# Patient Record
Sex: FEMALE | Race: OTHER | Employment: OTHER | URBAN - METROPOLITAN AREA
[De-identification: names, ages, dates, MRNs, and addresses within clinical notes are randomized per-mention and may not be internally consistent; named-entity substitution may affect disease eponyms.]

---

## 2017-01-02 ENCOUNTER — APPOINTMENT (OUTPATIENT)
Dept: CARDIAC REHAB | Facility: HOSPITAL | Age: 77
End: 2017-01-02
Attending: INTERNAL MEDICINE
Payer: MEDICARE

## 2017-01-04 ENCOUNTER — CARDPULM VISIT (OUTPATIENT)
Dept: CARDIAC REHAB | Facility: HOSPITAL | Age: 77
End: 2017-01-04
Attending: INTERNAL MEDICINE
Payer: MEDICARE

## 2017-01-04 ENCOUNTER — TELEPHONE (OUTPATIENT)
Dept: SURGERY | Facility: CLINIC | Age: 77
End: 2017-01-04

## 2017-01-04 PROCEDURE — 93798 PHYS/QHP OP CAR RHAB W/ECG: CPT

## 2017-01-04 NOTE — TELEPHONE ENCOUNTER
----- Message from Antonio Wood MD sent at 12/27/2016  8:26 AM CST -----  Call pt. Inform her that CT scan shows no abnormal findings in the kidneys or ureters or bladder. There are some incidental findings in the bile ducts that show some distension.

## 2017-01-04 NOTE — TELEPHONE ENCOUNTER
Phoned pt and spoke with daughter, Nba Wheeler. (after verifying in fyi, ok do so) Read to her 's note as outlined below. She verbalized understanding and agrees to plan. Copy of c.t. Placed in outgoing mail. Mimi agrees to keep appt for upcoming cysto.

## 2017-01-06 ENCOUNTER — CARDPULM VISIT (OUTPATIENT)
Dept: CARDIAC REHAB | Facility: HOSPITAL | Age: 77
End: 2017-01-06
Attending: INTERNAL MEDICINE
Payer: MEDICARE

## 2017-01-06 PROCEDURE — 93798 PHYS/QHP OP CAR RHAB W/ECG: CPT

## 2017-01-09 ENCOUNTER — CARDPULM VISIT (OUTPATIENT)
Dept: CARDIAC REHAB | Facility: HOSPITAL | Age: 77
End: 2017-01-09
Attending: INTERNAL MEDICINE
Payer: MEDICARE

## 2017-01-09 PROCEDURE — 93798 PHYS/QHP OP CAR RHAB W/ECG: CPT

## 2017-01-11 ENCOUNTER — CARDPULM VISIT (OUTPATIENT)
Dept: CARDIAC REHAB | Facility: HOSPITAL | Age: 77
End: 2017-01-11
Attending: INTERNAL MEDICINE
Payer: MEDICARE

## 2017-01-11 PROCEDURE — 93798 PHYS/QHP OP CAR RHAB W/ECG: CPT

## 2017-01-13 ENCOUNTER — CARDPULM VISIT (OUTPATIENT)
Dept: CARDIAC REHAB | Facility: HOSPITAL | Age: 77
End: 2017-01-13
Attending: INTERNAL MEDICINE
Payer: MEDICARE

## 2017-01-13 PROCEDURE — 93798 PHYS/QHP OP CAR RHAB W/ECG: CPT

## 2017-01-16 ENCOUNTER — CARDPULM VISIT (OUTPATIENT)
Dept: CARDIAC REHAB | Facility: HOSPITAL | Age: 77
End: 2017-01-16
Attending: INTERNAL MEDICINE
Payer: MEDICARE

## 2017-01-16 PROCEDURE — 93798 PHYS/QHP OP CAR RHAB W/ECG: CPT

## 2017-01-18 ENCOUNTER — CARDPULM VISIT (OUTPATIENT)
Dept: CARDIAC REHAB | Facility: HOSPITAL | Age: 77
End: 2017-01-18
Attending: INTERNAL MEDICINE
Payer: MEDICARE

## 2017-01-18 PROCEDURE — 93798 PHYS/QHP OP CAR RHAB W/ECG: CPT

## 2017-01-19 RX ORDER — CITALOPRAM 10 MG/1
TABLET ORAL
Qty: 30 TABLET | Refills: 2 | Status: SHIPPED | OUTPATIENT
Start: 2017-01-19 | End: 2017-01-20

## 2017-01-19 NOTE — TELEPHONE ENCOUNTER
Refill Protocol Appointment Criteria  · Appointment scheduled in the past 6 months or in the next 3 months  Recent Visits       Provider Department Primary Dx    1 month ago [address] [facility] [address], [address] [facility] Hematuria    3 teddy

## 2017-01-20 ENCOUNTER — OFFICE VISIT (OUTPATIENT)
Dept: SURGERY | Facility: CLINIC | Age: 77
End: 2017-01-20

## 2017-01-20 VITALS
HEIGHT: 57 IN | DIASTOLIC BLOOD PRESSURE: 60 MMHG | BODY MASS INDEX: 25.46 KG/M2 | WEIGHT: 118 LBS | SYSTOLIC BLOOD PRESSURE: 128 MMHG | HEART RATE: 66 BPM | RESPIRATION RATE: 16 BRPM | TEMPERATURE: 98 F

## 2017-01-20 DIAGNOSIS — R31.0 GROSS HEMATURIA: Primary | ICD-10-CM

## 2017-01-20 PROCEDURE — 52000 CYSTOURETHROSCOPY: CPT | Performed by: UROLOGY

## 2017-01-20 PROCEDURE — 99213 OFFICE O/P EST LOW 20 MIN: CPT | Performed by: UROLOGY

## 2017-01-20 NOTE — PROGRESS NOTES
Frank Calixto is a 77year old female. HPI:   Patient presents with:  Hematuria: here for cystoscopy      78-year-old female presents for office cystoscopy in follow-up to a previous visit December 6, 2016.   The patient had intermittent gross painless ELECTROCARDIOGRAM, COMPLETE  09-    Comment Scanned to Media Tab - Date of Service 09-    ANESTH,ANGIOPLASTY        Family History   Problem Relation Age of Onset   • Diabetes Father    • Other[other] Olive Martínez Father 58   • Diabetes Mother 59 Chloride ER 10 MG Oral Tablet 24 Hr Take 1 tablet (10 mg total) by mouth daily. Disp: 90 tablet Rfl: 3   aspirin 325 MG Oral Tab Take 325 mg by mouth once daily. Disp:  Rfl:    Milk Thistle Extract 175 MG Oral Tab Take 1 tablet by mouth once daily.  Disp:

## 2017-01-23 ENCOUNTER — CARDPULM VISIT (OUTPATIENT)
Dept: CARDIAC REHAB | Facility: HOSPITAL | Age: 77
End: 2017-01-23
Attending: INTERNAL MEDICINE
Payer: MEDICARE

## 2017-01-23 PROCEDURE — 93798 PHYS/QHP OP CAR RHAB W/ECG: CPT

## 2017-01-24 ENCOUNTER — OFFICE VISIT (OUTPATIENT)
Dept: CARDIOLOGY CLINIC | Facility: CLINIC | Age: 77
End: 2017-01-24

## 2017-01-24 VITALS
HEIGHT: 57 IN | HEART RATE: 62 BPM | SYSTOLIC BLOOD PRESSURE: 110 MMHG | WEIGHT: 118 LBS | BODY MASS INDEX: 25.46 KG/M2 | DIASTOLIC BLOOD PRESSURE: 58 MMHG

## 2017-01-24 DIAGNOSIS — I25.10 CORONARY ARTERY DISEASE INVOLVING NATIVE CORONARY ARTERY OF NATIVE HEART WITHOUT ANGINA PECTORIS: Primary | ICD-10-CM

## 2017-01-24 DIAGNOSIS — I10 ESSENTIAL HYPERTENSION: ICD-10-CM

## 2017-01-24 PROCEDURE — 99214 OFFICE O/P EST MOD 30 MIN: CPT | Performed by: INTERNAL MEDICINE

## 2017-01-24 PROCEDURE — G0463 HOSPITAL OUTPT CLINIC VISIT: HCPCS | Performed by: INTERNAL MEDICINE

## 2017-01-24 NOTE — PROGRESS NOTES
Fly Estevez is a 77year old female. Patient presents with:  CAD: follow up    HPI:   This is a pleasant 21-year-old with hypertension coronary disease with LAD stent in the past who presents for follow-up. She is presently feeling well.   She has had o mouth daily. Disp: 90 tablet Rfl: 3   aspirin 325 MG Oral Tab Take 325 mg by mouth once daily. Disp:  Rfl:    Milk Thistle Extract 175 MG Oral Tab Take 1 tablet by mouth once daily.  Disp:  Rfl:    B Complex Vitamins (VITAMIN B COMPLEX) Oral Tab Take 1 tabl S1,S2,no murmur  GI: good BS's,no masses, HSM or tenderness  EXTREMITIES: no cyanosis, clubbing or edema    Assessment  ASSESSMENT AND PLAN:     Problem List Items Addressed This Visit     HTN (hypertension)    CAD (coronary artery disease) - Primary

## 2017-01-25 ENCOUNTER — CARDPULM VISIT (OUTPATIENT)
Dept: CARDIAC REHAB | Facility: HOSPITAL | Age: 77
End: 2017-01-25
Attending: INTERNAL MEDICINE
Payer: MEDICARE

## 2017-01-25 PROCEDURE — 93798 PHYS/QHP OP CAR RHAB W/ECG: CPT

## 2017-01-27 ENCOUNTER — CARDPULM VISIT (OUTPATIENT)
Dept: CARDIAC REHAB | Facility: HOSPITAL | Age: 77
End: 2017-01-27
Attending: INTERNAL MEDICINE
Payer: MEDICARE

## 2017-01-27 PROCEDURE — 93798 PHYS/QHP OP CAR RHAB W/ECG: CPT

## 2017-01-30 ENCOUNTER — APPOINTMENT (OUTPATIENT)
Dept: CARDIAC REHAB | Facility: HOSPITAL | Age: 77
End: 2017-01-30
Attending: INTERNAL MEDICINE
Payer: MEDICARE

## 2017-02-01 ENCOUNTER — APPOINTMENT (OUTPATIENT)
Dept: CARDIAC REHAB | Facility: HOSPITAL | Age: 77
End: 2017-02-01
Attending: INTERNAL MEDICINE
Payer: MEDICARE

## 2017-02-02 ENCOUNTER — TELEPHONE (OUTPATIENT)
Dept: FAMILY MEDICINE CLINIC | Facility: CLINIC | Age: 77
End: 2017-02-02

## 2017-02-02 DIAGNOSIS — I25.10 CORONARY ARTERY DISEASE INVOLVING NATIVE HEART WITHOUT ANGINA PECTORIS, UNSPECIFIED VESSEL OR LESION TYPE: Primary | ICD-10-CM

## 2017-02-02 NOTE — TELEPHONE ENCOUNTER
Tonja Horner from cardiac rehab calling and requesting to have patients referral for cardiac rehab back dated to 10/17/16 for (36) visits.        Fax to [phone]

## 2017-02-03 ENCOUNTER — APPOINTMENT (OUTPATIENT)
Dept: CARDIAC REHAB | Facility: HOSPITAL | Age: 77
End: 2017-02-03
Attending: INTERNAL MEDICINE
Payer: MEDICARE

## 2017-02-07 NOTE — TELEPHONE ENCOUNTER
Anupam Ventura from cardiac rehab called again regarding status of the previous message. States she needs for referral to be retro.

## 2017-03-02 ENCOUNTER — TELEPHONE (OUTPATIENT)
Dept: OPHTHALMOLOGY | Facility: CLINIC | Age: 77
End: 2017-03-02

## 2017-03-02 NOTE — TELEPHONE ENCOUNTER
Patient has an appointment scheduled with Dr. Chandu Ramos on 3/21/17 and they need a referral for the appointment. Please do not hesitate to call me if you have any questions about this. Thank you, Dalton Travis at Dr. Marrufo Monday office [phone].

## 2017-03-06 ENCOUNTER — LAB ENCOUNTER (OUTPATIENT)
Dept: LAB | Age: 77
End: 2017-03-06
Attending: FAMILY MEDICINE
Payer: MEDICARE

## 2017-03-06 DIAGNOSIS — N18.30 CKD (CHRONIC KIDNEY DISEASE) STAGE 3, GFR 30-59 ML/MIN (HCC): ICD-10-CM

## 2017-03-06 DIAGNOSIS — R31.9 HEMATURIA: ICD-10-CM

## 2017-03-06 DIAGNOSIS — E11.9 TYPE 2 DIABETES MELLITUS WITHOUT COMPLICATION, WITHOUT LONG-TERM CURRENT USE OF INSULIN (HCC): ICD-10-CM

## 2017-03-06 LAB
ALBUMIN SERPL BCP-MCNC: 3.7 G/DL (ref 3.5–4.8)
ALBUMIN/GLOB SERPL: 1.5 {RATIO} (ref 1–2)
ALP SERPL-CCNC: 46 U/L (ref 32–100)
ALT SERPL-CCNC: 29 U/L (ref 14–54)
ANION GAP SERPL CALC-SCNC: 9 MMOL/L (ref 0–18)
AST SERPL-CCNC: 31 U/L (ref 15–41)
BASOPHILS # BLD: 0 K/UL (ref 0–0.2)
BASOPHILS NFR BLD: 1 %
BILIRUB SERPL-MCNC: 0.5 MG/DL (ref 0.3–1.2)
BUN SERPL-MCNC: 13 MG/DL (ref 8–20)
BUN/CREAT SERPL: 15.1 (ref 10–20)
CALCIUM SERPL-MCNC: 9 MG/DL (ref 8.5–10.5)
CHLORIDE SERPL-SCNC: 102 MMOL/L (ref 95–110)
CHOLEST SERPL-MCNC: 153 MG/DL (ref 110–200)
CO2 SERPL-SCNC: 26 MMOL/L (ref 22–32)
CREAT SERPL-MCNC: 0.86 MG/DL (ref 0.5–1.5)
CREAT UR-MCNC: 32.7 MG/DL
EOSINOPHIL # BLD: 0.1 K/UL (ref 0–0.7)
EOSINOPHIL NFR BLD: 3 %
ERYTHROCYTE [DISTWIDTH] IN BLOOD BY AUTOMATED COUNT: 14.1 % (ref 11–15)
GLOBULIN PLAS-MCNC: 2.5 G/DL (ref 2.5–3.7)
GLUCOSE SERPL-MCNC: 114 MG/DL (ref 70–99)
HBA1C MFR BLD: 4.5 % (ref 4–6)
HCT VFR BLD AUTO: 27.2 % (ref 35–48)
HDLC SERPL-MCNC: 47 MG/DL
HGB BLD-MCNC: 9.1 G/DL (ref 12–16)
LDLC SERPL CALC-MCNC: 81 MG/DL (ref 0–99)
LYMPHOCYTES # BLD: 1.3 K/UL (ref 1–4)
LYMPHOCYTES NFR BLD: 30 %
MCH RBC QN AUTO: 33.4 PG (ref 27–32)
MCHC RBC AUTO-ENTMCNC: 33.3 G/DL (ref 32–37)
MCV RBC AUTO: 100.2 FL (ref 80–100)
MICROALBUMIN UR-MCNC: 0 MG/DL (ref 0–1.8)
MICROALBUMIN/CREAT UR: 0 MG/G{CREAT} (ref 0–20)
MONOCYTES # BLD: 0.3 K/UL (ref 0–1)
MONOCYTES NFR BLD: 8 %
NEUTROPHILS # BLD AUTO: 2.5 K/UL (ref 1.8–7.7)
NEUTROPHILS NFR BLD: 58 %
NONHDLC SERPL-MCNC: 106 MG/DL
OSMOLALITY UR CALC.SUM OF ELEC: 285 MOSM/KG (ref 275–295)
PLATELET # BLD AUTO: 247 K/UL (ref 140–400)
PMV BLD AUTO: 7.7 FL (ref 7.4–10.3)
POTASSIUM SERPL-SCNC: 4.1 MMOL/L (ref 3.3–5.1)
PROT SERPL-MCNC: 6.2 G/DL (ref 5.9–8.4)
RBC # BLD AUTO: 2.71 M/UL (ref 3.7–5.4)
SODIUM SERPL-SCNC: 137 MMOL/L (ref 136–144)
TRIGL SERPL-MCNC: 127 MG/DL (ref 1–149)
WBC # BLD AUTO: 4.2 K/UL (ref 4–11)

## 2017-03-06 PROCEDURE — 82570 ASSAY OF URINE CREATININE: CPT

## 2017-03-06 PROCEDURE — 85025 COMPLETE CBC W/AUTO DIFF WBC: CPT

## 2017-03-06 PROCEDURE — 36415 COLL VENOUS BLD VENIPUNCTURE: CPT

## 2017-03-06 PROCEDURE — 80061 LIPID PANEL: CPT

## 2017-03-06 PROCEDURE — 82043 UR ALBUMIN QUANTITATIVE: CPT

## 2017-03-06 PROCEDURE — 80053 COMPREHEN METABOLIC PANEL: CPT

## 2017-03-06 PROCEDURE — 83036 HEMOGLOBIN GLYCOSYLATED A1C: CPT

## 2017-03-13 ENCOUNTER — OFFICE VISIT (OUTPATIENT)
Dept: FAMILY MEDICINE CLINIC | Facility: CLINIC | Age: 77
End: 2017-03-13

## 2017-03-13 VITALS
HEART RATE: 62 BPM | WEIGHT: 120 LBS | SYSTOLIC BLOOD PRESSURE: 111 MMHG | BODY MASS INDEX: 26 KG/M2 | TEMPERATURE: 98 F | DIASTOLIC BLOOD PRESSURE: 62 MMHG

## 2017-03-13 DIAGNOSIS — E11.9 TYPE 2 DIABETES MELLITUS WITHOUT COMPLICATION, WITHOUT LONG-TERM CURRENT USE OF INSULIN (HCC): ICD-10-CM

## 2017-03-13 DIAGNOSIS — F41.9 ANXIETY: ICD-10-CM

## 2017-03-13 DIAGNOSIS — I25.10 CORONARY ARTERY DISEASE INVOLVING NATIVE HEART WITHOUT ANGINA PECTORIS, UNSPECIFIED VESSEL OR LESION TYPE: Primary | ICD-10-CM

## 2017-03-13 DIAGNOSIS — D64.9 ANEMIA, UNSPECIFIED TYPE: ICD-10-CM

## 2017-03-13 PROCEDURE — 99214 OFFICE O/P EST MOD 30 MIN: CPT | Performed by: FAMILY MEDICINE

## 2017-03-13 PROCEDURE — G0463 HOSPITAL OUTPT CLINIC VISIT: HCPCS | Performed by: FAMILY MEDICINE

## 2017-03-13 RX ORDER — LOSARTAN POTASSIUM AND HYDROCHLOROTHIAZIDE 12.5; 1 MG/1; MG/1
1 TABLET ORAL DAILY
Qty: 90 TABLET | Refills: 3 | Status: SHIPPED | OUTPATIENT
Start: 2017-03-13 | End: 2017-10-20

## 2017-03-13 NOTE — PROGRESS NOTES
Numbers much better. No problems with he r  Paulette. Has anemia worse. On aspirin and plavix. No sob. Has some swelling of ankles. Has been much more active. Its started in cardiac rehab. No still walking.         Patient's past medical surgical fami or lesion type  Stable. 2. Type 2 diabetes mellitus without complication, without long-term current use of insulin (HCC)  Stable monitor. Improved with exercise    3. Anxiety  Stable.     4. Anemia, unspecified type  Referral.  - Oncology/Hematology Ref

## 2017-03-17 ENCOUNTER — TELEPHONE (OUTPATIENT)
Dept: INTERNAL MEDICINE CLINIC | Facility: CLINIC | Age: 77
End: 2017-03-17

## 2017-03-17 NOTE — TELEPHONE ENCOUNTER
Pt is eligible for a Medicare Annual Health Assessment anytime during the next 12 months. Left message to call back.

## 2017-03-17 NOTE — TELEPHONE ENCOUNTER
Daughter called back. Declined appointment currently.  Patient was just seen by Dr. Olman Chambers on 03/13/2016 and was given referral for opthalmology appointment, was advised by MD not to do Mammogram until patient consults with oncologist. Daughter states she

## 2017-03-21 ENCOUNTER — TELEPHONE (OUTPATIENT)
Dept: FAMILY MEDICINE CLINIC | Facility: CLINIC | Age: 77
End: 2017-03-21

## 2017-03-21 DIAGNOSIS — R14.0 BLOATING: Primary | ICD-10-CM

## 2017-03-21 RX ORDER — CITALOPRAM 10 MG/1
TABLET ORAL
Qty: 90 TABLET | Refills: 2 | Status: SHIPPED | OUTPATIENT
Start: 2017-03-21 | End: 2017-12-30

## 2017-03-21 NOTE — TELEPHONE ENCOUNTER
Pt will need a 90 day script send to the mail pharmacy.      Current Outpatient Prescriptions:  CITALOPRAM HYDROBROMIDE 10 MG Oral Tab TAKE 1 TABLET(10 MG) BY MOUTH DAILY Disp: 90 tablet Rfl: 3

## 2017-03-21 NOTE — TELEPHONE ENCOUNTER
Chart reviewed, last filled 12/16/16 for 90 days 3 refills.  erx with 2 refills sent to [facility] mail order

## 2017-03-27 RX ORDER — ESOMEPRAZOLE MAGNESIUM 40 MG/1
40 CAPSULE, DELAYED RELEASE ORAL
Qty: 90 CAPSULE | Refills: 3 | Status: SHIPPED | OUTPATIENT
Start: 2017-03-27 | End: 2018-03-06

## 2017-03-30 ENCOUNTER — OFFICE VISIT (OUTPATIENT)
Dept: OPHTHALMOLOGY | Facility: CLINIC | Age: 77
End: 2017-03-30

## 2017-03-30 DIAGNOSIS — H25.13 AGE-RELATED NUCLEAR CATARACT OF BOTH EYES: ICD-10-CM

## 2017-03-30 DIAGNOSIS — E11.9 DIABETES MELLITUS TYPE 2 WITHOUT RETINOPATHY (HCC): Primary | ICD-10-CM

## 2017-03-30 DIAGNOSIS — T15.01XA FOREIGN BODY IN CORNEA, RIGHT, INITIAL ENCOUNTER: ICD-10-CM

## 2017-03-30 PROBLEM — T15.00XA FOREIGN BODY IN CORNEA: Status: ACTIVE | Noted: 2017-03-30

## 2017-03-30 PROCEDURE — 99214 OFFICE O/P EST MOD 30 MIN: CPT | Performed by: OPHTHALMOLOGY

## 2017-03-30 PROCEDURE — G0463 HOSPITAL OUTPT CLINIC VISIT: HCPCS | Performed by: OPHTHALMOLOGY

## 2017-03-30 PROCEDURE — 92015 DETERMINE REFRACTIVE STATE: CPT | Performed by: OPHTHALMOLOGY

## 2017-03-30 NOTE — PROGRESS NOTES
Deya Christiansen is a 69year old female.     HPI:     HPI     Consult    Additional comments: Referred by Glendy Murillo MD           Diabetic Eye Exam    Additional comments: Pt has been a diabetic for 8 years  0 years on pills/  0 years on Insulin   Pt degeneration Neg    • Other[other] [OTHER] Brother 59       Social History:   Smoking Status: Never Smoker                      Smokeless Status: Never Used                        Alcohol Use: No                Medications:    Current Outpatient Prescripti mouth once daily.  Disp:  Rfl:        Allergies:    Zocor [Simvastatin]     Swelling    ROS:     ROS     Negative for: Constitutional, Gastrointestinal, Neurological, Skin, Genitourinary, Musculoskeletal, HENT, Endocrine, Cardiovascular, Eyes, Respiratory, +0.50 +1.25 100 +2.75   Left +0.75 +0.50 095 +2.75       Type:  Progressive bifocal                 ASSESSMENT/PLAN:     Diagnoses and Plan:     Diabetes mellitus type 2 without retinopathy ([facility]  Diet controlled diabetes: no background of retinopathy, no

## 2017-03-30 NOTE — ASSESSMENT & PLAN NOTE
Diet controlled diabetes: no background of retinopathy, no signs of neovascularization noted. Discussed ocular and systemic benefits of blood sugar control. Diagnosis and treatment discussed in detail with patient.

## 2017-03-30 NOTE — PATIENT INSTRUCTIONS
Diabetes mellitus type 2 without retinopathy ([facility]  Diet controlled diabetes: no background of retinopathy, no signs of neovascularization noted. Discussed ocular and systemic benefits of blood sugar control.   Diagnosis and treatment discussed in detail w

## 2017-04-24 ENCOUNTER — TELEPHONE (OUTPATIENT)
Dept: FAMILY MEDICINE CLINIC | Facility: CLINIC | Age: 77
End: 2017-04-24

## 2017-04-24 NOTE — TELEPHONE ENCOUNTER
Pt's daughter called for pt. stts pt has an UTI but is [place]. Daughter requested pt to be seen Friday with only [facility] but no open appointments. Daughter asking for a call back from a nurse. Please advise.

## 2017-04-24 NOTE — TELEPHONE ENCOUNTER
Spoke to daughter; again reiterated to reinforce to patient to go to [place] in [facility] Should not wait until Friday. Daughter, Stiven Marquestz will speak to mother.

## 2017-04-24 NOTE — TELEPHONE ENCOUNTER
DR Melara 16: please advise if you can add on patient to Friday schedule. (SDS slot)    Spoke to daughter; patient currently in [facility] now and will return Thursday.   Has minimal pain and discomfort with urination; per daughter communication with patient, her sym

## 2017-05-02 ENCOUNTER — LAB ENCOUNTER (OUTPATIENT)
Dept: LAB | Facility: HOSPITAL | Age: 77
End: 2017-05-02
Attending: NURSE PRACTITIONER
Payer: MEDICARE

## 2017-05-02 ENCOUNTER — OFFICE VISIT (OUTPATIENT)
Dept: HEMATOLOGY/ONCOLOGY | Facility: HOSPITAL | Age: 77
End: 2017-05-02
Attending: INTERNAL MEDICINE
Payer: MEDICARE

## 2017-05-02 VITALS
DIASTOLIC BLOOD PRESSURE: 46 MMHG | HEART RATE: 64 BPM | WEIGHT: 119 LBS | SYSTOLIC BLOOD PRESSURE: 117 MMHG | RESPIRATION RATE: 18 BRPM | BODY MASS INDEX: 25.67 KG/M2 | HEIGHT: 57 IN | TEMPERATURE: 98 F

## 2017-05-02 DIAGNOSIS — N18.9 ANEMIA IN CKD (CHRONIC KIDNEY DISEASE): ICD-10-CM

## 2017-05-02 DIAGNOSIS — D63.1 ANEMIA IN CKD (CHRONIC KIDNEY DISEASE): ICD-10-CM

## 2017-05-02 DIAGNOSIS — D50.8 OTHER IRON DEFICIENCY ANEMIA: ICD-10-CM

## 2017-05-02 DIAGNOSIS — D50.8 OTHER IRON DEFICIENCY ANEMIA: Primary | ICD-10-CM

## 2017-05-02 PROCEDURE — 82728 ASSAY OF FERRITIN: CPT

## 2017-05-02 PROCEDURE — 83540 ASSAY OF IRON: CPT

## 2017-05-02 PROCEDURE — G0463 HOSPITAL OUTPT CLINIC VISIT: HCPCS | Performed by: NURSE PRACTITIONER

## 2017-05-02 PROCEDURE — 36415 COLL VENOUS BLD VENIPUNCTURE: CPT

## 2017-05-02 PROCEDURE — 85025 COMPLETE CBC W/AUTO DIFF WBC: CPT

## 2017-05-02 PROCEDURE — 84466 ASSAY OF TRANSFERRIN: CPT

## 2017-05-02 PROCEDURE — 99214 OFFICE O/P EST MOD 30 MIN: CPT | Performed by: NURSE PRACTITIONER

## 2017-05-02 NOTE — PROGRESS NOTES
HPI       Jefry Bullard is a 69year old female who is here today for f/u of Other iron deficiency anemia  (primary encounter diagnosis)     She states still taking the iron daily.  States that if has GI issues such as diarrhea or constipation she holds Negative for adenopathy. Does not bruise/bleed easily. Psychiatric/Behavioral: Negative for sleep disturbance. The patient is not nervous/anxious.          Denies depression           Current Outpatient Prescriptions:  Esomeprazole Magnesium 40 MG Oral Ca Allergies:     Zocor [Simvastatin]     Swelling    Past Medical History   Diagnosis Date   • Diabetes [facility])      Management:  Medication   • Unspecified essential hypertension      medical management   • Other and unspecified hyperlipidemia      medica Readings from Last 6 Encounters:  05/02/17 : 53.978 kg (119 lb)  03/13/17 : 54.432 kg (120 lb)  01/24/17 : 53.524 kg (118 lb)  01/20/17 : 53.524 kg (118 lb)  12/06/16 : 53.524 kg (118 lb)  11/28/16 : 52.164 kg (115 lb)      Physical Exam   Constitutional: erythema. Psychiatric: She has a normal mood and affect. Judgment and thought content normal.   Vitals reviewed.           ASSESSMENT/PLAN:   Other iron deficiency anemia  (primary encounter diagnosis)    Improved on iron replacement therapy with resolved Absolute      1.8-7.7 K/UL 2.5   Lymphocytes Absolute      1.0-4.0 K/UL 1.3   Monocytes Absolute      0.0-1.0 K/UL 0.3   Eosinophils Absolute      0.0-0.7 K/UL 0.1   Basophils Absolute      0.0-0.2 K/UL 0.0   HDL Cholesterol       47   CHOLESTEROL, TOTAL

## 2017-05-03 ENCOUNTER — TELEPHONE (OUTPATIENT)
Dept: HEMATOLOGY/ONCOLOGY | Facility: HOSPITAL | Age: 77
End: 2017-05-03

## 2017-05-03 NOTE — TELEPHONE ENCOUNTER
Spoke with patients daughter and notified per Pari EDUARDO request that iron test results were low. Instructed that pt should increase to 3 iron tabs a day as done previously. Daughter verbalizes understanding and will notify pt.  Instructed for pt to

## 2017-05-15 ENCOUNTER — TELEPHONE (OUTPATIENT)
Dept: INTERNAL MEDICINE CLINIC | Facility: CLINIC | Age: 77
End: 2017-05-15

## 2017-05-15 NOTE — TELEPHONE ENCOUNTER
Pt is eligible for a Medicare Annual Health Assessment anytime during the next 12 months. Left message to call back [phone].

## 2017-05-25 RX ORDER — MONTELUKAST SODIUM 10 MG/1
TABLET ORAL
Qty: 90 TABLET | Refills: 11 | Status: SHIPPED | OUTPATIENT
Start: 2017-05-25 | End: 2018-05-26

## 2017-06-09 ENCOUNTER — TELEPHONE (OUTPATIENT)
Dept: FAMILY MEDICINE CLINIC | Facility: CLINIC | Age: 77
End: 2017-06-09

## 2017-06-09 RX ORDER — HYDROCHLOROTHIAZIDE 12.5 MG/1
CAPSULE, GELATIN COATED ORAL
Qty: 90 CAPSULE | Refills: 0 | OUTPATIENT
Start: 2017-06-09

## 2017-06-09 RX ORDER — HYDROCHLOROTHIAZIDE 12.5 MG/1
12.5 CAPSULE, GELATIN COATED ORAL DAILY
Qty: 30 CAPSULE | Refills: 0 | Status: SHIPPED | OUTPATIENT
Start: 2017-06-09 | End: 2017-10-20

## 2017-06-09 NOTE — TELEPHONE ENCOUNTER
Pt daughter is calling state that pt ankles are swollen and the color is changing requesting to speak with a RN

## 2017-06-09 NOTE — TELEPHONE ENCOUNTER
Add 12.5 mg 1 p.o. dailyhydrochlorothiazide  to her meds  #30 no refill. (She is already on losartan hydrochlorothiazide) follow-up next week  this will be in addition to that.

## 2017-06-09 NOTE — TELEPHONE ENCOUNTER
Actions Requested: expedited appt for 6/12/17 Mon  No access  Clear triage w/o access  Situation/Background   Problem: bilateral ankle swelling and discoloration   Onset: swelling ongoing and for past week worsening.   Discoloration > 2 days   Associated Sy

## 2017-06-10 ENCOUNTER — TELEPHONE (OUTPATIENT)
Dept: FAMILY MEDICINE CLINIC | Facility: CLINIC | Age: 77
End: 2017-06-10

## 2017-06-10 ENCOUNTER — TELEPHONE (OUTPATIENT)
Dept: INTERNAL MEDICINE CLINIC | Facility: CLINIC | Age: 77
End: 2017-06-10

## 2017-06-10 NOTE — TELEPHONE ENCOUNTER
Pharmacy called and informed with understanding. Per Dr. Nancy Kamara notes on 6/9/17 5:07 pm acute encounter  Add 12.5 mg 1 p.o. dailyhydrochlorothiazide to her meds   #30 no refill.    (She is already on losartan hydrochlorothiazide) follow-up next week

## 2017-06-10 NOTE — TELEPHONE ENCOUNTER
# [id number] to triage pt    Actions Requested: verification of appt and clarification of message regarding new medication (HCTZ 12.5 mg)  Situation/Background   Problem: intermittent redness to legs    Onset: 3 days   Associated Symptoms:

## 2017-06-10 NOTE — TELEPHONE ENCOUNTER
Pharmacy just want to confirm that the patient should be taking both medications listed below please call to advise    as both of them have been prescribed by Dr. Nancy Kamara     dailyhydrochlorothiazide    hydrochlorothiazide)

## 2017-06-10 NOTE — TELEPHONE ENCOUNTER
Please advise,Pharmacy stated pt takes Losartan/HCTZ,asking if pt taking an Extra dose along with RX

## 2017-06-15 ENCOUNTER — OFFICE VISIT (OUTPATIENT)
Dept: FAMILY MEDICINE CLINIC | Facility: CLINIC | Age: 77
End: 2017-06-15

## 2017-06-15 ENCOUNTER — LAB ENCOUNTER (OUTPATIENT)
Dept: LAB | Age: 77
End: 2017-06-15
Attending: FAMILY MEDICINE
Payer: MEDICARE

## 2017-06-15 VITALS
WEIGHT: 117 LBS | DIASTOLIC BLOOD PRESSURE: 62 MMHG | HEART RATE: 56 BPM | TEMPERATURE: 98 F | BODY MASS INDEX: 25 KG/M2 | SYSTOLIC BLOOD PRESSURE: 149 MMHG

## 2017-06-15 DIAGNOSIS — D50.9 IRON DEFICIENCY ANEMIA, UNSPECIFIED IRON DEFICIENCY ANEMIA TYPE: ICD-10-CM

## 2017-06-15 DIAGNOSIS — I10 ESSENTIAL HYPERTENSION: ICD-10-CM

## 2017-06-15 DIAGNOSIS — I73.9 PVD (PERIPHERAL VASCULAR DISEASE) (HCC): ICD-10-CM

## 2017-06-15 DIAGNOSIS — D50.8 OTHER IRON DEFICIENCY ANEMIA: ICD-10-CM

## 2017-06-15 DIAGNOSIS — E11.9 TYPE 2 DIABETES MELLITUS WITHOUT COMPLICATION, WITHOUT LONG-TERM CURRENT USE OF INSULIN (HCC): Primary | ICD-10-CM

## 2017-06-15 DIAGNOSIS — E11.9 TYPE 2 DIABETES MELLITUS WITHOUT COMPLICATION, WITHOUT LONG-TERM CURRENT USE OF INSULIN (HCC): ICD-10-CM

## 2017-06-15 PROCEDURE — 99214 OFFICE O/P EST MOD 30 MIN: CPT | Performed by: FAMILY MEDICINE

## 2017-06-15 PROCEDURE — 84100 ASSAY OF PHOSPHORUS: CPT | Performed by: INTERNAL MEDICINE

## 2017-06-15 PROCEDURE — 84443 ASSAY THYROID STIM HORMONE: CPT

## 2017-06-15 PROCEDURE — 82728 ASSAY OF FERRITIN: CPT

## 2017-06-15 PROCEDURE — 83036 HEMOGLOBIN GLYCOSYLATED A1C: CPT

## 2017-06-15 PROCEDURE — 83540 ASSAY OF IRON: CPT

## 2017-06-15 PROCEDURE — 85025 COMPLETE CBC W/AUTO DIFF WBC: CPT

## 2017-06-15 PROCEDURE — 80061 LIPID PANEL: CPT

## 2017-06-15 PROCEDURE — 36415 COLL VENOUS BLD VENIPUNCTURE: CPT

## 2017-06-15 PROCEDURE — G0463 HOSPITAL OUTPT CLINIC VISIT: HCPCS | Performed by: FAMILY MEDICINE

## 2017-06-15 PROCEDURE — 84466 ASSAY OF TRANSFERRIN: CPT

## 2017-06-15 PROCEDURE — 80053 COMPREHEN METABOLIC PANEL: CPT

## 2017-06-16 ENCOUNTER — TELEPHONE (OUTPATIENT)
Dept: INTERNAL MEDICINE CLINIC | Facility: CLINIC | Age: 77
End: 2017-06-16

## 2017-06-22 ENCOUNTER — HOSPITAL ENCOUNTER (OUTPATIENT)
Dept: ULTRASOUND IMAGING | Facility: HOSPITAL | Age: 77
Discharge: HOME | End: 2017-06-22
Attending: FAMILY MEDICINE
Payer: MEDICARE

## 2017-06-22 DIAGNOSIS — I73.9 PVD (PERIPHERAL VASCULAR DISEASE) (HCC): ICD-10-CM

## 2017-06-22 PROCEDURE — 93924 LWR XTR VASC STDY BILAT: CPT | Performed by: FAMILY MEDICINE

## 2017-06-22 PROCEDURE — 93923 UPR/LXTR ART STDY 3+ LVLS: CPT | Performed by: FAMILY MEDICINE

## 2017-06-26 RX ORDER — OXYBUTYNIN CHLORIDE 10 MG/1
TABLET, EXTENDED RELEASE ORAL
Qty: 90 TABLET | Refills: 11 | Status: SHIPPED | OUTPATIENT
Start: 2017-06-26 | End: 2018-09-25

## 2017-06-29 ENCOUNTER — OFFICE VISIT (OUTPATIENT)
Dept: FAMILY MEDICINE CLINIC | Facility: CLINIC | Age: 77
End: 2017-06-29

## 2017-06-29 VITALS
BODY MASS INDEX: 26.85 KG/M2 | HEIGHT: 56 IN | SYSTOLIC BLOOD PRESSURE: 104 MMHG | DIASTOLIC BLOOD PRESSURE: 56 MMHG | OXYGEN SATURATION: 97 % | TEMPERATURE: 99 F | WEIGHT: 119.38 LBS | HEART RATE: 62 BPM

## 2017-06-29 DIAGNOSIS — I73.9 PVD (PERIPHERAL VASCULAR DISEASE) (HCC): ICD-10-CM

## 2017-06-29 DIAGNOSIS — E11.9 TYPE 2 DIABETES MELLITUS WITHOUT COMPLICATION, WITHOUT LONG-TERM CURRENT USE OF INSULIN (HCC): Primary | ICD-10-CM

## 2017-06-29 DIAGNOSIS — I10 ESSENTIAL HYPERTENSION: ICD-10-CM

## 2017-06-29 DIAGNOSIS — M16.0 OSTEOARTHRITIS OF BOTH HIPS, UNSPECIFIED OSTEOARTHRITIS TYPE: ICD-10-CM

## 2017-06-29 DIAGNOSIS — D50.9 IRON DEFICIENCY ANEMIA, UNSPECIFIED IRON DEFICIENCY ANEMIA TYPE: ICD-10-CM

## 2017-06-29 PROBLEM — F32.4 MAJOR DEPRESSION IN PARTIAL REMISSION (HCC): Chronic | Status: ACTIVE | Noted: 2017-06-29

## 2017-06-29 PROBLEM — F32.4 MAJOR DEPRESSION IN PARTIAL REMISSION: Chronic | Status: ACTIVE | Noted: 2017-06-29

## 2017-06-29 PROCEDURE — 99214 OFFICE O/P EST MOD 30 MIN: CPT | Performed by: FAMILY MEDICINE

## 2017-06-29 PROCEDURE — G0463 HOSPITAL OUTPT CLINIC VISIT: HCPCS | Performed by: FAMILY MEDICINE

## 2017-06-29 RX ORDER — AMLODIPINE BESYLATE 5 MG/1
5 TABLET ORAL DAILY
Qty: 90 TABLET | Refills: 3 | Status: SHIPPED | OUTPATIENT
Start: 2017-06-29 | End: 2017-08-21

## 2017-06-29 NOTE — PROGRESS NOTES
767 systolic bp  Sugar 408 this am.  Some mild leg swell ing less than before. B/l lower ext    Cbc still shows anemia. appt with Dr. Jah Machuca 8/2/2017    Patient's past medical surgical family social history was reviewed.      Review of Systems  Allergic: no

## 2017-06-29 NOTE — PROGRESS NOTES
Pt here without takingher htn medicaiton  No sob no nv  Sugar under relatively good control  No hypoglycemic episodes at home  No bracelet   Trying to follow a diabetic diet. Leg pain anteriorly and posteriorly worse with walking.   Goes to inguinal luis alberto options. referral to Dr Mauricio Ty. Majority of time was spent in discussion with 25 minutes spent discussing these issues. More than 50% of our time was spent in discussion.

## 2017-07-06 ENCOUNTER — TELEPHONE (OUTPATIENT)
Dept: INTERNAL MEDICINE CLINIC | Facility: CLINIC | Age: 77
End: 2017-07-06

## 2017-07-06 NOTE — TELEPHONE ENCOUNTER
----- Message from Deidra Doyle DO sent at 7/6/2017  8:49 AM CDT -----  Patient does have some mild blockage in the arteries of her leg.   I would have her follow-up with Dr. Parisa Mcleod diagnosis peripheral vascular disease    Dr Warden Vogt [phone]

## 2017-07-07 NOTE — TELEPHONE ENCOUNTER
Patient informed of results during office visit with her  today with Aia 16. No further action need. F/w appt with Dr. Makayla Teixeira has been schedule by patient, referral provided.

## 2017-07-14 PROBLEM — I73.9 PERIPHERAL VASCULAR DISEASE: Status: ACTIVE | Noted: 2017-07-14

## 2017-07-14 PROBLEM — I70.0 ATHEROSCLEROSIS OF AORTA (HCC): Status: ACTIVE | Noted: 2017-07-14

## 2017-07-14 PROBLEM — I73.9 PERIPHERAL VASCULAR DISEASE (HCC): Status: ACTIVE | Noted: 2017-07-14

## 2017-07-14 PROBLEM — I70.0 ATHEROSCLEROSIS OF AORTA: Status: ACTIVE | Noted: 2017-07-14

## 2017-07-17 ENCOUNTER — OFFICE VISIT (OUTPATIENT)
Dept: FAMILY MEDICINE CLINIC | Facility: CLINIC | Age: 77
End: 2017-07-17

## 2017-07-17 VITALS
DIASTOLIC BLOOD PRESSURE: 73 MMHG | SYSTOLIC BLOOD PRESSURE: 145 MMHG | BODY MASS INDEX: 26 KG/M2 | HEART RATE: 61 BPM | WEIGHT: 118 LBS | TEMPERATURE: 98 F

## 2017-07-17 DIAGNOSIS — D50.8 OTHER IRON DEFICIENCY ANEMIA: ICD-10-CM

## 2017-07-17 DIAGNOSIS — Z23 NEED FOR VACCINATION: Primary | ICD-10-CM

## 2017-07-17 DIAGNOSIS — F33.41 RECURRENT MAJOR DEPRESSIVE DISORDER, IN PARTIAL REMISSION (HCC): Chronic | ICD-10-CM

## 2017-07-17 DIAGNOSIS — I10 ESSENTIAL HYPERTENSION: ICD-10-CM

## 2017-07-17 DIAGNOSIS — Z00.00 ENCOUNTER FOR ANNUAL HEALTH EXAMINATION: ICD-10-CM

## 2017-07-17 DIAGNOSIS — Z12.31 VISIT FOR SCREENING MAMMOGRAM: ICD-10-CM

## 2017-07-17 DIAGNOSIS — I70.0 ATHEROSCLEROSIS OF AORTA (HCC): ICD-10-CM

## 2017-07-17 DIAGNOSIS — I73.9 PVD (PERIPHERAL VASCULAR DISEASE) (HCC): ICD-10-CM

## 2017-07-17 DIAGNOSIS — M81.0 AGE-RELATED OSTEOPOROSIS WITHOUT CURRENT PATHOLOGICAL FRACTURE: ICD-10-CM

## 2017-07-17 DIAGNOSIS — Z00.00 MEDICARE ANNUAL WELLNESS VISIT, SUBSEQUENT: ICD-10-CM

## 2017-07-17 DIAGNOSIS — I25.10 CORONARY ARTERY DISEASE INVOLVING NATIVE HEART WITHOUT ANGINA PECTORIS, UNSPECIFIED VESSEL OR LESION TYPE: ICD-10-CM

## 2017-07-17 DIAGNOSIS — D50.9 IRON DEFICIENCY ANEMIA, UNSPECIFIED IRON DEFICIENCY ANEMIA TYPE: ICD-10-CM

## 2017-07-17 DIAGNOSIS — E11.9 TYPE 2 DIABETES MELLITUS WITHOUT COMPLICATION, WITHOUT LONG-TERM CURRENT USE OF INSULIN (HCC): ICD-10-CM

## 2017-07-17 PROCEDURE — 96160 PT-FOCUSED HLTH RISK ASSMT: CPT | Performed by: FAMILY MEDICINE

## 2017-07-17 PROCEDURE — 90732 PPSV23 VACC 2 YRS+ SUBQ/IM: CPT | Performed by: FAMILY MEDICINE

## 2017-07-17 PROCEDURE — G0439 PPPS, SUBSEQ VISIT: HCPCS | Performed by: FAMILY MEDICINE

## 2017-07-17 PROCEDURE — G0009 ADMIN PNEUMOCOCCAL VACCINE: HCPCS | Performed by: FAMILY MEDICINE

## 2017-07-17 NOTE — PATIENT INSTRUCTIONS
Brennon Nieves's SCREENING SCHEDULE   Tests on this list are recommended by your physician but may not be covered, or covered at this frequency, by your insurer. Please check with your insurance carrier before scheduling to verify coverage.    PREVENTATI visit.  Limited to patients who meet one of the following criteria:   • Men who are 73-68 years old and have smoked more than 100 cigarettes in their lifetime   • Anyone with a family history    Colorectal Cancer Screening  Covered up to Age 76     Colonosc 06/16/2015 Please get this Mammogram regularly   Immunizations      Influenza  Covered Annually   Orders placed or performed in visit on 10/06/16  -FLU VACC PRSV FREE INC ANTIG    Please get every year    Pneumococcal 13 (Prevnar)  Covered Once after 65 [address] [facility] regarding Advance Directives.

## 2017-07-17 NOTE — PROGRESS NOTES
HPI:   Jefry Bullard is a 69year old female who presents for a Medicare Subsequent Annual Wellness visit (Pt already had Initial Annual Wellness).     Pt states leg pain went away when we reduced the dose of the amlodipine  Min disease on u/s    Her la daily.   OXYBUTYNIN CHLORIDE ER 10 MG Oral Tablet 24 Hr TAKE 1 TABLET(10 MG) BY MOUTH DAILY   hydrochlorothiazide 12.5 MG Oral Cap Take 1 capsule (12.5 mg total) by mouth daily.    MONTELUKAST SODIUM 10 MG Oral Tab TAKE ONE TABLET BY MOUTH EVERY DAY   Maishaome She  has a past surgical history that includes ; cholecystectomy; electrocardiogram, complete (2013); and anesth,angioplasty.     Her family history includes Diabetes in her brother and father; Diabetes (age of onset: 59) in her mother; [de-identified] I have to strain to understand conversations:  No   I have to worry about missing the telephone ring or doorbell:  No I have trouble hearing conversations in a noisy background such as a crowded room or restaurant:  No   I get confused about where sounds c Pulses: Fleeting pulse b/l dp   Skin: Skin color, texture, turgor normal, no rashes or lesions no foot ulcers   Lymph nodes: Cervical, supraclavicular, and axillary nodes normal   Neurologic: Normal         SUGGESTED VACCINATIONS - Influenza, Pneumococca to see Dr. Ramin Orozco. Ms. Clare Rodríguez already takes aspirin and has it on her medication list.     Diet assessment: good     Advanced Directive:  Living Will on file in Gen? Ousmane Chew does not have a Living Will on file in Gen.  Discussed with pa 1-Yes    Have you fallen in the last 12 months?: 0-No    Do you accidently lose urine?: 0-No    Do you have difficulty seeing?: 1-Yes    Do you have any difficulty walking or getting up?: 0-No    Do you have any tripping hazards?: 0-No    Are you on multip Screening      Colonoscopy Screen every 10 years Colonoscopy,5 Years due on 07/08/2021 Update Health Maintenance if applicable    Flex Sigmoidoscopy Screen every 10 years No results found for this or any previous visit. No flowsheet data found.      Fecal O covered with your prescription benefits, but Medicare does not cover unless Medically needed    Zoster  Not covered by Medicare Part B No vaccine history found This may be covered with your pharmacy  prescription benefits        SPECIFIC DISEASE MONITORING

## 2017-07-20 ENCOUNTER — HOSPITAL ENCOUNTER (OUTPATIENT)
Dept: BONE DENSITY | Age: 77
Discharge: HOME | End: 2017-07-20
Attending: FAMILY MEDICINE
Payer: MEDICARE

## 2017-07-20 ENCOUNTER — HOSPITAL ENCOUNTER (OUTPATIENT)
Dept: MAMMOGRAPHY | Age: 77
Discharge: HOME | End: 2017-07-20
Attending: FAMILY MEDICINE
Payer: MEDICARE

## 2017-07-20 DIAGNOSIS — Z12.31 VISIT FOR SCREENING MAMMOGRAM: ICD-10-CM

## 2017-07-20 DIAGNOSIS — M81.0 AGE-RELATED OSTEOPOROSIS WITHOUT CURRENT PATHOLOGICAL FRACTURE: ICD-10-CM

## 2017-07-20 PROCEDURE — 77067 SCR MAMMO BI INCL CAD: CPT | Performed by: FAMILY MEDICINE

## 2017-07-20 PROCEDURE — 77080 DXA BONE DENSITY AXIAL: CPT | Performed by: FAMILY MEDICINE

## 2017-07-21 ENCOUNTER — OFFICE VISIT (OUTPATIENT)
Dept: SURGERY | Facility: CLINIC | Age: 77
End: 2017-07-21

## 2017-07-21 VITALS
RESPIRATION RATE: 16 BRPM | DIASTOLIC BLOOD PRESSURE: 65 MMHG | SYSTOLIC BLOOD PRESSURE: 115 MMHG | BODY MASS INDEX: 27.31 KG/M2 | HEIGHT: 55 IN | HEART RATE: 68 BPM | WEIGHT: 118 LBS

## 2017-07-21 DIAGNOSIS — R31.29 MICROHEMATURIA: ICD-10-CM

## 2017-07-21 DIAGNOSIS — R31.0 GROSS HEMATURIA: Primary | ICD-10-CM

## 2017-07-21 LAB
BILIRUB UR QL: NEGATIVE
COLOR UR: YELLOW
GLUCOSE UR-MCNC: NEGATIVE MG/DL
HGB UR QL STRIP.AUTO: NEGATIVE
KETONES UR-MCNC: NEGATIVE MG/DL
NITRITE UR QL STRIP.AUTO: NEGATIVE
PH UR: 6 [PH] (ref 5–8)
PROT UR-MCNC: NEGATIVE MG/DL
RBC #/AREA URNS AUTO: 4 /HPF
SP GR UR STRIP: 1.01 (ref 1–1.03)
UROBILINOGEN UR STRIP-ACNC: <2
VIT C UR-MCNC: 40 MG/DL
WBC #/AREA URNS AUTO: 169 /HPF

## 2017-07-21 PROCEDURE — G0463 HOSPITAL OUTPT CLINIC VISIT: HCPCS | Performed by: UROLOGY

## 2017-07-21 PROCEDURE — 99213 OFFICE O/P EST LOW 20 MIN: CPT | Performed by: UROLOGY

## 2017-07-21 NOTE — PROGRESS NOTES
Lynda Guerrero is a 69year old female. HPI:   Patient presents with:  Hematuria    79-year-old female status post gross hematuria evaluation January 20, 2017 presents in follow-up. States she feels well.   She has some baseline history of overactive b Alcohol use: No                 Medications (Active prior to today's visit):    Current Outpatient Prescriptions:  aspirin 81 MG Oral Tab Take 81 mg by mouth daily.  Disp:  Rfl:    AmLODIPine Besylate 5 MG Oral Tab Take 1 tablet (5 mg total) by mouth bar Vitamins (VITAMIN B COMPLEX) Oral Tab Take 1 tablet by mouth once daily.  Disp:  Rfl:        Allergies:    Zocor [Simvastatin]     Swelling      ROS:       PHYSICAL EXAM:        ASSESSMENT/PLAN:   Assessment   Microhematuria  Gross hematuria  (primary encou

## 2017-07-25 ENCOUNTER — OFFICE VISIT (OUTPATIENT)
Dept: CARDIOLOGY CLINIC | Facility: CLINIC | Age: 77
End: 2017-07-25

## 2017-07-25 VITALS
WEIGHT: 113 LBS | DIASTOLIC BLOOD PRESSURE: 64 MMHG | SYSTOLIC BLOOD PRESSURE: 126 MMHG | RESPIRATION RATE: 16 BRPM | HEART RATE: 56 BPM | BODY MASS INDEX: 28 KG/M2

## 2017-07-25 DIAGNOSIS — I25.10 CORONARY ARTERY DISEASE INVOLVING NATIVE CORONARY ARTERY OF NATIVE HEART WITHOUT ANGINA PECTORIS: Primary | ICD-10-CM

## 2017-07-25 DIAGNOSIS — I10 ESSENTIAL HYPERTENSION: ICD-10-CM

## 2017-07-25 PROCEDURE — G0463 HOSPITAL OUTPT CLINIC VISIT: HCPCS | Performed by: INTERNAL MEDICINE

## 2017-07-25 PROCEDURE — 99214 OFFICE O/P EST MOD 30 MIN: CPT | Performed by: INTERNAL MEDICINE

## 2017-07-25 NOTE — PATIENT INSTRUCTIONS
Continue same medications and keep on walking regularly  Schedule Lexiscan stress test just prior to follow-up visit in 6 months  Call sooner if chest pains are increased

## 2017-07-25 NOTE — PROGRESS NOTES
Frank Calixto is a 69year old female. Patient presents with:   Follow - Up: Pressure in chest with activity, Dizziness    HPI:   This is a pleasant 80-year-old diabetic with hypertension coronary disease prior LAD stent in July 2016 who presents for cornelio Enzymes (ENZYME DIGEST OR) Take by mouth. Disp:  Rfl:    LORazepam 0.5 MG Oral Tab Take 0.5 mg by mouth every 4 (four) hours as needed for Anxiety. Disp:  Rfl:    Atorvastatin Calcium 20 MG Oral Tab Take 20 mg by mouth nightly.  Disp:  Rfl:    Blood Pressur Resp 16   Wt 113 lb (51.3 kg)   BMI 28.28 kg/m²   GENERAL: well developed, well nourished,in no apparent distress  SKIN: no rashes,no suspicious lesions  HEENT: atraumatic, normocephalic,ears and throat are clear  NECK: supple,no adenopathy,no bruits  YIN

## 2017-08-01 ENCOUNTER — OFFICE VISIT (OUTPATIENT)
Dept: HEMATOLOGY/ONCOLOGY | Facility: HOSPITAL | Age: 77
End: 2017-08-01
Attending: INTERNAL MEDICINE
Payer: MEDICARE

## 2017-08-01 VITALS
HEIGHT: 57 IN | DIASTOLIC BLOOD PRESSURE: 51 MMHG | TEMPERATURE: 99 F | HEART RATE: 62 BPM | WEIGHT: 122 LBS | SYSTOLIC BLOOD PRESSURE: 149 MMHG | BODY MASS INDEX: 26.32 KG/M2 | RESPIRATION RATE: 16 BRPM

## 2017-08-01 DIAGNOSIS — Z51.81 MEDICATION MONITORING ENCOUNTER: ICD-10-CM

## 2017-08-01 DIAGNOSIS — D50.8 OTHER IRON DEFICIENCY ANEMIA: Primary | ICD-10-CM

## 2017-08-01 PROCEDURE — 99214 OFFICE O/P EST MOD 30 MIN: CPT | Performed by: INTERNAL MEDICINE

## 2017-08-01 PROCEDURE — G0463 HOSPITAL OUTPT CLINIC VISIT: HCPCS | Performed by: INTERNAL MEDICINE

## 2017-08-01 RX ORDER — CHOLECALCIFEROL (VITAMIN D3) 1250 MCG
CAPSULE ORAL WEEKLY
COMMUNITY
End: 2017-08-01

## 2017-08-01 RX ORDER — MELATONIN
325 DAILY
COMMUNITY

## 2017-08-01 NOTE — PROGRESS NOTES
HPI       Fang Chase is a 69year old female who is here today for f/u of Other iron deficiency anemia  (primary encounter diagnosis)     She states still taking the iron 3x daily. States felt better but dizzy at times when gets up from bending. for sleep disturbance. The patient is not nervous/anxious. Depressed mood as above. Current Outpatient Prescriptions:  ferrous sulfate 325 (65 FE) MG Oral Tab EC Take 325 mg by mouth 3 (three) times daily with meals.  Disp:  Rfl:    judy Monitoring Does not apply Misc Use as needed Disp: 1 Device Rfl: 0   Milk Thistle Extract 175 MG Oral Tab Take 1 tablet by mouth once daily. Disp:  Rfl:    B Complex Vitamins (VITAMIN B COMPLEX) Oral Tab Take 1 tablet by mouth once daily.  Disp:  Rfl: Other [OTHER] Brother 47   • Other [OTHER] Brother 59   • Glaucoma Neg    • Macular degeneration Neg          PHYSICAL EXAM:    /51 (BP Location: Left arm, Patient Position: Sitting, Cuff Size: adult)   Pulse 62   Temp 98.6 °F (37 °C) (Oral)   Resp 1 adenopathy. Right: No inguinal and no supraclavicular adenopathy present. Left: No inguinal and no supraclavicular adenopathy present. Neurological: She is alert and oriented to person, place, and time. Gait normal.   Skin: No rash noted.  Meño Romero Eosinophils Absolute      0.0 - 0.7 K/UL 0.1 0.1 0.1   Basophils Absolute      0.0 - 0.2 K/UL 0.0 0.0 0.0   Iron, Serum      28 - 170 mcg/dL 19 (L) 23 (L)    Iron Bind. Cap.(TIBC)      228 - 428 mcg/dL 400 371    IRON SATURATION      15 - 50 % 5 (L) 6 (L)

## 2017-08-08 RX ORDER — ATORVASTATIN CALCIUM 20 MG/1
TABLET, FILM COATED ORAL
Qty: 90 TABLET | Refills: 0 | Status: SHIPPED | OUTPATIENT
Start: 2017-08-08 | End: 2018-03-06

## 2017-08-19 ENCOUNTER — LAB ENCOUNTER (OUTPATIENT)
Dept: LAB | Age: 77
End: 2017-08-19
Attending: FAMILY MEDICINE
Payer: MEDICARE

## 2017-08-19 DIAGNOSIS — D50.9 IRON DEFICIENCY ANEMIA, UNSPECIFIED IRON DEFICIENCY ANEMIA TYPE: ICD-10-CM

## 2017-08-19 DIAGNOSIS — E11.9 TYPE 2 DIABETES MELLITUS WITHOUT COMPLICATION, WITHOUT LONG-TERM CURRENT USE OF INSULIN (HCC): ICD-10-CM

## 2017-08-19 DIAGNOSIS — D50.8 OTHER IRON DEFICIENCY ANEMIA: ICD-10-CM

## 2017-08-19 DIAGNOSIS — M81.0 AGE-RELATED OSTEOPOROSIS WITHOUT CURRENT PATHOLOGICAL FRACTURE: ICD-10-CM

## 2017-08-19 LAB
ALBUMIN SERPL BCP-MCNC: 3.9 G/DL (ref 3.5–4.8)
ALBUMIN/GLOB SERPL: 1.7 {RATIO} (ref 1–2)
ALP SERPL-CCNC: 38 U/L (ref 32–100)
ALT SERPL-CCNC: 22 U/L (ref 14–54)
ANION GAP SERPL CALC-SCNC: 11 MMOL/L (ref 0–18)
AST SERPL-CCNC: 24 U/L (ref 15–41)
BASOPHILS # BLD: 0.1 K/UL (ref 0–0.2)
BASOPHILS NFR BLD: 1 %
BILIRUB SERPL-MCNC: 0.4 MG/DL (ref 0.3–1.2)
BUN SERPL-MCNC: 14 MG/DL (ref 8–20)
BUN/CREAT SERPL: 16.7 (ref 10–20)
CALCIUM SERPL-MCNC: 9.2 MG/DL (ref 8.5–10.5)
CHLORIDE SERPL-SCNC: 94 MMOL/L (ref 95–110)
CHOLEST SERPL-MCNC: 164 MG/DL (ref 110–200)
CO2 SERPL-SCNC: 26 MMOL/L (ref 22–32)
CREAT SERPL-MCNC: 0.84 MG/DL (ref 0.5–1.5)
CREAT UR-MCNC: 41.9 MG/DL
EOSINOPHIL # BLD: 0.1 K/UL (ref 0–0.7)
EOSINOPHIL NFR BLD: 2 %
ERYTHROCYTE [DISTWIDTH] IN BLOOD BY AUTOMATED COUNT: 12.9 % (ref 11–15)
GLOBULIN PLAS-MCNC: 2.3 G/DL (ref 2.5–3.7)
GLUCOSE SERPL-MCNC: 129 MG/DL (ref 70–99)
HBA1C MFR BLD: 5.1 % (ref 4–6)
HCT VFR BLD AUTO: 29.9 % (ref 35–48)
HDLC SERPL-MCNC: 46 MG/DL
HGB BLD-MCNC: 10.2 G/DL (ref 12–16)
LDLC SERPL CALC-MCNC: 75 MG/DL (ref 0–99)
LYMPHOCYTES # BLD: 1.2 K/UL (ref 1–4)
LYMPHOCYTES NFR BLD: 27 %
MCH RBC QN AUTO: 33.3 PG (ref 27–32)
MCHC RBC AUTO-ENTMCNC: 34.2 G/DL (ref 32–37)
MCV RBC AUTO: 97.5 FL (ref 80–100)
MICROALBUMIN UR-MCNC: 0.4 MG/DL (ref 0–1.8)
MICROALBUMIN/CREAT UR: 9.5 MG/G{CREAT} (ref 0–20)
MONOCYTES # BLD: 0.4 K/UL (ref 0–1)
MONOCYTES NFR BLD: 9 %
NEUTROPHILS # BLD AUTO: 2.9 K/UL (ref 1.8–7.7)
NEUTROPHILS NFR BLD: 61 %
NONHDLC SERPL-MCNC: 118 MG/DL
OSMOLALITY UR CALC.SUM OF ELEC: 274 MOSM/KG (ref 275–295)
PLATELET # BLD AUTO: 251 K/UL (ref 140–400)
PMV BLD AUTO: 7.3 FL (ref 7.4–10.3)
POTASSIUM SERPL-SCNC: 4.2 MMOL/L (ref 3.3–5.1)
PROT SERPL-MCNC: 6.2 G/DL (ref 5.9–8.4)
RBC # BLD AUTO: 3.07 M/UL (ref 3.7–5.4)
SODIUM SERPL-SCNC: 131 MMOL/L (ref 136–144)
TRIGL SERPL-MCNC: 215 MG/DL (ref 1–149)
VIT B12 SERPL-MCNC: 1014 PG/ML (ref 181–914)
WBC # BLD AUTO: 4.7 K/UL (ref 4–11)

## 2017-08-19 PROCEDURE — 82306 VITAMIN D 25 HYDROXY: CPT

## 2017-08-19 PROCEDURE — 85025 COMPLETE CBC W/AUTO DIFF WBC: CPT

## 2017-08-19 PROCEDURE — 80053 COMPREHEN METABOLIC PANEL: CPT

## 2017-08-19 PROCEDURE — 82043 UR ALBUMIN QUANTITATIVE: CPT

## 2017-08-19 PROCEDURE — 82607 VITAMIN B-12: CPT

## 2017-08-19 PROCEDURE — 83036 HEMOGLOBIN GLYCOSYLATED A1C: CPT

## 2017-08-19 PROCEDURE — 36415 COLL VENOUS BLD VENIPUNCTURE: CPT

## 2017-08-19 PROCEDURE — 80061 LIPID PANEL: CPT

## 2017-08-19 PROCEDURE — 82570 ASSAY OF URINE CREATININE: CPT

## 2017-08-21 ENCOUNTER — OFFICE VISIT (OUTPATIENT)
Dept: FAMILY MEDICINE CLINIC | Facility: CLINIC | Age: 77
End: 2017-08-21

## 2017-08-21 VITALS
DIASTOLIC BLOOD PRESSURE: 74 MMHG | TEMPERATURE: 98 F | HEART RATE: 60 BPM | WEIGHT: 118 LBS | SYSTOLIC BLOOD PRESSURE: 151 MMHG | BODY MASS INDEX: 26 KG/M2

## 2017-08-21 DIAGNOSIS — I25.10 CORONARY ARTERY DISEASE INVOLVING NATIVE HEART WITHOUT ANGINA PECTORIS, UNSPECIFIED VESSEL OR LESION TYPE: ICD-10-CM

## 2017-08-21 DIAGNOSIS — D50.9 IRON DEFICIENCY ANEMIA, UNSPECIFIED IRON DEFICIENCY ANEMIA TYPE: ICD-10-CM

## 2017-08-21 DIAGNOSIS — E87.1 HYPONATREMIA: ICD-10-CM

## 2017-08-21 DIAGNOSIS — R31.9 HEMATURIA, UNSPECIFIED TYPE: ICD-10-CM

## 2017-08-21 DIAGNOSIS — I10 ESSENTIAL HYPERTENSION: Primary | ICD-10-CM

## 2017-08-21 LAB — 25(OH)D3 SERPL-MCNC: 67 NG/ML

## 2017-08-21 PROCEDURE — 99214 OFFICE O/P EST MOD 30 MIN: CPT | Performed by: FAMILY MEDICINE

## 2017-08-21 PROCEDURE — G0463 HOSPITAL OUTPT CLINIC VISIT: HCPCS | Performed by: FAMILY MEDICINE

## 2017-08-21 RX ORDER — METOPROLOL SUCCINATE 25 MG/1
25 TABLET, EXTENDED RELEASE ORAL DAILY
Qty: 90 TABLET | Refills: 3 | Status: SHIPPED | OUTPATIENT
Start: 2017-08-21 | End: 2018-03-06

## 2017-08-29 NOTE — PROGRESS NOTES
Patient states that she feels better. She has recently seen cardiology regarding her coronary artery disease. She is to have a Lexiscan and a follow-up in their office in the near future history of a stent placed last year.   Denies any chest pain at this there was no cyanosis or trace edema      Assessment/ Plan          1. Essential hypertension  Continue on the current medication regimen recheck BMP  - Metoprolol Succinate ER 25 MG Oral Tablet 24 Hr; Take 1 tablet (25 mg total) by mouth daily.   Dispense:

## 2017-09-29 ENCOUNTER — TELEPHONE (OUTPATIENT)
Dept: HEMATOLOGY/ONCOLOGY | Facility: HOSPITAL | Age: 77
End: 2017-09-29

## 2017-10-12 ENCOUNTER — LAB ENCOUNTER (OUTPATIENT)
Dept: LAB | Age: 77
End: 2017-10-12
Attending: NURSE PRACTITIONER
Payer: MEDICARE

## 2017-10-12 DIAGNOSIS — D50.8 OTHER IRON DEFICIENCY ANEMIA: ICD-10-CM

## 2017-10-12 DIAGNOSIS — I10 ESSENTIAL HYPERTENSION: ICD-10-CM

## 2017-10-12 DIAGNOSIS — E87.1 HYPONATREMIA: ICD-10-CM

## 2017-10-12 PROCEDURE — 80048 BASIC METABOLIC PNL TOTAL CA: CPT

## 2017-10-12 PROCEDURE — 84466 ASSAY OF TRANSFERRIN: CPT

## 2017-10-12 PROCEDURE — 83540 ASSAY OF IRON: CPT

## 2017-10-12 PROCEDURE — 82728 ASSAY OF FERRITIN: CPT

## 2017-10-12 PROCEDURE — 36415 COLL VENOUS BLD VENIPUNCTURE: CPT

## 2017-10-12 PROCEDURE — 85025 COMPLETE CBC W/AUTO DIFF WBC: CPT

## 2017-10-20 ENCOUNTER — TELEPHONE (OUTPATIENT)
Dept: OTHER | Age: 77
End: 2017-10-20

## 2017-10-20 DIAGNOSIS — E87.1 HYPONATREMIA: Primary | ICD-10-CM

## 2017-10-20 NOTE — TELEPHONE ENCOUNTER
----- Message from Stacey Kemp RN sent at 10/20/2017  8:19 AM CDT -----      ----- Message -----  From: Liliana Gagnon DO  Sent: 10/19/2017   3:36 PM  To: Tessa Oconnor Fm Lmb Lpn/Cma    Sodium remains low.   Discontinue the losartan hydroch

## 2017-10-24 ENCOUNTER — OFFICE VISIT (OUTPATIENT)
Dept: HEMATOLOGY/ONCOLOGY | Facility: HOSPITAL | Age: 77
End: 2017-10-24
Attending: INTERNAL MEDICINE
Payer: MEDICARE

## 2017-10-24 VITALS
HEIGHT: 57 IN | TEMPERATURE: 99 F | SYSTOLIC BLOOD PRESSURE: 130 MMHG | BODY MASS INDEX: 26.32 KG/M2 | RESPIRATION RATE: 18 BRPM | DIASTOLIC BLOOD PRESSURE: 57 MMHG | WEIGHT: 122 LBS | HEART RATE: 57 BPM

## 2017-10-24 DIAGNOSIS — Z51.81 MEDICATION MONITORING ENCOUNTER: ICD-10-CM

## 2017-10-24 DIAGNOSIS — D50.9 IRON DEFICIENCY ANEMIA, UNSPECIFIED IRON DEFICIENCY ANEMIA TYPE: Primary | ICD-10-CM

## 2017-10-24 PROCEDURE — G0463 HOSPITAL OUTPT CLINIC VISIT: HCPCS | Performed by: INTERNAL MEDICINE

## 2017-10-24 PROCEDURE — 99213 OFFICE O/P EST LOW 20 MIN: CPT | Performed by: INTERNAL MEDICINE

## 2017-10-24 RX ORDER — AMLODIPINE BESYLATE 5 MG/1
TABLET ORAL
Refills: 3 | COMMUNITY
Start: 2017-08-07 | End: 2018-03-06

## 2017-10-24 RX ORDER — LOSARTAN POTASSIUM AND HYDROCHLOROTHIAZIDE 12.5; 1 MG/1; MG/1
TABLET ORAL
Refills: 3 | COMMUNITY
Start: 2017-09-05 | End: 2017-10-25

## 2017-10-24 NOTE — PROGRESS NOTES
KT       Matilda Guzman is a 69year old female who is here today for f/u of Iron deficiency anemia, unspecified iron deficiency anemia type  (primary encounter diagnosis)  Medication monitoring encounter     She states still taking the iron 3x daily. disturbance. The patient is not nervous/anxious. Denies depression.            Current Outpatient Prescriptions:  ATORVASTATIN 20 MG Oral Tab TAKE 1 TABLET(20 MG) BY MOUTH DAILY Disp: 90 tablet Rfl: 0   ferrous sulfate 325 (65 FE) MG Oral Tab EC Som Potassium-HCTZ 100-12.5 MG Oral Tab TK 1 T PO D Disp:  Rfl: 3   Metoprolol Succinate ER 25 MG Oral Tablet 24 Hr Take 1 tablet (25 mg total) by mouth daily.  Disp: 90 tablet Rfl: 3     Allergies:     Zocor [Simvastatin]     Swelling    Past Medical History: • Macular degeneration Neg          PHYSICAL EXAM:    /57 (BP Location: Left arm, Patient Position: Sitting, Cuff Size: adult)   Pulse 57   Temp 98.6 °F (37 °C) (Oral)   Resp 18   Ht 1.448 m (4' 9\")   Wt 55.3 kg (122 lb)   BMI 26.40 kg/m²   Wt Edyrie Stair Left: No inguinal and no supraclavicular adenopathy present. Neurological: She is alert and oriented to person, place, and time. Gait normal.   Skin: No rash noted. She is not diaphoretic. No erythema.    Psychiatric: She has a normal mood and affe 4.0 - 11.0 K/UL 4.9 4.7   RBC      3.70 - 5.40 M/UL 3.07 (L) 3.07 (L)   Hemoglobin      12.0 - 16.0 g/dL 10.2 (L) 10.2 (L)   Hematocrit      35.0 - 48.0 % 29.7 (L) 29.9 (L)   MCV      80.0 - 100.0 fL 97.0 97.5   MCH      27.0 - 32.0 pg 33.3 (H) 33.3 (H)

## 2017-10-25 RX ORDER — LOSARTAN POTASSIUM 100 MG/1
100 TABLET ORAL DAILY
Qty: 90 TABLET | Refills: 3 | Status: SHIPPED | OUTPATIENT
Start: 2017-10-25 | End: 2018-03-06

## 2017-10-25 NOTE — TELEPHONE ENCOUNTER
Pt returned call, information reviewed with Language Line assistance, agent # [id number]. Verified pt name and . Reviewed test results and recommendations with pt per doctor's instructions.  Pt agreed with plan of care, will start Losartan 100 mg and disc

## 2017-11-06 ENCOUNTER — APPOINTMENT (OUTPATIENT)
Dept: LAB | Age: 77
End: 2017-11-06
Attending: FAMILY MEDICINE
Payer: MEDICARE

## 2017-11-06 PROCEDURE — 80048 BASIC METABOLIC PNL TOTAL CA: CPT | Performed by: FAMILY MEDICINE

## 2017-11-06 PROCEDURE — 36415 COLL VENOUS BLD VENIPUNCTURE: CPT | Performed by: FAMILY MEDICINE

## 2017-11-07 ENCOUNTER — TELEPHONE (OUTPATIENT)
Dept: FAMILY MEDICINE CLINIC | Facility: CLINIC | Age: 77
End: 2017-11-07

## 2017-11-08 RX ORDER — ATORVASTATIN CALCIUM 20 MG/1
TABLET, FILM COATED ORAL
Qty: 90 TABLET | Refills: 0 | Status: SHIPPED | OUTPATIENT
Start: 2017-11-08 | End: 2018-03-06

## 2017-11-08 NOTE — TELEPHONE ENCOUNTER
Signed Prescriptions Disp Refills    ATORVASTATIN 20 MG Oral Tab 90 tablet 0      Sig: TAKE 1 TABLET(20 MG) BY MOUTH DAILY        Authorizing Provider: Ky Ade        Ordering User: Ugo Moscoso           Refill approved per protocol.

## 2017-11-08 NOTE — TELEPHONE ENCOUNTER
Cholesterol Medications  Protocol Criteria:  · Appointment scheduled in the past 12 months or in the next 3 months  · ALT & LDL on file in the past 12 months  · ALT result < 80  · LDL result <130   Recent Outpatient Visits            2 weeks ago Iron defic

## 2017-12-09 RX ORDER — CLOPIDOGREL BISULFATE 75 MG/1
TABLET ORAL
Qty: 90 TABLET | Refills: 0 | Status: SHIPPED | OUTPATIENT
Start: 2017-12-09 | End: 2018-03-06

## 2017-12-30 RX ORDER — CALCIUM CITRATE/VITAMIN D3 200MG-6.25
TABLET ORAL
Qty: 200 STRIP | Refills: 0 | Status: SHIPPED | OUTPATIENT
Start: 2017-12-30 | End: 2018-03-07

## 2017-12-30 RX ORDER — GLUCOSAM/CHON-MSM1/C/MANG/BOSW 500-416.6
TABLET ORAL
Qty: 200 EACH | Refills: 0 | Status: SHIPPED | OUTPATIENT
Start: 2017-12-30 | End: 2018-03-07

## 2017-12-30 NOTE — TELEPHONE ENCOUNTER
Refilled per office supply protocol.    Diabetes Medications  Protocol Criteria:  · Appointment scheduled in the past 6 months or the next 3 months  · A1C < 7.5 in the past 6 months  · Creatinine in the past 12 months  · Creatinine result < 1.5   Recent Out

## 2018-01-02 RX ORDER — CITALOPRAM 10 MG/1
TABLET ORAL
Qty: 90 TABLET | Refills: 0 | Status: SHIPPED | OUTPATIENT
Start: 2018-01-02 | End: 2018-03-06

## 2018-02-05 ENCOUNTER — OFFICE VISIT (OUTPATIENT)
Dept: SURGERY | Facility: CLINIC | Age: 78
End: 2018-02-05

## 2018-02-05 VITALS
BODY MASS INDEX: 25.89 KG/M2 | TEMPERATURE: 98 F | SYSTOLIC BLOOD PRESSURE: 129 MMHG | HEART RATE: 59 BPM | WEIGHT: 120 LBS | HEIGHT: 57 IN | DIASTOLIC BLOOD PRESSURE: 72 MMHG

## 2018-02-05 DIAGNOSIS — R31.29 MICROHEMATURIA: Primary | ICD-10-CM

## 2018-02-05 DIAGNOSIS — R82.90 URINE FINDING: ICD-10-CM

## 2018-02-05 LAB
APPEARANCE: CLEAR
BILIRUB UR QL: NEGATIVE
COLOR UR: YELLOW
GLUCOSE UR-MCNC: NEGATIVE MG/DL
KETONES UR-MCNC: NEGATIVE MG/DL
MULTISTIX LOT#: ABNORMAL NUMERIC
NITRITE UR QL STRIP.AUTO: NEGATIVE
PH UR: 6 [PH] (ref 5–8)
PH, URINE: 7 (ref 4.5–8)
PROT UR-MCNC: NEGATIVE MG/DL
RBC #/AREA URNS AUTO: 2 /HPF
SP GR UR STRIP: 1.01 (ref 1–1.03)
SPECIFIC GRAVITY: 1.01 (ref 1–1.03)
URINE-COLOR: YELLOW
UROBILINOGEN UR STRIP-ACNC: <2
UROBILINOGEN,SEMI-QN: 0.2 MG/DL (ref 0–1.9)
VIT C UR-MCNC: NEGATIVE MG/DL
WBC #/AREA URNS AUTO: 40 /HPF

## 2018-02-05 PROCEDURE — 99213 OFFICE O/P EST LOW 20 MIN: CPT | Performed by: UROLOGY

## 2018-02-05 PROCEDURE — 81003 URINALYSIS AUTO W/O SCOPE: CPT | Performed by: UROLOGY

## 2018-02-05 PROCEDURE — G0463 HOSPITAL OUTPT CLINIC VISIT: HCPCS | Performed by: UROLOGY

## 2018-02-05 NOTE — PROGRESS NOTES
Latosha Bell is a 69year old female. HPI:   Patient presents with:  Hematuria: Patient here for 6 month follow up. Patient denies gross hematuria. Urinary Symptoms (urologic): Patient c/o urge incontinence.  Patient states she has more of an urgenc ANESTH,ANGIOPLASTY  No date:   No date: CHOLECYSTECTOMY  2013: ELECTROCARDIOGRAM, COMPLETE      Comment: Scanned to Media Tab - Date of Service                2013   Family History   Problem Relation Age of Onset   • Diabetes Father Disp: 90 tablet Rfl: 11   MONTELUKAST SODIUM 10 MG Oral Tab TAKE ONE TABLET BY MOUTH EVERY DAY Disp: 90 tablet Rfl: 11   Esomeprazole Magnesium 40 MG Oral Capsule Delayed Release Take 1 capsule (40 mg total) by mouth every morning before breakfast. Disp: 9 encounter    Imaging & Referrals:  None     2/5/2018  Sebastian Mcnamara MD

## 2018-02-06 RX ORDER — ATORVASTATIN CALCIUM 20 MG/1
TABLET, FILM COATED ORAL
Qty: 90 TABLET | Refills: 0 | Status: SHIPPED | OUTPATIENT
Start: 2018-02-06 | End: 2018-03-06

## 2018-02-06 NOTE — TELEPHONE ENCOUNTER
Cholesterol Medications:Refilled per protocol    Protocol Criteria:  · Appointment scheduled in the past 12 months or in the next 3 months  · ALT & LDL on file in the past 12 months  · ALT result < 80  · LDL result <130   Recent Outpatient Visits

## 2018-02-08 ENCOUNTER — OFFICE VISIT (OUTPATIENT)
Dept: CARDIOLOGY CLINIC | Facility: CLINIC | Age: 78
End: 2018-02-08

## 2018-02-08 ENCOUNTER — TELEPHONE (OUTPATIENT)
Dept: FAMILY MEDICINE CLINIC | Facility: CLINIC | Age: 78
End: 2018-02-08

## 2018-02-08 VITALS
WEIGHT: 126 LBS | HEART RATE: 60 BPM | BODY MASS INDEX: 27 KG/M2 | DIASTOLIC BLOOD PRESSURE: 70 MMHG | SYSTOLIC BLOOD PRESSURE: 132 MMHG | RESPIRATION RATE: 12 BRPM

## 2018-02-08 DIAGNOSIS — I10 ESSENTIAL HYPERTENSION: ICD-10-CM

## 2018-02-08 DIAGNOSIS — I25.10 CORONARY ARTERY DISEASE INVOLVING NATIVE CORONARY ARTERY OF NATIVE HEART WITHOUT ANGINA PECTORIS: Primary | ICD-10-CM

## 2018-02-08 DIAGNOSIS — I25.10 DISEASE OF CARDIOVASCULAR SYSTEM: Primary | ICD-10-CM

## 2018-02-08 PROCEDURE — G0463 HOSPITAL OUTPT CLINIC VISIT: HCPCS | Performed by: INTERNAL MEDICINE

## 2018-02-08 PROCEDURE — 99214 OFFICE O/P EST MOD 30 MIN: CPT | Performed by: INTERNAL MEDICINE

## 2018-02-08 NOTE — PATIENT INSTRUCTIONS
Lexiscan stress test prior to follow-up  Continue same medicines  Drink plenty water to stay hydrated and pause when changing positions to commit MR does not

## 2018-02-08 NOTE — PROGRESS NOTES
Jayden Hendersondeborah is a 69year old female. Patient presents with:   Follow - Up: Dizziness    HPI:   This is a pleasant 15-year-old with diabetes hypertension coronary disease and prior LAD stent in July 2016 who presents for follow-up she is presently doing w Disp: 90 tablet Rfl: 11   Esomeprazole Magnesium 40 MG Oral Capsule Delayed Release Take 1 capsule (40 mg total) by mouth every morning before breakfast. Disp: 90 capsule Rfl: 3   Blood Glucose Monitoring Suppl (TRUE METRIX METER) W/DEVICE Does not apply K HPI  GI: denies abdominal pain and denies heartburn  NEURO: denies headaches  Remainder of review of systems is completed and negative    EXAM:   /70   Pulse 60   Resp 12   Wt 126 lb (57.2 kg)   BMI 27.27 kg/m²   GENERAL: well developed, well nourish

## 2018-02-23 ENCOUNTER — PATIENT OUTREACH (OUTPATIENT)
Dept: INTERNAL MEDICINE CLINIC | Facility: CLINIC | Age: 78
End: 2018-02-23

## 2018-02-26 ENCOUNTER — TELEPHONE (OUTPATIENT)
Dept: SURGERY | Facility: CLINIC | Age: 78
End: 2018-02-26

## 2018-02-26 NOTE — TELEPHONE ENCOUNTER
----- Message from Ryann Rankin MD sent at 2/25/2018 10:43 PM CST -----  Please call this patient. please note the patient noted a urine culture shows an infection.   During her visit she denied any dysuria or UTI symptoms but because she complained of

## 2018-02-26 NOTE — TELEPHONE ENCOUNTER
Phoned pt and spoke with her. She put  on phone. Read to him 's result note as outlined below. He verbalized understanding, agrees to plan, and is thankful.

## 2018-02-28 ENCOUNTER — PATIENT OUTREACH (OUTPATIENT)
Dept: INTERNAL MEDICINE CLINIC | Facility: CLINIC | Age: 78
End: 2018-02-28

## 2018-02-28 ENCOUNTER — TELEPHONE (OUTPATIENT)
Dept: SURGERY | Facility: CLINIC | Age: 78
End: 2018-02-28

## 2018-02-28 NOTE — TELEPHONE ENCOUNTER
Spoke to patient's daughter, Melissa Jenkins see Nabila Vlilasenor. Daughter states since patient started Bactrim DS 2 days ago, patient c/o nausea, abdominal pain, diarrhea.   Patient's daughter states patient had 3 doses total; stopped taking the antibiotic last night; this mor

## 2018-02-28 NOTE — TELEPHONE ENCOUNTER
Pts daughter states bactrim was causing pt to have dizziness and diarrhea, states she has stopped taking medication, asking if it is necessary for pt to be taking rx. Pls call thank you.

## 2018-03-02 ENCOUNTER — TELEPHONE (OUTPATIENT)
Dept: OTHER | Age: 78
End: 2018-03-02

## 2018-03-02 DIAGNOSIS — N30.01 ACUTE CYSTITIS WITH HEMATURIA: Primary | ICD-10-CM

## 2018-03-02 NOTE — TELEPHONE ENCOUNTER
Pts daugh calling to f/up on previous message. [address] states that pt has not taken any medication for the past 2 days.

## 2018-03-02 NOTE — TELEPHONE ENCOUNTER
Have the patient repeat her urine culture since she did get 3 doses of the Bactrim prior to deciding on any additional antibiotics.   Her diarrhea is a common side effect unfortunately to most antibiotics so I do not want to substitute something else in the

## 2018-03-02 NOTE — TELEPHONE ENCOUNTER
I spoke with pt's dtr Airam Khan because she called back but I only saw this because it was under and enct of todays date under Dr. Rupert Almonte and that is where [facility] [place] also wrote his msg that I copied and pasted below. I gave dtr.  All the info in the msg and she

## 2018-03-02 NOTE — TELEPHONE ENCOUNTER
Spoke with pt's dtr and gave [facility] msg and she will have pt submit a new C&S and call on Monday for the results.

## 2018-03-02 NOTE — TELEPHONE ENCOUNTER
I tried to reach pt's dtr at the [facility] # and spokw with pt's spouse who did not speak very good english and he advised to call his dtr Radha Day on the cell # and I did this and had to [place].     Mercedes Hendrix MD        3/2/18 1:55 PM   Note      Have the patien

## 2018-03-02 NOTE — TELEPHONE ENCOUNTER
I tried to reach pt's dtr to inform that [facility] wants pt to submit another urine culture specimen but I had to [place].

## 2018-03-02 NOTE — TELEPHONE ENCOUNTER
call #[phone]    Spoke with patient and  and are still waiting for Dr. Mady Lyle to address Bactrim that was \"too strong\"-please see acute encounter 2/28/18 under Dr. Pablo Matamoros been sent to him 2 times-awaiting response.     Relayed to sherice

## 2018-03-03 ENCOUNTER — APPOINTMENT (OUTPATIENT)
Dept: LAB | Age: 78
End: 2018-03-03
Attending: UROLOGY
Payer: MEDICARE

## 2018-03-03 DIAGNOSIS — N30.01 ACUTE CYSTITIS WITH HEMATURIA: ICD-10-CM

## 2018-03-03 PROCEDURE — 87086 URINE CULTURE/COLONY COUNT: CPT

## 2018-03-05 ENCOUNTER — TELEPHONE (OUTPATIENT)
Dept: SURGERY | Facility: CLINIC | Age: 78
End: 2018-03-05

## 2018-03-05 NOTE — TELEPHONE ENCOUNTER
LMTCB     Notes Recorded by Cecile Langford MD on 3/4/2018 at 10:11 PM CST  Please mail a copy of the patient's recent urine culture with a note stating results are normal.   Culture

## 2018-03-06 ENCOUNTER — OFFICE VISIT (OUTPATIENT)
Dept: FAMILY MEDICINE CLINIC | Facility: CLINIC | Age: 78
End: 2018-03-06

## 2018-03-06 VITALS
WEIGHT: 123 LBS | HEIGHT: 57 IN | BODY MASS INDEX: 26.54 KG/M2 | SYSTOLIC BLOOD PRESSURE: 144 MMHG | DIASTOLIC BLOOD PRESSURE: 69 MMHG | HEART RATE: 54 BPM

## 2018-03-06 DIAGNOSIS — M85.80 OSTEOPENIA, UNSPECIFIED LOCATION: Primary | ICD-10-CM

## 2018-03-06 DIAGNOSIS — E11.9 TYPE 2 DIABETES MELLITUS WITHOUT COMPLICATION, WITHOUT LONG-TERM CURRENT USE OF INSULIN (HCC): ICD-10-CM

## 2018-03-06 DIAGNOSIS — F33.41 RECURRENT MAJOR DEPRESSIVE DISORDER, IN PARTIAL REMISSION (HCC): ICD-10-CM

## 2018-03-06 DIAGNOSIS — I70.0 ATHEROSCLEROSIS OF AORTA (HCC): ICD-10-CM

## 2018-03-06 DIAGNOSIS — I25.10 CORONARY ARTERY DISEASE INVOLVING NATIVE CORONARY ARTERY OF NATIVE HEART WITHOUT ANGINA PECTORIS: ICD-10-CM

## 2018-03-06 DIAGNOSIS — Z84.1 FAMILY HISTORY OF RENAL FAILURE: ICD-10-CM

## 2018-03-06 DIAGNOSIS — D50.9 IRON DEFICIENCY ANEMIA, UNSPECIFIED IRON DEFICIENCY ANEMIA TYPE: ICD-10-CM

## 2018-03-06 DIAGNOSIS — Z00.00 ENCOUNTER FOR ANNUAL HEALTH EXAMINATION: ICD-10-CM

## 2018-03-06 DIAGNOSIS — I73.9 PERIPHERAL VASCULAR DISEASE (HCC): ICD-10-CM

## 2018-03-06 DIAGNOSIS — I10 ESSENTIAL HYPERTENSION: ICD-10-CM

## 2018-03-06 DIAGNOSIS — Z12.31 VISIT FOR SCREENING MAMMOGRAM: ICD-10-CM

## 2018-03-06 DIAGNOSIS — R14.0 BLOATING: ICD-10-CM

## 2018-03-06 DIAGNOSIS — I25.118 CORONARY ARTERY DISEASE OF NATIVE HEART WITH STABLE ANGINA PECTORIS, UNSPECIFIED VESSEL OR LESION TYPE (HCC): ICD-10-CM

## 2018-03-06 DIAGNOSIS — Z00.00 MEDICARE ANNUAL WELLNESS VISIT, SUBSEQUENT: ICD-10-CM

## 2018-03-06 DIAGNOSIS — E87.1 HYPONATREMIA: ICD-10-CM

## 2018-03-06 PROBLEM — T15.00XA FOREIGN BODY IN CORNEA: Status: RESOLVED | Noted: 2017-03-30 | Resolved: 2018-03-06

## 2018-03-06 PROBLEM — E11.22 TYPE 2 DIABETES MELLITUS WITH DIABETIC CHRONIC KIDNEY DISEASE (HCC): Chronic | Status: ACTIVE | Noted: 2018-03-06

## 2018-03-06 PROCEDURE — G0009 ADMIN PNEUMOCOCCAL VACCINE: HCPCS | Performed by: FAMILY MEDICINE

## 2018-03-06 PROCEDURE — 96160 PT-FOCUSED HLTH RISK ASSMT: CPT | Performed by: FAMILY MEDICINE

## 2018-03-06 PROCEDURE — G0439 PPPS, SUBSEQ VISIT: HCPCS | Performed by: FAMILY MEDICINE

## 2018-03-06 PROCEDURE — 90670 PCV13 VACCINE IM: CPT | Performed by: FAMILY MEDICINE

## 2018-03-06 RX ORDER — CLOPIDOGREL BISULFATE 75 MG/1
75 TABLET ORAL
Qty: 90 TABLET | Refills: 3 | Status: SHIPPED | OUTPATIENT
Start: 2018-03-06 | End: 2019-03-05

## 2018-03-06 RX ORDER — ATORVASTATIN CALCIUM 20 MG/1
TABLET, FILM COATED ORAL
Qty: 90 TABLET | Refills: 3 | Status: SHIPPED | OUTPATIENT
Start: 2018-03-06 | End: 2019-04-19

## 2018-03-06 RX ORDER — METOPROLOL SUCCINATE 25 MG/1
25 TABLET, EXTENDED RELEASE ORAL DAILY
Qty: 90 TABLET | Refills: 3 | Status: ON HOLD | OUTPATIENT
Start: 2018-03-06 | End: 2018-09-23

## 2018-03-06 RX ORDER — AMLODIPINE BESYLATE 5 MG/1
5 TABLET ORAL DAILY
Qty: 90 TABLET | Refills: 3 | Status: SHIPPED | OUTPATIENT
Start: 2018-03-06 | End: 2018-04-03 | Stop reason: ALTCHOICE

## 2018-03-06 RX ORDER — CITALOPRAM 10 MG/1
10 TABLET ORAL
Qty: 90 TABLET | Refills: 3 | Status: SHIPPED | OUTPATIENT
Start: 2018-03-06 | End: 2019-04-19

## 2018-03-06 RX ORDER — ESOMEPRAZOLE MAGNESIUM 40 MG/1
40 CAPSULE, DELAYED RELEASE ORAL
Qty: 90 CAPSULE | Refills: 3 | Status: SHIPPED | OUTPATIENT
Start: 2018-03-06 | End: 2018-03-28

## 2018-03-06 RX ORDER — LOSARTAN POTASSIUM 100 MG/1
100 TABLET ORAL DAILY
Qty: 90 TABLET | Refills: 3 | Status: SHIPPED | OUTPATIENT
Start: 2018-03-06 | End: 2018-08-27

## 2018-03-06 NOTE — PATIENT INSTRUCTIONS
Moreno Nieves's SCREENING SCHEDULE   Tests on this list are recommended by your physician but may not be covered, or covered at this frequency, by your insurer. Please check with your insurance carrier before scheduling to verify coverage.    PREVENTATI visit.  Limited to patients who meet one of the following criteria:   • Men who are 73-68 years old and have smoked more than 100 cigarettes in their lifetime   • Anyone with a family history    Colorectal Cancer Screening  Covered up to Age 76     Colonosc care reminders to display for this patient.  Please get this Mammogram regularly   Immunizations      Influenza  Covered Annually   Orders placed or performed in visit on 10/06/16  -FLU VACC PRSV FREE INC ANTIG    Please get every year    Pneumococcal 13 (P [place])  www. [email or web address]. org  This link also has information from the [address] [place] [address] regarding Advance Directives.

## 2018-03-06 NOTE — PROGRESS NOTES
HPI:   Jian Sánchez is a 69year old female who presents for a Medicare Subsequent Annual Wellness visit (Pt already had Initial Annual Wellness). Here with .   Annual Physical due on 07/17/2018        Fall/Risk Assessment   She has been screen both eyes     Anemia, iron deficiency     HTN (hypertension)     CAD (coronary artery disease)     Murmur     Major depression in partial remission (HCC)     Atherosclerosis of aorta (HCC)     Peripheral vascular disease (HCC)     Coronary artery disease o GLUCOSE TWICE DAILY   TRUEPLUS LANCETS 28G Does not apply Misc CHECK BLOOD GLUCOSE TWICE DAILY   ferrous sulfate 325 (65 FE) MG Oral Tab EC Take 325 mg by mouth 3 (three) times daily with meals.    ergocalciferol 92555 units Oral Cap Take 1 capsule (50,000 her brother. SOCIAL HISTORY:   She  reports that she has never smoked. She has never used smokeless tobacco. She reports that she does not drink alcohol or use drugs.      REVIEW OF SYSTEMS:   GENERAL: feels well otherwise  SKIN: denies any unusual skin l People get annoyed because I misunderstand what they say:  No   I misunderstand what others are saying and make inappropriate responses:  No I avoid social activities because I cannot hear well and fear I will reply improperly:  No   Family members and fri orders for this visit:    Osteopenia, unspecified location  Stable    Medicare annual wellness visit, subsequent  Stable  Recurrent major depressive disorder, in partial remission ([facility] [address]  Continue on medication medication was renewed    Coronary artery dise COMP METABOLIC PANEL (14); Future  -     HEMOGLOBIN A1C; Future  -     LIPID PANEL; Future  -     MICROALB/CREAT RATIO, RANDOM URINE; Future  -     CBC WITH DIFFERENTIAL WITH PLATELET;  Future  -     OPHTHALMOLOGY - INTERNAL    Other orders  -     Clopidogr LDL Annually LDL Cholesterol (mg/dL)   Date Value   08/19/2017 75   09/28/2016 73        EKG - w/ Initial Preventative Physical Exam only, or if medically necessary Electrocardiogram date       Colorectal Cancer Screening      Colonoscopy Screen every 10 y house as a HepB virus carrier   Homosexual men   Illicit injectable drug abusers     Tetanus Toxoid  Only covered with a cut with metal- TD and TDaP Not covered by Medicare Part B No vaccine history found This may be covered with your prescription benefits

## 2018-03-08 RX ORDER — GLUCOSAM/CHON-MSM1/C/MANG/BOSW 500-416.6
TABLET ORAL
Qty: 200 EACH | Refills: 0 | Status: SHIPPED | OUTPATIENT
Start: 2018-03-08 | End: 2018-05-10

## 2018-03-08 RX ORDER — CALCIUM CITRATE/VITAMIN D3 200MG-6.25
TABLET ORAL
Qty: 200 STRIP | Refills: 0 | Status: SHIPPED | OUTPATIENT
Start: 2018-03-08 | End: 2018-05-14

## 2018-03-08 NOTE — TELEPHONE ENCOUNTER
Refilled per protocol.    Refill Protocol Appointment Criteria  · Appointment scheduled in the past 12 months or in the next 3 months  Recent Outpatient Visits            2 days ago Osteopenia, unspecified location    [address], [address] [facility]

## 2018-03-23 ENCOUNTER — LAB ENCOUNTER (OUTPATIENT)
Dept: LAB | Age: 78
End: 2018-03-23
Attending: FAMILY MEDICINE
Payer: MEDICARE

## 2018-03-23 DIAGNOSIS — E11.9 TYPE 2 DIABETES MELLITUS WITHOUT COMPLICATION, WITHOUT LONG-TERM CURRENT USE OF INSULIN (HCC): ICD-10-CM

## 2018-03-23 DIAGNOSIS — D50.9 IRON DEFICIENCY ANEMIA, UNSPECIFIED IRON DEFICIENCY ANEMIA TYPE: ICD-10-CM

## 2018-03-23 DIAGNOSIS — I70.0 ATHEROSCLEROSIS OF AORTA (HCC): ICD-10-CM

## 2018-03-23 LAB
ALBUMIN SERPL BCP-MCNC: 3.6 G/DL (ref 3.5–4.8)
ALBUMIN/GLOB SERPL: 1.6 {RATIO} (ref 1–2)
ALP SERPL-CCNC: 39 U/L (ref 32–100)
ALT SERPL-CCNC: 19 U/L (ref 14–54)
ANION GAP SERPL CALC-SCNC: 8 MMOL/L (ref 0–18)
AST SERPL-CCNC: 22 U/L (ref 15–41)
BASOPHILS # BLD: 0 K/UL (ref 0–0.2)
BASOPHILS NFR BLD: 1 %
BILIRUB SERPL-MCNC: 0.5 MG/DL (ref 0.3–1.2)
BUN SERPL-MCNC: 11 MG/DL (ref 8–20)
BUN/CREAT SERPL: 12 (ref 10–20)
CALCIUM SERPL-MCNC: 9 MG/DL (ref 8.5–10.5)
CHLORIDE SERPL-SCNC: 102 MMOL/L (ref 95–110)
CHOLEST SERPL-MCNC: 145 MG/DL (ref 110–200)
CO2 SERPL-SCNC: 26 MMOL/L (ref 22–32)
CREAT SERPL-MCNC: 0.92 MG/DL (ref 0.5–1.5)
CREAT UR-MCNC: 41.7 MG/DL
EOSINOPHIL # BLD: 0.1 K/UL (ref 0–0.7)
EOSINOPHIL NFR BLD: 2 %
ERYTHROCYTE [DISTWIDTH] IN BLOOD BY AUTOMATED COUNT: 15.2 % (ref 11–15)
GLOBULIN PLAS-MCNC: 2.2 G/DL (ref 2.5–3.7)
GLUCOSE SERPL-MCNC: 143 MG/DL (ref 70–99)
HBA1C MFR BLD: 4.5 % (ref 4–6)
HCT VFR BLD AUTO: 23.6 % (ref 35–48)
HDLC SERPL-MCNC: 43 MG/DL
HGB BLD-MCNC: 7.9 G/DL (ref 12–16)
LDLC SERPL CALC-MCNC: 59 MG/DL (ref 0–99)
LYMPHOCYTES # BLD: 1.1 K/UL (ref 1–4)
LYMPHOCYTES NFR BLD: 24 %
MCH RBC QN AUTO: 34.9 PG (ref 27–32)
MCHC RBC AUTO-ENTMCNC: 33.6 G/DL (ref 32–37)
MCV RBC AUTO: 103.6 FL (ref 80–100)
MICROALBUMIN UR-MCNC: 0.2 MG/DL (ref 0–1.8)
MICROALBUMIN/CREAT UR: 4.8 MG/G{CREAT} (ref 0–20)
MONOCYTES # BLD: 0.4 K/UL (ref 0–1)
MONOCYTES NFR BLD: 8 %
NEUTROPHILS # BLD AUTO: 2.8 K/UL (ref 1.8–7.7)
NEUTROPHILS NFR BLD: 65 %
NONHDLC SERPL-MCNC: 102 MG/DL
OSMOLALITY UR CALC.SUM OF ELEC: 284 MOSM/KG (ref 275–295)
PATIENT FASTING: YES
PLATELET # BLD AUTO: 265 K/UL (ref 140–400)
PMV BLD AUTO: 7.7 FL (ref 7.4–10.3)
POTASSIUM SERPL-SCNC: 4.2 MMOL/L (ref 3.3–5.1)
PROT SERPL-MCNC: 5.8 G/DL (ref 5.9–8.4)
RBC # BLD AUTO: 2.28 M/UL (ref 3.7–5.4)
SODIUM SERPL-SCNC: 136 MMOL/L (ref 136–144)
TRIGL SERPL-MCNC: 216 MG/DL (ref 1–149)
WBC # BLD AUTO: 4.3 K/UL (ref 4–11)

## 2018-03-23 PROCEDURE — 36415 COLL VENOUS BLD VENIPUNCTURE: CPT

## 2018-03-23 PROCEDURE — 85025 COMPLETE CBC W/AUTO DIFF WBC: CPT

## 2018-03-23 PROCEDURE — 80061 LIPID PANEL: CPT

## 2018-03-23 PROCEDURE — 82043 UR ALBUMIN QUANTITATIVE: CPT

## 2018-03-23 PROCEDURE — 83036 HEMOGLOBIN GLYCOSYLATED A1C: CPT

## 2018-03-23 PROCEDURE — 82570 ASSAY OF URINE CREATININE: CPT

## 2018-03-23 PROCEDURE — 80053 COMPREHEN METABOLIC PANEL: CPT

## 2018-03-28 DIAGNOSIS — R14.0 BLOATING: ICD-10-CM

## 2018-03-29 DIAGNOSIS — D50.9 IRON DEFICIENCY ANEMIA, UNSPECIFIED IRON DEFICIENCY ANEMIA TYPE: Primary | ICD-10-CM

## 2018-03-30 ENCOUNTER — TELEPHONE (OUTPATIENT)
Dept: OTHER | Age: 78
End: 2018-03-30

## 2018-03-30 RX ORDER — ESOMEPRAZOLE MAGNESIUM 40 MG/1
CAPSULE, DELAYED RELEASE ORAL
Qty: 90 CAPSULE | Refills: 0 | Status: SHIPPED | OUTPATIENT
Start: 2018-03-30 | End: 2018-07-24

## 2018-03-30 NOTE — TELEPHONE ENCOUNTER
Please clarify on message sent below of see lab results template. Thank you. Pt was contacted (Name and  verified) utilizing a # [id number]  and results were provided below and pt states that she has an OV appt with Dr. Nahum Thapa on 18.  C

## 2018-03-30 NOTE — TELEPHONE ENCOUNTER
Refill Protocol Appointment Criteria  · Appointment scheduled in the past 12 months or in the next 3 months  Recent Outpatient Visits            3 weeks ago Osteopenia, unspecified location    [address] [place] [address] [address] [place], [place]

## 2018-04-02 ENCOUNTER — TELEPHONE (OUTPATIENT)
Dept: HEMATOLOGY/ONCOLOGY | Facility: HOSPITAL | Age: 78
End: 2018-04-02

## 2018-04-02 DIAGNOSIS — D50.9 IRON DEFICIENCY ANEMIA, UNSPECIFIED IRON DEFICIENCY ANEMIA TYPE: Primary | ICD-10-CM

## 2018-04-02 NOTE — TELEPHONE ENCOUNTER
Pt called using a  Malou Tejeda and notified that has lab appointment at the [facility] tomorrow at [address] and appointment with Dr. Raphael Issa at [facility].

## 2018-04-03 ENCOUNTER — LAB ENCOUNTER (OUTPATIENT)
Dept: LAB | Facility: HOSPITAL | Age: 78
End: 2018-04-03
Attending: INTERNAL MEDICINE
Payer: MEDICARE

## 2018-04-03 ENCOUNTER — OFFICE VISIT (OUTPATIENT)
Dept: HEMATOLOGY/ONCOLOGY | Facility: HOSPITAL | Age: 78
End: 2018-04-03
Attending: INTERNAL MEDICINE
Payer: MEDICARE

## 2018-04-03 VITALS
HEART RATE: 63 BPM | WEIGHT: 127 LBS | SYSTOLIC BLOOD PRESSURE: 134 MMHG | RESPIRATION RATE: 16 BRPM | BODY MASS INDEX: 27.4 KG/M2 | DIASTOLIC BLOOD PRESSURE: 34 MMHG | HEIGHT: 57 IN | TEMPERATURE: 99 F

## 2018-04-03 DIAGNOSIS — D64.9 ANEMIA, UNSPECIFIED TYPE: Primary | ICD-10-CM

## 2018-04-03 DIAGNOSIS — D64.9 ANEMIA, UNSPECIFIED TYPE: ICD-10-CM

## 2018-04-03 DIAGNOSIS — D50.9 IRON DEFICIENCY ANEMIA, UNSPECIFIED IRON DEFICIENCY ANEMIA TYPE: ICD-10-CM

## 2018-04-03 PROBLEM — K90.9 IMPAIRED INTESTINAL ABSORPTION (HCC): Status: ACTIVE | Noted: 2018-04-03

## 2018-04-03 PROBLEM — K90.9 IMPAIRED INTESTINAL ABSORPTION: Status: ACTIVE | Noted: 2018-04-03

## 2018-04-03 PROCEDURE — 82728 ASSAY OF FERRITIN: CPT

## 2018-04-03 PROCEDURE — 86880 COOMBS TEST DIRECT: CPT

## 2018-04-03 PROCEDURE — 82607 VITAMIN B-12: CPT

## 2018-04-03 PROCEDURE — 86900 BLOOD TYPING SEROLOGIC ABO: CPT

## 2018-04-03 PROCEDURE — 86850 RBC ANTIBODY SCREEN: CPT

## 2018-04-03 PROCEDURE — 83540 ASSAY OF IRON: CPT

## 2018-04-03 PROCEDURE — 86901 BLOOD TYPING SEROLOGIC RH(D): CPT

## 2018-04-03 PROCEDURE — 83615 LACTATE (LD) (LDH) ENZYME: CPT

## 2018-04-03 PROCEDURE — 83010 ASSAY OF HAPTOGLOBIN QUANT: CPT

## 2018-04-03 PROCEDURE — 36415 COLL VENOUS BLD VENIPUNCTURE: CPT

## 2018-04-03 PROCEDURE — 99214 OFFICE O/P EST MOD 30 MIN: CPT | Performed by: INTERNAL MEDICINE

## 2018-04-03 PROCEDURE — 84466 ASSAY OF TRANSFERRIN: CPT

## 2018-04-03 RX ORDER — DIPHENHYDRAMINE HYDROCHLORIDE 50 MG/ML
50 INJECTION INTRAMUSCULAR; INTRAVENOUS ONCE
Status: CANCELLED | OUTPATIENT
Start: 2018-04-03

## 2018-04-03 NOTE — PROGRESS NOTES
KT Gaines is a 69year old female who is here today for f/u of Anemia, unspecified type  (primary encounter diagnosis)     The patient was recently seen by her primary care doctor, Dr. Diane Isidro.   She had routine blood work performe shortness of breath (BRYANT with walking 3K to 7K steps, but not always. ). Negative for cough and wheezing. Cardiovascular: Negative for chest pain, palpitations and leg swelling. Gastrointestinal: Positive for constipation and diarrhea.  Negative for abd tablet Rfl: 3   atorvastatin 20 MG Oral Tab TAKE 1 TABLET(20 MG) BY MOUTH DAILY Disp: 90 tablet Rfl: 3   Metoprolol Succinate ER 25 MG Oral Tablet 24 Hr Take 1 tablet (25 mg total) by mouth daily.  Disp: 90 tablet Rfl: 3   losartan 100 MG Oral Tab Take 1 ta unspecified hyperlipidemia     medical management   • Pregnancy        • Special screening for osteoporosis 2013    Dexa Scan   • Stress incontinence    • Type II or unspecified type diabetes mellitus without mention of complication, not state ear normal.   Left Ear: External ear normal.   Nose: Nose normal.   Mouth/Throat: Oropharynx is clear and moist. No oropharyngeal exudate. Eyes: Conjunctivae and EOM are normal. Right eye exhibits no discharge. Left eye exhibits no discharge.  No scleral today, which are pending. Given that the patient has macrocytosis, hemolytic workup has been ordered. However her CMP did not show an increase in bilirubin. Reticulocyte count has been ordered as well.   Type and screen was also ordered, and if patient's - 1.50 mg/dL 0.92  0.84   CALCIUM      8.5 - 10.5 mg/dL 9.0  9.2   ALT (SGPT)      14 - 54 U/L 19  22   AST (SGOT)      15 - 41 U/L 22  24   ALKALINE PHOSPHATASE      32 - 100 U/L 39  38   Total Bilirubin      0.3 - 1.2 mg/dL 0.5  0.4   TOTAL PROTEIN

## 2018-04-06 ENCOUNTER — OFFICE VISIT (OUTPATIENT)
Dept: HEMATOLOGY/ONCOLOGY | Facility: HOSPITAL | Age: 78
End: 2018-04-06
Attending: INTERNAL MEDICINE
Payer: MEDICARE

## 2018-04-06 VITALS
SYSTOLIC BLOOD PRESSURE: 144 MMHG | HEART RATE: 66 BPM | DIASTOLIC BLOOD PRESSURE: 37 MMHG | RESPIRATION RATE: 16 BRPM | TEMPERATURE: 98 F

## 2018-04-06 DIAGNOSIS — K90.9 IMPAIRED INTESTINAL ABSORPTION: Primary | ICD-10-CM

## 2018-04-06 DIAGNOSIS — D50.9 IRON DEFICIENCY ANEMIA, UNSPECIFIED IRON DEFICIENCY ANEMIA TYPE: ICD-10-CM

## 2018-04-06 PROCEDURE — 96374 THER/PROPH/DIAG INJ IV PUSH: CPT

## 2018-04-06 PROCEDURE — A4216 STERILE WATER/SALINE, 10 ML: HCPCS

## 2018-04-06 RX ORDER — 0.9 % SODIUM CHLORIDE 0.9 %
VIAL (ML) INJECTION
Status: DISCONTINUED
Start: 2018-04-06 | End: 2018-04-06

## 2018-04-06 RX ORDER — DIPHENHYDRAMINE HYDROCHLORIDE 50 MG/ML
50 INJECTION INTRAMUSCULAR; INTRAVENOUS ONCE
Status: CANCELLED | OUTPATIENT
Start: 2018-04-06

## 2018-04-06 NOTE — PROGRESS NOTES
Peggy Maynard presents today for the first of 5 doses of iron venofer 200mg ordered by Dr. Bhumi Baptiste for iron deficiency anemia. Lab results from 3/23 hgb of 7.9/23.6 has complaint of feeling tired and dizzy at times.  This nurse explained plan of care, possible side ef

## 2018-04-09 ENCOUNTER — OFFICE VISIT (OUTPATIENT)
Dept: HEMATOLOGY/ONCOLOGY | Facility: HOSPITAL | Age: 78
End: 2018-04-09
Attending: INTERNAL MEDICINE
Payer: MEDICARE

## 2018-04-09 VITALS
HEART RATE: 54 BPM | RESPIRATION RATE: 16 BRPM | SYSTOLIC BLOOD PRESSURE: 139 MMHG | DIASTOLIC BLOOD PRESSURE: 61 MMHG | TEMPERATURE: 98 F

## 2018-04-09 DIAGNOSIS — D50.9 IRON DEFICIENCY ANEMIA, UNSPECIFIED IRON DEFICIENCY ANEMIA TYPE: ICD-10-CM

## 2018-04-09 DIAGNOSIS — K90.9 IMPAIRED INTESTINAL ABSORPTION: Primary | ICD-10-CM

## 2018-04-09 PROCEDURE — A4216 STERILE WATER/SALINE, 10 ML: HCPCS

## 2018-04-09 PROCEDURE — 96374 THER/PROPH/DIAG INJ IV PUSH: CPT

## 2018-04-09 RX ORDER — DIPHENHYDRAMINE HYDROCHLORIDE 50 MG/ML
50 INJECTION INTRAMUSCULAR; INTRAVENOUS ONCE
Status: CANCELLED | OUTPATIENT
Start: 2018-04-09

## 2018-04-09 RX ORDER — 0.9 % SODIUM CHLORIDE 0.9 %
VIAL (ML) INJECTION
Status: DISCONTINUED
Start: 2018-04-09 | End: 2018-04-09

## 2018-04-09 NOTE — PROGRESS NOTES
Pt to infusion for Venofer 2/5.  Labs from 4/3   Ferritin 11 - 307 ng/mL 64      Iron 28 - 170 mcg/dL 36    Total Iron Binding Capacity 228 - 428 mcg/dL 374    Iron Saturation 15 - 50 % 10     Transferrin 192 - 382 mg/dL 283      Pt reports feeling fatigued

## 2018-04-12 ENCOUNTER — OFFICE VISIT (OUTPATIENT)
Dept: HEMATOLOGY/ONCOLOGY | Facility: HOSPITAL | Age: 78
End: 2018-04-12
Attending: INTERNAL MEDICINE
Payer: MEDICARE

## 2018-04-12 VITALS
DIASTOLIC BLOOD PRESSURE: 32 MMHG | SYSTOLIC BLOOD PRESSURE: 134 MMHG | RESPIRATION RATE: 16 BRPM | HEART RATE: 70 BPM | TEMPERATURE: 99 F

## 2018-04-12 DIAGNOSIS — D50.9 IRON DEFICIENCY ANEMIA, UNSPECIFIED IRON DEFICIENCY ANEMIA TYPE: ICD-10-CM

## 2018-04-12 DIAGNOSIS — K90.9 IMPAIRED INTESTINAL ABSORPTION: Primary | ICD-10-CM

## 2018-04-12 PROCEDURE — A4216 STERILE WATER/SALINE, 10 ML: HCPCS

## 2018-04-12 PROCEDURE — 96372 THER/PROPH/DIAG INJ SC/IM: CPT

## 2018-04-12 RX ORDER — 0.9 % SODIUM CHLORIDE 0.9 %
VIAL (ML) INJECTION
Status: DISCONTINUED
Start: 2018-04-12 | End: 2018-04-12

## 2018-04-12 RX ORDER — DIPHENHYDRAMINE HYDROCHLORIDE 50 MG/ML
50 INJECTION INTRAMUSCULAR; INTRAVENOUS ONCE
Status: CANCELLED | OUTPATIENT
Start: 2018-04-12

## 2018-04-12 NOTE — PROGRESS NOTES
Pt arrived for Venofer #3. .  States her energy has improved, not sleeping so much. IV placed in Left Ac, Venofer given as ordered, tolerated without incident. Patient observed for 15 minutes, no signs of adverse reaction noted.   IV removed, gauze and kandy

## 2018-04-16 ENCOUNTER — OFFICE VISIT (OUTPATIENT)
Dept: HEMATOLOGY/ONCOLOGY | Facility: HOSPITAL | Age: 78
End: 2018-04-16
Attending: INTERNAL MEDICINE
Payer: MEDICARE

## 2018-04-16 VITALS
HEART RATE: 57 BPM | TEMPERATURE: 98 F | SYSTOLIC BLOOD PRESSURE: 131 MMHG | RESPIRATION RATE: 18 BRPM | DIASTOLIC BLOOD PRESSURE: 36 MMHG

## 2018-04-16 DIAGNOSIS — D50.9 IRON DEFICIENCY ANEMIA, UNSPECIFIED IRON DEFICIENCY ANEMIA TYPE: ICD-10-CM

## 2018-04-16 DIAGNOSIS — K90.9 IMPAIRED INTESTINAL ABSORPTION: Primary | ICD-10-CM

## 2018-04-16 PROCEDURE — A4216 STERILE WATER/SALINE, 10 ML: HCPCS

## 2018-04-16 PROCEDURE — 96374 THER/PROPH/DIAG INJ IV PUSH: CPT

## 2018-04-16 RX ORDER — DIPHENHYDRAMINE HYDROCHLORIDE 50 MG/ML
50 INJECTION INTRAMUSCULAR; INTRAVENOUS ONCE
Status: CANCELLED | OUTPATIENT
Start: 2018-04-16

## 2018-04-16 RX ORDER — 0.9 % SODIUM CHLORIDE 0.9 %
VIAL (ML) INJECTION
Status: DISCONTINUED
Start: 2018-04-16 | End: 2018-04-16

## 2018-04-19 ENCOUNTER — OFFICE VISIT (OUTPATIENT)
Dept: HEMATOLOGY/ONCOLOGY | Facility: HOSPITAL | Age: 78
End: 2018-04-19
Attending: INTERNAL MEDICINE
Payer: MEDICARE

## 2018-04-19 VITALS
TEMPERATURE: 98 F | RESPIRATION RATE: 16 BRPM | DIASTOLIC BLOOD PRESSURE: 45 MMHG | SYSTOLIC BLOOD PRESSURE: 141 MMHG | HEART RATE: 65 BPM

## 2018-04-19 DIAGNOSIS — K90.9 IMPAIRED INTESTINAL ABSORPTION: Primary | ICD-10-CM

## 2018-04-19 DIAGNOSIS — D50.9 IRON DEFICIENCY ANEMIA, UNSPECIFIED IRON DEFICIENCY ANEMIA TYPE: ICD-10-CM

## 2018-04-19 PROCEDURE — 96374 THER/PROPH/DIAG INJ IV PUSH: CPT

## 2018-04-19 PROCEDURE — A4216 STERILE WATER/SALINE, 10 ML: HCPCS

## 2018-04-19 RX ORDER — DIPHENHYDRAMINE HYDROCHLORIDE 50 MG/ML
50 INJECTION INTRAMUSCULAR; INTRAVENOUS ONCE
Status: CANCELLED | OUTPATIENT
Start: 2018-04-19

## 2018-04-19 RX ORDER — 0.9 % SODIUM CHLORIDE 0.9 %
VIAL (ML) INJECTION
Status: DISCONTINUED
Start: 2018-04-19 | End: 2018-04-19

## 2018-04-19 NOTE — PROGRESS NOTES
Pt presents to infusion today for IV venofer 5 of 5. Pt appears well and denies any shortness of breath, dizziness or light headedness. Pt with no complaints, allergies and medications confirmed. PIV started in left ac, positive blood return noted.

## 2018-04-24 ENCOUNTER — APPOINTMENT (OUTPATIENT)
Dept: HEMATOLOGY/ONCOLOGY | Facility: HOSPITAL | Age: 78
End: 2018-04-24
Attending: INTERNAL MEDICINE
Payer: MEDICARE

## 2018-05-10 RX ORDER — CALCIUM CITRATE/VITAMIN D3 200MG-6.25
TABLET ORAL
Qty: 200 STRIP | Refills: 0 | Status: CANCELLED | OUTPATIENT
Start: 2018-05-10

## 2018-05-14 ENCOUNTER — TELEPHONE (OUTPATIENT)
Dept: FAMILY MEDICINE CLINIC | Facility: CLINIC | Age: 78
End: 2018-05-14

## 2018-05-14 RX ORDER — CALCIUM CITRATE/VITAMIN D3 200MG-6.25
TABLET ORAL
Qty: 200 STRIP | Refills: 2 | Status: SHIPPED | OUTPATIENT
Start: 2018-05-14 | End: 2020-09-06

## 2018-05-14 RX ORDER — GLUCOSAM/CHON-MSM1/C/MANG/BOSW 500-416.6
TABLET ORAL
Qty: 200 EACH | Refills: 4 | Status: SHIPPED | OUTPATIENT
Start: 2018-05-14

## 2018-05-14 NOTE — TELEPHONE ENCOUNTER
Diabetes Medications  Protocol Criteria:  · Appointment scheduled in the past 6 months or the next 3 months  · A1C < 7.5 in the past 6 months  · Creatinine in the past 12 months  · Creatinine result < 1.5   Recent Outpatient Visits            3 weeks ago I

## 2018-05-14 NOTE — TELEPHONE ENCOUNTER
Pt states that she is out of test strips. Pharmacy: [facility]. Per pt the pharmacy has sent this request to the office already.        Current Outpatient Prescriptions:  TRUE METRIX BLOOD GLUCOSE TEST In Vitro Strip CHECK BLOOD GLUCOSE TWICE Martín Cat

## 2018-05-14 NOTE — TELEPHONE ENCOUNTER
LOV with Dr Melara 16 3/6/18 with DM reviewed by MD. True metrix test strips refilled for 3 months for bid testing.      Diabetes Medications  Protocol Criteria:  · Appointment scheduled in the past 6 months or the next 3 months  · A1C < 7.5 in the past 6 months

## 2018-05-29 RX ORDER — MONTELUKAST SODIUM 10 MG/1
TABLET ORAL
Qty: 90 TABLET | Refills: 0 | Status: SHIPPED | OUTPATIENT
Start: 2018-05-29 | End: 2018-08-20

## 2018-05-29 NOTE — TELEPHONE ENCOUNTER
Refill Protocol Appointment Criteria  · Appointment scheduled in the past 6 months or in the next 3 months  Recent Outpatient Visits            1 month ago Impaired intestinal absorption    [facility] Visit    1 mon

## 2018-07-17 ENCOUNTER — NURSE ONLY (OUTPATIENT)
Dept: HEMATOLOGY/ONCOLOGY | Facility: HOSPITAL | Age: 78
End: 2018-07-17
Attending: INTERNAL MEDICINE
Payer: MEDICARE

## 2018-07-17 VITALS
WEIGHT: 124 LBS | RESPIRATION RATE: 16 BRPM | BODY MASS INDEX: 26.75 KG/M2 | DIASTOLIC BLOOD PRESSURE: 42 MMHG | TEMPERATURE: 98 F | HEART RATE: 52 BPM | SYSTOLIC BLOOD PRESSURE: 134 MMHG | HEIGHT: 57 IN

## 2018-07-17 DIAGNOSIS — D64.9 ANEMIA, UNSPECIFIED TYPE: ICD-10-CM

## 2018-07-17 DIAGNOSIS — D50.8 OTHER IRON DEFICIENCY ANEMIA: Primary | ICD-10-CM

## 2018-07-17 DIAGNOSIS — D64.9 ANEMIA: ICD-10-CM

## 2018-07-17 DIAGNOSIS — Z51.81 MEDICATION MONITORING ENCOUNTER: ICD-10-CM

## 2018-07-17 LAB
ANTIBODY SCREEN: NEGATIVE
BASOPHILS # BLD: 0 K/UL (ref 0–0.2)
BASOPHILS NFR BLD: 1 %
EOSINOPHIL # BLD: 0.1 K/UL (ref 0–0.7)
EOSINOPHIL NFR BLD: 2 %
ERYTHROCYTE [DISTWIDTH] IN BLOOD BY AUTOMATED COUNT: 14.1 % (ref 11–15)
FERRITIN SERPL IA-MCNC: 43 NG/ML (ref 11–307)
HCT VFR BLD AUTO: 28.5 % (ref 35–48)
HGB BLD-MCNC: 9.4 G/DL (ref 12–16)
IRON SATN MFR SERPL: 9 % (ref 15–50)
IRON SERPL-MCNC: 29 MCG/DL (ref 28–170)
LYMPHOCYTES # BLD: 0.9 K/UL (ref 1–4)
LYMPHOCYTES NFR BLD: 21 %
MCH RBC QN AUTO: 32.8 PG (ref 27–32)
MCHC RBC AUTO-ENTMCNC: 33 G/DL (ref 32–37)
MCV RBC AUTO: 99.4 FL (ref 80–100)
MONOCYTES # BLD: 0.3 K/UL (ref 0–1)
MONOCYTES NFR BLD: 6 %
NEUTROPHILS # BLD AUTO: 3 K/UL (ref 1.8–7.7)
NEUTROPHILS NFR BLD: 70 %
PLATELET # BLD AUTO: 231 K/UL (ref 140–400)
PMV BLD AUTO: 7.7 FL (ref 7.4–10.3)
RBC # BLD AUTO: 2.87 M/UL (ref 3.7–5.4)
RH BLOOD TYPE: POSITIVE
TIBC SERPL-MCNC: 323 MCG/DL (ref 228–428)
TRANSFERRIN SERPL-MCNC: 245 MG/DL (ref 192–382)
WBC # BLD AUTO: 4.3 K/UL (ref 4–11)

## 2018-07-17 PROCEDURE — 86901 BLOOD TYPING SEROLOGIC RH(D): CPT

## 2018-07-17 PROCEDURE — 36415 COLL VENOUS BLD VENIPUNCTURE: CPT

## 2018-07-17 PROCEDURE — 99214 OFFICE O/P EST MOD 30 MIN: CPT | Performed by: INTERNAL MEDICINE

## 2018-07-17 PROCEDURE — 86850 RBC ANTIBODY SCREEN: CPT

## 2018-07-17 PROCEDURE — 85025 COMPLETE CBC W/AUTO DIFF WBC: CPT

## 2018-07-17 PROCEDURE — 82728 ASSAY OF FERRITIN: CPT

## 2018-07-17 PROCEDURE — 84466 ASSAY OF TRANSFERRIN: CPT

## 2018-07-17 PROCEDURE — 83540 ASSAY OF IRON: CPT

## 2018-07-17 PROCEDURE — 86900 BLOOD TYPING SEROLOGIC ABO: CPT

## 2018-07-17 NOTE — PROGRESS NOTES
HPI       Al Staff is a 69year old female who is here today for f/u of Anemia, unspecified type  Other iron deficiency anemia  (primary encounter diagnosis)  Medication monitoring encounter     States was recently went to [place].   States was no complete the treatment for the UTI. Was told was going to have repeat UA and this was OK and did not get further antibiotics. Musculoskeletal: Positive for arthralgias (knee pain and ankles at times. States now at fingers. ).  Negative for back pain, ga Take 81 mg by mouth daily.  Disp:  Rfl:    OXYBUTYNIN CHLORIDE ER 10 MG Oral Tablet 24 Hr TAKE 1 TABLET(10 MG) BY MOUTH DAILY Disp: 90 tablet Rfl: 11   Blood Glucose Monitoring Suppl (TRUE METRIX METER) W/DEVICE Does not apply Kit 1 kit by Does not apply ro Years of education: N/A  Number of children: N/A     Occupational History  None on file     Social History Main Topics   Smoking status: Never Smoker    Smokeless tobacco: Never Used    Alcohol use No    Drug use: No    Sexual activity: Not on file     Oth no mass. There is no hepatosplenomegaly. There is no tenderness. There is no rebound and no guarding. Musculoskeletal: She exhibits no edema or tenderness.    Lymphadenopathy:        Head (right side): No submental, no submandibular, no tonsillar, no prea MCHC 33.0 32.0 - 37.0 g/dl   RDW 14.1 11.0 - 15.0 %    140 - 400 K/UL   MPV 7.7 7.4 - 10.3 fL   Neutrophil % 70 %   Lymphocyte % 21 %   Monocyte % 6 %   Eosinophil % 2 %   Basophil % 1 %   Neutrophil Absolute 3.0 1.8 - 7.7 K/UL   Lymphocyte Absolu

## 2018-07-17 NOTE — PROGRESS NOTES
Patient to center for FT labs  Labs collected peripherally from right [facility] , 2 x 2 and coban to site . discharged stable to waiting area with spouse . MD appointment today.

## 2018-07-20 NOTE — LETTER
2/9/2024              Mercy Nieves        [address]        [place] [address]         Dear Mercy,    This letter is to inform you that our office has made several attempts to reach you by phone without success.  We were attempting to contact you by phone because we received a request for the carafate and pantoprazole from your pharmacy.    Dr. Clement said that if you were feeling better they can cancel (you don't need the carafate) and that you were treated for the esophagitis.    Please contact our office at the number listed below as soon as you receive this letter to discuss this issue and to make the necessary changes in our system to your contact information.  Thank you for your cooperation.        Sincerely,    Gastroenterology Clinical Staff  [facility]  [address]  [place] [place] [facility] [address] [phone]           -NISS.  -hemoglobin A1C 7.8.

## 2018-07-24 DIAGNOSIS — R14.0 BLOATING: ICD-10-CM

## 2018-07-24 RX ORDER — ESOMEPRAZOLE MAGNESIUM 40 MG/1
CAPSULE, DELAYED RELEASE ORAL
Qty: 90 CAPSULE | Refills: 0 | Status: ON HOLD | OUTPATIENT
Start: 2018-07-24 | End: 2018-09-23

## 2018-07-24 RX ORDER — ERGOCALCIFEROL 1.25 MG/1
CAPSULE ORAL
Qty: 12 CAPSULE | Refills: 11 | Status: SHIPPED | OUTPATIENT
Start: 2018-07-24 | End: 2019-10-15

## 2018-07-25 NOTE — TELEPHONE ENCOUNTER
LOV: 3-6-18 Last Rx: 7-24-17     No protocol     Please advise in regards to refill request. Thank You      Refill Protocol Appointment Criteria  · Appointment scheduled in the past 12 months or in the next 3 months  Recent Outpatient Visits            1 w

## 2018-08-09 ENCOUNTER — OFFICE VISIT (OUTPATIENT)
Dept: CARDIOLOGY CLINIC | Facility: CLINIC | Age: 78
End: 2018-08-09

## 2018-08-09 VITALS
RESPIRATION RATE: 18 BRPM | DIASTOLIC BLOOD PRESSURE: 58 MMHG | BODY MASS INDEX: 27 KG/M2 | SYSTOLIC BLOOD PRESSURE: 110 MMHG | HEART RATE: 56 BPM | WEIGHT: 124 LBS

## 2018-08-09 DIAGNOSIS — I25.10 CORONARY ARTERY DISEASE INVOLVING NATIVE CORONARY ARTERY OF NATIVE HEART WITHOUT ANGINA PECTORIS: Primary | ICD-10-CM

## 2018-08-09 DIAGNOSIS — R09.89 BILATERAL CAROTID BRUITS: ICD-10-CM

## 2018-08-09 DIAGNOSIS — I10 ESSENTIAL HYPERTENSION: ICD-10-CM

## 2018-08-09 PROCEDURE — 99214 OFFICE O/P EST MOD 30 MIN: CPT | Performed by: INTERNAL MEDICINE

## 2018-08-09 PROCEDURE — G0463 HOSPITAL OUTPT CLINIC VISIT: HCPCS | Performed by: INTERNAL MEDICINE

## 2018-08-09 NOTE — PROGRESS NOTES
Jian Sánchez is a 69year old female. Patient presents with:   Follow - Up  CAD  Hypertension  Dizziness: 2 episodes since last ov 2/8/18    HPI:   This is a pleasant 81-year-old with known coronary disease in LAD stent in July 2016 who also has diabetes Oral Tab Take 1 tablet by mouth once daily. Disp:  Rfl:    B Complex Vitamins (VITAMIN B COMPLEX) Oral Tab Take 1 tablet by mouth once daily.  Disp:  Rfl:    TRUEPLUS LANCETS 28G Does not apply Misc CHECK BLOOD GLUCOSE TWICE DAILY Disp: 200 each Rfl: 4   Gl Pulse 56   Resp 18   Wt 124 lb (56.2 kg)   BMI 26.83 kg/m²   GENERAL: well developed, well nourished,in no apparent distress  SKIN: no rashes,no suspicious lesions  HEENT: atraumatic, normocephalic,ears and throat are clear  NECK: supple,no adenopathy, sof

## 2018-08-14 ENCOUNTER — HOSPITAL ENCOUNTER (OUTPATIENT)
Dept: ULTRASOUND IMAGING | Age: 78
Discharge: HOME | End: 2018-08-14
Attending: INTERNAL MEDICINE
Payer: MEDICARE

## 2018-08-14 DIAGNOSIS — R09.89 BILATERAL CAROTID BRUITS: ICD-10-CM

## 2018-08-14 PROCEDURE — 93880 EXTRACRANIAL BILAT STUDY: CPT | Performed by: INTERNAL MEDICINE

## 2018-08-20 RX ORDER — MONTELUKAST SODIUM 10 MG/1
TABLET ORAL
Qty: 90 TABLET | Refills: 0 | Status: SHIPPED | OUTPATIENT
Start: 2018-08-20 | End: 2018-12-05

## 2018-08-20 NOTE — TELEPHONE ENCOUNTER
Refill protocol criteria met, rx sent.       Refill Protocol Appointment Criteria  · Appointment scheduled in the past 6 months or in the next 3 months  Recent Outpatient Visits            1 week ago Coronary artery disease involving native coronary artery

## 2018-08-27 ENCOUNTER — LAB ENCOUNTER (OUTPATIENT)
Dept: LAB | Age: 78
End: 2018-08-27
Attending: FAMILY MEDICINE
Payer: MEDICARE

## 2018-08-27 ENCOUNTER — OFFICE VISIT (OUTPATIENT)
Dept: FAMILY MEDICINE CLINIC | Facility: CLINIC | Age: 78
End: 2018-08-27

## 2018-08-27 VITALS
TEMPERATURE: 98 F | SYSTOLIC BLOOD PRESSURE: 145 MMHG | WEIGHT: 122 LBS | HEART RATE: 51 BPM | BODY MASS INDEX: 26 KG/M2 | DIASTOLIC BLOOD PRESSURE: 73 MMHG

## 2018-08-27 DIAGNOSIS — I25.118 CORONARY ARTERY DISEASE OF NATIVE HEART WITH STABLE ANGINA PECTORIS, UNSPECIFIED VESSEL OR LESION TYPE (HCC): ICD-10-CM

## 2018-08-27 DIAGNOSIS — N18.30 TYPE 2 DIABETES MELLITUS WITH STAGE 3 CHRONIC KIDNEY DISEASE, WITHOUT LONG-TERM CURRENT USE OF INSULIN (HCC): Chronic | ICD-10-CM

## 2018-08-27 DIAGNOSIS — D64.9 ANEMIA, UNSPECIFIED TYPE: ICD-10-CM

## 2018-08-27 DIAGNOSIS — R14.0 BLOATING: ICD-10-CM

## 2018-08-27 DIAGNOSIS — I10 ESSENTIAL HYPERTENSION: Primary | ICD-10-CM

## 2018-08-27 DIAGNOSIS — E11.22 TYPE 2 DIABETES MELLITUS WITH STAGE 3 CHRONIC KIDNEY DISEASE, WITHOUT LONG-TERM CURRENT USE OF INSULIN (HCC): Chronic | ICD-10-CM

## 2018-08-27 PROCEDURE — 82570 ASSAY OF URINE CREATININE: CPT

## 2018-08-27 PROCEDURE — 99214 OFFICE O/P EST MOD 30 MIN: CPT | Performed by: FAMILY MEDICINE

## 2018-08-27 PROCEDURE — 82043 UR ALBUMIN QUANTITATIVE: CPT

## 2018-08-27 PROCEDURE — G0463 HOSPITAL OUTPT CLINIC VISIT: HCPCS | Performed by: FAMILY MEDICINE

## 2018-08-27 PROCEDURE — 80061 LIPID PANEL: CPT

## 2018-08-27 PROCEDURE — 80053 COMPREHEN METABOLIC PANEL: CPT

## 2018-08-27 PROCEDURE — 36415 COLL VENOUS BLD VENIPUNCTURE: CPT

## 2018-08-27 PROCEDURE — 83036 HEMOGLOBIN GLYCOSYLATED A1C: CPT

## 2018-08-27 RX ORDER — LOSARTAN POTASSIUM AND HYDROCHLOROTHIAZIDE 12.5; 1 MG/1; MG/1
1 TABLET ORAL DAILY
Qty: 90 TABLET | Refills: 3 | Status: SHIPPED | OUTPATIENT
Start: 2018-08-27 | End: 2019-02-14

## 2018-08-27 NOTE — PROGRESS NOTES
Has htn    Doing well  Discussed diet high in carbs cookies bread and [place] food. Low in protein        Patient's past medical surgical family social history was reviewed.      Review of Systems  Allergic: no environmental allergies or food allergies  Ca lesion type ([facility]  referral  - CARDIO - INTERNAL    5.  Anemia, unspecified type  F/u with Dr. Jaxon Purcell.  - Rebecca Mares, GASTRO (INTERNAL)

## 2018-08-28 ENCOUNTER — TELEPHONE (OUTPATIENT)
Dept: OTHER | Age: 78
End: 2018-08-28

## 2018-08-28 NOTE — TELEPHONE ENCOUNTER
Split the losartan in half so she is taking 50mg a day  Eat more SALT. Recheck sodium in [place] in a week or so. Don't wait til she gets back.

## 2018-08-28 NOTE — TELEPHONE ENCOUNTER
Dr. [facility] pt was inform of your message below in [address] . Pt stated that for the past 3 months she has only been taking the Losartan 100 mg once a day. She stated that you d/c the losartan/Hctz 3 months ago.  Pt stated that she is going to [facility] tomorrow and

## 2018-08-29 NOTE — TELEPHONE ENCOUNTER
Pt was contacted and inform of Dr. [facility] message below.  Pt verbalized understanding and stated that she will have it check in [place].Thanks

## 2018-09-21 ENCOUNTER — HOSPITAL ENCOUNTER (INPATIENT)
Facility: HOSPITAL | Age: 78
LOS: 2 days | Discharge: HOME | DRG: 812 | End: 2018-09-23
Attending: EMERGENCY MEDICINE | Admitting: HOSPITALIST
Payer: MEDICARE

## 2018-09-21 ENCOUNTER — OFFICE VISIT (OUTPATIENT)
Dept: FAMILY MEDICINE CLINIC | Facility: CLINIC | Age: 78
End: 2018-09-21

## 2018-09-21 VITALS
HEART RATE: 69 BPM | TEMPERATURE: 99 F | WEIGHT: 122 LBS | BODY MASS INDEX: 26 KG/M2 | SYSTOLIC BLOOD PRESSURE: 146 MMHG | DIASTOLIC BLOOD PRESSURE: 62 MMHG

## 2018-09-21 DIAGNOSIS — R55 SYNCOPE, UNSPECIFIED SYNCOPE TYPE: Primary | ICD-10-CM

## 2018-09-21 DIAGNOSIS — I10 ESSENTIAL HYPERTENSION: ICD-10-CM

## 2018-09-21 DIAGNOSIS — E11.22 TYPE 2 DIABETES MELLITUS WITH STAGE 3 CHRONIC KIDNEY DISEASE, WITHOUT LONG-TERM CURRENT USE OF INSULIN (HCC): ICD-10-CM

## 2018-09-21 DIAGNOSIS — D64.9 ANEMIA, UNSPECIFIED TYPE: ICD-10-CM

## 2018-09-21 DIAGNOSIS — N18.30 TYPE 2 DIABETES MELLITUS WITH STAGE 3 CHRONIC KIDNEY DISEASE, WITHOUT LONG-TERM CURRENT USE OF INSULIN (HCC): ICD-10-CM

## 2018-09-21 DIAGNOSIS — D64.9 ANEMIA, UNSPECIFIED TYPE: Primary | ICD-10-CM

## 2018-09-21 LAB
ANION GAP SERPL CALC-SCNC: 9 MMOL/L (ref 0–18)
ANTIBODY SCREEN: NEGATIVE
BASOPHILS # BLD: 0 K/UL (ref 0–0.2)
BASOPHILS NFR BLD: 0 %
BUN SERPL-MCNC: 16 MG/DL (ref 8–20)
BUN/CREAT SERPL: 16.2 (ref 10–20)
CALCIUM SERPL-MCNC: 9 MG/DL (ref 8.5–10.5)
CHLORIDE SERPL-SCNC: 102 MMOL/L (ref 95–110)
CO2 SERPL-SCNC: 23 MMOL/L (ref 22–32)
CREAT SERPL-MCNC: 0.99 MG/DL (ref 0.5–1.5)
EOSINOPHIL # BLD: 0.1 K/UL (ref 0–0.7)
EOSINOPHIL NFR BLD: 1 %
ERYTHROCYTE [DISTWIDTH] IN BLOOD BY AUTOMATED COUNT: 15.4 % (ref 11–15)
GLUCOSE BLDC GLUCOMTR-MCNC: 134 MG/DL (ref 70–99)
GLUCOSE BLDC GLUCOMTR-MCNC: 183 MG/DL (ref 70–99)
GLUCOSE SERPL-MCNC: 155 MG/DL (ref 70–99)
HBA1C MFR BLD: 5.3 % (ref 4–6)
HCT VFR BLD AUTO: 21.2 % (ref 35–48)
HGB BLD-MCNC: 7 G/DL (ref 12–16)
LYMPHOCYTES # BLD: 0.9 K/UL (ref 1–4)
LYMPHOCYTES NFR BLD: 12 %
MCH RBC QN AUTO: 31.9 PG (ref 27–32)
MCHC RBC AUTO-ENTMCNC: 32.9 G/DL (ref 32–37)
MCV RBC AUTO: 97 FL (ref 80–100)
MONOCYTES # BLD: 0.6 K/UL (ref 0–1)
MONOCYTES NFR BLD: 8 %
NEUTROPHILS # BLD AUTO: 6.3 K/UL (ref 1.8–7.7)
NEUTROPHILS NFR BLD: 79 %
OSMOLALITY UR CALC.SUM OF ELEC: 282 MOSM/KG (ref 275–295)
PLATELET # BLD AUTO: 342 K/UL (ref 140–400)
PMV BLD AUTO: 7.2 FL (ref 7.4–10.3)
POTASSIUM SERPL-SCNC: 4 MMOL/L (ref 3.3–5.1)
RBC # BLD AUTO: 2.18 M/UL (ref 3.7–5.4)
RH BLOOD TYPE: POSITIVE
SODIUM SERPL-SCNC: 134 MMOL/L (ref 136–144)
WBC # BLD AUTO: 8 K/UL (ref 4–11)

## 2018-09-21 PROCEDURE — 30233R1 TRANSFUSION OF NONAUTOLOGOUS PLATELETS INTO PERIPHERAL VEIN, PERCUTANEOUS APPROACH: ICD-10-PCS | Performed by: HOSPITALIST

## 2018-09-21 PROCEDURE — 99223 1ST HOSP IP/OBS HIGH 75: CPT | Performed by: INTERNAL MEDICINE

## 2018-09-21 PROCEDURE — 99214 OFFICE O/P EST MOD 30 MIN: CPT | Performed by: FAMILY MEDICINE

## 2018-09-21 PROCEDURE — 99223 1ST HOSP IP/OBS HIGH 75: CPT | Performed by: HOSPITALIST

## 2018-09-21 RX ORDER — DEXTROSE MONOHYDRATE 25 G/50ML
50 INJECTION, SOLUTION INTRAVENOUS AS NEEDED
Status: DISCONTINUED | OUTPATIENT
Start: 2018-09-21 | End: 2018-09-23

## 2018-09-21 RX ORDER — ACETAMINOPHEN 325 MG/1
650 TABLET ORAL EVERY 6 HOURS PRN
Status: DISCONTINUED | OUTPATIENT
Start: 2018-09-21 | End: 2018-09-23

## 2018-09-21 RX ORDER — SODIUM CHLORIDE 0.9 % (FLUSH) 0.9 %
3 SYRINGE (ML) INJECTION AS NEEDED
Status: DISCONTINUED | OUTPATIENT
Start: 2018-09-21 | End: 2018-09-23

## 2018-09-21 RX ORDER — ONDANSETRON 2 MG/ML
4 INJECTION INTRAMUSCULAR; INTRAVENOUS EVERY 6 HOURS PRN
Status: DISCONTINUED | OUTPATIENT
Start: 2018-09-21 | End: 2018-09-23

## 2018-09-21 RX ORDER — SODIUM CHLORIDE 9 MG/ML
INJECTION, SOLUTION INTRAVENOUS
Status: COMPLETED
Start: 2018-09-21 | End: 2018-09-21

## 2018-09-21 RX ORDER — SODIUM CHLORIDE 9 MG/ML
INJECTION, SOLUTION INTRAVENOUS CONTINUOUS
Status: DISCONTINUED | OUTPATIENT
Start: 2018-09-21 | End: 2018-09-22

## 2018-09-21 NOTE — PROGRESS NOTES
Patient had an episode in [place] where she passed out this was on the 10th of this month. She went to a clinic and received a blood test of sodium which was 132 potassium of 4.3 chloride 100 calcium of 8.7.   She was given some fluids and told to continue diabetes mellitus with stage 3 chronic kidney disease, without long-term current use of insulin (HCC)        Continue with losartan  To er

## 2018-09-21 NOTE — ED PROVIDER NOTES
Patient Seen in: [facility] AND [facility] Emergency Department    History   Patient presents with:  Abnormal Result (metabolic, cardiac)    Stated Complaint: hemoglobin of 5.8, syconpal episode. HPI    79-year-old female presents for evaluation of anemia.   P complaint: hemoglobin of 5.8, syconpal episode. Other systems are as noted in HPI. Constitutional and vital signs reviewed. All other systems reviewed and negative except as noted above.     Physical Exam     ED Triage Vitals [09/21/18 1235]    0.9 (*)     All other components within normal limits   CBC WITH DIFFERENTIAL WITH PLATELET    Narrative: The following orders were created for panel order CBC WITH DIFFERENTIAL WITH PLATELET.   Procedure                               Abnormality specified. We recommend that you schedule follow up care with a primary care provider within the next three months to obtain basic health screening including reassessment of your blood pressure.     Medications Prescribed:  Current Discharge Medication Lis

## 2018-09-21 NOTE — ED INITIAL ASSESSMENT (HPI)
Pt states, \"For 5 days I have been feeling so tired. I saw Dr. Neha Galindo today who checked some lab work and said my hgb was 5.8 and sent me here. \" Pt is on Plavix. Denies any blood in stool.

## 2018-09-21 NOTE — H&P
[facility]    PATIENT'S NAME: Nadira Magnus   ATTENDING PHYSICIAN: Corrina Rodriguez MD   PATIENT ACCOUNT#:   [id number]    LOCATION:  [place] [address]  MEDICAL RECORD #:   [id number]       YOB: 1940  ADMISSION DATE:       09/21/2 oriented to time, place, and person. Mild distress. VITAL SIGNS:  Temperature 98.6, pulse 55, respiratory rate 20, blood pressure 135/52, pulse ox 98% on room air. HEENT:  Atraumatic. Oropharynx clear. Moist mucous membranes.   Normal hard and soft pal

## 2018-09-22 ENCOUNTER — ANESTHESIA (OUTPATIENT)
Dept: ENDOSCOPY | Facility: HOSPITAL | Age: 78
DRG: 812 | End: 2018-09-22
Payer: MEDICARE

## 2018-09-22 ENCOUNTER — ANESTHESIA EVENT (OUTPATIENT)
Dept: ENDOSCOPY | Facility: HOSPITAL | Age: 78
DRG: 812 | End: 2018-09-22
Payer: MEDICARE

## 2018-09-22 LAB
ANION GAP SERPL CALC-SCNC: 4 MMOL/L (ref 0–18)
BASOPHILS # BLD: 0 K/UL (ref 0–0.2)
BASOPHILS NFR BLD: 1 %
BUN SERPL-MCNC: 10 MG/DL (ref 8–20)
BUN/CREAT SERPL: 12.5 (ref 10–20)
CALCIUM SERPL-MCNC: 8.2 MG/DL (ref 8.5–10.5)
CHLORIDE SERPL-SCNC: 107 MMOL/L (ref 95–110)
CO2 SERPL-SCNC: 26 MMOL/L (ref 22–32)
CREAT SERPL-MCNC: 0.8 MG/DL (ref 0.5–1.5)
EOSINOPHIL # BLD: 0.1 K/UL (ref 0–0.7)
EOSINOPHIL NFR BLD: 2 %
ERYTHROCYTE [DISTWIDTH] IN BLOOD BY AUTOMATED COUNT: 16 % (ref 11–15)
GLUCOSE BLDC GLUCOMTR-MCNC: 113 MG/DL (ref 70–99)
GLUCOSE BLDC GLUCOMTR-MCNC: 120 MG/DL (ref 70–99)
GLUCOSE BLDC GLUCOMTR-MCNC: 148 MG/DL (ref 70–99)
GLUCOSE BLDC GLUCOMTR-MCNC: 157 MG/DL (ref 70–99)
GLUCOSE SERPL-MCNC: 127 MG/DL (ref 70–99)
HCT VFR BLD AUTO: 24.8 % (ref 35–48)
HGB BLD-MCNC: 8.4 G/DL (ref 12–16)
LYMPHOCYTES # BLD: 0.8 K/UL (ref 1–4)
LYMPHOCYTES NFR BLD: 15 %
MCH RBC QN AUTO: 32.2 PG (ref 27–32)
MCHC RBC AUTO-ENTMCNC: 34.1 G/DL (ref 32–37)
MCV RBC AUTO: 94.4 FL (ref 80–100)
MONOCYTES # BLD: 0.6 K/UL (ref 0–1)
MONOCYTES NFR BLD: 10 %
NEUTROPHILS # BLD AUTO: 4.3 K/UL (ref 1.8–7.7)
NEUTROPHILS NFR BLD: 74 %
OSMOLALITY UR CALC.SUM OF ELEC: 285 MOSM/KG (ref 275–295)
PLATELET # BLD AUTO: 327 K/UL (ref 140–400)
PMV BLD AUTO: 7.5 FL (ref 7.4–10.3)
POTASSIUM SERPL-SCNC: 4 MMOL/L (ref 3.3–5.1)
RBC # BLD AUTO: 2.62 M/UL (ref 3.7–5.4)
SODIUM SERPL-SCNC: 137 MMOL/L (ref 136–144)
WBC # BLD AUTO: 5.8 K/UL (ref 4–11)

## 2018-09-22 PROCEDURE — 43239 EGD BIOPSY SINGLE/MULTIPLE: CPT | Performed by: INTERNAL MEDICINE

## 2018-09-22 PROCEDURE — 99233 SBSQ HOSP IP/OBS HIGH 50: CPT | Performed by: HOSPITALIST

## 2018-09-22 PROCEDURE — 0DB68ZZ EXCISION OF STOMACH, VIA NATURAL OR ARTIFICIAL OPENING ENDOSCOPIC: ICD-10-PCS | Performed by: INTERNAL MEDICINE

## 2018-09-22 PROCEDURE — 0DB68ZX EXCISION OF STOMACH, VIA NATURAL OR ARTIFICIAL OPENING ENDOSCOPIC, DIAGNOSTIC: ICD-10-PCS | Performed by: INTERNAL MEDICINE

## 2018-09-22 RX ORDER — MONTELUKAST SODIUM 10 MG/1
10 TABLET ORAL
Status: DISCONTINUED | OUTPATIENT
Start: 2018-09-22 | End: 2018-09-23

## 2018-09-22 RX ORDER — ATORVASTATIN CALCIUM 20 MG/1
20 TABLET, FILM COATED ORAL DAILY
Status: DISCONTINUED | OUTPATIENT
Start: 2018-09-23 | End: 2018-09-23

## 2018-09-22 RX ORDER — CITALOPRAM 20 MG/1
10 TABLET ORAL
Status: DISCONTINUED | OUTPATIENT
Start: 2018-09-22 | End: 2018-09-23

## 2018-09-22 RX ORDER — SODIUM CHLORIDE, SODIUM LACTATE, POTASSIUM CHLORIDE, CALCIUM CHLORIDE 600; 310; 30; 20 MG/100ML; MG/100ML; MG/100ML; MG/100ML
INJECTION, SOLUTION INTRAVENOUS CONTINUOUS PRN
Status: DISCONTINUED | OUTPATIENT
Start: 2018-09-22 | End: 2018-09-22 | Stop reason: SURG

## 2018-09-22 RX ORDER — SODIUM CHLORIDE, SODIUM LACTATE, POTASSIUM CHLORIDE, CALCIUM CHLORIDE 600; 310; 30; 20 MG/100ML; MG/100ML; MG/100ML; MG/100ML
INJECTION, SOLUTION INTRAVENOUS CONTINUOUS
Status: DISCONTINUED | OUTPATIENT
Start: 2018-09-22 | End: 2018-09-22

## 2018-09-22 RX ORDER — MELATONIN
325
Status: DISCONTINUED | OUTPATIENT
Start: 2018-09-22 | End: 2018-09-23

## 2018-09-22 RX ORDER — NALOXONE HYDROCHLORIDE 0.4 MG/ML
80 INJECTION, SOLUTION INTRAMUSCULAR; INTRAVENOUS; SUBCUTANEOUS AS NEEDED
Status: ACTIVE | OUTPATIENT
Start: 2018-09-22 | End: 2018-09-22

## 2018-09-22 RX ORDER — OXYBUTYNIN CHLORIDE 10 MG/1
10 TABLET, EXTENDED RELEASE ORAL DAILY
Status: DISCONTINUED | OUTPATIENT
Start: 2018-09-22 | End: 2018-09-23

## 2018-09-22 RX ORDER — LORAZEPAM 0.5 MG/1
0.5 TABLET ORAL EVERY 4 HOURS PRN
Status: DISCONTINUED | OUTPATIENT
Start: 2018-09-22 | End: 2018-09-23

## 2018-09-22 RX ORDER — DEXTROSE MONOHYDRATE 25 G/50ML
50 INJECTION, SOLUTION INTRAVENOUS
Status: DISCONTINUED | OUTPATIENT
Start: 2018-09-22 | End: 2018-09-23

## 2018-09-22 RX ADMIN — SODIUM CHLORIDE, SODIUM LACTATE, POTASSIUM CHLORIDE, CALCIUM CHLORIDE: 600; 310; 30; 20 INJECTION, SOLUTION INTRAVENOUS at 10:40:00

## 2018-09-22 NOTE — OPERATIVE REPORT
[facility] Endoscopy Report      Preoperative Diagnosis:  - anemia      Postoperative Diagnosis:  - gastritis  - gastric polyps       Procedure:    Esophagogastroduodenoscopy       Surgeon:  Radha Day M.D.     Anesthesia:  MAC sedation,

## 2018-09-22 NOTE — PLAN OF CARE
Problem: Patient Centered Care  Goal: Patient preferences are identified and integrated in the patient's plan of care  Interventions:  - What would you like us to know as we care for you?  \"I LIVE WITH FAMILY\"   - Provide timely, complete, and accurate in self management of diabetes  Outcome: Progressing    Goal: Electrolytes maintained within normal limits  INTERVENTIONS:  - Monitor labs and rhythm and assess patient for signs and symptoms of electrolyte imbalances  - Administer electrolyte replacement as

## 2018-09-22 NOTE — PROGRESS NOTES
[facility]    Progress Note    Israel Gaines Patient Status:  Inpatient    3/28/1940 MRN [id number]   Location [facility] 5SW/SE Attending Chela Demarco, [address] [facility] Day # 1 PCP Huy Ulrich.  DO [place]       Subjective:     Pt ea and plavix on hold as above.    Resume when ok with GI.  - hold metoprolol    dvt proph:   SCDs    Code status:   Full    >35 minutes spent     Luci Quinonez MD  9/22/2018

## 2018-09-22 NOTE — H&P
History & Physical Examination    Patient Name: Frank Calixto  MRN: [id number]  [facility]: [id number]  YOB: 1940    Diagnosis: anemia, abdominal pain    Medications Prior to Admission:  Losartan Potassium-HCTZ 100-12.5 MG Oral Tab Take 1 tablet b TWICE DAILY Disp: 200 each Rfl: 4 Taking   Glucose Blood (TRUE METRIX BLOOD GLUCOSE TEST) In Vitro Strip CHECK BLOOD GLUCOSE TWICE DAILY Disp: 200 strip Rfl: 2 Taking   Blood Glucose Monitoring Suppl (TRUE METRIX METER) W/DEVICE Does not apply Kit 1 kit by Pregnancy      Comment:    2013: Special screening for osteoporosis      Comment:  Dexa Scan  No date: Stress incontinence  2010: Type II or unspecified type diabetes mellitus without mention   of complication, not stated as uncontrolled

## 2018-09-22 NOTE — CONSULTS
[facility] CLIN[facility]  Report of Consultation    Pete Brennan Patient Status:  Inpatient    3/28/1940 MRN [id number]   Location [facility] 5SW/SE Attending Desi Vo MD   Hosp Day # 0 PCP Evens Chavez.  Steffen Ruffin DO     Reason for Consultation: Surgical History:  No date: ANESTH,ANGIOPLASTY  No date:   No date: CHOLECYSTECTOMY  2013: ELECTROCARDIOGRAM, COMPLETE      Comment:  Scanned to Media Tab - Date of Service 2013  Family History   Problem Relation Age of Onset   • Diabe noted.  Skin: Normal texture and turgor. Lymphatic:  No cervical lymphadenopathy.     Impression and Plan:  Patient Active Problem List:     Type II diabetes mellitus ([facility] [address]     Age-related nuclear cataract of both eyes     Anemia, iron deficiency     Medic

## 2018-09-22 NOTE — ANESTHESIA POSTPROCEDURE EVALUATION
Patient: Deya Christiansen    Procedure Summary     Date:  09/22/18 Room / Location:  [facility] ENDOSCOPY 01 / [facility] ENDOSCOPY    Anesthesia Start:  9881 Anesthesia Stop:      Procedure:  ESOPHAGOGASTRODUODENOSCOPY (EGD) (N/A ) Diagnosis:  (Gastritis, gastric polyps

## 2018-09-22 NOTE — ANESTHESIA PREPROCEDURE EVALUATION
Anesthesia PreOp Note    HPI:     Leonid Ball is a 69year old female who presents for preoperative consultation requested by: Rosalee Avalos MD    Date of Surgery: 9/21/2018 - 9/22/2018    Procedure(s):  ESOPHAGOGASTRODUODENOSCOPY (EGD)  Indication (gastroesophageal reflux disease)  7/2016: Non-ST elevation MI (NSTEMI) ([address]      Comment:  stenting of the proximal LAD and ptca of mid LAD  No date:  Other and unspecified hyperlipidemia      Comment:  medical management  No date: Pregnancy      Comment: Take 325 mg by mouth 3 (three) times daily with meals. Disp:  Rfl:  9/20/2018 at Unknown time   aspirin 81 MG Oral Tab Take 81 mg by mouth daily.  Disp:  Rfl:  9/20/2018 at Unknown time   OXYBUTYNIN CHLORIDE ER 10 MG Oral Tablet 24 Hr TAKE 1 TABLET(10 MG) B chewable tab 4 tablet 4 tablet Oral Q15 Min PRN Gaetano Power MD    glucose (DEX4) oral liquid 15 g 15 g Oral Q15 Min PRN Gaetano Power MD    Insulin Regular Human (NOVOLIN R) 100 UNIT/ML injection 1-5 Units 1-5 Units Subcutaneous 4 times per day Ban Narrative      Not on file      Available pre-op labs reviewed.   Lab Results   Component Value Date    WBC 5.8 09/22/2018    RBC 2.62 (L) 09/22/2018    HGB 8.4 (L) 09/22/2018    HCT 24.8 (L) 09/22/2018    MCV 94.4 09/22/2018    MCH 32.2 (H) 09/22/2018    M questions were answered to the best of my ability. The patient desires the anesthetic management as planned.   GREGORY BLUM  9/22/2018 10:42 AM

## 2018-09-23 ENCOUNTER — TELEPHONE (OUTPATIENT)
Dept: GASTROENTEROLOGY | Facility: CLINIC | Age: 78
End: 2018-09-23

## 2018-09-23 VITALS
RESPIRATION RATE: 18 BRPM | TEMPERATURE: 99 F | HEIGHT: 57 IN | WEIGHT: 122 LBS | BODY MASS INDEX: 26.32 KG/M2 | HEART RATE: 52 BPM | OXYGEN SATURATION: 97 % | DIASTOLIC BLOOD PRESSURE: 47 MMHG | SYSTOLIC BLOOD PRESSURE: 137 MMHG

## 2018-09-23 DIAGNOSIS — D64.9 ANEMIA, UNSPECIFIED TYPE: Primary | ICD-10-CM

## 2018-09-23 LAB
ANION GAP SERPL CALC-SCNC: 5 MMOL/L (ref 0–18)
BASOPHILS # BLD: 0 K/UL (ref 0–0.2)
BASOPHILS NFR BLD: 1 %
BLOOD TYPE BARCODE: 6200
BUN SERPL-MCNC: 12 MG/DL (ref 8–20)
BUN/CREAT SERPL: 13.8 (ref 10–20)
CALCIUM SERPL-MCNC: 8.6 MG/DL (ref 8.5–10.5)
CHLORIDE SERPL-SCNC: 107 MMOL/L (ref 95–110)
CO2 SERPL-SCNC: 24 MMOL/L (ref 22–32)
CREAT SERPL-MCNC: 0.87 MG/DL (ref 0.5–1.5)
EOSINOPHIL # BLD: 0.1 K/UL (ref 0–0.7)
EOSINOPHIL NFR BLD: 2 %
ERYTHROCYTE [DISTWIDTH] IN BLOOD BY AUTOMATED COUNT: 15.9 % (ref 11–15)
GLUCOSE BLDC GLUCOMTR-MCNC: 138 MG/DL (ref 70–99)
GLUCOSE BLDC GLUCOMTR-MCNC: 141 MG/DL (ref 70–99)
GLUCOSE SERPL-MCNC: 145 MG/DL (ref 70–99)
HCT VFR BLD AUTO: 26.9 % (ref 35–48)
HGB BLD-MCNC: 8.9 G/DL (ref 12–16)
LYMPHOCYTES # BLD: 0.9 K/UL (ref 1–4)
LYMPHOCYTES NFR BLD: 16 %
MCH RBC QN AUTO: 31.1 PG (ref 27–32)
MCHC RBC AUTO-ENTMCNC: 32.9 G/DL (ref 32–37)
MCV RBC AUTO: 94.6 FL (ref 80–100)
MONOCYTES # BLD: 0.4 K/UL (ref 0–1)
MONOCYTES NFR BLD: 7 %
NEUTROPHILS # BLD AUTO: 4.2 K/UL (ref 1.8–7.7)
NEUTROPHILS NFR BLD: 74 %
OSMOLALITY UR CALC.SUM OF ELEC: 284 MOSM/KG (ref 275–295)
PLATELET # BLD AUTO: 373 K/UL (ref 140–400)
PMV BLD AUTO: 7.1 FL (ref 7.4–10.3)
POTASSIUM SERPL-SCNC: 4.2 MMOL/L (ref 3.3–5.1)
RBC # BLD AUTO: 2.85 M/UL (ref 3.7–5.4)
SODIUM SERPL-SCNC: 136 MMOL/L (ref 136–144)
WBC # BLD AUTO: 5.7 K/UL (ref 4–11)

## 2018-09-23 PROCEDURE — 99239 HOSP IP/OBS DSCHRG MGMT >30: CPT | Performed by: HOSPITALIST

## 2018-09-23 PROCEDURE — 99232 SBSQ HOSP IP/OBS MODERATE 35: CPT | Performed by: INTERNAL MEDICINE

## 2018-09-23 RX ORDER — PANTOPRAZOLE SODIUM 40 MG/1
40 TABLET, DELAYED RELEASE ORAL
Qty: 30 TABLET | Refills: 3 | Status: SHIPPED | OUTPATIENT
Start: 2018-09-23 | End: 2018-12-19

## 2018-09-23 NOTE — DISCHARGE SUMMARY
[facility]    Discharge Summary    Radha Kidney Patient Status:  Inpatient    3/28/1940 MRN [id number]   Location [facility] 5SW/SE Attending No att. providers found   Hosp Day # 2 PCP Demario Teague.  DO M[place]     Date of Admis patient had IV Protonix. Rectal exam showed trace positive Hemoccult blood. She will be admitted to the hospital for further management. Hospital Course:     Pt was admitted and treated as below:    Posthemorrhagic anemia, acute on chronic.   Gastritis BY MOUTH EVERY WEEK   Quantity:  12 capsule  Refills:  11     ferrous sulfate 325 (65 FE) MG Tbec      Take 325 mg by mouth 3 (three) times daily with meals.    Refills:  0     LORazepam 0.5 MG Tabs  Commonly known as:  ATIVAN      Take 0.5 mg by mouth ever information:  P[address]ie [address] [phone]                   Hospital Discharge Diagnoses: Upper GIB    Lace+ Score: 65  59-90 High Risk  29-58 Medium Risk  0-28   Low Risk. TCM Follow-Up Recommendation:  LACE > 58:  High Risk o

## 2018-09-23 NOTE — PROGRESS NOTES
[facility]  Progress Note    Claudette Pouch Patient Status:  Inpatient    3/28/1940 MRN [id number]   Location [facility] 5SW/SE Attending Yasmeen Cui,  [facility] Day # 2 PCP Lily Brewster.  Antonia Raygoza, DO     Subjective:  Claudette Pouch is a( clinical course. Don Clement  9/23/2018  2:19 PM

## 2018-09-23 NOTE — PLAN OF CARE
Problem: Patient Centered Care  Goal: Patient preferences are identified and integrated in the patient's plan of care  Interventions:    - Provide timely, complete, and accurate information to patient/family  - Incorporate patient and family knowledge, thiago Monitor labs and rhythm and assess patient for signs and symptoms of electrolyte imbalances  - Administer electrolyte replacement as ordered  - Monitor response to electrolyte replacements, including rhythm and repeat lab results as appropriate  - Fluid re

## 2018-09-24 ENCOUNTER — MED REC SCAN ONLY (OUTPATIENT)
Dept: GASTROENTEROLOGY | Facility: CLINIC | Age: 78
End: 2018-09-24

## 2018-09-24 ENCOUNTER — PATIENT OUTREACH (OUTPATIENT)
Dept: CASE MANAGEMENT | Age: 78
End: 2018-09-24

## 2018-09-24 DIAGNOSIS — Z02.9 ENCOUNTERS FOR ADMINISTRATIVE PURPOSES: ICD-10-CM

## 2018-09-24 PROCEDURE — 1111F DSCHRG MED/CURRENT MED MERGE: CPT

## 2018-09-24 NOTE — PROGRESS NOTES
TCM OUTREACH    Call made to attempt to reach patient for TCM follow up. Pt unavailable at this time.  Family requested call back     Book by date: 10/7/18    (Triage Team if pt returns call please transfer to ext [phone])

## 2018-09-24 NOTE — TELEPHONE ENCOUNTER
Using [facility], #[id number], pt and spouse contacted. Given message below to get CBC (order entered) and accepted appt with Dr Todd Jordan at [facility] office next Tues Oct 2, instructed to arrive at 4:15pm and given detailed directions to [facility] office.

## 2018-09-25 RX ORDER — MELATONIN
400 DAILY
COMMUNITY
End: 2020-06-03 | Stop reason: ALTCHOICE

## 2018-09-25 RX ORDER — OXYBUTYNIN CHLORIDE 10 MG/1
TABLET, EXTENDED RELEASE ORAL
Qty: 90 TABLET | Refills: 0 | Status: SHIPPED | OUTPATIENT
Start: 2018-09-25 | End: 2018-12-27

## 2018-09-25 NOTE — PROGRESS NOTES
Initial Post Discharge Follow Up   Discharge Date: 9/23/18  Contact Date: 9/24/2018    Consent Verification:  Assessment Completed With: Patient  HIPAA Verified? Yes    Discharge Dx:  Posthemorrhagic anemia, acute on chronic.     General:   • How have yo atorvastatin 20 MG Oral Tab TAKE 1 TABLET(20 MG) BY MOUTH DAILY Disp: 90 tablet Rfl: 3   ferrous sulfate 325 (65 FE) MG Oral Tab EC Take 325 mg by mouth 3 (three) times daily with meals. Disp:  Rfl:    aspirin 81 MG Oral Tab Take 81 mg by mouth daily.  Nilo Betancourt Chad Green MD [facility], [facility], [address], [address] ([address])    Oct 18, 2018 12:00 PM CDT Lab with Cynthia Schaefer Hematology Oncology [facility])    Oct 18, 2018  1:00 PM CDT HEMATOLOGY ONCOLOGY FOLLOW UP with Ramos Ceballos

## 2018-09-26 ENCOUNTER — LAB ENCOUNTER (OUTPATIENT)
Dept: LAB | Age: 78
End: 2018-09-26
Attending: INTERNAL MEDICINE
Payer: MEDICARE

## 2018-09-26 DIAGNOSIS — D64.9 ANEMIA, UNSPECIFIED TYPE: ICD-10-CM

## 2018-09-26 LAB
ANION GAP SERPL CALC-SCNC: 9 MMOL/L (ref 0–18)
BASOPHILS # BLD: 0.1 K/UL (ref 0–0.2)
BASOPHILS NFR BLD: 2 %
BUN SERPL-MCNC: 22 MG/DL (ref 8–20)
BUN/CREAT SERPL: 23.7 (ref 10–20)
CALCIUM SERPL-MCNC: 9.6 MG/DL (ref 8.5–10.5)
CHLORIDE SERPL-SCNC: 102 MMOL/L (ref 95–110)
CO2 SERPL-SCNC: 25 MMOL/L (ref 22–32)
CREAT SERPL-MCNC: 0.93 MG/DL (ref 0.5–1.5)
EOSINOPHIL # BLD: 0.1 K/UL (ref 0–0.7)
EOSINOPHIL NFR BLD: 2 %
ERYTHROCYTE [DISTWIDTH] IN BLOOD BY AUTOMATED COUNT: 15.7 % (ref 11–15)
GLUCOSE SERPL-MCNC: 135 MG/DL (ref 70–99)
HCT VFR BLD AUTO: 30.4 % (ref 35–48)
HGB BLD-MCNC: 10 G/DL (ref 12–16)
LYMPHOCYTES # BLD: 0.9 K/UL (ref 1–4)
LYMPHOCYTES NFR BLD: 21 %
MCH RBC QN AUTO: 31.4 PG (ref 27–32)
MCHC RBC AUTO-ENTMCNC: 33.1 G/DL (ref 32–37)
MCV RBC AUTO: 95 FL (ref 80–100)
MONOCYTES # BLD: 0.3 K/UL (ref 0–1)
MONOCYTES NFR BLD: 6 %
NEUTROPHILS # BLD AUTO: 3.1 K/UL (ref 1.8–7.7)
NEUTROPHILS NFR BLD: 69 %
OSMOLALITY UR CALC.SUM OF ELEC: 287 MOSM/KG (ref 275–295)
PLATELET # BLD AUTO: 475 K/UL (ref 140–400)
PMV BLD AUTO: 7.3 FL (ref 7.4–10.3)
POTASSIUM SERPL-SCNC: 4.9 MMOL/L (ref 3.3–5.1)
RBC # BLD AUTO: 3.2 M/UL (ref 3.7–5.4)
SODIUM SERPL-SCNC: 136 MMOL/L (ref 136–144)
WBC # BLD AUTO: 4.5 K/UL (ref 4–11)

## 2018-09-26 PROCEDURE — 85025 COMPLETE CBC W/AUTO DIFF WBC: CPT

## 2018-09-26 PROCEDURE — 80048 BASIC METABOLIC PNL TOTAL CA: CPT

## 2018-09-26 PROCEDURE — 36415 COLL VENOUS BLD VENIPUNCTURE: CPT

## 2018-09-26 NOTE — PROGRESS NOTES
RN to contact the pt and or family - her blood count is up to 10. 0 which is improved. Please have her repeat in 2 weeks and then see "[employer] in office for monitoring.

## 2018-09-26 NOTE — TELEPHONE ENCOUNTER
Refill Protocol Appointment Criteria  · Appointment scheduled in the past 12 months or in the next 3 months  Recent Outpatient Visits            4 days ago Syncope, unspecified syncope type    [facility], [place], [facility], [address], [address] [place], [place]

## 2018-10-01 ENCOUNTER — OFFICE VISIT (OUTPATIENT)
Dept: FAMILY MEDICINE CLINIC | Facility: CLINIC | Age: 78
End: 2018-10-01

## 2018-10-01 VITALS
HEIGHT: 57 IN | WEIGHT: 123 LBS | RESPIRATION RATE: 16 BRPM | BODY MASS INDEX: 26.54 KG/M2 | HEART RATE: 80 BPM | SYSTOLIC BLOOD PRESSURE: 116 MMHG | DIASTOLIC BLOOD PRESSURE: 79 MMHG

## 2018-10-01 DIAGNOSIS — I10 ESSENTIAL HYPERTENSION: ICD-10-CM

## 2018-10-01 DIAGNOSIS — N18.30 TYPE 2 DIABETES MELLITUS WITH STAGE 3 CHRONIC KIDNEY DISEASE, WITHOUT LONG-TERM CURRENT USE OF INSULIN (HCC): ICD-10-CM

## 2018-10-01 DIAGNOSIS — R55 SYNCOPE, UNSPECIFIED SYNCOPE TYPE: Primary | ICD-10-CM

## 2018-10-01 DIAGNOSIS — E11.22 TYPE 2 DIABETES MELLITUS WITH STAGE 3 CHRONIC KIDNEY DISEASE, WITHOUT LONG-TERM CURRENT USE OF INSULIN (HCC): ICD-10-CM

## 2018-10-01 DIAGNOSIS — D64.9 ANEMIA, UNSPECIFIED TYPE: ICD-10-CM

## 2018-10-01 PROCEDURE — 1111F DSCHRG MED/CURRENT MED MERGE: CPT | Performed by: FAMILY MEDICINE

## 2018-10-01 PROCEDURE — 99495 TRANSJ CARE MGMT MOD F2F 14D: CPT | Performed by: FAMILY MEDICINE

## 2018-10-01 NOTE — PROGRESS NOTES
Pt with severe anemia  Difficult for her to see the need to get checked when she is starting to get tired or bone pain. \"I always feel weak. \"   disagrees he says she acts differently when she is low.     Jayda Orr is a 69year old female her Denies rectal bleeding  Denies dark tarry stools. No sob   no chest pain. Patient's past medical surgical family social history was reviewed.     Review of Systems  Allergic: no environmental allergies or food allergies  Cardiovascular: no chest p PLATELET; Standing  - EVALUATE & TREAT, GASTRO (INTERNAL)  - OP REFERRAL TO Elio Flynn        Patient seen within 7 days from date of discharge. Nancy Saavedra DO, 10/1/2018

## 2018-10-02 ENCOUNTER — OFFICE VISIT (OUTPATIENT)
Dept: GASTROENTEROLOGY | Facility: CLINIC | Age: 78
End: 2018-10-02

## 2018-10-02 VITALS
DIASTOLIC BLOOD PRESSURE: 60 MMHG | BODY MASS INDEX: 26.75 KG/M2 | WEIGHT: 124 LBS | SYSTOLIC BLOOD PRESSURE: 141 MMHG | HEIGHT: 57 IN | HEART RATE: 60 BPM

## 2018-10-02 DIAGNOSIS — D64.9 ANEMIA, UNSPECIFIED TYPE: Primary | ICD-10-CM

## 2018-10-02 DIAGNOSIS — K29.51 CHRONIC GASTRITIS WITH BLEEDING, UNSPECIFIED GASTRITIS TYPE: ICD-10-CM

## 2018-10-02 PROCEDURE — 99213 OFFICE O/P EST LOW 20 MIN: CPT | Performed by: INTERNAL MEDICINE

## 2018-10-02 NOTE — PROGRESS NOTES
Leonid Ball is a 69year old female. HPI:   Patient presents with: Follow - Up      The patient is a 23-year-old female who has a history of GERD, heart disease and anemia who was admitted to the hospital recently with acute on chronic anemia.   Real Pen • Other (Other) Brother 59   • Glaucoma Neg    • Macular degeneration Neg       Social History: Social History    Tobacco Use      Smoking status: Never Smoker      Smokeless tobacco: Never Used    Alcohol use: No      Alcohol/week: 0.0 oz    Drug use: N needed Disp: 1 Device Rfl: 0   Milk Thistle Extract 175 MG Oral Tab Take 1 tablet by mouth once daily. Disp:  Rfl:    B Complex Vitamins (VITAMIN B COMPLEX) Oral Tab Take 1 tablet by mouth once daily.  Disp:  Rfl:    Losartan Potassium-HCTZ 100-12.5 MG Oral

## 2018-10-18 ENCOUNTER — APPOINTMENT (OUTPATIENT)
Dept: HEMATOLOGY/ONCOLOGY | Facility: HOSPITAL | Age: 78
End: 2018-10-18
Attending: INTERNAL MEDICINE
Payer: MEDICARE

## 2018-10-18 ENCOUNTER — APPOINTMENT (OUTPATIENT)
Dept: LAB | Facility: HOSPITAL | Age: 78
End: 2018-10-18
Attending: INTERNAL MEDICINE
Payer: MEDICARE

## 2018-10-18 VITALS
DIASTOLIC BLOOD PRESSURE: 49 MMHG | RESPIRATION RATE: 16 BRPM | HEART RATE: 64 BPM | SYSTOLIC BLOOD PRESSURE: 138 MMHG | HEIGHT: 57 IN | TEMPERATURE: 99 F | WEIGHT: 125 LBS | BODY MASS INDEX: 26.97 KG/M2

## 2018-10-18 DIAGNOSIS — I10 ESSENTIAL HYPERTENSION: ICD-10-CM

## 2018-10-18 DIAGNOSIS — D64.9 ANEMIA, UNSPECIFIED TYPE: ICD-10-CM

## 2018-10-18 DIAGNOSIS — D50.8 OTHER IRON DEFICIENCY ANEMIA: Primary | ICD-10-CM

## 2018-10-18 DIAGNOSIS — E11.22 TYPE 2 DIABETES MELLITUS WITH STAGE 3 CHRONIC KIDNEY DISEASE, WITHOUT LONG-TERM CURRENT USE OF INSULIN (HCC): ICD-10-CM

## 2018-10-18 DIAGNOSIS — N18.30 TYPE 2 DIABETES MELLITUS WITH STAGE 3 CHRONIC KIDNEY DISEASE, WITHOUT LONG-TERM CURRENT USE OF INSULIN (HCC): ICD-10-CM

## 2018-10-18 PROCEDURE — 82728 ASSAY OF FERRITIN: CPT

## 2018-10-18 PROCEDURE — 83540 ASSAY OF IRON: CPT

## 2018-10-18 PROCEDURE — 36415 COLL VENOUS BLD VENIPUNCTURE: CPT

## 2018-10-18 PROCEDURE — 80061 LIPID PANEL: CPT

## 2018-10-18 PROCEDURE — 83036 HEMOGLOBIN GLYCOSYLATED A1C: CPT

## 2018-10-18 PROCEDURE — 85025 COMPLETE CBC W/AUTO DIFF WBC: CPT

## 2018-10-18 PROCEDURE — 84466 ASSAY OF TRANSFERRIN: CPT

## 2018-10-18 PROCEDURE — 80053 COMPREHEN METABOLIC PANEL: CPT

## 2018-10-18 PROCEDURE — 99214 OFFICE O/P EST MOD 30 MIN: CPT | Performed by: INTERNAL MEDICINE

## 2018-10-18 PROCEDURE — 85045 AUTOMATED RETICULOCYTE COUNT: CPT

## 2018-10-18 NOTE — PROGRESS NOTES
KT Christiansen is a 69year old female who is here today for f/u of Other iron deficiency anemia  (primary encounter diagnosis)     Patient was admitted on September 21, 2018 through September 23, 2018 due to severe anemia.   The patient went t Review of Systems   Constitutional: Positive for activity change (decreased) and fatigue (improved). HENT: Negative for nosebleeds. Respiratory: Positive for shortness of breath (improved. ).     Cardiovascular: Negative for chest pain (when starts Does not apply Kit 1 kit by Does not apply route 2 (two) times daily. Disp: 1 kit Rfl: 0   Digestive Enzymes (ENZYME DIGEST OR) Take by mouth. Disp:  Rfl:    LORazepam 0.5 MG Oral Tab Take 0.5 mg by mouth every 4 (four) hours as needed for Anxiety.  Disp:       Spouse name: Not on file      Number of children: Not on file      Years of education: Not on file      Highest education level: Not on file    Social Needs      Financial resource strain: Not on file      Food insecurity - worry: Not on file Constitutional: She appears well-developed and well-nourished. Obese. HENT:   Mouth/Throat: Oropharynx is clear and moist. No oropharyngeal exudate. Eyes: No scleral icterus. Cardiovascular: Normal rate and regular rhythm. No murmur heard.   Pul (14)    Collection Time: 10/18/18 11:24 AM   Result Value Ref Range    Glucose 243 (H) 70 - 99 mg/dL    Sodium 130 (L) 136 - 144 mmol/L    Potassium 4.2 3.3 - 5.1 mmol/L    Chloride 98 95 - 110 mmol/L    CO2 24 22 - 32 mmol/L    BUN 17 8 - 20 mg/dL    Crea Carbon Dioxide, Total      22 - 32 mmol/L 24      BUN      8 - 20 mg/dL 17      CREATININE      0.50 - 1.50 mg/dL 0.90      CALCIUM      8.5 - 10.5 mg/dL 9.1      ALT (SGPT)      14 - 54 U/L 24      AST (SGOT)      15 - 41 U/L 27      ALKALINE PHOSPHATAS FERRITIN      11 - 307 ng/mL 27      RETIC%      0.5 - 1.5 % 4.3 (H)        Component      Latest Ref Rng & Units 7/17/2018   Glucose      70 - 99 mg/dL    Sodium      136 - 144 mmol/L    Potassium      3.3 - 5.1 mmol/L    Chloride      95 - 110 mmol/L encounter.     Authorizing Provider: Drake Siemens, MD Collected: 09/22/2018 10:51 AM Ordering Location: [facility] Received: 09/24/2018 06:04 AM Endoscopy Lab Suites Pathologist: Brittni Arana MD Specimens: A) - Gastric biopsy, rule out H. Py

## 2018-10-24 ENCOUNTER — TELEPHONE (OUTPATIENT)
Dept: GASTROENTEROLOGY | Facility: CLINIC | Age: 78
End: 2018-10-24

## 2018-10-24 NOTE — TELEPHONE ENCOUNTER
----- Message from Marcela Stapleton MD sent at 10/19/2018  4:14 PM CDT -----  RN to contact pt or family member/ hgb in same range at 9.9   - repeat in 1 month, order placed  - call if symptoms  - consider further workup /ie capsule endoscopy

## 2018-11-17 ENCOUNTER — LAB ENCOUNTER (OUTPATIENT)
Dept: LAB | Age: 78
End: 2018-11-17
Attending: INTERNAL MEDICINE
Payer: MEDICARE

## 2018-11-17 DIAGNOSIS — D64.9 ANEMIA, UNSPECIFIED TYPE: ICD-10-CM

## 2018-11-17 PROCEDURE — 36415 COLL VENOUS BLD VENIPUNCTURE: CPT

## 2018-11-17 PROCEDURE — 85025 COMPLETE CBC W/AUTO DIFF WBC: CPT

## 2018-11-19 ENCOUNTER — NURSE ONLY (OUTPATIENT)
Dept: FAMILY MEDICINE CLINIC | Facility: CLINIC | Age: 78
End: 2018-11-19

## 2018-11-19 PROCEDURE — 90653 IIV ADJUVANT VACCINE IM: CPT | Performed by: FAMILY MEDICINE

## 2018-11-19 PROCEDURE — G0008 ADMIN INFLUENZA VIRUS VAC: HCPCS | Performed by: FAMILY MEDICINE

## 2018-11-26 ENCOUNTER — TELEPHONE (OUTPATIENT)
Dept: GASTROENTEROLOGY | Facility: CLINIC | Age: 78
End: 2018-11-26

## 2018-11-26 DIAGNOSIS — D64.9 ANEMIA, UNSPECIFIED TYPE: Primary | ICD-10-CM

## 2018-11-26 NOTE — TELEPHONE ENCOUNTER
Dr Bri Amaya contacted and reviewd. Please sign CBC order due Dec 17 that I entered. Mailed to pt. Accepted appt with Candelaria Biswas for f/u on Wed Dec 19, arrival 7:45am and given directions to [facility]. Reminded to get referral. Thanks.

## 2018-11-26 NOTE — TELEPHONE ENCOUNTER
----- Message from Hal Bello MD sent at 11/23/2018  4:17 PM CST -----  Agree with repeating in 1 month. RN to make sure pt has follow up appointment after next set of lab work.

## 2018-11-27 NOTE — PROGRESS NOTES
[address] [facility] Follow-up Visit    Ana Surgical Hx:    Cholecystectomy  - See additional surgical hx below  - No known issues with sedation.  - No known history of sleep apnea. Pertinent Family Hx:  - No family hx of esophageal, gastric or colon cancer  - No family history of IBD. [facility]      Family History   Problem Relation Age of Onset   • Diabetes Father    • Other (Other) Father 58   • Diabetes Mother 59        (cause of death)   • Diabetes Brother    • Other (Other) Brother 47   • Other (Other) Brother 59   • Glaucoma N not apply Kit 1 kit by Does not apply route 2 (two) times daily. Disp: 1 kit Rfl: 0   Digestive Enzymes (ENZYME DIGEST OR) Take by mouth. Disp:  Rfl:    LORazepam 0.5 MG Oral Tab Take 0.5 mg by mouth every 4 (four) hours as needed for Anxiety.  Disp:  Rfl: Psych: Pt has a normal mood and affect, behavior is normal    Nursing note and vitals reviewed      Labs/Imaging:     Patient's labs and imaging were reviewed and discussed with patient today. See HPI and A&P for further details.      .  ASSESSMENT/PLAN:

## 2018-12-06 ENCOUNTER — OFFICE VISIT (OUTPATIENT)
Dept: OPHTHALMOLOGY | Facility: CLINIC | Age: 78
End: 2018-12-06

## 2018-12-06 DIAGNOSIS — H02.886 MEIBOMIAN GLAND DYSFUNCTION (MGD) OF BOTH EYES: ICD-10-CM

## 2018-12-06 DIAGNOSIS — H02.883 MEIBOMIAN GLAND DYSFUNCTION (MGD) OF BOTH EYES: ICD-10-CM

## 2018-12-06 DIAGNOSIS — H25.13 AGE-RELATED NUCLEAR CATARACT OF BOTH EYES: ICD-10-CM

## 2018-12-06 DIAGNOSIS — E11.9 DIABETES MELLITUS TYPE 2 WITHOUT RETINOPATHY (HCC): Primary | ICD-10-CM

## 2018-12-06 PROCEDURE — 92014 COMPRE OPH EXAM EST PT 1/>: CPT | Performed by: OPHTHALMOLOGY

## 2018-12-06 RX ORDER — MONTELUKAST SODIUM 10 MG/1
TABLET ORAL
Qty: 90 TABLET | Refills: 0 | Status: SHIPPED | OUTPATIENT
Start: 2018-12-06 | End: 2019-03-05

## 2018-12-06 NOTE — PROGRESS NOTES
Roopa Alejandro is a 69year old female.     HPI:     HPI     Consult      Additional comments: Per Dr. Iris Church              Diabetic Eye Exam      Additional comments: Pt has been a diabetic for 8 years  0 years on pills/  0 years on Insulin   Pt checks Never Smoker      Smokeless tobacco: Never Used    Alcohol use: No      Alcohol/week: 0.0 oz    Drug use: No      Medications:    Current Outpatient Medications:  folic acid 501 MCG Oral Tab Take 400 mcg by mouth 2 (two) times daily.  Disp:  Rfl:    OXYBUTY mouth once daily. Disp:  Rfl:    B Complex Vitamins (VITAMIN B COMPLEX) Oral Tab Take 1 tablet by mouth once daily.  Disp:  Rfl:        Allergies:    Bactrim [Sulfametho*    NAUSEA AND VOMITING  Zocor [Simvastatin]     SWELLING    ROS:     ROS     Positive type II: no background of retinopathy, no signs of neovascularization noted. Discussed ocular and systemic benefits of blood sugar control. Diagnosis and treatment discussed in detail with patient.     Age-related nuclear cataract of both eyes  Discussed

## 2018-12-06 NOTE — PATIENT INSTRUCTIONS
Diabetes mellitus type 2 without retinopathy ([facility] [address]  Diabetes type II: no background of retinopathy, no signs of neovascularization noted. Discussed ocular and systemic benefits of blood sugar control.   Diagnosis and treatment discussed in detail with tayo

## 2018-12-17 ENCOUNTER — LAB ENCOUNTER (OUTPATIENT)
Dept: LAB | Age: 78
End: 2018-12-17
Attending: INTERNAL MEDICINE
Payer: MEDICARE

## 2018-12-17 DIAGNOSIS — D64.9 ANEMIA, UNSPECIFIED TYPE: ICD-10-CM

## 2018-12-17 PROCEDURE — 36415 COLL VENOUS BLD VENIPUNCTURE: CPT

## 2018-12-17 PROCEDURE — 85025 COMPLETE CBC W/AUTO DIFF WBC: CPT

## 2018-12-19 ENCOUNTER — OFFICE VISIT (OUTPATIENT)
Dept: GASTROENTEROLOGY | Facility: CLINIC | Age: 78
End: 2018-12-19

## 2018-12-19 VITALS
WEIGHT: 126.38 LBS | DIASTOLIC BLOOD PRESSURE: 76 MMHG | BODY MASS INDEX: 27.26 KG/M2 | HEIGHT: 57 IN | HEART RATE: 60 BPM | SYSTOLIC BLOOD PRESSURE: 122 MMHG

## 2018-12-19 DIAGNOSIS — D50.8 OTHER IRON DEFICIENCY ANEMIA: Primary | ICD-10-CM

## 2018-12-19 DIAGNOSIS — D64.9 ANEMIA, UNSPECIFIED TYPE: ICD-10-CM

## 2018-12-19 DIAGNOSIS — D50.9 IRON DEFICIENCY ANEMIA, UNSPECIFIED IRON DEFICIENCY ANEMIA TYPE: Primary | ICD-10-CM

## 2018-12-19 PROCEDURE — G0463 HOSPITAL OUTPT CLINIC VISIT: HCPCS | Performed by: NURSE PRACTITIONER

## 2018-12-19 PROCEDURE — 99213 OFFICE O/P EST LOW 20 MIN: CPT | Performed by: NURSE PRACTITIONER

## 2018-12-19 RX ORDER — PANTOPRAZOLE SODIUM 40 MG/1
40 TABLET, DELAYED RELEASE ORAL
Qty: 30 TABLET | Refills: 5 | Status: SHIPPED | OUTPATIENT
Start: 2018-12-19 | End: 2019-04-19

## 2018-12-19 NOTE — PATIENT INSTRUCTIONS
1. Continue Protonix every morning  2. Continue iron 3x/day  3. Repeat blood work in 1 month  4.  Increase fiber intake - can use over the counter fiber (Fibercon, etc)

## 2018-12-20 ENCOUNTER — OFFICE VISIT (OUTPATIENT)
Dept: HEMATOLOGY/ONCOLOGY | Facility: HOSPITAL | Age: 78
End: 2018-12-20
Attending: INTERNAL MEDICINE
Payer: MEDICARE

## 2018-12-20 ENCOUNTER — TELEPHONE (OUTPATIENT)
Dept: GASTROENTEROLOGY | Facility: CLINIC | Age: 78
End: 2018-12-20

## 2018-12-20 ENCOUNTER — LAB ENCOUNTER (OUTPATIENT)
Dept: LAB | Facility: HOSPITAL | Age: 78
End: 2018-12-20
Attending: INTERNAL MEDICINE
Payer: MEDICARE

## 2018-12-20 VITALS
RESPIRATION RATE: 16 BRPM | HEIGHT: 57 IN | SYSTOLIC BLOOD PRESSURE: 144 MMHG | BODY MASS INDEX: 26.75 KG/M2 | WEIGHT: 124 LBS | HEART RATE: 57 BPM | TEMPERATURE: 98 F | DIASTOLIC BLOOD PRESSURE: 42 MMHG

## 2018-12-20 DIAGNOSIS — D64.9 ANEMIA, UNSPECIFIED TYPE: ICD-10-CM

## 2018-12-20 DIAGNOSIS — D50.9 IRON DEFICIENCY ANEMIA, UNSPECIFIED IRON DEFICIENCY ANEMIA TYPE: Primary | ICD-10-CM

## 2018-12-20 DIAGNOSIS — D50.8 OTHER IRON DEFICIENCY ANEMIA: Primary | ICD-10-CM

## 2018-12-20 DIAGNOSIS — D50.8 OTHER IRON DEFICIENCY ANEMIA: ICD-10-CM

## 2018-12-20 PROCEDURE — 84466 ASSAY OF TRANSFERRIN: CPT

## 2018-12-20 PROCEDURE — 86900 BLOOD TYPING SEROLOGIC ABO: CPT

## 2018-12-20 PROCEDURE — 82728 ASSAY OF FERRITIN: CPT

## 2018-12-20 PROCEDURE — 36415 COLL VENOUS BLD VENIPUNCTURE: CPT

## 2018-12-20 PROCEDURE — 99214 OFFICE O/P EST MOD 30 MIN: CPT | Performed by: INTERNAL MEDICINE

## 2018-12-20 PROCEDURE — 86901 BLOOD TYPING SEROLOGIC RH(D): CPT

## 2018-12-20 PROCEDURE — 83540 ASSAY OF IRON: CPT

## 2018-12-20 PROCEDURE — 86850 RBC ANTIBODY SCREEN: CPT

## 2018-12-20 PROCEDURE — 85025 COMPLETE CBC W/AUTO DIFF WBC: CPT

## 2018-12-20 NOTE — TELEPHONE ENCOUNTER
----- Message from Cheyanne Beckwith MD sent at 12/19/2018  3:15 PM CST -----  RN to contact pt and or her family, blood counts remain low  - I would like to arrange follow up colonoscopy and possible capsule endo - please have the pt follow up in the offic

## 2018-12-20 NOTE — TELEPHONE ENCOUNTER
Dr. Kaitlyn Meza,    Dr. Dano Arriola contacted me today and is requesting capsule endoscopy. She saw the pt today and is concerned about her ongoing anemia. Hgb relatively stable (10.3 - 9.8) w/o overt s/s bleeding. Tolerating PO iron. Though Hgb is relatively stable, iron studies today demonstrate decreased saturation (from 9-10 to 7) and WNL ferritin (19) but decreased vs 27 in October. Pt will be receiving Venofer. I saw pt in office yesterday - she appears overall well and stable. Asymptomatic at that time. Was planning to repeat short interval CBC. You mentioned either capsule endoscopy vs repeat EGD/colonoscopy - would you prefer one over the other? Thanks!

## 2018-12-20 NOTE — TELEPHONE ENCOUNTER
Dr Ruben Squires, pt was just seen in office yesterday by Genoveva--do you want to just order procedures, or f/u with Casandra Solo around Jan 9? Your first available is Jan 22 or 24 on call week.  I contacted daughter Deborah Carlson (have VEE) [phone] and gave [employer]

## 2018-12-20 NOTE — TELEPHONE ENCOUNTER
Patients daughter Stephanie Villegas (okay per FYI in [facility] verbal release) advised no need to see [facility] or  in OV may schedule procedure but please to come and get CBC done in one week. We will reach out to patient to schedule.     Routed to GI Schedulers  GI anastasia

## 2018-12-20 NOTE — PROGRESS NOTES
HPI       Arlene Crowder is a 69year old female who is here today for f/u of Other iron deficiency anemia  (primary encounter diagnosis)       She is here today for follow-up. Was at GI appt yesterday. States taking the po iron tid.   Was doing OK ERGOCALCIFEROL 26772 units Oral Cap TAKE 1 CAPSULE BY MOUTH EVERY WEEK Disp: 12 capsule Rfl: 11   TRUEPLUS LANCETS 28G Does not apply Misc CHECK BLOOD GLUCOSE TWICE DAILY Disp: 200 each Rfl: 4   Glucose Blood (TRUE METRIX BLOOD GLUCOSE TEST) In Vitro Str unspecified hyperlipidemia     medical management   • Pregnancy        • Special screening for osteoporosis 2013    Dexa Scan   • Stress incontinence    • Type II or unspecified type diabetes mellitus without mention of complication, not state Social History Narrative      Not on file    Family History   Problem Relation Age of Onset   • Diabetes Father    • Other (Other) Father 58   • Diabetes Mother 59        (cause of death)   • Diabetes Brother    • Other (Other) Brother 47   • Other (Other) this encounter. No Follow-up on file.         Results From Past 48 Hours:  Recent Results (from the past 48 hour(s))   IRON AND TIBC    Collection Time: 12/20/18  1:42 PM   Result Value Ref Range    Iron 25 (L) 28 - 170 mcg/dL    Total Iron Binding Capa 3.8    Lymphocytes Absolute      1.0 - 4.0 K/UL 1.2 0.9 (L) 0.9 (L)    Monocytes Absolute      0.0 - 1.0 K/UL 0.5 0.4 0.4    Eosinophils Absolute      0.0 - 0.7 K/UL 0.1 0.1 0.1    Basophils Absolute      0.0 - 0.2 K/UL 0.0 0.0 0.0    Iron, Serum      28 -

## 2018-12-21 ENCOUNTER — PATIENT OUTREACH (OUTPATIENT)
Dept: CASE MANAGEMENT | Age: 78
End: 2018-12-21

## 2018-12-21 NOTE — PROGRESS NOTES
Outreached to patient in regards to scheduling Medicare Annual exam (AHA). Patient scheduled her appt with her PCP for 03/08/2019. Thank you.

## 2018-12-26 ENCOUNTER — OFFICE VISIT (OUTPATIENT)
Dept: HEMATOLOGY/ONCOLOGY | Facility: HOSPITAL | Age: 78
End: 2018-12-26
Attending: INTERNAL MEDICINE
Payer: MEDICARE

## 2018-12-26 DIAGNOSIS — K90.9 IMPAIRED INTESTINAL ABSORPTION: Primary | ICD-10-CM

## 2018-12-26 DIAGNOSIS — D50.9 IRON DEFICIENCY ANEMIA, UNSPECIFIED IRON DEFICIENCY ANEMIA TYPE: ICD-10-CM

## 2018-12-26 PROCEDURE — A4216 STERILE WATER/SALINE, 10 ML: HCPCS

## 2018-12-26 PROCEDURE — 96374 THER/PROPH/DIAG INJ IV PUSH: CPT

## 2018-12-26 RX ORDER — 0.9 % SODIUM CHLORIDE 0.9 %
VIAL (ML) INJECTION
Status: DISCONTINUED
Start: 2018-12-26 | End: 2018-12-26

## 2018-12-26 NOTE — PROGRESS NOTES
Pt presents to infusion today for IV venofer 1 of 5. Pt appears well and denies any shortness of breath, dizziness or light headedness.   Pt states she has received venofer in the past and has tolerated well    PIV started in left ac, attempt X1, blood ret

## 2018-12-26 NOTE — TELEPHONE ENCOUNTER
Scheduled for:  Colonoscopy 98272  Provider Name: Dr. Lex Lebron  Date:  3/11/19  Location:  [facility]  Sedation:  MAC  Time:  9582 (pt is aware to arrive at [facility])   Prep:  NPO after  Midnight, mailed Luxembourgish and English instructions on 12/27/18  Meds/Allergies Recon

## 2018-12-26 NOTE — TELEPHONE ENCOUNTER
Dr. Cassi Khan--    Please advise whether you would like the colonoscopy first scheduled then on one day then the capsule preformed on another date. Also, please advise on diagnosis.  Thank you

## 2018-12-27 ENCOUNTER — LAB ENCOUNTER (OUTPATIENT)
Dept: LAB | Age: 78
End: 2018-12-27
Attending: INTERNAL MEDICINE
Payer: MEDICARE

## 2018-12-27 DIAGNOSIS — D64.9 ANEMIA, UNSPECIFIED TYPE: ICD-10-CM

## 2018-12-27 DIAGNOSIS — D50.8 OTHER IRON DEFICIENCY ANEMIA: ICD-10-CM

## 2018-12-27 PROCEDURE — 86900 BLOOD TYPING SEROLOGIC ABO: CPT

## 2018-12-27 PROCEDURE — 86850 RBC ANTIBODY SCREEN: CPT

## 2018-12-27 PROCEDURE — 86901 BLOOD TYPING SEROLOGIC RH(D): CPT

## 2018-12-27 PROCEDURE — 85025 COMPLETE CBC W/AUTO DIFF WBC: CPT

## 2018-12-27 PROCEDURE — 36415 COLL VENOUS BLD VENIPUNCTURE: CPT

## 2018-12-28 ENCOUNTER — OFFICE VISIT (OUTPATIENT)
Dept: HEMATOLOGY/ONCOLOGY | Facility: HOSPITAL | Age: 78
End: 2018-12-28
Attending: INTERNAL MEDICINE
Payer: MEDICARE

## 2018-12-28 VITALS
DIASTOLIC BLOOD PRESSURE: 41 MMHG | SYSTOLIC BLOOD PRESSURE: 145 MMHG | TEMPERATURE: 98 F | HEART RATE: 66 BPM | RESPIRATION RATE: 16 BRPM

## 2018-12-28 DIAGNOSIS — D50.9 IRON DEFICIENCY ANEMIA, UNSPECIFIED IRON DEFICIENCY ANEMIA TYPE: ICD-10-CM

## 2018-12-28 DIAGNOSIS — K90.9 IMPAIRED INTESTINAL ABSORPTION: Primary | ICD-10-CM

## 2018-12-28 PROCEDURE — 96374 THER/PROPH/DIAG INJ IV PUSH: CPT

## 2018-12-28 PROCEDURE — A4216 STERILE WATER/SALINE, 10 ML: HCPCS

## 2018-12-28 RX ORDER — 0.9 % SODIUM CHLORIDE 0.9 %
VIAL (ML) INJECTION
Status: DISCONTINUED
Start: 2018-12-28 | End: 2018-12-28

## 2018-12-28 RX ORDER — OXYBUTYNIN CHLORIDE 10 MG/1
TABLET, EXTENDED RELEASE ORAL
Qty: 90 TABLET | Refills: 0 | Status: SHIPPED | OUTPATIENT
Start: 2018-12-28 | End: 2019-03-20

## 2018-12-31 ENCOUNTER — OFFICE VISIT (OUTPATIENT)
Dept: HEMATOLOGY/ONCOLOGY | Facility: HOSPITAL | Age: 78
End: 2018-12-31
Attending: INTERNAL MEDICINE
Payer: MEDICARE

## 2018-12-31 VITALS
DIASTOLIC BLOOD PRESSURE: 36 MMHG | SYSTOLIC BLOOD PRESSURE: 134 MMHG | TEMPERATURE: 98 F | HEART RATE: 61 BPM | RESPIRATION RATE: 16 BRPM

## 2018-12-31 DIAGNOSIS — D50.9 IRON DEFICIENCY ANEMIA, UNSPECIFIED IRON DEFICIENCY ANEMIA TYPE: ICD-10-CM

## 2018-12-31 DIAGNOSIS — K90.9 IMPAIRED INTESTINAL ABSORPTION: Primary | ICD-10-CM

## 2018-12-31 PROCEDURE — A4216 STERILE WATER/SALINE, 10 ML: HCPCS

## 2018-12-31 PROCEDURE — 96374 THER/PROPH/DIAG INJ IV PUSH: CPT

## 2018-12-31 RX ORDER — 0.9 % SODIUM CHLORIDE 0.9 %
VIAL (ML) INJECTION
Status: DISCONTINUED
Start: 2018-12-31 | End: 2018-12-31

## 2019-01-02 ENCOUNTER — OFFICE VISIT (OUTPATIENT)
Dept: HEMATOLOGY/ONCOLOGY | Facility: HOSPITAL | Age: 79
End: 2019-01-02
Attending: INTERNAL MEDICINE
Payer: MEDICARE

## 2019-01-02 VITALS
DIASTOLIC BLOOD PRESSURE: 42 MMHG | TEMPERATURE: 98 F | RESPIRATION RATE: 16 BRPM | SYSTOLIC BLOOD PRESSURE: 138 MMHG | HEART RATE: 65 BPM

## 2019-01-02 DIAGNOSIS — D50.9 IRON DEFICIENCY ANEMIA, UNSPECIFIED IRON DEFICIENCY ANEMIA TYPE: ICD-10-CM

## 2019-01-02 DIAGNOSIS — K90.9 IMPAIRED INTESTINAL ABSORPTION: Primary | ICD-10-CM

## 2019-01-02 PROCEDURE — 96374 THER/PROPH/DIAG INJ IV PUSH: CPT

## 2019-01-02 PROCEDURE — A4216 STERILE WATER/SALINE, 10 ML: HCPCS

## 2019-01-02 RX ORDER — 0.9 % SODIUM CHLORIDE 0.9 %
VIAL (ML) INJECTION
Status: DISCONTINUED
Start: 2019-01-02 | End: 2019-01-02

## 2019-01-02 NOTE — PROGRESS NOTES
Pt presents to infusion today for IV venofer 3 of 5. Pt appears well and denies any shortness of breath, dizziness or light headedness.   Pt states she has received venofer in the past and has tolerated well    PIV started in left ac, attempt X1, blood ret

## 2019-01-04 ENCOUNTER — OFFICE VISIT (OUTPATIENT)
Dept: HEMATOLOGY/ONCOLOGY | Facility: HOSPITAL | Age: 79
End: 2019-01-04
Attending: INTERNAL MEDICINE
Payer: MEDICARE

## 2019-01-04 VITALS
TEMPERATURE: 98 F | HEART RATE: 56 BPM | DIASTOLIC BLOOD PRESSURE: 35 MMHG | SYSTOLIC BLOOD PRESSURE: 142 MMHG | RESPIRATION RATE: 16 BRPM

## 2019-01-04 DIAGNOSIS — D50.9 IRON DEFICIENCY ANEMIA, UNSPECIFIED IRON DEFICIENCY ANEMIA TYPE: ICD-10-CM

## 2019-01-04 DIAGNOSIS — K90.9 IMPAIRED INTESTINAL ABSORPTION: Primary | ICD-10-CM

## 2019-01-04 PROCEDURE — 96374 THER/PROPH/DIAG INJ IV PUSH: CPT

## 2019-01-04 PROCEDURE — A4216 STERILE WATER/SALINE, 10 ML: HCPCS

## 2019-01-04 RX ORDER — 0.9 % SODIUM CHLORIDE 0.9 %
VIAL (ML) INJECTION
Status: DISCONTINUED
Start: 2019-01-04 | End: 2019-01-04

## 2019-01-04 NOTE — PROGRESS NOTES
Micah Ice to infusion today for IV Venofer 5 of 5. She arrives alert and independent. Patient denies complaints today, is feeling pretty well. Denies any shortness of breath, dizziness or light headedness.   She is scheduled for a colonoscopy in March with

## 2019-01-08 ENCOUNTER — OFFICE VISIT (OUTPATIENT)
Dept: FAMILY MEDICINE CLINIC | Facility: CLINIC | Age: 79
End: 2019-01-08
Payer: MEDICARE

## 2019-01-08 VITALS
SYSTOLIC BLOOD PRESSURE: 136 MMHG | WEIGHT: 127 LBS | BODY MASS INDEX: 27 KG/M2 | TEMPERATURE: 99 F | DIASTOLIC BLOOD PRESSURE: 74 MMHG | HEART RATE: 62 BPM

## 2019-01-08 DIAGNOSIS — R55 SYNCOPE, UNSPECIFIED SYNCOPE TYPE: ICD-10-CM

## 2019-01-08 DIAGNOSIS — I25.118 CORONARY ARTERY DISEASE OF NATIVE HEART WITH STABLE ANGINA PECTORIS, UNSPECIFIED VESSEL OR LESION TYPE (HCC): ICD-10-CM

## 2019-01-08 DIAGNOSIS — I35.1 NONRHEUMATIC AORTIC VALVE INSUFFICIENCY: ICD-10-CM

## 2019-01-08 DIAGNOSIS — D64.9 ANEMIA, UNSPECIFIED TYPE: ICD-10-CM

## 2019-01-08 DIAGNOSIS — I10 ESSENTIAL HYPERTENSION: ICD-10-CM

## 2019-01-08 DIAGNOSIS — E11.22 TYPE 2 DIABETES MELLITUS WITH STAGE 3 CHRONIC KIDNEY DISEASE, WITHOUT LONG-TERM CURRENT USE OF INSULIN (HCC): Primary | ICD-10-CM

## 2019-01-08 DIAGNOSIS — N18.30 TYPE 2 DIABETES MELLITUS WITH STAGE 3 CHRONIC KIDNEY DISEASE, WITHOUT LONG-TERM CURRENT USE OF INSULIN (HCC): Primary | ICD-10-CM

## 2019-01-08 DIAGNOSIS — I70.0 ATHEROSCLEROSIS OF AORTA (HCC): ICD-10-CM

## 2019-01-08 DIAGNOSIS — F33.41 RECURRENT MAJOR DEPRESSIVE DISORDER, IN PARTIAL REMISSION (HCC): ICD-10-CM

## 2019-01-08 PROCEDURE — 99215 OFFICE O/P EST HI 40 MIN: CPT | Performed by: FAMILY MEDICINE

## 2019-01-08 NOTE — PROGRESS NOTES
I have more energy now. No sob no more syncope episodes  No hypo or hyperglycemic episodes    Here with . Patient's past medical surgical family social history was reviewed.     Review of Systems  Allergic: no environmental allergies or food prosper PLATELET; Future    4. Anemia, unspecified type  Recheck 1 mo   Results to AG  - CBC WITH DIFFERENTIAL WITH PLATELET; Future    5. Nonrheumatic aortic valve insufficiency  Recheck echo  - CARD ECHO 2D DOPPLER (CPT=93306); Future    6.  Atherosclerosis of ao

## 2019-02-07 ENCOUNTER — LAB ENCOUNTER (OUTPATIENT)
Dept: LAB | Age: 79
End: 2019-02-07
Attending: FAMILY MEDICINE
Payer: MEDICARE

## 2019-02-07 DIAGNOSIS — I25.118 CORONARY ARTERY DISEASE OF NATIVE HEART WITH STABLE ANGINA PECTORIS, UNSPECIFIED VESSEL OR LESION TYPE (HCC): ICD-10-CM

## 2019-02-07 DIAGNOSIS — N18.30 TYPE 2 DIABETES MELLITUS WITH STAGE 3 CHRONIC KIDNEY DISEASE, WITHOUT LONG-TERM CURRENT USE OF INSULIN (HCC): ICD-10-CM

## 2019-02-07 DIAGNOSIS — E11.22 TYPE 2 DIABETES MELLITUS WITH STAGE 3 CHRONIC KIDNEY DISEASE, WITHOUT LONG-TERM CURRENT USE OF INSULIN (HCC): ICD-10-CM

## 2019-02-07 DIAGNOSIS — D64.9 ANEMIA, UNSPECIFIED TYPE: ICD-10-CM

## 2019-02-07 DIAGNOSIS — R55 SYNCOPE, UNSPECIFIED SYNCOPE TYPE: ICD-10-CM

## 2019-02-07 LAB
ALBUMIN SERPL BCP-MCNC: 3.9 G/DL (ref 3.5–4.8)
ALBUMIN/GLOB SERPL: 1.7 {RATIO} (ref 1–2)
ALP SERPL-CCNC: 50 U/L (ref 32–100)
ALT SERPL-CCNC: 25 U/L (ref 14–54)
ANION GAP SERPL CALC-SCNC: 9 MMOL/L (ref 0–18)
AST SERPL-CCNC: 26 U/L (ref 15–41)
BASOPHILS # BLD AUTO: 0.02 X10(3) UL (ref 0–0.2)
BASOPHILS NFR BLD AUTO: 0.5 %
BILIRUB SERPL-MCNC: 0.5 MG/DL (ref 0.3–1.2)
BUN SERPL-MCNC: 10 MG/DL (ref 8–20)
BUN/CREAT SERPL: 11.8 (ref 10–20)
CALCIUM SERPL-MCNC: 9.2 MG/DL (ref 8.5–10.5)
CHLORIDE SERPL-SCNC: 97 MMOL/L (ref 95–110)
CHOLEST SERPL-MCNC: 154 MG/DL (ref 110–200)
CO2 SERPL-SCNC: 25 MMOL/L (ref 22–32)
CREAT SERPL-MCNC: 0.85 MG/DL (ref 0.5–1.5)
CREAT UR-MCNC: 41.4 MG/DL
DEPRECATED RDW RBC AUTO: 49.3 FL (ref 35.1–46.3)
EOSINOPHIL # BLD AUTO: 0.09 X10(3) UL (ref 0–0.7)
EOSINOPHIL NFR BLD AUTO: 2.2 %
ERYTHROCYTE [DISTWIDTH] IN BLOOD BY AUTOMATED COUNT: 13.3 % (ref 11–15)
EST. AVERAGE GLUCOSE BLD GHB EST-MCNC: 85 MG/DL (ref 68–126)
GLOBULIN PLAS-MCNC: 2.3 G/DL (ref 2.5–3.7)
GLUCOSE SERPL-MCNC: 155 MG/DL (ref 70–99)
HBA1C MFR BLD HPLC: 4.6 % (ref ?–5.7)
HCT VFR BLD AUTO: 30.2 % (ref 35–48)
HDLC SERPL-MCNC: 45 MG/DL
HGB BLD-MCNC: 10.1 G/DL (ref 12–16)
IMM GRANULOCYTES # BLD AUTO: 0.01 X10(3) UL (ref 0–1)
IMM GRANULOCYTES NFR BLD: 0.2 %
LDLC SERPL CALC-MCNC: 60 MG/DL (ref 0–99)
LYMPHOCYTES # BLD AUTO: 0.96 X10(3) UL (ref 1–4)
LYMPHOCYTES NFR BLD AUTO: 23.1 %
MCH RBC QN AUTO: 33.6 PG (ref 26–34)
MCHC RBC AUTO-ENTMCNC: 33.4 G/DL (ref 31–37)
MCV RBC AUTO: 100.3 FL (ref 80–100)
MICROALBUMIN UR-MCNC: 0.5 MG/DL (ref 0–1.8)
MICROALBUMIN/CREAT UR: 12.1 MG/G{CREAT} (ref 0–20)
MONOCYTES # BLD AUTO: 0.37 X10(3) UL (ref 0.1–1)
MONOCYTES NFR BLD AUTO: 8.9 %
NEUTROPHILS # BLD AUTO: 2.7 X10 (3) UL (ref 1.5–7.7)
NEUTROPHILS # BLD AUTO: 2.7 X10(3) UL (ref 1.5–7.7)
NEUTROPHILS NFR BLD AUTO: 65.1 %
NONHDLC SERPL-MCNC: 109 MG/DL
OSMOLALITY UR CALC.SUM OF ELEC: 274 MOSM/KG (ref 275–295)
PATIENT FASTING: YES
PLATELET # BLD AUTO: 301 10(3)UL (ref 150–450)
POTASSIUM SERPL-SCNC: 4.3 MMOL/L (ref 3.3–5.1)
PROT SERPL-MCNC: 6.2 G/DL (ref 5.9–8.4)
RBC # BLD AUTO: 3.01 X10(6)UL (ref 3.8–5.3)
SODIUM SERPL-SCNC: 131 MMOL/L (ref 136–144)
TRIGL SERPL-MCNC: 243 MG/DL (ref 1–149)
WBC # BLD AUTO: 4.2 X10(3) UL (ref 4–11)

## 2019-02-07 PROCEDURE — 80053 COMPREHEN METABOLIC PANEL: CPT

## 2019-02-07 PROCEDURE — 82570 ASSAY OF URINE CREATININE: CPT

## 2019-02-07 PROCEDURE — 80061 LIPID PANEL: CPT

## 2019-02-07 PROCEDURE — 82043 UR ALBUMIN QUANTITATIVE: CPT

## 2019-02-07 PROCEDURE — 83036 HEMOGLOBIN GLYCOSYLATED A1C: CPT

## 2019-02-07 PROCEDURE — 85025 COMPLETE CBC W/AUTO DIFF WBC: CPT

## 2019-02-07 PROCEDURE — 36415 COLL VENOUS BLD VENIPUNCTURE: CPT

## 2019-02-13 ENCOUNTER — TELEPHONE (OUTPATIENT)
Dept: FAMILY MEDICINE CLINIC | Facility: CLINIC | Age: 79
End: 2019-02-13

## 2019-02-13 RX ORDER — LOSARTAN POTASSIUM AND HYDROCHLOROTHIAZIDE 12.5; 5 MG/1; MG/1
1 TABLET ORAL DAILY
Qty: 90 TABLET | Refills: 3 | Status: CANCELLED | OUTPATIENT
Start: 2019-02-13

## 2019-02-13 NOTE — TELEPHONE ENCOUNTER
Patient called to ask if we can order the 50/12.5 mg of Losartan/HCTZ because she is having trouble cutting her 100/12.5 mgs in half. RX Pending.  Please advise

## 2019-02-13 NOTE — TELEPHONE ENCOUNTER
Pt would like to speak with nurse to know if she needs to refill rx for     Current Outpatient Medications:  Losartan Potassium-HCTZ 100-12.5 MG Oral Tab Take 1 tablet by mouth daily.  (Patient taking differently: Take 0.5 tablets by mouth daily.  ) Disp: 9

## 2019-02-14 RX ORDER — TELMISARTAN 20 MG/1
20 TABLET ORAL DAILY
Qty: 90 TABLET | Refills: 3 | Status: SHIPPED | OUTPATIENT
Start: 2019-02-14 | End: 2019-12-02 | Stop reason: DRUGHIGH

## 2019-02-14 RX ORDER — HYDROCHLOROTHIAZIDE 12.5 MG/1
12.5 TABLET ORAL DAILY
Qty: 90 TABLET | Refills: 3 | Status: SHIPPED | OUTPATIENT
Start: 2019-02-14 | End: 2019-10-12

## 2019-02-14 NOTE — TELEPHONE ENCOUNTER
Pharmacy call to f/u on request below losartan-HCTZ 50mg-12.5. Pharmacy states pt handed them a prescription bottle with Losartan Potasium 100mg 1 tab daily that she has been cutting in half. New script was erx on 8/27/18 for Losartan HCTZ 100-12. 5.  Per ph

## 2019-02-15 NOTE — TELEPHONE ENCOUNTER
[facility] states patient should be on both medications. Pharmacy was contacted and informed. [facility] please sign off on this message.

## 2019-02-15 NOTE — TELEPHONE ENCOUNTER
Compa Nunez from [facility] is calling regarding the status of the message.

## 2019-03-05 RX ORDER — MONTELUKAST SODIUM 10 MG/1
TABLET ORAL
Qty: 90 TABLET | Refills: 11 | Status: SHIPPED | OUTPATIENT
Start: 2019-03-05 | End: 2019-12-31 | Stop reason: ALTCHOICE

## 2019-03-05 RX ORDER — CLOPIDOGREL BISULFATE 75 MG/1
TABLET ORAL
Qty: 90 TABLET | Refills: 11 | Status: SHIPPED | OUTPATIENT
Start: 2019-03-05 | End: 2020-06-23

## 2019-03-06 PROBLEM — M85.89 OSTEOPENIA OF MULTIPLE SITES: Status: ACTIVE | Noted: 2019-03-06

## 2019-03-06 PROBLEM — M85.852 OSTEOPENIA OF NECK OF LEFT FEMUR: Status: ACTIVE | Noted: 2019-03-06

## 2019-03-08 ENCOUNTER — OFFICE VISIT (OUTPATIENT)
Dept: FAMILY MEDICINE CLINIC | Facility: CLINIC | Age: 79
End: 2019-03-08
Payer: MEDICARE

## 2019-03-08 ENCOUNTER — TELEPHONE (OUTPATIENT)
Dept: GASTROENTEROLOGY | Facility: CLINIC | Age: 79
End: 2019-03-08

## 2019-03-08 VITALS
WEIGHT: 126 LBS | DIASTOLIC BLOOD PRESSURE: 74 MMHG | TEMPERATURE: 98 F | SYSTOLIC BLOOD PRESSURE: 129 MMHG | HEIGHT: 58 IN | HEART RATE: 65 BPM | BODY MASS INDEX: 26.45 KG/M2

## 2019-03-08 DIAGNOSIS — Z00.00 MEDICARE ANNUAL WELLNESS VISIT, SUBSEQUENT: Primary | ICD-10-CM

## 2019-03-08 DIAGNOSIS — I73.9 PERIPHERAL VASCULAR DISEASE (HCC): ICD-10-CM

## 2019-03-08 DIAGNOSIS — I10 ESSENTIAL HYPERTENSION: ICD-10-CM

## 2019-03-08 DIAGNOSIS — E11.22 TYPE 2 DIABETES MELLITUS WITH STAGE 3 CHRONIC KIDNEY DISEASE, WITHOUT LONG-TERM CURRENT USE OF INSULIN (HCC): ICD-10-CM

## 2019-03-08 DIAGNOSIS — M16.0 OSTEOARTHRITIS OF BOTH HIPS, UNSPECIFIED OSTEOARTHRITIS TYPE: ICD-10-CM

## 2019-03-08 DIAGNOSIS — N18.30 TYPE 2 DIABETES MELLITUS WITH STAGE 3 CHRONIC KIDNEY DISEASE, WITHOUT LONG-TERM CURRENT USE OF INSULIN (HCC): ICD-10-CM

## 2019-03-08 DIAGNOSIS — I35.1 NONRHEUMATIC AORTIC VALVE INSUFFICIENCY: ICD-10-CM

## 2019-03-08 DIAGNOSIS — Z00.00 ENCOUNTER FOR ANNUAL HEALTH EXAMINATION: ICD-10-CM

## 2019-03-08 DIAGNOSIS — E55.9 VITAMIN D DEFICIENCY: ICD-10-CM

## 2019-03-08 DIAGNOSIS — F33.41 RECURRENT MAJOR DEPRESSIVE DISORDER, IN PARTIAL REMISSION (HCC): ICD-10-CM

## 2019-03-08 DIAGNOSIS — M85.80 OSTEOPENIA, UNSPECIFIED LOCATION: ICD-10-CM

## 2019-03-08 DIAGNOSIS — I70.0 ATHEROSCLEROSIS OF AORTA (HCC): ICD-10-CM

## 2019-03-08 DIAGNOSIS — R09.89 BILATERAL CAROTID BRUITS: ICD-10-CM

## 2019-03-08 DIAGNOSIS — K90.9 IMPAIRED INTESTINAL ABSORPTION: ICD-10-CM

## 2019-03-08 DIAGNOSIS — H25.13 AGE-RELATED NUCLEAR CATARACT OF BOTH EYES: ICD-10-CM

## 2019-03-08 DIAGNOSIS — D64.9 ANEMIA, UNSPECIFIED TYPE: ICD-10-CM

## 2019-03-08 DIAGNOSIS — I73.9 PVD (PERIPHERAL VASCULAR DISEASE) (HCC): ICD-10-CM

## 2019-03-08 DIAGNOSIS — Z12.31 VISIT FOR SCREENING MAMMOGRAM: ICD-10-CM

## 2019-03-08 DIAGNOSIS — I25.118 CORONARY ARTERY DISEASE OF NATIVE HEART WITH STABLE ANGINA PECTORIS, UNSPECIFIED VESSEL OR LESION TYPE (HCC): ICD-10-CM

## 2019-03-08 PROBLEM — H02.886 MEIBOMIAN GLAND DYSFUNCTION (MGD) OF BOTH EYES: Status: RESOLVED | Noted: 2018-12-06 | Resolved: 2019-03-08

## 2019-03-08 PROBLEM — H02.883 MEIBOMIAN GLAND DYSFUNCTION (MGD) OF BOTH EYES: Status: RESOLVED | Noted: 2018-12-06 | Resolved: 2019-03-08

## 2019-03-08 PROCEDURE — 99397 PER PM REEVAL EST PAT 65+ YR: CPT | Performed by: FAMILY MEDICINE

## 2019-03-08 PROCEDURE — 96160 PT-FOCUSED HLTH RISK ASSMT: CPT | Performed by: FAMILY MEDICINE

## 2019-03-08 PROCEDURE — G0439 PPPS, SUBSEQ VISIT: HCPCS | Performed by: FAMILY MEDICINE

## 2019-03-08 NOTE — TELEPHONE ENCOUNTER
Per verbal from Dr. Asia Bustamante-   Patient can keep Monday procedure appointment but stop Plavix starting today for the next 4 days and  iron. May continue with baby aspirin. Patient contacted and message given to patient and  who speaks [place].    Pa

## 2019-03-08 NOTE — TELEPHONE ENCOUNTER
I received a call from April at  [facility] who states patient told her on the phone that she has been taking Plavix for years and is still taking it. Was prescribed by Dr. Magnus Reilly. Last dose last night. Patient scheduled for colonoscopy on Monday.  Has not hel

## 2019-03-08 NOTE — PATIENT INSTRUCTIONS
Labs in May  [address]   Tests on this list are recommended by your physician but may not be covered, or covered at this frequency, by your insurer. Please check with your insurance carrier before scheduling to verify coverage. old and have smoked more than 100 cigarettes in their lifetime   • Anyone with a family history    Colorectal Cancer Screening  Covered up to Age 76     Colonoscopy Screen   Covered every 10 years- more often if abnormal Colonoscopy due on 07/08/2021 [place] Influenza  Covered Annually Orders placed or performed in visit on 10/06/16   • FLU VACC PRSV FREE INC ANTIG    Please get every year    Pneumococcal 13 (Prevnar)  Covered Once after 65 Orders placed or performed in visit on 03/06/18   • PNEUMOCOCCAL VACC, regarding Advance Directives.

## 2019-03-08 NOTE — PROGRESS NOTES
HPI:   Israel Gaines is a 69year old female who presents for a Medicare Subsequent Annual Wellness visit (Pt already had Initial Annual Wellness).       Her last annual assessment has been over 1 year: Annual Physical due on 03/06/2019         Fall/Risk Alcohol abuse and had a score of 0 so is at low risk.     Patient Care Team: Patient Care Team:  Bishnu Drake DO as PCP - [facility])    Patient Active Problem List:     Type II diabetes mellitus [facility])     Age-related nuclear cataract o Diabetes ([facility] Gastritis, GERD (gastroesophageal reflux disease), Non-ST elevation MI (NSTEMI) ([facility] (7/2016), Other and unspecified hyperlipidemia, Pregnancy, Special screening for osteoporosis (1/17/2013), Stress incontinence, Type II or unspecified typ Jannie  Screening Method:  Questionnaire, Other  Other:  Patient had bilateral hearing aids  I have a problem hearing over the telephone:  No I have trouble following the conversations when two or more people are talking at the same time:  No   I have tr respirations unlabored   Heart:  Regular pos 2/6 eliot   Abdomen:   Soft, non-tender, bowel sounds active all four quadrants,  no masses, no organomegaly   Pelvic: Deferred   Extremities: Extremities normal, atraumatic, no cyanosis or edema   Pulses: 2+ and med    14. Peripheral vascular disease ([facility] [address]  Cont on chol med    15. Impaired intestinal absorption  F/u gi    16. Bilateral carotid bruits  F/u optho    17. Vitamin D deficiency  Recheck labs. Stable.          Diet assessment: good     PLAN:  The patient data found.     Glaucoma Screening      Ophthalmology Visit Annually: Diabetics, FHx Glaucoma, AA>50, > 65 Data entered on: 1/8/2019   Last Dilated Eye Exam 12/6/2018       Bone Density Screening      Dexascan Every two years Last Dexa Scan:   XR DE Annual Monitoring of Persistent     Medications (ACE/ARB, digoxin diuretics, anticonvulsants.)    Potassium  Annually Potassium (mmol/L)   Date Value   02/07/2019 4.3     POTASSIUM (P) (mmol/L)   Date Value   09/28/2016 4.6   09/28/2016 4.6    No flowshe

## 2019-03-11 ENCOUNTER — HOSPITAL ENCOUNTER (OUTPATIENT)
Facility: HOSPITAL | Age: 79
Setting detail: HOSPITAL OUTPATIENT SURGERY
Discharge: HOME | End: 2019-03-11
Attending: INTERNAL MEDICINE | Admitting: INTERNAL MEDICINE
Payer: MEDICARE

## 2019-03-11 ENCOUNTER — ANESTHESIA EVENT (OUTPATIENT)
Dept: ENDOSCOPY | Facility: HOSPITAL | Age: 79
End: 2019-03-11
Payer: MEDICARE

## 2019-03-11 ENCOUNTER — ANESTHESIA (OUTPATIENT)
Dept: ENDOSCOPY | Facility: HOSPITAL | Age: 79
End: 2019-03-11
Payer: MEDICARE

## 2019-03-11 DIAGNOSIS — D50.9 IRON DEFICIENCY ANEMIA, UNSPECIFIED IRON DEFICIENCY ANEMIA TYPE: ICD-10-CM

## 2019-03-11 LAB — GLUCOSE BLDC GLUCOMTR-MCNC: 135 MG/DL (ref 70–99)

## 2019-03-11 PROCEDURE — 0DBK8ZX EXCISION OF ASCENDING COLON, VIA NATURAL OR ARTIFICIAL OPENING ENDOSCOPIC, DIAGNOSTIC: ICD-10-PCS | Performed by: INTERNAL MEDICINE

## 2019-03-11 PROCEDURE — 45380 COLONOSCOPY AND BIOPSY: CPT | Performed by: INTERNAL MEDICINE

## 2019-03-11 RX ORDER — LIDOCAINE HYDROCHLORIDE 10 MG/ML
INJECTION, SOLUTION EPIDURAL; INFILTRATION; INTRACAUDAL; PERINEURAL AS NEEDED
Status: DISCONTINUED | OUTPATIENT
Start: 2019-03-11 | End: 2019-03-11 | Stop reason: SURG

## 2019-03-11 RX ORDER — SODIUM CHLORIDE, SODIUM LACTATE, POTASSIUM CHLORIDE, CALCIUM CHLORIDE 600; 310; 30; 20 MG/100ML; MG/100ML; MG/100ML; MG/100ML
INJECTION, SOLUTION INTRAVENOUS CONTINUOUS
Status: DISCONTINUED | OUTPATIENT
Start: 2019-03-11 | End: 2019-03-11

## 2019-03-11 RX ORDER — NALOXONE HYDROCHLORIDE 0.4 MG/ML
80 INJECTION, SOLUTION INTRAMUSCULAR; INTRAVENOUS; SUBCUTANEOUS AS NEEDED
Status: DISCONTINUED | OUTPATIENT
Start: 2019-03-11 | End: 2019-03-11

## 2019-03-11 RX ORDER — DEXTROSE MONOHYDRATE 25 G/50ML
50 INJECTION, SOLUTION INTRAVENOUS
Status: DISCONTINUED | OUTPATIENT
Start: 2019-03-11 | End: 2019-03-11

## 2019-03-11 RX ADMIN — LIDOCAINE HYDROCHLORIDE 50 MG: 10 INJECTION, SOLUTION EPIDURAL; INFILTRATION; INTRACAUDAL; PERINEURAL at 08:43:00

## 2019-03-11 RX ADMIN — SODIUM CHLORIDE, SODIUM LACTATE, POTASSIUM CHLORIDE, CALCIUM CHLORIDE: 600; 310; 30; 20 INJECTION, SOLUTION INTRAVENOUS at 09:16:00

## 2019-03-11 NOTE — H&P
History & Physical Examination    Patient Name: Jose Llamas  MRN: [id number]  [facility]: [id number]  YOB: 1940    Diagnosis: anemia        Medications Prior to Admission:  CLOPIDOGREL BISULFATE 75 MG Oral Tab TAKE 1 TABLET BY MOUTH ONCE DAILY D W/DEVICE Does not apply Kit 1 kit by Does not apply route 2 (two) times daily. Disp: 1 kit Rfl: 0 3/10/2019   Digestive Enzymes (ENZYME DIGEST OR) Take by mouth.  Disp:  Rfl:  3/10/2019   LORazepam 0.5 MG Oral Tab Take 0.5 mg by mouth every 4 (four) hours a ANESTH,ANGIOPLASTY     •       4X   • CHOLECYSTECTOMY     • COLONOSCOPY  2016   • EGD  2018   • ELECTROCARDIOGRAM, COMPLETE  2013    Scanned to Media Tab - Date of Service 2013   • ESOPHAGOGASTRODUODENOSCOPY (EGD) N/A 2018    Perf

## 2019-03-11 NOTE — ANESTHESIA POSTPROCEDURE EVALUATION
Patient: Jose G Sanders    Procedure Summary     Date:  03/11/19 Room / Location:  [address] [place] [address] ENDOSCOPY 01 / [address] [place] [address] ENDOSCOPY    Anesthesia Start:  3499 Anesthesia Stop:      Procedure:  COLONOSCOPY (N/A ) Diagnosis:       Iron deficiency anemia, unspecified iron d

## 2019-03-11 NOTE — ANESTHESIA PREPROCEDURE EVALUATION
Anesthesia PreOp Note    HPI:     Za Avilez is a 69year old female who presents for preoperative consultation requested by: Suzi Marte MD    Date of Surgery: 3/11/2019    Procedure(s):  COLONOSCOPY  Indication: Iron deficiency anemia, unspeci kidney disease) stage 3, GFR 30-59 ml/min (HCC)    • Coronary atherosclerosis    • Depression    • Diabetes ([facility]     Management:  Medication   • Gastritis    • GERD (gastroesophageal reflux disease)    • High blood pressure    • High cholesterol    • Non- breakfast. Disp: 30 tablet Rfl: 5 4/45/6213   folic acid 366 MCG Oral Tab Take 400 mcg by mouth 2 (two) times daily.  Disp:  Rfl:  3/10/2019   ERGOCALCIFEROL 86163 units Oral Cap TAKE 1 CAPSULE BY MOUTH EVERY WEEK Disp: 12 capsule Rfl: 11 3/10/2019   TRUEPL Other (Other) Father 58   • Diabetes Mother 59        (cause of death)   • Diabetes Brother    • Other (Other) Brother 47   • Other (Other) Brother 59   • Glaucoma Neg    • Macular degeneration Neg      Social History    Socioeconomic History      Marital Exercise: Not Asked        Bike Helmet: Not Asked        Seat Belt: Not Asked        Self-Exams: Not Asked    Social History Narrative      Not on file      Available pre-op labs reviewed.   Lab Results   Component Value Date    WBC 4.2 02/07/2019    R PRATIK  3/11/2019 7:56 AM

## 2019-03-11 NOTE — OPERATIVE REPORT
[facility] - [facility] Endoscopy Report      Preoperative Diagnosis:  - anemia      Postoperative Diagnosis:  - colon polyp x 1  - diverticulosis  - internal hemorrhoids      Procedure:    Colonoscopy       Surgeon:  Radha Day M.D.     Anesthesia:

## 2019-03-12 ENCOUNTER — TELEPHONE (OUTPATIENT)
Dept: GASTROENTEROLOGY | Facility: CLINIC | Age: 79
End: 2019-03-12

## 2019-03-12 VITALS
HEIGHT: 60 IN | BODY MASS INDEX: 24.54 KG/M2 | HEART RATE: 53 BPM | WEIGHT: 125 LBS | SYSTOLIC BLOOD PRESSURE: 112 MMHG | OXYGEN SATURATION: 100 % | TEMPERATURE: 98 F | DIASTOLIC BLOOD PRESSURE: 46 MMHG | RESPIRATION RATE: 16 BRPM

## 2019-03-12 DIAGNOSIS — D64.9 ANEMIA, UNSPECIFIED TYPE: Primary | ICD-10-CM

## 2019-03-12 NOTE — TELEPHONE ENCOUNTER
----- Message from Shanique Jones MD sent at 3/11/2019  3:49 PM CDT -----  RN to contact pt and inform that polyp removed, place on call back list for 5 years   - also please arrange capusle endoscopy for anemia

## 2019-03-12 NOTE — TELEPHONE ENCOUNTER
Forwarded to GI schedulers re capsule below. Please call daughter Harper Leal [phone] (we have VEE) as she speaks [place] and arranges appts. Thanks. Reviewed results below and entered colonoscopy recall in epic for 3/11/2024.

## 2019-03-18 DIAGNOSIS — D50.8 OTHER IRON DEFICIENCY ANEMIA: Primary | ICD-10-CM

## 2019-03-18 DIAGNOSIS — D64.9 ANEMIA, UNSPECIFIED TYPE: ICD-10-CM

## 2019-03-19 ENCOUNTER — OFFICE VISIT (OUTPATIENT)
Dept: HEMATOLOGY/ONCOLOGY | Facility: HOSPITAL | Age: 79
End: 2019-03-19
Attending: INTERNAL MEDICINE
Payer: MEDICARE

## 2019-03-19 VITALS
SYSTOLIC BLOOD PRESSURE: 166 MMHG | OXYGEN SATURATION: 99 % | TEMPERATURE: 99 F | HEART RATE: 57 BPM | BODY MASS INDEX: 26.97 KG/M2 | RESPIRATION RATE: 16 BRPM | WEIGHT: 125 LBS | HEIGHT: 57 IN | DIASTOLIC BLOOD PRESSURE: 126 MMHG

## 2019-03-19 DIAGNOSIS — D50.8 OTHER IRON DEFICIENCY ANEMIA: Primary | ICD-10-CM

## 2019-03-19 DIAGNOSIS — D50.8 OTHER IRON DEFICIENCY ANEMIA: ICD-10-CM

## 2019-03-19 DIAGNOSIS — Z51.81 MEDICATION MONITORING ENCOUNTER: ICD-10-CM

## 2019-03-19 DIAGNOSIS — D64.9 ANEMIA, UNSPECIFIED TYPE: ICD-10-CM

## 2019-03-19 LAB
BASOPHILS # BLD AUTO: 0.01 X10(3) UL (ref 0–0.2)
BASOPHILS NFR BLD AUTO: 0.2 %
DEPRECATED HBV CORE AB SER IA-ACNC: 35.2 NG/ML (ref 18–340)
DEPRECATED RDW RBC AUTO: 48.4 FL (ref 35.1–46.3)
EOSINOPHIL # BLD AUTO: 0.1 X10(3) UL (ref 0–0.7)
EOSINOPHIL NFR BLD AUTO: 1.9 %
ERYTHROCYTE [DISTWIDTH] IN BLOOD BY AUTOMATED COUNT: 13.7 % (ref 11–15)
HCT VFR BLD AUTO: 29.5 % (ref 35–48)
HGB BLD-MCNC: 9.7 G/DL (ref 12–16)
IMM GRANULOCYTES # BLD AUTO: 0.01 X10(3) UL (ref 0–1)
IMM GRANULOCYTES NFR BLD: 0.2 %
IRON SATURATION: 9 % (ref 15–50)
IRON SERPL-MCNC: 38 UG/DL (ref 50–170)
LYMPHOCYTES # BLD AUTO: 1.43 X10(3) UL (ref 1–4)
LYMPHOCYTES NFR BLD AUTO: 27.2 %
MCH RBC QN AUTO: 32.2 PG (ref 26–34)
MCHC RBC AUTO-ENTMCNC: 32.9 G/DL (ref 31–37)
MCV RBC AUTO: 98 FL (ref 80–100)
MONOCYTES # BLD AUTO: 0.53 X10(3) UL (ref 0.1–1)
MONOCYTES NFR BLD AUTO: 10.1 %
NEUTROPHILS # BLD AUTO: 3.18 X10 (3) UL (ref 1.5–7.7)
NEUTROPHILS # BLD AUTO: 3.18 X10(3) UL (ref 1.5–7.7)
NEUTROPHILS NFR BLD AUTO: 60.4 %
PLATELET # BLD AUTO: 280 10(3)UL (ref 150–450)
RBC # BLD AUTO: 3.01 X10(6)UL (ref 3.8–5.3)
TOTAL IRON BINDING CAPACITY: 413 UG/DL (ref 240–450)
TRANSFERRIN SERPL-MCNC: 277 MG/DL (ref 200–360)
WBC # BLD AUTO: 5.3 X10(3) UL (ref 4–11)

## 2019-03-19 PROCEDURE — 85025 COMPLETE CBC W/AUTO DIFF WBC: CPT

## 2019-03-19 PROCEDURE — 99214 OFFICE O/P EST MOD 30 MIN: CPT | Performed by: INTERNAL MEDICINE

## 2019-03-19 PROCEDURE — 36415 COLL VENOUS BLD VENIPUNCTURE: CPT

## 2019-03-19 PROCEDURE — 84466 ASSAY OF TRANSFERRIN: CPT

## 2019-03-19 PROCEDURE — 83540 ASSAY OF IRON: CPT

## 2019-03-19 PROCEDURE — 82728 ASSAY OF FERRITIN: CPT

## 2019-03-19 NOTE — PROGRESS NOTES
KT       Tara Richardson is a 69year old female who is here today for f/u of Other iron deficiency anemia  (primary encounter diagnosis)     She is here today for follow-up. Had colonoscopy on 03/11/19, no findings.  One polyp this was a tubular katina 240 g Oral Recon Soln Take as directed.  Generic/substitute okay Disp: 1 Bottle Rfl: 0   Pantoprazole Sodium 40 MG Oral Tab EC Take 1 tablet (40 mg total) by mouth every morning before breakfast. Disp: 30 tablet Rfl: 5   folic acid 050 MCG Oral Tab Take 400 • Diabetes [facility])     Management:  Medication   • Gastritis    • GERD (gastroesophageal reflux disease)    • High blood pressure    • High cholesterol    • Non-ST elevation MI (NSTEMI) ([facility] [address] [facility] 7/2016    stenting of the proximal LAD and ptca of mid LAD   • O Social connections:        Talks on phone: Not on file        Gets together: Not on file        Attends Jehovah's witness service: Not on file        Active member of club or organization: Not on file        Attends meetings of clubs or organizations: Not on file exudate. Eyes: No scleral icterus. Cardiovascular: Normal rate and regular rhythm. No murmur heard. Pulmonary/Chest: Effort normal and breath sounds normal.   Abdominal: Soft. Bowel sounds are normal. There is no tenderness.    Musculoskeletal: She e MCH 32.2 26.0 - 34.0 pg    MCHC 32.9 31.0 - 37.0 g/dL    RDW-SD 48.4 (H) 35.1 - 46.3 fL    RDW 13.7 11.0 - 15.0 %    .0 150.0 - 450.0 10(3)uL    Neutrophil Absolute Prelim 3.18 1.50 - 7.70 x10 (3) uL    Neutrophil Absolute 3.18 1.50 - 7.70 x10(3) Referrals:  None   No orders of the defined types were placed in this encounter.

## 2019-03-20 RX ORDER — OXYBUTYNIN CHLORIDE 10 MG/1
TABLET, EXTENDED RELEASE ORAL
Qty: 90 TABLET | Refills: 11 | Status: SHIPPED | OUTPATIENT
Start: 2019-03-20 | End: 2020-06-23

## 2019-03-20 NOTE — TELEPHONE ENCOUNTER
Scheduled for:  Video Capsule 09307  Provider Name: Dr. Rubens Bach  Date:  4/3/19  Location:  [facility]  Sedation:  None  Time:   0730 (pt is aware to arrive at 0630)   Prep:  Clear Liquid Dinner the night before   Drink Mag Citrate 1/2 bottle at 2000 the night befor

## 2019-04-01 ENCOUNTER — TELEPHONE (OUTPATIENT)
Dept: GASTROENTEROLOGY | Facility: CLINIC | Age: 79
End: 2019-04-01

## 2019-04-01 DIAGNOSIS — D64.9 ANEMIA, UNSPECIFIED TYPE: Primary | ICD-10-CM

## 2019-04-01 NOTE — TELEPHONE ENCOUNTER
Good Afternoon,    [facility]ization will be [facility] the Capsule endoscopy on 4/3/19.     Thanks,  [address]

## 2019-04-02 NOTE — TELEPHONE ENCOUNTER
Pt has [employer] Complete HMO--this should be done through [employer]. I spoke to Bhumi 4 in [facility], she will work on Andrea Maulik and let me know. Lanette in insurance verification notified.

## 2019-04-02 NOTE — TELEPHONE ENCOUNTER
Lanette from insurance verification called back, still not approved, states pending medical review. I spoke to Bhumi 4 again, she states Marco Antonio Zeng working on this--reminded that procedure is tomorrow.  She will have Cristina contact me-- Marco Antonio Zeng called back--this is un

## 2019-04-04 NOTE — TELEPHONE ENCOUNTER
This procedure was schedule by her daughter Lalitha Baron (on VEE)    Scheduled for:  Video Capsule 82039  Provider Name: Dr. Augustina Jerrynackenneth  Date:  4/11/19  Location:  [facility]  Sedation:  None  Time:   0730 (pt is aware to arrive at 0630)   Prep[facility]  [facility]

## 2019-04-04 NOTE — TELEPHONE ENCOUNTER
CBLM with daughter Dioni Mcclendon to schedule procedure.  Please [facility] to [facility] in [facility]

## 2019-04-04 NOTE — TELEPHONE ENCOUNTER
I have received authorization. Referral has been updated.     Thank you,  [facility]

## 2019-04-04 NOTE — TELEPHONE ENCOUNTER
Marcy STOLL  in office, needing update on below prior auth for capsule study. I spoke to Anabel in [phone] [facility] was working on this but appears to be closed.  I requested that this be re-opened immediately and auth obtained--it should st

## 2019-04-04 NOTE — TELEPHONE ENCOUNTER
Noted, forwarded to GI . Please be sure to let [facility] know new date so they can update on referral. Thanks.

## 2019-04-11 ENCOUNTER — HOSPITAL ENCOUNTER (OUTPATIENT)
Facility: HOSPITAL | Age: 79
Setting detail: HOSPITAL OUTPATIENT SURGERY
Discharge: HOME | End: 2019-04-11
Attending: INTERNAL MEDICINE | Admitting: INTERNAL MEDICINE
Payer: MEDICARE

## 2019-04-11 DIAGNOSIS — D64.9 ANEMIA, UNSPECIFIED TYPE: ICD-10-CM

## 2019-04-11 PROCEDURE — 0DJ07ZZ INSPECTION OF UPPER INTESTINAL TRACT, VIA NATURAL OR ARTIFICIAL OPENING: ICD-10-PCS | Performed by: INTERNAL MEDICINE

## 2019-04-11 PROCEDURE — 91110 GI TRC IMG INTRAL ESOPH-ILE: CPT | Performed by: INTERNAL MEDICINE

## 2019-04-12 VITALS
HEIGHT: 56 IN | DIASTOLIC BLOOD PRESSURE: 46 MMHG | OXYGEN SATURATION: 99 % | SYSTOLIC BLOOD PRESSURE: 171 MMHG | WEIGHT: 125 LBS | BODY MASS INDEX: 28.12 KG/M2 | HEART RATE: 56 BPM | RESPIRATION RATE: 13 BRPM

## 2019-04-13 NOTE — H&P
History & Physical Examination    Patient Name: Jayden Layne  MRN: [id number]  [facility]: [id number]  YOB: 1940    Diagnosis: capsule for anemia    No medications prior to admission.     No current facility-administered medications for this encou Diabetes Brother    • Other (Other) Brother 47   • Other (Other) Brother 59   • Glaucoma Neg    • Macular degeneration Neg      Social History    Tobacco Use      Smoking status: Never Smoker      Smokeless tobacco: Never Used    Alcohol use: No      Alcoh

## 2019-04-15 ENCOUNTER — TELEPHONE (OUTPATIENT)
Dept: GASTROENTEROLOGY | Facility: CLINIC | Age: 79
End: 2019-04-15

## 2019-04-15 ENCOUNTER — LAB ENCOUNTER (OUTPATIENT)
Dept: LAB | Age: 79
End: 2019-04-15
Attending: INTERNAL MEDICINE
Payer: MEDICARE

## 2019-04-15 DIAGNOSIS — D64.9 ANEMIA, UNSPECIFIED TYPE: ICD-10-CM

## 2019-04-15 DIAGNOSIS — I78.1 TELANGIECTASIAS: Primary | ICD-10-CM

## 2019-04-15 PROCEDURE — 85025 COMPLETE CBC W/AUTO DIFF WBC: CPT

## 2019-04-15 PROCEDURE — 36415 COLL VENOUS BLD VENIPUNCTURE: CPT

## 2019-04-15 NOTE — TELEPHONE ENCOUNTER
Dr Logan Del Rio is speaking with family member about egd with JUAN CARLOS 4/17 and they stated pt had been off Plavix 5 days last week for colonoscopy, and just taken Sat and Sun. Could hold today and tomorrow, but is this enough time to be off?   Forwarded also

## 2019-04-15 NOTE — TELEPHONE ENCOUNTER
Noted. I spoke to [facility] JERMAN OLIVAS  and sent. She is waiting for pt call back, as she has an opening for Friday. She will relay below Plavix order.

## 2019-04-15 NOTE — TELEPHONE ENCOUNTER
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

## 2019-04-15 NOTE — TELEPHONE ENCOUNTER
Dr. Melanie Kang--    I spoke with Ger Nogueira (her daughter) to find a date as soon as possible. Please advise if I can add this patient to your on call day 4/17/19 at 1430. You have 3 pt's on this will be #4.  Thank you

## 2019-04-15 NOTE — OPERATIVE REPORT
Small Bowel Capsule Endoscopy report     Pre-op Diagnosis: Occult GI bleed, chronic blood loss anemia     Postoperative diagnosis: See impression     Sedation: none     Study details:  Repeat \"Given\" Video Capsule Enteroscopy study is being performed to

## 2019-04-15 NOTE — TELEPHONE ENCOUNTER
I spoke to Kassandra at ext [phone], she will have Dr Sami Mcclellan' nurse speak to him about this and get back to us.

## 2019-04-15 NOTE — TELEPHONE ENCOUNTER
Forwarded to GI . Thanks. Still awaiting ok from Dr Riccardo Raman if pt can hold Plavix. Attempting to reach RN at [phone], in case Dr Riccardo Raman out of office.

## 2019-04-15 NOTE — TELEPHONE ENCOUNTER
Using [facility] #[id number], pt contacted and reviewed below. She will get CBC today at [address] [place] [address]. She requests that we contact daughter William Senior [phone] (have VEE) for scheduling. I left message on Courtney's voicemail to call.  She is carolin

## 2019-04-17 NOTE — TELEPHONE ENCOUNTER
Pt's  is calling states pt needs refills on her medication       Current Outpatient Medications:                                      Pantoprazole Sodium 40 MG Oral Tab EC Take 1 tablet (40 mg total) by mouth every morning before breakfast. Disp: 30

## 2019-04-19 ENCOUNTER — ANESTHESIA EVENT (OUTPATIENT)
Dept: ENDOSCOPY | Facility: HOSPITAL | Age: 79
End: 2019-04-19
Payer: MEDICARE

## 2019-04-19 ENCOUNTER — ANESTHESIA (OUTPATIENT)
Dept: ENDOSCOPY | Facility: HOSPITAL | Age: 79
End: 2019-04-19
Payer: MEDICARE

## 2019-04-19 ENCOUNTER — TELEPHONE (OUTPATIENT)
Dept: GASTROENTEROLOGY | Facility: CLINIC | Age: 79
End: 2019-04-19

## 2019-04-19 ENCOUNTER — HOSPITAL ENCOUNTER (OUTPATIENT)
Facility: HOSPITAL | Age: 79
Setting detail: HOSPITAL OUTPATIENT SURGERY
Discharge: HOME | End: 2019-04-19
Attending: INTERNAL MEDICINE | Admitting: INTERNAL MEDICINE
Payer: MEDICARE

## 2019-04-19 DIAGNOSIS — I78.1 TELANGIECTASIAS: ICD-10-CM

## 2019-04-19 PROCEDURE — 0W3P8ZZ CONTROL BLEEDING IN GASTROINTESTINAL TRACT, VIA NATURAL OR ARTIFICIAL OPENING ENDOSCOPIC: ICD-10-PCS | Performed by: INTERNAL MEDICINE

## 2019-04-19 PROCEDURE — 43255 EGD CONTROL BLEEDING ANY: CPT | Performed by: INTERNAL MEDICINE

## 2019-04-19 RX ORDER — CITALOPRAM 10 MG/1
10 TABLET ORAL
Qty: 90 TABLET | Refills: 1 | Status: SHIPPED | OUTPATIENT
Start: 2019-04-19 | End: 2019-10-23

## 2019-04-19 RX ORDER — OMEPRAZOLE 20 MG/1
20 CAPSULE, DELAYED RELEASE ORAL EVERY MORNING
Qty: 30 CAPSULE | Refills: 1 | Status: SHIPPED | OUTPATIENT
Start: 2019-04-19 | End: 2019-04-19

## 2019-04-19 RX ORDER — SODIUM CHLORIDE, SODIUM LACTATE, POTASSIUM CHLORIDE, CALCIUM CHLORIDE 600; 310; 30; 20 MG/100ML; MG/100ML; MG/100ML; MG/100ML
INJECTION, SOLUTION INTRAVENOUS CONTINUOUS
Status: DISCONTINUED | OUTPATIENT
Start: 2019-04-19 | End: 2019-04-19

## 2019-04-19 RX ORDER — NALOXONE HYDROCHLORIDE 0.4 MG/ML
80 INJECTION, SOLUTION INTRAMUSCULAR; INTRAVENOUS; SUBCUTANEOUS AS NEEDED
Status: DISCONTINUED | OUTPATIENT
Start: 2019-04-19 | End: 2019-04-19

## 2019-04-19 RX ORDER — LIDOCAINE HYDROCHLORIDE 10 MG/ML
INJECTION, SOLUTION EPIDURAL; INFILTRATION; INTRACAUDAL; PERINEURAL AS NEEDED
Status: DISCONTINUED | OUTPATIENT
Start: 2019-04-19 | End: 2019-04-19 | Stop reason: SURG

## 2019-04-19 RX ORDER — OMEPRAZOLE 20 MG/1
CAPSULE, DELAYED RELEASE ORAL
Qty: 90 CAPSULE | Refills: 1 | Status: SHIPPED | OUTPATIENT
Start: 2019-04-19 | End: 2019-04-22

## 2019-04-19 RX ADMIN — SODIUM CHLORIDE, SODIUM LACTATE, POTASSIUM CHLORIDE, CALCIUM CHLORIDE: 600; 310; 30; 20 INJECTION, SOLUTION INTRAVENOUS at 09:02:00

## 2019-04-19 RX ADMIN — LIDOCAINE HYDROCHLORIDE 25 MG: 10 INJECTION, SOLUTION EPIDURAL; INFILTRATION; INTRACAUDAL; PERINEURAL at 08:48:00

## 2019-04-19 RX ADMIN — SODIUM CHLORIDE, SODIUM LACTATE, POTASSIUM CHLORIDE, CALCIUM CHLORIDE: 600; 310; 30; 20 INJECTION, SOLUTION INTRAVENOUS at 08:44:00

## 2019-04-19 NOTE — TELEPHONE ENCOUNTER
Notes recorded by Rosalee Avalos MD on 4/15/2019 at 7:48 PM CDT  RN to contact the pt or family - blood count stable.

## 2019-04-19 NOTE — H&P
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 Disp:  Rfl:  4/18/2019   LORazepam 0.5 MG Oral Tab Take 0.5 mg by mouth every 4 (four) hours as needed for Anxiety.  Disp:  Rfl:  4/18/2019   Blood Pressure Monitoring Does not apply Misc Use as needed Disp: 1 Device Rfl: 0 Taking   Milk 2018   • ELECTROCARDIOGRAM, COMPLETE  09-    Scanned to Media Tab - Date of Service 09-   • ESOPHAGOGASTRODUODENOSCOPY (EGD) N/A 9/22/2018    Performed by Neelima Broderick MD at [address] [place] [address] ENDOSCOPY     Family History   Problem Relation Age of Onse

## 2019-04-19 NOTE — TELEPHONE ENCOUNTER
# [id number]  Patient not accepting calls at this time. Unable to leave a message. Will call another time.

## 2019-04-19 NOTE — ANESTHESIA PREPROCEDURE EVALUATION
Anesthesia PreOp Note    HPI:     Leonid Ball is a 67year old female who presents for preoperative consultation requested by: Rosalee Avlaos MD    Date of Surgery: 4/19/2019    Procedure(s):  ESOPHAGOGASTRODUODENOSCOPY (EGD)  Indication: Telangiect disease) stage 3, GFR 30-59 ml/min (HCC)    • Coronary atherosclerosis    • Depression    • [facility]     Management:  Medication   • Gastritis    • GERD (gastroesophageal reflux disease)    • High blood pressure    • High cholesterol    • Non-ST elev daily. Disp:  Rfl:  4/18/2019   ERGOCALCIFEROL 99638 units Oral Cap TAKE 1 CAPSULE BY MOUTH EVERY WEEK Disp: 12 capsule Rfl: 11 4/17/2019   TRUEPLUS LANCETS 28G Does not apply Misc CHECK BLOOD GLUCOSE TWICE DAILY Disp: 200 each Rfl: 4 Taking   Glucose Bloo • Other (Other) Brother 59   • Glaucoma Neg    • Macular degeneration Neg      Social History    Socioeconomic History      Marital status:       Spouse name: Not on file      Number of children: Not on file      Years of education: Not on file History Narrative      Not on file      Available pre-op labs reviewed.   Lab Results   Component Value Date    WBC 5.0 04/15/2019    RBC 3.15 (L) 04/15/2019    HGB 10.0 (L) 04/15/2019    HCT 30.5 (L) 04/15/2019    MCV 96.8 04/15/2019    MCH 31.7 04/15/2019 management as planned.   CHANTALE SUTTON  4/19/2019 8:31 AM

## 2019-04-19 NOTE — ANESTHESIA POSTPROCEDURE EVALUATION
Patient: Iban Watt    Procedure Summary     Date:  04/19/19 Room / Location:  [address] [place] [address] ENDOSCOPY 01 / [address] [place] [address] ENDOSCOPY    Anesthesia Start:  7738 Anesthesia Stop:  0623    Procedure:  ESOPHAGOGASTRODUODENOSCOPY (EGD) (N/A ) Diagnosis:       Telangiectasias

## 2019-04-19 NOTE — TELEPHONE ENCOUNTER
Requested Prescriptions     Pending Prescriptions Disp Refills   • OMEPRAZOLE 20 MG Oral Capsule Delayed Release [Pharmacy Med Name: OMEPRAZOLE 20MG CAPSULES] 90 capsule 1     Sig: TAKE 1 CAPSULE(20 MG) BY MOUTH EVERY MORNING     LOV-4/19/19-egd  LR-4/19/1

## 2019-04-20 NOTE — TELEPHONE ENCOUNTER
Cholesterol Medications  Protocol Criteria:  · Appointment scheduled in the past 12 months or in the next 3 months  · ALT & LDL on file in the past 12 months  · ALT result < 80  · LDL result <130   Recent Outpatient Visits            1 month ago Other iron

## 2019-04-20 NOTE — TELEPHONE ENCOUNTER
Dr [facility]- Please advise as atorvastatin has allergy alert due to pt's reaction with simvastatin, and also please advise on pantoprazole refill, as it looks like Dr Cady Gimenez sent in omeprazole 20mg today for pt

## 2019-04-22 VITALS
OXYGEN SATURATION: 99 % | SYSTOLIC BLOOD PRESSURE: 130 MMHG | HEART RATE: 48 BPM | BODY MASS INDEX: 26.97 KG/M2 | RESPIRATION RATE: 13 BRPM | WEIGHT: 125 LBS | HEIGHT: 57 IN | DIASTOLIC BLOOD PRESSURE: 74 MMHG

## 2019-04-22 RX ORDER — PANTOPRAZOLE SODIUM 40 MG/1
40 TABLET, DELAYED RELEASE ORAL
Qty: 90 TABLET | Refills: 11 | Status: SHIPPED | OUTPATIENT
Start: 2019-04-22 | End: 2020-06-23

## 2019-04-22 RX ORDER — ATORVASTATIN CALCIUM 20 MG/1
TABLET, FILM COATED ORAL
Qty: 90 TABLET | Refills: 11 | Status: SHIPPED | OUTPATIENT
Start: 2019-04-22 | End: 2020-06-23

## 2019-05-09 ENCOUNTER — OFFICE VISIT (OUTPATIENT)
Dept: FAMILY MEDICINE CLINIC | Facility: CLINIC | Age: 79
End: 2019-05-09
Payer: MEDICARE

## 2019-05-09 VITALS
RESPIRATION RATE: 18 BRPM | WEIGHT: 125.63 LBS | BODY MASS INDEX: 27.1 KG/M2 | OXYGEN SATURATION: 98 % | HEIGHT: 57 IN | HEART RATE: 64 BPM | TEMPERATURE: 99 F | DIASTOLIC BLOOD PRESSURE: 75 MMHG | SYSTOLIC BLOOD PRESSURE: 135 MMHG

## 2019-05-09 DIAGNOSIS — D50.8 OTHER IRON DEFICIENCY ANEMIA: ICD-10-CM

## 2019-05-09 DIAGNOSIS — F33.41 RECURRENT MAJOR DEPRESSIVE DISORDER, IN PARTIAL REMISSION (HCC): Chronic | ICD-10-CM

## 2019-05-09 DIAGNOSIS — I10 ESSENTIAL HYPERTENSION: ICD-10-CM

## 2019-05-09 DIAGNOSIS — E11.9 DIABETES MELLITUS TYPE 2 WITHOUT RETINOPATHY (HCC): Primary | ICD-10-CM

## 2019-05-09 DIAGNOSIS — I25.118 CORONARY ARTERY DISEASE OF NATIVE HEART WITH STABLE ANGINA PECTORIS, UNSPECIFIED VESSEL OR LESION TYPE (HCC): ICD-10-CM

## 2019-05-09 PROCEDURE — 99214 OFFICE O/P EST MOD 30 MIN: CPT | Performed by: FAMILY MEDICINE

## 2019-05-09 PROCEDURE — G0463 HOSPITAL OUTPT CLINIC VISIT: HCPCS | Performed by: FAMILY MEDICINE

## 2019-05-09 NOTE — PROGRESS NOTES
I feel fine  Reviewed resutls of the endocpy  Has avms  Dr. Dixie Goldman would like to reviewed medications    Assuming it is due to anemia and plavix  Had reprort from Dr. Amaya Carvalho:      Problem List Items Addressed This Visit      HTN (hypertension)     CAD (co without enlargement or nodularity  Posterior pharynx was somewhat crowded palate was normal  Lungs were clear bilaterally there was no wheezes rhonchus or rales  Heart was regular rate and rhythm normal S1-S2 no S3 no S4 there were no murmurs appreciated

## 2019-06-13 ENCOUNTER — APPOINTMENT (OUTPATIENT)
Dept: HEMATOLOGY/ONCOLOGY | Facility: HOSPITAL | Age: 79
End: 2019-06-13
Attending: INTERNAL MEDICINE
Payer: MEDICARE

## 2019-06-20 ENCOUNTER — APPOINTMENT (OUTPATIENT)
Dept: HEMATOLOGY/ONCOLOGY | Facility: HOSPITAL | Age: 79
End: 2019-06-20
Attending: INTERNAL MEDICINE
Payer: MEDICARE

## 2019-06-30 NOTE — PROGRESS NOTES
HPI     Lynda Guerrero is a 67year old female who is here today for f/u of Other iron deficiency anemia  (primary encounter diagnosis)  Anemia, unspecified type  Medication monitoring encounter     She is here today for follow-up.     Had EGD in April a 11   atorvastatin 20 MG Oral Tab TAKE 1 TABLET(20 MG) BY MOUTH DAILY Disp: 90 tablet Rfl: 11   Citalopram Hydrobromide 10 MG Oral Tab Take 1 tablet (10 mg total) by mouth once daily.  Disp: 90 tablet Rfl: 1   OXYBUTYNIN CHLORIDE ER 10 MG Oral Tablet 24 Hr T Cholelithiasis    • CKD (chronic kidney disease) stage 3, GFR 30-59 ml/min ([facility])    • Coronary atherosclerosis    • Depression    • Diabetes ([facility]     Management:  Medication   • Gastritis    • GERD (gastroesophageal reflux disease)    • High blood pressur BMI 27.48 kg/m²   Wt Readings from Last 6 Encounters:  07/01/19 : 57.6 kg (127 lb)  05/09/19 : 57 kg (125 lb 9.6 oz)  04/19/19 : 56.7 kg (125 lb)  04/11/19 : 56.7 kg (125 lb)  03/19/19 : 56.7 kg (125 lb)  03/08/19 : 56.7 kg (125 lb)      Physical Exam  G Results (from the past 24 hour(s))   IRON AND TIBC    Collection Time: 07/01/19  8:54 AM   Result Value Ref Range    Iron 49 (L) 50 - 170 ug/dL    Transferrin 257 200 - 360 mg/dL    Total Iron Binding Capacity 383 240 - 450 ug/dL    % Saturation 13 (L) 15 32.2 33.6   MCHC      31.0 - 37.0 g/dL 32.8 32.9 33.4   RDW-SD      35.1 - 46.3 fL 47.3 (H) 48.4 (H) 49.3 (H)   RDW      11.0 - 15.0 % 13.2 13.7 13.3   Platelet Count      184.4 - 450.0 10(3)uL 293.0 280.0 301.0   Prelim Neutrophil Abs      1.50 - 7.70 x10 0. 15 0.00 - 0.70 x10(3) uL    Basophil Absolute 0.01 0.00 - 0.20 x10(3) uL    Immature Granulocyte Absolute 0.01 0.00 - 1.00 x10(3) uL    Neutrophil % 72.6 %    Lymphocyte % 16.0 %    Monocyte % 8.0 %    Eosinophil % 3.0 %    Basophil % 0.2 %    Immature G

## 2019-07-01 ENCOUNTER — OFFICE VISIT (OUTPATIENT)
Dept: HEMATOLOGY/ONCOLOGY | Facility: HOSPITAL | Age: 79
End: 2019-07-01
Attending: INTERNAL MEDICINE
Payer: MEDICARE

## 2019-07-01 VITALS
BODY MASS INDEX: 27.4 KG/M2 | OXYGEN SATURATION: 98 % | RESPIRATION RATE: 16 BRPM | HEART RATE: 67 BPM | HEIGHT: 57 IN | TEMPERATURE: 98 F | DIASTOLIC BLOOD PRESSURE: 44 MMHG | SYSTOLIC BLOOD PRESSURE: 140 MMHG | WEIGHT: 127 LBS

## 2019-07-01 DIAGNOSIS — Z51.81 MEDICATION MONITORING ENCOUNTER: ICD-10-CM

## 2019-07-01 DIAGNOSIS — D64.9 ANEMIA, UNSPECIFIED TYPE: ICD-10-CM

## 2019-07-01 DIAGNOSIS — D50.8 OTHER IRON DEFICIENCY ANEMIA: Primary | ICD-10-CM

## 2019-07-01 DIAGNOSIS — D50.8 OTHER IRON DEFICIENCY ANEMIA: ICD-10-CM

## 2019-07-01 LAB
BASOPHILS # BLD AUTO: 0.01 X10(3) UL (ref 0–0.2)
BASOPHILS NFR BLD AUTO: 0.2 %
DEPRECATED HBV CORE AB SER IA-ACNC: 21.4 NG/ML (ref 18–340)
DEPRECATED RDW RBC AUTO: 46.8 FL (ref 35.1–46.3)
EOSINOPHIL # BLD AUTO: 0.15 X10(3) UL (ref 0–0.7)
EOSINOPHIL NFR BLD AUTO: 3 %
ERYTHROCYTE [DISTWIDTH] IN BLOOD BY AUTOMATED COUNT: 13.4 % (ref 11–15)
HCT VFR BLD AUTO: 33.1 % (ref 35–48)
HGB BLD-MCNC: 11.3 G/DL (ref 12–16)
IMM GRANULOCYTES # BLD AUTO: 0.01 X10(3) UL (ref 0–1)
IMM GRANULOCYTES NFR BLD: 0.2 %
IRON SATURATION: 13 % (ref 15–50)
IRON SERPL-MCNC: 49 UG/DL (ref 50–170)
LYMPHOCYTES # BLD AUTO: 0.8 X10(3) UL (ref 1–4)
LYMPHOCYTES NFR BLD AUTO: 16 %
MCH RBC QN AUTO: 32.7 PG (ref 26–34)
MCHC RBC AUTO-ENTMCNC: 34.1 G/DL (ref 31–37)
MCV RBC AUTO: 95.7 FL (ref 80–100)
MONOCYTES # BLD AUTO: 0.4 X10(3) UL (ref 0.1–1)
MONOCYTES NFR BLD AUTO: 8 %
NEUTROPHILS # BLD AUTO: 3.62 X10 (3) UL (ref 1.5–7.7)
NEUTROPHILS # BLD AUTO: 3.62 X10(3) UL (ref 1.5–7.7)
NEUTROPHILS NFR BLD AUTO: 72.6 %
PLATELET # BLD AUTO: 229 10(3)UL (ref 150–450)
RBC # BLD AUTO: 3.46 X10(6)UL (ref 3.8–5.3)
TOTAL IRON BINDING CAPACITY: 383 UG/DL (ref 240–450)
TRANSFERRIN SERPL-MCNC: 257 MG/DL (ref 200–360)
WBC # BLD AUTO: 5 X10(3) UL (ref 4–11)

## 2019-07-01 PROCEDURE — 36415 COLL VENOUS BLD VENIPUNCTURE: CPT

## 2019-07-01 PROCEDURE — 99214 OFFICE O/P EST MOD 30 MIN: CPT | Performed by: INTERNAL MEDICINE

## 2019-07-01 PROCEDURE — 84466 ASSAY OF TRANSFERRIN: CPT

## 2019-07-01 PROCEDURE — 83540 ASSAY OF IRON: CPT

## 2019-07-01 PROCEDURE — 85025 COMPLETE CBC W/AUTO DIFF WBC: CPT

## 2019-07-01 PROCEDURE — 82728 ASSAY OF FERRITIN: CPT

## 2019-09-16 ENCOUNTER — TELEPHONE (OUTPATIENT)
Dept: FAMILY MEDICINE CLINIC | Facility: CLINIC | Age: 79
End: 2019-09-16

## 2019-09-16 NOTE — TELEPHONE ENCOUNTER
Per spouse the pt's bp machine has stopped working. He is asking if  can send a new order to [facility]?

## 2019-10-02 ENCOUNTER — NURSE ONLY (OUTPATIENT)
Dept: HEMATOLOGY/ONCOLOGY | Facility: HOSPITAL | Age: 79
End: 2019-10-02
Attending: INTERNAL MEDICINE
Payer: MEDICARE

## 2019-10-02 VITALS
WEIGHT: 125.19 LBS | BODY MASS INDEX: 27.01 KG/M2 | SYSTOLIC BLOOD PRESSURE: 149 MMHG | DIASTOLIC BLOOD PRESSURE: 53 MMHG | RESPIRATION RATE: 16 BRPM | OXYGEN SATURATION: 100 % | TEMPERATURE: 98 F | HEIGHT: 57 IN | HEART RATE: 64 BPM

## 2019-10-02 DIAGNOSIS — Z51.81 MEDICATION MONITORING ENCOUNTER: ICD-10-CM

## 2019-10-02 DIAGNOSIS — D64.9 ANEMIA, UNSPECIFIED TYPE: ICD-10-CM

## 2019-10-02 DIAGNOSIS — D50.8 OTHER IRON DEFICIENCY ANEMIA: ICD-10-CM

## 2019-10-02 DIAGNOSIS — D50.8 OTHER IRON DEFICIENCY ANEMIA: Primary | ICD-10-CM

## 2019-10-02 DIAGNOSIS — E55.9 VITAMIN D DEFICIENCY: ICD-10-CM

## 2019-10-02 PROCEDURE — 83540 ASSAY OF IRON: CPT

## 2019-10-02 PROCEDURE — 36415 COLL VENOUS BLD VENIPUNCTURE: CPT

## 2019-10-02 PROCEDURE — 82728 ASSAY OF FERRITIN: CPT

## 2019-10-02 PROCEDURE — 84466 ASSAY OF TRANSFERRIN: CPT

## 2019-10-02 PROCEDURE — 99214 OFFICE O/P EST MOD 30 MIN: CPT | Performed by: INTERNAL MEDICINE

## 2019-10-02 PROCEDURE — 85025 COMPLETE CBC W/AUTO DIFF WBC: CPT

## 2019-10-02 PROCEDURE — 82306 VITAMIN D 25 HYDROXY: CPT

## 2019-10-02 NOTE — PROGRESS NOTES
KT     Chip Alexander is a 67year old female who is here today for f/u of Other iron deficiency anemia  (primary encounter diagnosis)  Medication monitoring encounter      She is here today for follow-up. Taking iron supplement once a day.       Sta TABLET(10 MG) BY MOUTH DAILY Disp: 90 tablet Rfl: 11   CLOPIDOGREL BISULFATE 75 MG Oral Tab TAKE 1 TABLET BY MOUTH ONCE DAILY Disp: 90 tablet Rfl: 11   MONTELUKAST SODIUM 10 MG Oral Tab TAKE ONE TABLET BY MOUTH EVERY DAY Disp: 90 tablet Rfl: 11   Telmisart Pregnancy        • Renal disorder     CKD 3   • Special screening for osteoporosis 2013    Dexa Scan   • Stress incontinence    • Type II or unspecified type diabetes mellitus without mention of complication, not stated as uncontrolled 2010 Oropharynx is clear. Neck: No JVD. No palpable lymphadenopathy. Neck is supple. Chest: Clear to auscultation. Heart: Regular rate and rhythm. Abdomen: Soft, non tender with good bowel sounds. Extremities: Pedal pulses are present. No edema.   Neurolo 46.3 fL    RDW 12.2 11.0 - 15.0 %    .0 150.0 - 450.0 10(3)uL    Neutrophil Absolute Prelim 3.92 1.50 - 7.70 x10 (3) uL    Neutrophil Absolute 3.92 1.50 - 7.70 x10(3) uL    Lymphocyte Absolute 1.16 1.00 - 4.00 x10(3) uL    Monocyte Absolute 0.54 0.1 69.7 60.4 65.1   Lymphocytes %      % 19.7 27.2 23.1   Monocytes %      % 7.2 10.1 8.9   Eosinophils %      % 2.6 1.9 2.2   Basophils %      % 0.4 0.2 0.5   Immature Granulocyte %      % 0.4 0.2 0.2       Results From Past 48 Hours:  Recent Results (from t

## 2019-10-04 ENCOUNTER — TELEPHONE (OUTPATIENT)
Dept: FAMILY MEDICINE CLINIC | Facility: CLINIC | Age: 79
End: 2019-10-04

## 2019-10-04 NOTE — TELEPHONE ENCOUNTER
----- Message from Kirt Lesches, DO sent at 10/4/2019  1:22 PM CDT -----  Vitamin D level is fine.   Continue on the current medication regimen

## 2019-10-10 ENCOUNTER — OFFICE VISIT (OUTPATIENT)
Dept: FAMILY MEDICINE CLINIC | Facility: CLINIC | Age: 79
End: 2019-10-10
Payer: MEDICARE

## 2019-10-10 VITALS
BODY MASS INDEX: 27 KG/M2 | SYSTOLIC BLOOD PRESSURE: 155 MMHG | HEART RATE: 56 BPM | WEIGHT: 124 LBS | RESPIRATION RATE: 20 BRPM | TEMPERATURE: 97 F | DIASTOLIC BLOOD PRESSURE: 65 MMHG

## 2019-10-10 DIAGNOSIS — E11.22 TYPE 2 DIABETES MELLITUS WITH STAGE 3 CHRONIC KIDNEY DISEASE, WITHOUT LONG-TERM CURRENT USE OF INSULIN (HCC): Chronic | ICD-10-CM

## 2019-10-10 DIAGNOSIS — N18.30 TYPE 2 DIABETES MELLITUS WITH STAGE 3 CHRONIC KIDNEY DISEASE, WITHOUT LONG-TERM CURRENT USE OF INSULIN (HCC): Chronic | ICD-10-CM

## 2019-10-10 DIAGNOSIS — I73.9 PVD (PERIPHERAL VASCULAR DISEASE) (HCC): ICD-10-CM

## 2019-10-10 DIAGNOSIS — K52.9 CHRONIC DIARRHEA: Primary | ICD-10-CM

## 2019-10-10 DIAGNOSIS — R25.2 LEG CRAMP: ICD-10-CM

## 2019-10-10 PROCEDURE — 99214 OFFICE O/P EST MOD 30 MIN: CPT | Performed by: FAMILY MEDICINE

## 2019-10-10 PROCEDURE — G0008 ADMIN INFLUENZA VIRUS VAC: HCPCS | Performed by: FAMILY MEDICINE

## 2019-10-10 PROCEDURE — 90662 IIV NO PRSV INCREASED AG IM: CPT | Performed by: FAMILY MEDICINE

## 2019-10-10 NOTE — PROGRESS NOTES
Cramps in legs  Chronic diarrhea  Sugars higher  [place]    Diabetic Visit    Patient denies problems with their medication. Denies ulcers, chest pain, dyspnea on exertion.     Ht rrr no M no S3 S4  Lung clear no wheeze  ab

## 2019-10-11 ENCOUNTER — LAB ENCOUNTER (OUTPATIENT)
Dept: LAB | Age: 79
End: 2019-10-11
Attending: FAMILY MEDICINE
Payer: MEDICARE

## 2019-10-11 DIAGNOSIS — E11.9 DIABETES MELLITUS TYPE 2 WITHOUT RETINOPATHY (HCC): ICD-10-CM

## 2019-10-11 DIAGNOSIS — N18.30 TYPE 2 DIABETES MELLITUS WITH STAGE 3 CHRONIC KIDNEY DISEASE, WITHOUT LONG-TERM CURRENT USE OF INSULIN (HCC): Chronic | ICD-10-CM

## 2019-10-11 DIAGNOSIS — E11.22 TYPE 2 DIABETES MELLITUS WITH STAGE 3 CHRONIC KIDNEY DISEASE, WITHOUT LONG-TERM CURRENT USE OF INSULIN (HCC): Chronic | ICD-10-CM

## 2019-10-11 PROCEDURE — 82043 UR ALBUMIN QUANTITATIVE: CPT

## 2019-10-11 PROCEDURE — 80061 LIPID PANEL: CPT

## 2019-10-11 PROCEDURE — 80053 COMPREHEN METABOLIC PANEL: CPT

## 2019-10-11 PROCEDURE — 36415 COLL VENOUS BLD VENIPUNCTURE: CPT

## 2019-10-11 PROCEDURE — 85025 COMPLETE CBC W/AUTO DIFF WBC: CPT

## 2019-10-11 PROCEDURE — 82570 ASSAY OF URINE CREATININE: CPT

## 2019-10-11 PROCEDURE — 83036 HEMOGLOBIN GLYCOSYLATED A1C: CPT

## 2019-10-14 ENCOUNTER — TELEPHONE (OUTPATIENT)
Dept: OTHER | Age: 79
End: 2019-10-14

## 2019-10-14 DIAGNOSIS — R19.7 DIARRHEA, UNSPECIFIED TYPE: Primary | ICD-10-CM

## 2019-10-14 NOTE — TELEPHONE ENCOUNTER
Pt was inform of your message below. Pt will get blood work done on Thursday and will see you on Friday. I was calling Nephrology office to set up as appt with the pt on the phone and she disconnected.      with Dr. Hernan Quintana the next alyssa

## 2019-10-14 NOTE — TELEPHONE ENCOUNTER
Pt was called and informed ok for her to see Pily Verdin on Nov 4 . Informed pt she is to arrive at 11 at [address] [facility] [address]. Pt verbalized understanding.  Thanks

## 2019-10-15 ENCOUNTER — TELEPHONE (OUTPATIENT)
Dept: GASTROENTEROLOGY | Facility: CLINIC | Age: 79
End: 2019-10-15

## 2019-10-15 NOTE — TELEPHONE ENCOUNTER
Dr. Goss Blood    I attempted to contact the daughter again to follow up as I did not get any calls. Per Radha Day, she has not talked to her mother because patient is not home yet and pt is a little bit of [place].     I offered an appointment to see K

## 2019-10-15 NOTE — TELEPHONE ENCOUNTER
Noted and appreciated. I agree with triage nursing advice. In office visit for evaluation would be appropriate.

## 2019-10-15 NOTE — TELEPHONE ENCOUNTER
Contacted patient's daughter Marta Todd (ok to speak to per VEE). States pt has been having on and off diarrhea and when it happens it lasts for 6 days.   Daughter states in 1 month pt would have 2 episodes of diarrhea in a month lasting up to 6 days, no blood

## 2019-10-15 NOTE — TELEPHONE ENCOUNTER
Pt stopped at  and called daughter who speaks English to schedule an appt with Dr. Dixie Goldman. .  Pt experiencing stomach pains and diarrhea and requesting appt sooner than first avail in Dec.    Please call daughter at [phone],

## 2019-10-16 RX ORDER — ERGOCALCIFEROL 1.25 MG/1
CAPSULE ORAL
Qty: 12 CAPSULE | Refills: 11 | Status: SHIPPED | OUTPATIENT
Start: 2019-10-16 | End: 2021-03-17

## 2019-10-16 NOTE — TELEPHONE ENCOUNTER
Review pended refill request as it does not fall under a protocol.   Requested Prescriptions     Pending Prescriptions Disp Refills   • ERGOCALCIFEROL 72977 units Oral Cap [Pharmacy Med Name: VITAMIN D2 50,000IU (ERGO) CAP RX] 12 capsule 0     Sig: TAKE 1 C

## 2019-10-17 ENCOUNTER — TELEPHONE (OUTPATIENT)
Dept: FAMILY MEDICINE CLINIC | Facility: CLINIC | Age: 79
End: 2019-10-17

## 2019-10-17 NOTE — TELEPHONE ENCOUNTER
Patient daughter is requesting if Dr can see her earlier tomorrow around noon instead of the 2:50 appt she has  Because daughter can't be with her at that time     Please advise

## 2019-10-18 ENCOUNTER — APPOINTMENT (OUTPATIENT)
Dept: LAB | Age: 79
End: 2019-10-18
Attending: FAMILY MEDICINE
Payer: MEDICARE

## 2019-10-18 DIAGNOSIS — N18.30 DIABETES MELLITUS DUE TO UNDERLYING CONDITION WITH STAGE 3 CHRONIC KIDNEY DISEASE, WITHOUT LONG-TERM CURRENT USE OF INSULIN (HCC): ICD-10-CM

## 2019-10-18 DIAGNOSIS — E08.22 DIABETES MELLITUS DUE TO UNDERLYING CONDITION WITH STAGE 3 CHRONIC KIDNEY DISEASE, WITHOUT LONG-TERM CURRENT USE OF INSULIN (HCC): ICD-10-CM

## 2019-10-18 PROCEDURE — 36415 COLL VENOUS BLD VENIPUNCTURE: CPT

## 2019-10-18 PROCEDURE — 80048 BASIC METABOLIC PNL TOTAL CA: CPT

## 2019-10-18 NOTE — PROGRESS NOTES
Na 132 now  Was much lower  hctz was discontinued  Didn't take her bp meds yet today  No sob no chest pain    Very contrarian  Has night terrors and yells so loud they hear it in the basement.         Patient's past medical surgical family social history wa condition appeared to be adequate. Assessment/Plan  1. Night terrors  referral  - PSYCHIATRIC - EXTERNAL    2. Current mild episode of major depressive disorder, unspecified whether recurrent [facility]  referral  - PSYCHIATRIC - EXTERNAL    3.  Type 2 diabetes

## 2019-10-21 NOTE — TELEPHONE ENCOUNTER
Halima Rangel, patient's daughter, to follow up. Per Harriet Lockett, pt's diarrhea has stopped since last week (from our last conversation). Pt states when pt was having diarrhea consistency is watery, no pain during defecation, no weakness, no nausea.   Harriet Lockett a

## 2019-10-23 NOTE — PROGRESS NOTES
[facility] Follow-up Visit    Ana breath. Appetite and weight are stable.           Pertinent Surgical Hx:    Cholecystectomy  - See additional surgical hx below  - No known issues with sedation.  - No known history of sleep apnea.     Pertinent Family Hx:  - No family hx of esoph • Special screening for osteoporosis 1/17/2013    Dexa Scan   • Stress incontinence    • Type II or unspecified type diabetes mellitus without mention of complication, not stated as uncontrolled 2010    Pills only      Past Surgical History:   Procedure TABLET(10 MG) BY MOUTH DAILY, Disp: 90 tablet, Rfl: 11  CLOPIDOGREL BISULFATE 75 MG Oral Tab, TAKE 1 TABLET BY MOUTH ONCE DAILY, Disp: 90 tablet, Rfl: 11  MONTELUKAST SODIUM 10 MG Oral Tab, TAKE ONE TABLET BY MOUTH EVERY DAY, Disp: 90 tablet, Rfl: 11  Telm not currently breastfeeding.     Gen: patient appears comfortable and in no acute distress  HEENT: conjunctiva pink, the sclera appears anicteric, oropharynx clear, mucus membranes appear moist  CV: regular rate and rhythm, the extremities are warm and well months ago when she was visiting family in [facility] and then resolved for short while and reoccurred 1-2 months ago. Stool is nonbloody without abdominal pain. I would favor stool testing to rule out acute infection.   We also discussed an earlier colonosco prolonged hospitalization or surgical intervention. I also specifically mentioned the miss rate of upper endoscopy of 5-10% in the best of all circumstances.  It is the patient's responsibility to contact his/her insurance company regarding questions about

## 2019-10-24 ENCOUNTER — HOSPITAL ENCOUNTER (OUTPATIENT)
Dept: ULTRASOUND IMAGING | Facility: HOSPITAL | Age: 79
Discharge: HOME | End: 2019-10-24
Attending: FAMILY MEDICINE
Payer: MEDICARE

## 2019-10-24 DIAGNOSIS — I73.9 PVD (PERIPHERAL VASCULAR DISEASE) (HCC): ICD-10-CM

## 2019-10-24 DIAGNOSIS — R25.2 LEG CRAMP: ICD-10-CM

## 2019-10-24 PROCEDURE — 93923 UPR/LXTR ART STDY 3+ LVLS: CPT | Performed by: FAMILY MEDICINE

## 2019-10-25 RX ORDER — CITALOPRAM 10 MG/1
TABLET ORAL
Qty: 90 TABLET | Refills: 1 | Status: SHIPPED | OUTPATIENT
Start: 2019-10-25 | End: 2019-12-06

## 2019-10-25 NOTE — TELEPHONE ENCOUNTER
Refill passed per [facility] protocol.   Refill Protocol Appointment Criteria  · Appointment scheduled in the past 6 months or in the next 3 months  Recent Outpatient Visits            1 week ago Night terrors    [address], [address] [facility]

## 2019-10-30 ENCOUNTER — OFFICE VISIT (OUTPATIENT)
Dept: GASTROENTEROLOGY | Facility: CLINIC | Age: 79
End: 2019-10-30
Payer: MEDICARE

## 2019-10-30 ENCOUNTER — TELEPHONE (OUTPATIENT)
Dept: GASTROENTEROLOGY | Facility: CLINIC | Age: 79
End: 2019-10-30

## 2019-10-30 VITALS
BODY MASS INDEX: 28.12 KG/M2 | DIASTOLIC BLOOD PRESSURE: 70 MMHG | WEIGHT: 125 LBS | HEIGHT: 56 IN | SYSTOLIC BLOOD PRESSURE: 150 MMHG

## 2019-10-30 DIAGNOSIS — D50.9 IRON DEFICIENCY ANEMIA, UNSPECIFIED IRON DEFICIENCY ANEMIA TYPE: Primary | ICD-10-CM

## 2019-10-30 DIAGNOSIS — K31.819 GAVE (GASTRIC ANTRAL VASCULAR ECTASIA): ICD-10-CM

## 2019-10-30 DIAGNOSIS — D50.9 IRON DEFICIENCY ANEMIA, UNSPECIFIED IRON DEFICIENCY ANEMIA TYPE: ICD-10-CM

## 2019-10-30 DIAGNOSIS — R19.4 ALTERED BOWEL HABITS: Primary | ICD-10-CM

## 2019-10-30 PROCEDURE — 99214 OFFICE O/P EST MOD 30 MIN: CPT | Performed by: NURSE PRACTITIONER

## 2019-10-30 NOTE — TELEPHONE ENCOUNTER
It appears that pt sees Dr Amaya Carvalho at [facility], last seen 7/24/18. Forwarding message to cardio staff --please advise if pt may hold Plavix prior to 1/13/2020 egd.  Was not sure if pt would need to be seen in office first?  Please send response

## 2019-10-30 NOTE — TELEPHONE ENCOUNTER
GI RNs,    Pt is scheduled for EGD on1/13/19 and is on Plavix. Can you check to seehow long pt has to be off. Thank you.  Also see Juliana Garrett note below      \"Anti-platelets and anti-coagulants: ASA-continue as prescribed, Plavix (Please contact prescribing MD

## 2019-10-30 NOTE — PATIENT INSTRUCTIONS
-Schedule EGD w/ possible biopsy w/ Dr. Loc Talbert with [facility]  Dx: SVITLANA, GAVE  -Eligible for NE: No r/t medical history  -Anti-platelets and anti-coagulants: ASA-continue as prescribed, Plavix (Please contact prescribing MD (Dr. Aydin Steele) for specific recommendati

## 2019-10-30 NOTE — TELEPHONE ENCOUNTER
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

## 2019-10-31 NOTE — TELEPHONE ENCOUNTER
LOV with Dr. Amber Tee was 8/9/18 and she was to return in 6 months. Will have her come in for annual follow up and have question addressed about holding plavix. Detailed message left (HIPAA verified) for patient to call and schedule annual follow up.

## 2019-11-01 DIAGNOSIS — R14.0 BLOATING: ICD-10-CM

## 2019-11-01 DIAGNOSIS — E87.1 HYPONATREMIA: ICD-10-CM

## 2019-11-01 DIAGNOSIS — I10 ESSENTIAL HYPERTENSION: ICD-10-CM

## 2019-11-01 RX ORDER — METOPROLOL SUCCINATE 25 MG/1
TABLET, EXTENDED RELEASE ORAL
Qty: 90 TABLET | Refills: 0 | OUTPATIENT
Start: 2019-11-01

## 2019-11-01 RX ORDER — ESOMEPRAZOLE MAGNESIUM 40 MG/1
CAPSULE, DELAYED RELEASE ORAL
Qty: 90 CAPSULE | Refills: 0 | OUTPATIENT
Start: 2019-11-01

## 2019-11-01 RX ORDER — LOSARTAN POTASSIUM 100 MG/1
TABLET ORAL
Qty: 90 TABLET | Refills: 0 | OUTPATIENT
Start: 2019-11-01

## 2019-11-04 ENCOUNTER — OFFICE VISIT (OUTPATIENT)
Dept: NEPHROLOGY | Facility: CLINIC | Age: 79
End: 2019-11-04
Payer: MEDICARE

## 2019-11-04 VITALS
HEIGHT: 55.5 IN | DIASTOLIC BLOOD PRESSURE: 65 MMHG | SYSTOLIC BLOOD PRESSURE: 126 MMHG | BODY MASS INDEX: 29.2 KG/M2 | HEART RATE: 63 BPM | TEMPERATURE: 98 F | WEIGHT: 128 LBS

## 2019-11-04 DIAGNOSIS — E87.1 HYPONATREMIA: Primary | ICD-10-CM

## 2019-11-04 DIAGNOSIS — N18.2 CHRONIC KIDNEY DISEASE (CKD), STAGE II (MILD): ICD-10-CM

## 2019-11-04 PROCEDURE — 99204 OFFICE O/P NEW MOD 45 MIN: CPT | Performed by: INTERNAL MEDICINE

## 2019-11-04 NOTE — PROGRESS NOTES
Consult Requested By: Dr. Humble Mathew     Reason for Consult: Hyponatremia     HPI:     Patient is a 67 yrs old male with pmh of DM II x 5 yrs diet control, HTN, CKD stage III, CAD s/p NSTEMI, GERD  who presented for work up and evaluation of rola Cecilio Gordon MD at [facility] ENDOSCOPY   • ESOPHAGOGASTRODUODENOSCOPY (EGD) N/A 9/22/2018    Performed by Cecilio Gordon MD at [facility] ENDOSCOPY      Family History   Problem Relation Age of Onset   • Diabetes Father    • Other (Other) Father 58   • Diabetes Monitoring Suppl (TRUE METRIX METER) W/DEVICE Does not apply Kit 1 kit by Does not apply route 2 (two) times daily. 1 kit 0   • Milk Thistle Extract 175 MG Oral Tab Take 1 tablet by mouth once daily.      • B Complex Vitamins (VITAMIN B COMPLEX) Oral Tab Ta normal       ASSESSMENT/PLAN:     1. Hyponatremia: asymptomatic   -Serum sodium between 130-132 since October 2018. - Na was 124 in early october and most recent 132.    - On citalopram   - check urine sodium and urine osmolality   - fluid restriction to

## 2019-11-11 NOTE — TELEPHONE ENCOUNTER
I spoke to pt's daughter, Harriet Lockett (have VEE) [phone] below. [facility] shows appt with Dr Elva Bonilla 12/17 at [place], but she was unaware. She will call their office to confirm.  Is aware that pt must be seen to determine if ok to hold plavix prior to 1/13

## 2019-11-15 ENCOUNTER — LAB ENCOUNTER (OUTPATIENT)
Dept: LAB | Facility: HOSPITAL | Age: 79
End: 2019-11-15
Attending: NURSE PRACTITIONER
Payer: MEDICARE

## 2019-11-15 DIAGNOSIS — K52.9 CHRONIC DIARRHEA: ICD-10-CM

## 2019-11-15 DIAGNOSIS — R19.4 ALTERED BOWEL HABITS: ICD-10-CM

## 2019-11-15 PROCEDURE — 87493 C DIFF AMPLIFIED PROBE: CPT

## 2019-11-15 PROCEDURE — 87209 SMEAR COMPLEX STAIN: CPT

## 2019-11-15 PROCEDURE — 89055 LEUKOCYTE ASSESSMENT FECAL: CPT

## 2019-11-15 PROCEDURE — 87177 OVA AND PARASITES SMEARS: CPT

## 2019-11-15 PROCEDURE — 87045 FECES CULTURE AEROBIC BACT: CPT

## 2019-11-15 PROCEDURE — 87046 STOOL CULTR AEROBIC BACT EA: CPT

## 2019-11-15 PROCEDURE — 87427 SHIGA-LIKE TOXIN AG IA: CPT

## 2019-11-20 ENCOUNTER — TELEPHONE (OUTPATIENT)
Dept: GASTROENTEROLOGY | Facility: CLINIC | Age: 79
End: 2019-11-20

## 2019-11-20 ENCOUNTER — LAB ENCOUNTER (OUTPATIENT)
Dept: LAB | Age: 79
End: 2019-11-20
Attending: INTERNAL MEDICINE
Payer: MEDICARE

## 2019-11-20 DIAGNOSIS — N18.2 CHRONIC KIDNEY DISEASE (CKD), STAGE II (MILD): ICD-10-CM

## 2019-11-20 DIAGNOSIS — E87.1 HYPONATREMIA: ICD-10-CM

## 2019-11-20 PROCEDURE — 83935 ASSAY OF URINE OSMOLALITY: CPT

## 2019-11-20 PROCEDURE — 87077 CULTURE AEROBIC IDENTIFY: CPT

## 2019-11-20 PROCEDURE — 87086 URINE CULTURE/COLONY COUNT: CPT

## 2019-11-20 PROCEDURE — 80048 BASIC METABOLIC PNL TOTAL CA: CPT

## 2019-11-20 PROCEDURE — 84443 ASSAY THYROID STIM HORMONE: CPT

## 2019-11-20 PROCEDURE — 87186 SC STD MICRODIL/AGAR DIL: CPT

## 2019-11-20 PROCEDURE — 81001 URINALYSIS AUTO W/SCOPE: CPT

## 2019-11-20 PROCEDURE — 84550 ASSAY OF BLOOD/URIC ACID: CPT

## 2019-11-20 PROCEDURE — 36415 COLL VENOUS BLD VENIPUNCTURE: CPT

## 2019-11-20 PROCEDURE — 84300 ASSAY OF URINE SODIUM: CPT

## 2019-11-20 NOTE — TELEPHONE ENCOUNTER
#[id number]    Recording states patient is currently not accepting any calls at this time. Please follow up. Thank you.

## 2019-11-20 NOTE — TELEPHONE ENCOUNTER
Notes recorded by MAHAD Warren on 11/20/2019 at 7:36 AM CST  Nursing: The patient is Frisian-speaking. [facility] let her know that her remaining stool test came back negative.  At the time of the office visit, the patient wished to see a dietitian a

## 2019-11-21 NOTE — TELEPHONE ENCOUNTER
I spoke to Severiano Earing pts daughter (okay to release medical information per [facility] verbal release in [facility] listed in pts chart) informed her mothers remaining stool test came back negative, patient wished to see a dietitian and follow-up with conservative measur

## 2019-11-22 ENCOUNTER — TELEPHONE (OUTPATIENT)
Dept: NEPHROLOGY | Facility: CLINIC | Age: 79
End: 2019-11-22

## 2019-11-22 RX ORDER — NITROFURANTOIN 25; 75 MG/1; MG/1
100 CAPSULE ORAL 2 TIMES DAILY
Qty: 10 CAPSULE | Refills: 0 | Status: CANCELLED | OUTPATIENT
Start: 2019-11-22 | End: 2019-11-27

## 2019-11-22 RX ORDER — NITROFURANTOIN MACROCRYSTALS 100 MG/1
100 CAPSULE ORAL 2 TIMES DAILY
Qty: 10 CAPSULE | Refills: 0 | Status: SHIPPED | OUTPATIENT
Start: 2019-11-22 | End: 2019-12-02 | Stop reason: ALTCHOICE

## 2019-11-22 NOTE — TELEPHONE ENCOUNTER
Contacted pt. I read her RSA's message but she didn't understand so she handed the phone to her . Notified him of RSA's message and instructions. He stated understanding.

## 2019-11-22 NOTE — TELEPHONE ENCOUNTER
Urine culture showed bacteria - start on nitrofurantoin at 100 mg BID for 5 days - prescription sent

## 2019-12-02 ENCOUNTER — OFFICE VISIT (OUTPATIENT)
Dept: NEPHROLOGY | Facility: CLINIC | Age: 79
End: 2019-12-02
Payer: MEDICARE

## 2019-12-02 VITALS
DIASTOLIC BLOOD PRESSURE: 61 MMHG | BODY MASS INDEX: 28.53 KG/M2 | WEIGHT: 126.81 LBS | HEIGHT: 56 IN | SYSTOLIC BLOOD PRESSURE: 154 MMHG | HEART RATE: 61 BPM | TEMPERATURE: 98 F

## 2019-12-02 DIAGNOSIS — E87.1 HYPONATREMIA: ICD-10-CM

## 2019-12-02 DIAGNOSIS — E22.2 SIADH (SYNDROME OF INAPPROPRIATE ADH PRODUCTION) (HCC): Primary | ICD-10-CM

## 2019-12-02 DIAGNOSIS — I10 UNCONTROLLED HYPERTENSION: ICD-10-CM

## 2019-12-02 PROCEDURE — 99214 OFFICE O/P EST MOD 30 MIN: CPT | Performed by: INTERNAL MEDICINE

## 2019-12-02 RX ORDER — TELMISARTAN 40 MG/1
40 TABLET ORAL DAILY
Qty: 90 TABLET | Refills: 0 | Status: SHIPPED | OUTPATIENT
Start: 2019-12-02 | End: 2020-03-03

## 2019-12-02 NOTE — PATIENT INSTRUCTIONS
Increase telmisartan to 40 mg daily dose   Call back in 3 weeks with home blood pressure readings  Follow up in 3 months   Lab test as ordered in 4 weeks   Call Dr. Xiong Hence to replaced citalopram with another antidepressant   Chest xray as ordered anyti

## 2019-12-02 NOTE — PROGRESS NOTES
Progress Note:      Patient is a 67 yrs old male with pmh of DM II x 5 yrs diet control, HTN, CKD stage III, CAD s/p NSTEMI, GERD  who presented for follow up    Lab test showed BUN/Cr 11/0.9 mg/dl with an eGFR 60 ml/min.  Creatinine mostly stable between 9/22/2018    Performed by Shobha Sandra MD at [facility]      Family History   Problem Relation Age of Onset   • Diabetes Father    • Other (Other) Father 58   • Diabetes Mother 59        (cause of death)   • Diabetes Brother    • Other (Other) Josesito Glucose Monitoring Suppl (TRUE METRIX METER) W/DEVICE Does not apply Kit 1 kit by Does not apply route 2 (two) times daily. 1 kit 0   • Milk Thistle Extract 175 MG Oral Tab Take 1 tablet by mouth once daily.      • B Complex Vitamins (VITAMIN B COMPLEX) Jesu Guzmán between 130-132 since October 2018. - Na was 124 in early october and most recent 132.    - On citalopram which is likely the cause   - consider stopping and starting on other alternatives - eg cymbalta, effexor, remeron   - urine sodium and urine osmolal

## 2019-12-06 ENCOUNTER — OFFICE VISIT (OUTPATIENT)
Dept: FAMILY MEDICINE CLINIC | Facility: CLINIC | Age: 79
End: 2019-12-06
Payer: MEDICARE

## 2019-12-06 ENCOUNTER — HOSPITAL ENCOUNTER (OUTPATIENT)
Dept: GENERAL RADIOLOGY | Age: 79
Discharge: HOME | End: 2019-12-06
Attending: INTERNAL MEDICINE
Payer: MEDICARE

## 2019-12-06 VITALS
WEIGHT: 126 LBS | HEART RATE: 67 BPM | HEIGHT: 56 IN | DIASTOLIC BLOOD PRESSURE: 75 MMHG | SYSTOLIC BLOOD PRESSURE: 171 MMHG | BODY MASS INDEX: 28.34 KG/M2

## 2019-12-06 DIAGNOSIS — F33.0 MILD EPISODE OF RECURRENT MAJOR DEPRESSIVE DISORDER (HCC): ICD-10-CM

## 2019-12-06 DIAGNOSIS — E22.2 SIADH (SYNDROME OF INAPPROPRIATE ADH PRODUCTION) (HCC): ICD-10-CM

## 2019-12-06 DIAGNOSIS — I10 ESSENTIAL HYPERTENSION: Primary | ICD-10-CM

## 2019-12-06 PROCEDURE — 99214 OFFICE O/P EST MOD 30 MIN: CPT | Performed by: FAMILY MEDICINE

## 2019-12-06 PROCEDURE — 71046 X-RAY EXAM CHEST 2 VIEWS: CPT | Performed by: INTERNAL MEDICINE

## 2019-12-06 RX ORDER — HYDROCHLOROTHIAZIDE 12.5 MG/1
TABLET ORAL
COMMUNITY
Start: 2019-11-01 | End: 2019-12-17

## 2019-12-06 RX ORDER — MIRTAZAPINE 15 MG/1
15 TABLET, FILM COATED ORAL NIGHTLY
Qty: 30 TABLET | Refills: 1 | Status: SHIPPED | OUTPATIENT
Start: 2019-12-06 | End: 2019-12-31

## 2019-12-06 NOTE — PROGRESS NOTES
Gabriel  hasnt' done xray yet  Suspect ssri  On citalopram for 3 years  gfeel fine. No sob no n/v    Dress was appropriate. Speech:  rate normal, volume was appropriate articulation normal, patient was coherent.    Language:  no paucity of language no p

## 2019-12-10 ENCOUNTER — TELEPHONE (OUTPATIENT)
Dept: FAMILY MEDICINE CLINIC | Facility: CLINIC | Age: 79
End: 2019-12-10

## 2019-12-10 NOTE — TELEPHONE ENCOUNTER
Spoke with patient via [facility]  ID# [phone]. Patient calling to remind the nurse to call the doctor about her medication and how she is feeling.  Advised patient PCP has not responded to her message as of yet but a nurse will call her back with an

## 2019-12-10 NOTE — TELEPHONE ENCOUNTER
Sri Lankan speaking - pt said after taking RX Mirtazapine then her nerves got shot and she slept for 12 hours.  Please advise            Current Outpatient Medications     Dispense Refill          • mirtazapine (REMERON) 15 MG Oral Tab Take 1 tablet (15 mg tot

## 2019-12-10 NOTE — TELEPHONE ENCOUNTER
pt stated that she took her mirtazapine 15 mg at 7 pm yesterday for the first time and then after 15 min. She started to feel very sleepy and she could not keep her eyes open.  She went to sleep at 10 pm  and she did not  Wakeup until today at

## 2019-12-11 NOTE — TELEPHONE ENCOUNTER
Pt gave phone to grandson. Informed grandson Lynnette Encarnacion provider's instructions. Verbalized understanding.

## 2019-12-17 ENCOUNTER — OFFICE VISIT (OUTPATIENT)
Dept: CARDIOLOGY CLINIC | Facility: CLINIC | Age: 79
End: 2019-12-17
Payer: MEDICARE

## 2019-12-17 VITALS
DIASTOLIC BLOOD PRESSURE: 73 MMHG | SYSTOLIC BLOOD PRESSURE: 128 MMHG | BODY MASS INDEX: 28.75 KG/M2 | WEIGHT: 127.81 LBS | HEART RATE: 65 BPM | HEIGHT: 56 IN | RESPIRATION RATE: 18 BRPM

## 2019-12-17 DIAGNOSIS — R01.1 MURMUR: ICD-10-CM

## 2019-12-17 DIAGNOSIS — I10 ESSENTIAL HYPERTENSION: ICD-10-CM

## 2019-12-17 DIAGNOSIS — Z01.810 ENCOUNTER FOR PRE-OPERATIVE CARDIOVASCULAR CLEARANCE: Primary | ICD-10-CM

## 2019-12-17 DIAGNOSIS — I25.10 CORONARY ARTERY DISEASE INVOLVING NATIVE CORONARY ARTERY OF NATIVE HEART WITHOUT ANGINA PECTORIS: ICD-10-CM

## 2019-12-17 PROCEDURE — 99214 OFFICE O/P EST MOD 30 MIN: CPT | Performed by: INTERNAL MEDICINE

## 2019-12-17 PROCEDURE — 93000 ELECTROCARDIOGRAM COMPLETE: CPT | Performed by: INTERNAL MEDICINE

## 2019-12-17 NOTE — PROGRESS NOTES
[address] [place] [address] NOTE    Miladys Del Rio is a 67year old female. Patient presents with:   Follow - Up: 2 year  Surgical/procedure Clearance: checking stomach due to consistant diarrhea    HPI:   This is a pleasant 67year old female with history of Take 1 tablet by mouth once daily. • hydrochlorothiazide 12.5 MG Oral Tab      • mirtazapine (REMERON) 15 MG Oral Tab Take 1 tablet (15 mg total) by mouth nightly.  (Patient not taking: Reported on 12/17/2019 ) 30 tablet 1   • Blood Pressure Monitoring Neg    • Macular degeneration Neg         REVIEW OF SYSTEMS:   GENERAL HEALTH: feels well otherwise  SKIN: denies any unusual skin lesions or rashes  RESPIRATORY: denies shortness of breath with exertion  CARDIOVASCULAR:See HPI  GI: denies abdominal pain a pressure stable. With findings of murmur patient should have echo to assess and help with long-term follow-up. She will call if changes  The patient indicates understanding of these issues and agrees to the plan.   Follow Up: Return in about 1 year (Janis Joshi

## 2019-12-17 NOTE — TELEPHONE ENCOUNTER
Pt's daughter Nora Tinajero contacted. Given Plavix orders below. Wrote down, states understanding, will review with pt.

## 2019-12-17 NOTE — TELEPHONE ENCOUNTER
RONDA Alejandro, please see 12/17 Dr Keesha Larson note, states Ok for GI procedure, may hold Clopidigrel  5 days prior to procedure, but resume after test. Thanks. Left message on Daughter Courtney's voicemail [phone] to call back to review instructions.

## 2019-12-17 NOTE — PATIENT INSTRUCTIONS
Okay for GI procedure    May hold clopidogrel for 5 days prior to procedure but resume after test    Schedule outpatient echocardiogram    Schedule outpatient nuclear stress test    Both test may be done after GI testing

## 2019-12-31 ENCOUNTER — OFFICE VISIT (OUTPATIENT)
Dept: FAMILY MEDICINE CLINIC | Facility: CLINIC | Age: 79
End: 2019-12-31
Payer: MEDICARE

## 2019-12-31 VITALS
DIASTOLIC BLOOD PRESSURE: 68 MMHG | HEART RATE: 65 BPM | HEIGHT: 56 IN | BODY MASS INDEX: 28.07 KG/M2 | WEIGHT: 124.81 LBS | RESPIRATION RATE: 18 BRPM | SYSTOLIC BLOOD PRESSURE: 120 MMHG

## 2019-12-31 DIAGNOSIS — E11.22 TYPE 2 DIABETES MELLITUS WITH STAGE 3 CHRONIC KIDNEY DISEASE, WITHOUT LONG-TERM CURRENT USE OF INSULIN (HCC): ICD-10-CM

## 2019-12-31 DIAGNOSIS — B37.2 INTERTRIGINOUS CANDIDIASIS: ICD-10-CM

## 2019-12-31 DIAGNOSIS — F33.0 MILD EPISODE OF RECURRENT MAJOR DEPRESSIVE DISORDER (HCC): ICD-10-CM

## 2019-12-31 DIAGNOSIS — E22.2 SIADH (SYNDROME OF INAPPROPRIATE ADH PRODUCTION) (HCC): Primary | ICD-10-CM

## 2019-12-31 DIAGNOSIS — E87.1 HYPONATREMIA: ICD-10-CM

## 2019-12-31 DIAGNOSIS — N18.30 TYPE 2 DIABETES MELLITUS WITH STAGE 3 CHRONIC KIDNEY DISEASE, WITHOUT LONG-TERM CURRENT USE OF INSULIN (HCC): ICD-10-CM

## 2019-12-31 PROCEDURE — 99214 OFFICE O/P EST MOD 30 MIN: CPT | Performed by: FAMILY MEDICINE

## 2019-12-31 RX ORDER — CLOTRIMAZOLE AND BETAMETHASONE DIPROPIONATE 10; .64 MG/G; MG/G
CREAM TOPICAL
Qty: 45 G | Refills: 2 | Status: SHIPPED | OUTPATIENT
Start: 2019-12-31 | End: 2020-08-13 | Stop reason: ALTCHOICE

## 2019-12-31 NOTE — PROGRESS NOTES
Patient does not like her new antidepressant medication and has stopped it. She states that the effect of the medication was more insomnia more anxiety.   Patient states that she does not feel like she is anxious daughter who is here today, states that the erythematous areas. Assessment/ Plan  1.  SIADH (syndrome of inappropriate ADH production) ([facility]  Follow-up with nephrology check labs as recommended  - OP REFERRAL TO PSYCHIATRY    2. Mild episode of recurrent major depressive disorder [facility])  Referral t

## 2020-01-01 ENCOUNTER — EXTERNAL RECORD (OUTPATIENT)
Dept: OTHER | Age: 80
End: 2020-01-01

## 2020-01-09 ENCOUNTER — TELEPHONE (OUTPATIENT)
Dept: NEPHROLOGY | Facility: CLINIC | Age: 80
End: 2020-01-09

## 2020-01-13 ENCOUNTER — ANESTHESIA (OUTPATIENT)
Dept: ENDOSCOPY | Facility: HOSPITAL | Age: 80
End: 2020-01-13
Payer: MEDICARE

## 2020-01-13 ENCOUNTER — TELEPHONE (OUTPATIENT)
Dept: GASTROENTEROLOGY | Facility: CLINIC | Age: 80
End: 2020-01-13

## 2020-01-13 ENCOUNTER — ANESTHESIA EVENT (OUTPATIENT)
Dept: ENDOSCOPY | Facility: HOSPITAL | Age: 80
End: 2020-01-13
Payer: MEDICARE

## 2020-01-13 ENCOUNTER — HOSPITAL ENCOUNTER (OUTPATIENT)
Facility: HOSPITAL | Age: 80
Setting detail: HOSPITAL OUTPATIENT SURGERY
Discharge: HOME | End: 2020-01-13
Attending: INTERNAL MEDICINE | Admitting: INTERNAL MEDICINE
Payer: MEDICARE

## 2020-01-13 VITALS
RESPIRATION RATE: 18 BRPM | WEIGHT: 125 LBS | SYSTOLIC BLOOD PRESSURE: 149 MMHG | HEIGHT: 58 IN | DIASTOLIC BLOOD PRESSURE: 54 MMHG | OXYGEN SATURATION: 99 % | HEART RATE: 53 BPM | BODY MASS INDEX: 26.24 KG/M2

## 2020-01-13 DIAGNOSIS — K31.819 GAVE (GASTRIC ANTRAL VASCULAR ECTASIA): ICD-10-CM

## 2020-01-13 DIAGNOSIS — D50.9 IRON DEFICIENCY ANEMIA, UNSPECIFIED IRON DEFICIENCY ANEMIA TYPE: ICD-10-CM

## 2020-01-13 LAB — GLUCOSE BLDC GLUCOMTR-MCNC: 151 MG/DL (ref 70–99)

## 2020-01-13 PROCEDURE — 0D578ZZ DESTRUCTION OF STOMACH, PYLORUS, VIA NATURAL OR ARTIFICIAL OPENING ENDOSCOPIC: ICD-10-PCS | Performed by: INTERNAL MEDICINE

## 2020-01-13 PROCEDURE — 43270 EGD LESION ABLATION: CPT | Performed by: INTERNAL MEDICINE

## 2020-01-13 RX ORDER — SUCRALFATE ORAL 1 G/10ML
1 SUSPENSION ORAL
Qty: 400 ML | Refills: 0 | Status: SHIPPED | OUTPATIENT
Start: 2020-01-13 | End: 2020-01-14

## 2020-01-13 RX ORDER — LIDOCAINE HYDROCHLORIDE 10 MG/ML
INJECTION, SOLUTION EPIDURAL; INFILTRATION; INTRACAUDAL; PERINEURAL AS NEEDED
Status: DISCONTINUED | OUTPATIENT
Start: 2020-01-13 | End: 2020-01-13 | Stop reason: SURG

## 2020-01-13 RX ORDER — SODIUM CHLORIDE, SODIUM LACTATE, POTASSIUM CHLORIDE, CALCIUM CHLORIDE 600; 310; 30; 20 MG/100ML; MG/100ML; MG/100ML; MG/100ML
INJECTION, SOLUTION INTRAVENOUS CONTINUOUS
Status: DISCONTINUED | OUTPATIENT
Start: 2020-01-13 | End: 2020-01-13

## 2020-01-13 RX ADMIN — LIDOCAINE HYDROCHLORIDE 50 MG: 10 INJECTION, SOLUTION EPIDURAL; INFILTRATION; INTRACAUDAL; PERINEURAL at 09:56:00

## 2020-01-13 RX ADMIN — SODIUM CHLORIDE, SODIUM LACTATE, POTASSIUM CHLORIDE, CALCIUM CHLORIDE: 600; 310; 30; 20 INJECTION, SOLUTION INTRAVENOUS at 10:06:00

## 2020-01-13 RX ADMIN — SODIUM CHLORIDE, SODIUM LACTATE, POTASSIUM CHLORIDE, CALCIUM CHLORIDE: 600; 310; 30; 20 INJECTION, SOLUTION INTRAVENOUS at 09:56:00

## 2020-01-13 NOTE — ANESTHESIA PREPROCEDURE EVALUATION
Anesthesia PreOp Note    HPI:     Roopa Alejandro is a 67year old female who presents for preoperative consultation requested by: Cecilio Gordon MD    Date of Surgery: 1/13/2020    Procedure(s):  ESOPHAGOGASTRODUODENOSCOPY (EGD)  Indication: Iron defic Date Noted: 03/19/2015      Type II diabetes mellitus ([facility]         Date Noted: 08/17/2012        Past Medical History:   Diagnosis Date   • Anemia    • Arthritis    • Cholelithiasis    • CKD (chronic kidney disease) stage 3, GFR 30-59 ml/min ([facility])    • Co CHLORIDE ER 10 MG Oral Tablet 24 Hr, TAKE 1 TABLET(10 MG) BY MOUTH DAILY, Disp: 90 tablet, Rfl: 11, 1/11/2020  CLOPIDOGREL BISULFATE 75 MG Oral Tab, TAKE 1 TABLET BY MOUTH ONCE DAILY, Disp: 90 tablet, Rfl: 11, 1/7/2339  folic acid 370 MCG Oral Tab, Take 40 Number of children: Not on file      Years of education: Not on file      Highest education level: Not on file    Occupational History      Not on file    Social Needs      Financial resource strain: Not on file      Food insecurity:        Worry: Not on f 11/20/2019    CO2 27.0 11/20/2019    BUN 12 11/20/2019    CREATSERUM 0.96 11/20/2019     (H) 11/20/2019    CA 9.4 11/20/2019          Vital Signs: Body mass index is 26.13 kg/m². height is 1.473 m (4' 10\") and weight is 56.7 kg (125 lb).  Her blo

## 2020-01-13 NOTE — ANESTHESIA POSTPROCEDURE EVALUATION
Patient: Darci Honeycutt    Procedure Summary     Date:  01/13/20 Room / Location:  [facility] ENDOSCOPY 05 / [facility] ENDOSCOPY    Anesthesia Start:  6217 Anesthesia Stop:  5781    Procedure:  ESOPHAGOGASTRODUODENOSCOPY (EGD) (N/A ) Diagnosis:       Iron deficiency a

## 2020-01-13 NOTE — OPERATIVE REPORT
[facility] - [facility] Endoscopy Report        Preoperative Diagnosis:  - anemia  - AVMs antrum stomach        Postoperative Diagnosis:  - Antral telangiectasias s/p APC treatment  - Gastric polyps ( fundic gland)       Procedure:    Esophagogastroduod

## 2020-01-13 NOTE — H&P
History & Physical Examination    Patient Name: Fang Chase  MRN: [id number]  [facility]: [id number]  YOB: 1940    Diagnosis: history of GAVE /anemia      telmisartan 40 MG Oral Tab, Take 1 tablet (40 mg total) by mouth daily. , Disp: 90 tablet, (two) times daily. , Disp: 1 kit, Rfl: 0      lactated ringers infusion, , Intravenous, Continuous        Allergies:   Zocor [Simvastatin]     SWELLING  Bactrim [Sulfametho*    NAUSEA AND VOMITING    Past Medical History:   Diagnosis Date   • Anemia    • Ar use: No      Alcohol/week: 0.0 standard drinks      SYSTEM Check if Review is Normal Check if Physical Exam is Normal If not normal, please explain:   HEENT [x ] [ x]    NECK & BACK [x ] [x ]    HEART [x ] [ x]    LUNGS [x ] [ x]    ABDOMEN [x ] [x ]    UR

## 2020-01-14 RX ORDER — SUCRALFATE ORAL 1 G/10ML
1 SUSPENSION ORAL
Qty: 400 ML | Refills: 0 | Status: SHIPPED | OUTPATIENT
Start: 2020-01-14 | End: 2020-02-03

## 2020-01-14 NOTE — TELEPHONE ENCOUNTER
Patients son/jr Garth indicates he just left [facility]/pharmacy they have no new prep, please call as soon at:[phone],thanks.

## 2020-01-14 NOTE — TELEPHONE ENCOUNTER
Tammy Barraza resent to pharmacy with twice day instructions - this was discussed after the procedure yesterday.    Thanks

## 2020-01-14 NOTE — TELEPHONE ENCOUNTER
Dr Kelly Parish, I attempted to contact [address]. Their voicemail states having issues with system and to please call back. I will recheck later, but assume their question is about the carafate?  The rx has 2 different sigs, please cancel and re-send whi

## 2020-01-15 NOTE — TELEPHONE ENCOUNTER
Unclear documentation below, no med listed. I did contact Salo at [facility] and she confirms that they did get the Sucralfate rx that was re-sent last evening. She will contact son.  I left message on pt's voicemail that the sucralfate would be ready, but

## 2020-01-27 ENCOUNTER — TELEPHONE (OUTPATIENT)
Dept: CASE MANAGEMENT | Age: 80
End: 2020-01-27

## 2020-01-27 NOTE — TELEPHONE ENCOUNTER
Patient is eligible for a 2020 Medicare Advantage Supervisit. Left message to call back [phone].

## 2020-02-03 RX ORDER — SUCRALFATE ORAL 1 G/10ML
SUSPENSION ORAL
Qty: 400 ML | Refills: 0 | Status: SHIPPED | OUTPATIENT
Start: 2020-02-03 | End: 2020-02-04

## 2020-02-03 NOTE — TELEPHONE ENCOUNTER
Requested Prescriptions     Pending Prescriptions Disp Refills   • SUCRALFATE 1 GM/10ML Oral Suspension [Pharmacy Med Name: SUCRALFATE 1GM/10ML SUSPENSION] 400 mL 0     Sig: TAKE 10 ML BY MOUTH TWICE DAILY BEFORE MEAL     lov-1/13/20  lr-1/14/20

## 2020-02-04 RX ORDER — SUCRALFATE ORAL 1 G/10ML
SUSPENSION ORAL
Qty: 1800 ML | Refills: 0 | Status: SHIPPED | OUTPATIENT
Start: 2020-02-04 | End: 2020-06-03 | Stop reason: ALTCHOICE

## 2020-02-25 NOTE — MR AVS SNAPSHOT
[facility] [place] [address] [address] [phone]               Thank you for choosing us for your health care visit with Vic Lema MD.  We are glad to serve you and happy to provide you with this summary o [facility] ([address])    [address] [address] [phone]              Allergies as of Jan 24, 2017     Zocor [Simvastatin] Swelling                Today's Vital Signs Take 1 tablet by mouth once daily. Vitamin D 1000 UNITS Tabs   Take by mouth. Follow-up Instructions     Return in about 6 months (around 7/24/2017).          Swet[place]art                  Visit [facility] online at  http: Quality 110: Preventive Care And Screening: Influenza Immunization: Influenza Immunization not Administered because Patient Refused. Detail Level: Detailed

## 2020-02-27 ENCOUNTER — APPOINTMENT (OUTPATIENT)
Dept: LAB | Age: 80
End: 2020-02-27
Attending: INTERNAL MEDICINE
Payer: MEDICARE

## 2020-02-27 DIAGNOSIS — E22.2 SIADH (SYNDROME OF INAPPROPRIATE ADH PRODUCTION) (HCC): ICD-10-CM

## 2020-02-27 LAB
ANION GAP SERPL CALC-SCNC: 3 MMOL/L (ref 0–18)
BUN BLD-MCNC: 13 MG/DL (ref 7–18)
BUN/CREAT SERPL: 14 (ref 10–20)
CALCIUM BLD-MCNC: 9 MG/DL (ref 8.5–10.1)
CHLORIDE SERPL-SCNC: 101 MMOL/L (ref 98–112)
CO2 SERPL-SCNC: 29 MMOL/L (ref 21–32)
CREAT BLD-MCNC: 0.93 MG/DL (ref 0.55–1.02)
GLUCOSE BLD-MCNC: 161 MG/DL (ref 70–99)
OSMOLALITY SERPL CALC.SUM OF ELEC: 280 MOSM/KG (ref 275–295)
PATIENT FASTING Y/N/NP: YES
POTASSIUM SERPL-SCNC: 4.5 MMOL/L (ref 3.5–5.1)
SODIUM SERPL-SCNC: 133 MMOL/L (ref 136–145)

## 2020-02-27 PROCEDURE — 36415 COLL VENOUS BLD VENIPUNCTURE: CPT

## 2020-02-27 PROCEDURE — 80048 BASIC METABOLIC PNL TOTAL CA: CPT

## 2020-03-02 ENCOUNTER — OFFICE VISIT (OUTPATIENT)
Dept: NEPHROLOGY | Facility: CLINIC | Age: 80
End: 2020-03-02
Payer: MEDICARE

## 2020-03-02 VITALS
WEIGHT: 125.81 LBS | HEART RATE: 66 BPM | DIASTOLIC BLOOD PRESSURE: 63 MMHG | HEIGHT: 55 IN | SYSTOLIC BLOOD PRESSURE: 166 MMHG | TEMPERATURE: 99 F | BODY MASS INDEX: 29.12 KG/M2

## 2020-03-02 DIAGNOSIS — E87.1 HYPONATREMIA: ICD-10-CM

## 2020-03-02 DIAGNOSIS — E22.2 SIADH (SYNDROME OF INAPPROPRIATE ADH PRODUCTION) (HCC): Primary | ICD-10-CM

## 2020-03-02 DIAGNOSIS — I10 UNCONTROLLED HYPERTENSION: ICD-10-CM

## 2020-03-02 PROCEDURE — 99213 OFFICE O/P EST LOW 20 MIN: CPT | Performed by: INTERNAL MEDICINE

## 2020-03-02 RX ORDER — CITALOPRAM 10 MG/1
10 TABLET ORAL DAILY
COMMUNITY
Start: 2020-01-16 | End: 2020-04-13

## 2020-03-02 NOTE — PROGRESS NOTES
Progress Note:      Patient is a 67 yrs old male with pmh of DM II x > 5 yrs diet control, HTN, CKD stage III, CAD s/p NSTEMI, GERD  who presented for follow up    Lab test showed BUN/Cr 11/0.9 mg/dl with an eGFR 60 ml/min.  Creatinine mostly stable [facility] ENDOSCOPY   • ESOPHAGOGASTRODUODENOSCOPY (EGD) N/A 9/22/2018    Performed by Love Murphy MD at [facility] ENDOSCOPY      Family History   Problem Relation Age of Onset   • Diabetes Father    • Other (Other) Father 58   • Diabetes Mother 59        (cause W/DEVICE Does not apply Kit 1 kit by Does not apply route 2 (two) times daily. 1 kit 0   • Milk Thistle Extract 175 MG Oral Tab Take 1 tablet by mouth once daily. • B Complex Vitamins (VITAMIN B COMPLEX) Oral Tab Take 1 tablet by mouth once daily. noted  Musculoskeletal:  no deformities  Extremities: no edema  Neurological:  Grossly normal       ASSESSMENT/PLAN:     1. Hyponatremia: asymptomatic   - Serum sodium between 130-132 since October 2018.    - Na was 124 in early october and most recent 133

## 2020-03-02 NOTE — TELEPHONE ENCOUNTER
Last seen today 3/2/2020. F/U in 2 weeks. Medication is noted as taking. Refill pended and routed to Dr. Allie Hunt.

## 2020-03-03 RX ORDER — TELMISARTAN 40 MG/1
TABLET ORAL
Qty: 90 TABLET | Refills: 0 | Status: SHIPPED | OUTPATIENT
Start: 2020-03-03 | End: 2020-05-28

## 2020-03-05 NOTE — LETTER
3/24/2021    [address] [facility] [address]            Dear Pete Brennan,    [address] records indicate that you are due for an appointment for a EGD or Upper Endoscopy in June 2021, or shortly there after, with Rosie Canada Never smoker

## 2020-04-02 ENCOUNTER — APPOINTMENT (OUTPATIENT)
Dept: HEMATOLOGY/ONCOLOGY | Facility: HOSPITAL | Age: 80
End: 2020-04-02
Attending: INTERNAL MEDICINE
Payer: MEDICARE

## 2020-04-10 ENCOUNTER — TELEPHONE (OUTPATIENT)
Dept: OTHER | Age: 80
End: 2020-04-10

## 2020-04-10 NOTE — TELEPHONE ENCOUNTER
Patient returning call from [facility] from Dr. Marleny Rae' office in regards to finding a new PCP, she would like call back.

## 2020-04-13 RX ORDER — CITALOPRAM 10 MG/1
TABLET ORAL
Qty: 90 TABLET | Refills: 0 | Status: SHIPPED | OUTPATIENT
Start: 2020-04-13 | End: 2020-06-23

## 2020-04-21 ENCOUNTER — TELEPHONE (OUTPATIENT)
Dept: NEPHROLOGY | Facility: CLINIC | Age: 80
End: 2020-04-21

## 2020-04-21 NOTE — TELEPHONE ENCOUNTER
4/21 Spoke with patient and her  Joseluis. Both refused Tele visitt as offered. Requested office appointment to be cancelled.  states he will reschedule at a later date.

## 2020-05-28 RX ORDER — TELMISARTAN 40 MG/1
TABLET ORAL
Qty: 90 TABLET | Refills: 0 | Status: SHIPPED | OUTPATIENT
Start: 2020-05-28 | End: 2020-06-23

## 2020-05-28 NOTE — TELEPHONE ENCOUNTER
Last seen 3/2/2020. Return to clinic in 2 weeks. No follow up scheduled. Medication is noted as taking. Refill pended and routed to Dr. Ruel Lehman.

## 2020-06-03 ENCOUNTER — OFFICE VISIT (OUTPATIENT)
Dept: HEMATOLOGY/ONCOLOGY | Facility: HOSPITAL | Age: 80
End: 2020-06-03
Attending: INTERNAL MEDICINE
Payer: MEDICARE

## 2020-06-03 VITALS
DIASTOLIC BLOOD PRESSURE: 51 MMHG | BODY MASS INDEX: 26.75 KG/M2 | RESPIRATION RATE: 16 BRPM | HEART RATE: 59 BPM | TEMPERATURE: 99 F | OXYGEN SATURATION: 98 % | HEIGHT: 57 IN | WEIGHT: 124 LBS | SYSTOLIC BLOOD PRESSURE: 161 MMHG

## 2020-06-03 DIAGNOSIS — K90.9 IMPAIRED INTESTINAL ABSORPTION: ICD-10-CM

## 2020-06-03 DIAGNOSIS — D50.8 OTHER IRON DEFICIENCY ANEMIA: ICD-10-CM

## 2020-06-03 DIAGNOSIS — D50.8 OTHER IRON DEFICIENCY ANEMIA: Primary | ICD-10-CM

## 2020-06-03 PROCEDURE — 82728 ASSAY OF FERRITIN: CPT

## 2020-06-03 PROCEDURE — 36415 COLL VENOUS BLD VENIPUNCTURE: CPT

## 2020-06-03 PROCEDURE — 99214 OFFICE O/P EST MOD 30 MIN: CPT | Performed by: INTERNAL MEDICINE

## 2020-06-03 PROCEDURE — 85025 COMPLETE CBC W/AUTO DIFF WBC: CPT

## 2020-06-03 PROCEDURE — 84466 ASSAY OF TRANSFERRIN: CPT

## 2020-06-03 PROCEDURE — 83540 ASSAY OF IRON: CPT

## 2020-06-03 NOTE — PROGRESS NOTES
HPI     Lynda Guerrero is a 79year old female who is here today for f/u of Other iron deficiency anemia  (primary encounter diagnosis)  Impaired intestinal absorption      She is here today for follow-up. Has h/o GAVE. She had last EGD in January. Monitoring Does not apply Misc Use as needed 1 Device 0   • Pantoprazole Sodium 40 MG Oral Tab EC Take 1 tablet (40 mg total) by mouth every morning before breakfast. 90 tablet 11   • atorvastatin 20 MG Oral Tab TAKE 1 TABLET(20 MG) BY MOUTH DAILY 90 table without mention of complication, not stated as uncontrolled     Pills only     Past Surgical History:   Procedure Laterality Date   • ANESTH,ANGIOPLASTY     •       4X   • CAPSULE ENDOSCOPY N/A 2019    Performed by Daphnie Matute MD at [facility]. Chest: Clear to auscultation. Heart: Regular rate and rhythm. Abdomen: Soft, + tender at LLQ w/o R/R/G with good bowel sounds. Extremities: Pedal pulses are present. No edema. Neurological: Grossly intact. Lymphatics:  There is no palpable l 46.3 fL    RDW 13.6 11.0 - 15.0 %    .0 150.0 - 450.0 10(3)uL    Neutrophil Absolute Prelim 3.37 1.50 - 7.70 x10 (3) uL    Neutrophil Absolute 3.37 1.50 - 7.70 x10(3) uL    Lymphocyte Absolute 1.12 1.00 - 4.00 x10(3) uL    Monocyte Absolute 0.51 0.1 - 450 ug/dL 425  387   Iron Saturation      15 - 50 % 8 (L)  17   FERRITIN      18.0 - 340.0 ng/mL 16.7 (L)  34.4      Imaging & Referrals:  None   No orders of the defined types were placed in this encounter.

## 2020-06-05 ENCOUNTER — TELEPHONE (OUTPATIENT)
Dept: HEMATOLOGY/ONCOLOGY | Facility: HOSPITAL | Age: 80
End: 2020-06-05

## 2020-06-09 ENCOUNTER — OFFICE VISIT (OUTPATIENT)
Dept: HEMATOLOGY/ONCOLOGY | Facility: HOSPITAL | Age: 80
End: 2020-06-09
Attending: INTERNAL MEDICINE
Payer: MEDICARE

## 2020-06-09 VITALS
OXYGEN SATURATION: 96 % | SYSTOLIC BLOOD PRESSURE: 129 MMHG | RESPIRATION RATE: 16 BRPM | DIASTOLIC BLOOD PRESSURE: 42 MMHG | TEMPERATURE: 99 F | HEART RATE: 62 BPM

## 2020-06-09 DIAGNOSIS — K90.9 IMPAIRED INTESTINAL ABSORPTION: Primary | ICD-10-CM

## 2020-06-09 DIAGNOSIS — D50.9 IRON DEFICIENCY ANEMIA, UNSPECIFIED IRON DEFICIENCY ANEMIA TYPE: ICD-10-CM

## 2020-06-09 PROCEDURE — 96374 THER/PROPH/DIAG INJ IV PUSH: CPT

## 2020-06-09 NOTE — PROGRESS NOTES
Pt presents to infusion today for IV venofer 1 of 5. Pt appears well and denies any shortness of breath, dizziness or light headedness. Pt has had Venofer in the past and tolerated it well. Discussed possible side effects.       PIV started in right ac, a

## 2020-06-12 ENCOUNTER — TELEPHONE (OUTPATIENT)
Dept: FAMILY MEDICINE CLINIC | Facility: CLINIC | Age: 80
End: 2020-06-12

## 2020-06-12 DIAGNOSIS — N18.30 CHRONIC KIDNEY DISEASE, STAGE III (MODERATE) (HCC): ICD-10-CM

## 2020-06-12 DIAGNOSIS — E11.22 TYPE 2 DIABETES MELLITUS WITH ESRD (END-STAGE RENAL DISEASE) (HCC): Primary | ICD-10-CM

## 2020-06-12 DIAGNOSIS — N18.6 TYPE 2 DIABETES MELLITUS WITH ESRD (END-STAGE RENAL DISEASE) (HCC): Primary | ICD-10-CM

## 2020-06-12 NOTE — TELEPHONE ENCOUNTER
pts  stopped at [address] [place] [address] office to schedule an appt with Dr Thu Childers to est care. Former pt of [facility]. Pts  stts she needs a referral for Dr Funmi Adam. Please advise. The pt was scheduled for first available appt 6/23/2020.

## 2020-06-13 NOTE — TELEPHONE ENCOUNTER
Please inform patient that I have placed the referral to see the nephrologist.  Also please her know I am at the [address] [place] [address] location, not [place].

## 2020-06-15 ENCOUNTER — OFFICE VISIT (OUTPATIENT)
Dept: HEMATOLOGY/ONCOLOGY | Facility: HOSPITAL | Age: 80
End: 2020-06-15
Attending: INTERNAL MEDICINE
Payer: MEDICARE

## 2020-06-15 VITALS
RESPIRATION RATE: 16 BRPM | TEMPERATURE: 98 F | HEART RATE: 61 BPM | SYSTOLIC BLOOD PRESSURE: 146 MMHG | DIASTOLIC BLOOD PRESSURE: 46 MMHG

## 2020-06-15 DIAGNOSIS — D50.9 IRON DEFICIENCY ANEMIA, UNSPECIFIED IRON DEFICIENCY ANEMIA TYPE: ICD-10-CM

## 2020-06-15 DIAGNOSIS — K90.9 IMPAIRED INTESTINAL ABSORPTION: Primary | ICD-10-CM

## 2020-06-15 PROCEDURE — 96374 THER/PROPH/DIAG INJ IV PUSH: CPT

## 2020-06-15 NOTE — PROGRESS NOTES
Pt presents to infusion today for IV venofer 2 of 5. Pt appears well and denies any shortness of breath, dizziness or light headedness. Pt has had Venofer in the past and tolerated it well.  No health changes or concerns voiced     PIV started in left ac,

## 2020-06-17 ENCOUNTER — TELEPHONE (OUTPATIENT)
Dept: GASTROENTEROLOGY | Facility: CLINIC | Age: 80
End: 2020-06-17

## 2020-06-17 NOTE — TELEPHONE ENCOUNTER
From telephone encounter on 1/13/2020. Samuel Alvarez MD     10:30 AM   Note      RN to place on call back list for 6 months for EGD /anemia history of GAVE\"          Recall letter mailed out to patient. Due for repeat EGD.

## 2020-06-18 ENCOUNTER — OFFICE VISIT (OUTPATIENT)
Dept: HEMATOLOGY/ONCOLOGY | Facility: HOSPITAL | Age: 80
End: 2020-06-18
Attending: INTERNAL MEDICINE
Payer: MEDICARE

## 2020-06-18 VITALS
DIASTOLIC BLOOD PRESSURE: 78 MMHG | SYSTOLIC BLOOD PRESSURE: 90 MMHG | OXYGEN SATURATION: 98 % | HEART RATE: 72 BPM | RESPIRATION RATE: 16 BRPM | TEMPERATURE: 98 F

## 2020-06-18 DIAGNOSIS — K90.9 IMPAIRED INTESTINAL ABSORPTION: Primary | ICD-10-CM

## 2020-06-18 DIAGNOSIS — D50.9 IRON DEFICIENCY ANEMIA, UNSPECIFIED IRON DEFICIENCY ANEMIA TYPE: ICD-10-CM

## 2020-06-18 PROCEDURE — 96374 THER/PROPH/DIAG INJ IV PUSH: CPT

## 2020-06-18 NOTE — PROGRESS NOTES
Pt presents to infusion today for IV venofer 3 of 5. Pt appears well, no complaints today. Pt has been tolerating venofert well. PIV started in right ac, attempt X1, blood return noted.   IV venofer given as slow IV push, positive blood return throughout

## 2020-06-22 ENCOUNTER — OFFICE VISIT (OUTPATIENT)
Dept: HEMATOLOGY/ONCOLOGY | Facility: HOSPITAL | Age: 80
End: 2020-06-22
Attending: INTERNAL MEDICINE
Payer: MEDICARE

## 2020-06-22 VITALS
SYSTOLIC BLOOD PRESSURE: 119 MMHG | HEART RATE: 77 BPM | DIASTOLIC BLOOD PRESSURE: 41 MMHG | RESPIRATION RATE: 16 BRPM | TEMPERATURE: 98 F | OXYGEN SATURATION: 98 %

## 2020-06-22 DIAGNOSIS — K90.9 IMPAIRED INTESTINAL ABSORPTION: Primary | ICD-10-CM

## 2020-06-22 DIAGNOSIS — D50.9 IRON DEFICIENCY ANEMIA, UNSPECIFIED IRON DEFICIENCY ANEMIA TYPE: ICD-10-CM

## 2020-06-22 PROCEDURE — 96374 THER/PROPH/DIAG INJ IV PUSH: CPT

## 2020-06-22 NOTE — PROGRESS NOTES
Pt presents to infusion today for IV venofer 4 of 5. Pt appears well, no complaints today. Pt has been tolerating venofer well. PIV started in right ac, attempt X1, blood return noted.   IV venofer given as slow IV push, positive blood return throughout i

## 2020-06-23 ENCOUNTER — OFFICE VISIT (OUTPATIENT)
Dept: FAMILY MEDICINE CLINIC | Facility: CLINIC | Age: 80
End: 2020-06-23
Payer: MEDICARE

## 2020-06-23 VITALS
TEMPERATURE: 99 F | HEART RATE: 65 BPM | DIASTOLIC BLOOD PRESSURE: 63 MMHG | BODY MASS INDEX: 27 KG/M2 | WEIGHT: 125 LBS | SYSTOLIC BLOOD PRESSURE: 119 MMHG

## 2020-06-23 DIAGNOSIS — E11.22 TYPE 2 DIABETES MELLITUS WITH STAGE 3 CHRONIC KIDNEY DISEASE, WITHOUT LONG-TERM CURRENT USE OF INSULIN (HCC): ICD-10-CM

## 2020-06-23 DIAGNOSIS — Z76.89 ENCOUNTER TO ESTABLISH CARE: Primary | ICD-10-CM

## 2020-06-23 DIAGNOSIS — D50.8 OTHER IRON DEFICIENCY ANEMIA: ICD-10-CM

## 2020-06-23 DIAGNOSIS — E87.1 HYPONATREMIA: ICD-10-CM

## 2020-06-23 DIAGNOSIS — I10 ESSENTIAL HYPERTENSION: ICD-10-CM

## 2020-06-23 DIAGNOSIS — E78.5 HYPERLIPIDEMIA, UNSPECIFIED HYPERLIPIDEMIA TYPE: ICD-10-CM

## 2020-06-23 DIAGNOSIS — H91.90 HEARING LOSS, UNSPECIFIED HEARING LOSS TYPE, UNSPECIFIED LATERALITY: ICD-10-CM

## 2020-06-23 DIAGNOSIS — N18.30 TYPE 2 DIABETES MELLITUS WITH STAGE 3 CHRONIC KIDNEY DISEASE, WITHOUT LONG-TERM CURRENT USE OF INSULIN (HCC): ICD-10-CM

## 2020-06-23 PROBLEM — D64.9 ANEMIA, UNSPECIFIED TYPE: Status: RESOLVED | Noted: 2018-09-21 | Resolved: 2020-06-23

## 2020-06-23 PROCEDURE — 99214 OFFICE O/P EST MOD 30 MIN: CPT | Performed by: FAMILY MEDICINE

## 2020-06-23 RX ORDER — B-COMPLEX WITH VITAMIN C
1 TABLET ORAL
Qty: 90 TABLET | Refills: 0 | Status: SHIPPED | OUTPATIENT
Start: 2020-06-23

## 2020-06-23 RX ORDER — ATORVASTATIN CALCIUM 20 MG/1
TABLET, FILM COATED ORAL
Qty: 90 TABLET | Refills: 2 | Status: SHIPPED | OUTPATIENT
Start: 2020-06-23 | End: 2021-05-28

## 2020-06-23 RX ORDER — CLOPIDOGREL BISULFATE 75 MG/1
75 TABLET ORAL DAILY
Qty: 90 TABLET | Refills: 3 | Status: SHIPPED | OUTPATIENT
Start: 2020-06-23 | End: 2021-06-22

## 2020-06-23 RX ORDER — TELMISARTAN 40 MG/1
40 TABLET ORAL DAILY
Qty: 90 TABLET | Refills: 0 | Status: SHIPPED | OUTPATIENT
Start: 2020-06-23 | End: 2020-11-20

## 2020-06-23 RX ORDER — OXYBUTYNIN CHLORIDE 10 MG/1
10 TABLET, EXTENDED RELEASE ORAL DAILY
Qty: 90 TABLET | Refills: 3 | Status: SHIPPED | OUTPATIENT
Start: 2020-06-23 | End: 2021-06-22

## 2020-06-23 RX ORDER — PANTOPRAZOLE SODIUM 40 MG/1
40 TABLET, DELAYED RELEASE ORAL
Qty: 90 TABLET | Refills: 0 | Status: SHIPPED | OUTPATIENT
Start: 2020-06-23 | End: 2021-01-28

## 2020-06-23 NOTE — TELEPHONE ENCOUNTER
This is being sent to you without review by the Triage staff due to the high call volumes created by the COVID-19 virus, per the email sent by Dr. Venkatesh Santos on 3/19/20. Thank you for your support.     Centralized Nurse Triage Team

## 2020-06-23 NOTE — PROGRESS NOTES
HPI:   Jose Llamas is a 79year old female who presents for an establish care visit.     Patient has a history of type II diabetes, anemia, chronic kidney disease stage III, hypertension, high cholesterol, reflux, coronary artery disease status post toby Misc Use as needed 1 Device 0   • TRUEPLUS LANCETS 28G Does not apply Misc CHECK BLOOD GLUCOSE TWICE DAILY 200 each 4   • Glucose Blood (TRUE METRIX BLOOD GLUCOSE TEST) In Vitro Strip CHECK BLOOD GLUCOSE TWICE DAILY 200 strip 2   • Blood Glucose Monitoring Diabetes Mother 59        (cause of death)   • Diabetes Brother    • Other (Other) Brother 47   • Other (Other) Brother 59   • Glaucoma Neg    • Macular degeneration Neg       Social History:   Social History    Tobacco Use      Smoking status: Never Smoke iron deficiency anemia  -We will GI GYN complaints into the lower belly. - B Complex Vitamins (VITAMIN B COMPLEX) Oral Tab; Take 1 tablet by mouth once daily. Dispense: 90 tablet; Refill: 0  - GASTRO - INTERNAL    4.  Encounter to establish care   -[facility]

## 2020-06-26 ENCOUNTER — OFFICE VISIT (OUTPATIENT)
Dept: HEMATOLOGY/ONCOLOGY | Facility: HOSPITAL | Age: 80
End: 2020-06-26
Attending: INTERNAL MEDICINE
Payer: MEDICARE

## 2020-06-26 DIAGNOSIS — K90.9 IMPAIRED INTESTINAL ABSORPTION: Primary | ICD-10-CM

## 2020-06-26 DIAGNOSIS — D50.9 IRON DEFICIENCY ANEMIA, UNSPECIFIED IRON DEFICIENCY ANEMIA TYPE: ICD-10-CM

## 2020-06-26 PROCEDURE — 96374 THER/PROPH/DIAG INJ IV PUSH: CPT

## 2020-06-26 NOTE — PROGRESS NOTES
Pt presents to infusion today for IV venofer 5 of 5. Pt appears well, no complaints today. Pt has been tolerating venofer well. PIV started in right ac, attempt X1, blood return noted.   IV venofer given over 5 minutes IV push, positive blood return throu

## 2020-07-13 ENCOUNTER — TELEPHONE (OUTPATIENT)
Dept: ADMINISTRATIVE | Age: 80
End: 2020-07-13

## 2020-07-13 NOTE — TELEPHONE ENCOUNTER
Good Afternoon Dr Marleny Singer from cancer [phone] left a voicemail looking for referral for  Iron deficiency, for Dr Caprice Regalado.

## 2020-07-31 ENCOUNTER — TELEPHONE (OUTPATIENT)
Dept: CASE MANAGEMENT | Age: 80
End: 2020-07-31

## 2020-07-31 NOTE — TELEPHONE ENCOUNTER
Patient is eligible for a 2020 Medicare Advantage Supervisit. Home phone disconnected. Work # tried. Discussed in detail w/patients . Appt scheduled for 8/13/20.

## 2020-08-13 ENCOUNTER — OFFICE VISIT (OUTPATIENT)
Dept: FAMILY MEDICINE CLINIC | Facility: CLINIC | Age: 80
End: 2020-08-13
Payer: MEDICARE

## 2020-08-13 VITALS
HEIGHT: 57 IN | WEIGHT: 125.5 LBS | SYSTOLIC BLOOD PRESSURE: 136 MMHG | HEART RATE: 62 BPM | BODY MASS INDEX: 27.08 KG/M2 | TEMPERATURE: 98 F | DIASTOLIC BLOOD PRESSURE: 68 MMHG

## 2020-08-13 DIAGNOSIS — H91.93 BILATERAL HEARING LOSS, UNSPECIFIED HEARING LOSS TYPE: ICD-10-CM

## 2020-08-13 DIAGNOSIS — N18.30 TYPE 2 DIABETES MELLITUS WITH STAGE 3 CHRONIC KIDNEY DISEASE, WITHOUT LONG-TERM CURRENT USE OF INSULIN (HCC): Chronic | ICD-10-CM

## 2020-08-13 DIAGNOSIS — Z13.6 SCREENING FOR CARDIOVASCULAR CONDITION: ICD-10-CM

## 2020-08-13 DIAGNOSIS — Z13.1 SCREENING FOR DIABETES MELLITUS (DM): ICD-10-CM

## 2020-08-13 DIAGNOSIS — K90.9 IMPAIRED INTESTINAL ABSORPTION: ICD-10-CM

## 2020-08-13 DIAGNOSIS — I10 ESSENTIAL HYPERTENSION: ICD-10-CM

## 2020-08-13 DIAGNOSIS — F33.0 MILD EPISODE OF RECURRENT MAJOR DEPRESSIVE DISORDER (HCC): ICD-10-CM

## 2020-08-13 DIAGNOSIS — M85.89 OSTEOPENIA OF MULTIPLE SITES: ICD-10-CM

## 2020-08-13 DIAGNOSIS — F33.41 RECURRENT MAJOR DEPRESSIVE DISORDER, IN PARTIAL REMISSION (HCC): ICD-10-CM

## 2020-08-13 DIAGNOSIS — H25.13 AGE-RELATED NUCLEAR CATARACT OF BOTH EYES: ICD-10-CM

## 2020-08-13 DIAGNOSIS — E11.22 TYPE 2 DIABETES MELLITUS WITH STAGE 3 CHRONIC KIDNEY DISEASE, WITHOUT LONG-TERM CURRENT USE OF INSULIN (HCC): Chronic | ICD-10-CM

## 2020-08-13 DIAGNOSIS — I25.118 CORONARY ARTERY DISEASE OF NATIVE HEART WITH STABLE ANGINA PECTORIS, UNSPECIFIED VESSEL OR LESION TYPE (HCC): ICD-10-CM

## 2020-08-13 DIAGNOSIS — D50.8 OTHER IRON DEFICIENCY ANEMIA: ICD-10-CM

## 2020-08-13 DIAGNOSIS — Z00.00 ENCOUNTER FOR ANNUAL HEALTH EXAMINATION: Primary | ICD-10-CM

## 2020-08-13 DIAGNOSIS — R01.1 MURMUR: ICD-10-CM

## 2020-08-13 DIAGNOSIS — I70.0 ATHEROSCLEROSIS OF AORTA (HCC): ICD-10-CM

## 2020-08-13 DIAGNOSIS — I25.10 CORONARY ARTERY DISEASE INVOLVING NATIVE CORONARY ARTERY OF NATIVE HEART WITHOUT ANGINA PECTORIS: ICD-10-CM

## 2020-08-13 DIAGNOSIS — I73.9 PVD (PERIPHERAL VASCULAR DISEASE) (HCC): ICD-10-CM

## 2020-08-13 DIAGNOSIS — I73.9 PERIPHERAL VASCULAR DISEASE (HCC): ICD-10-CM

## 2020-08-13 DIAGNOSIS — R09.89 BILATERAL CAROTID BRUITS: ICD-10-CM

## 2020-08-13 DIAGNOSIS — E55.9 VITAMIN D DEFICIENCY: ICD-10-CM

## 2020-08-13 PROCEDURE — 96160 PT-FOCUSED HLTH RISK ASSMT: CPT | Performed by: FAMILY MEDICINE

## 2020-08-13 PROCEDURE — 3078F DIAST BP <80 MM HG: CPT | Performed by: FAMILY MEDICINE

## 2020-08-13 PROCEDURE — 99397 PER PM REEVAL EST PAT 65+ YR: CPT | Performed by: FAMILY MEDICINE

## 2020-08-13 PROCEDURE — 3075F SYST BP GE 130 - 139MM HG: CPT | Performed by: FAMILY MEDICINE

## 2020-08-13 PROCEDURE — G0439 PPPS, SUBSEQ VISIT: HCPCS | Performed by: FAMILY MEDICINE

## 2020-08-13 PROCEDURE — 3008F BODY MASS INDEX DOCD: CPT | Performed by: FAMILY MEDICINE

## 2020-08-13 NOTE — PATIENT INSTRUCTIONS
Miki Nieves's SCREENING SCHEDULE   Tests on this list are recommended by your physician but may not be covered, or covered at this frequency, by your insurer. Please check with your insurance carrier before scheduling to verify coverage.    PREVENTATI Covered every 10 years- more often if abnormal Colonoscopy due on 03/11/2024 Update Health Maintenance if applicable    Flex Sigmoidoscopy Screen  Covered every 5 years No results found for this or any previous visit. No flowsheet data found.      Fecal O FREE INC ANTIG    Please get every year    Pneumococcal 13 (Prevnar)  Covered Once after 65 Orders placed or performed in visit on 03/06/18   • PNEUMOCOCCAL VACC, 13 YOVANNY IM    Please get once after your 65th birthday    Pneumococcal 23 (Pneumovax)  Covered

## 2020-08-13 NOTE — PROGRESS NOTES
HPI:   Amara Serna is a 79year old female who presents for a Medicare Subsequent Annual Wellness visit (Pt already had Initial Annual Wellness). Patient needs referral to audiology for hearing aids.   Also requesting referral to ophthalmology for without angina pectoris     Murmur     Major depression in partial remission ([address]     Atherosclerosis of aorta (HCC)     Peripheral vascular disease (HCC)     Coronary artery disease of native heart with stable angina pectoris ([address]     Type 2 diabetes me Blood (TRUE METRIX BLOOD GLUCOSE TEST) In Vitro Strip, CHECK BLOOD GLUCOSE TWICE DAILY  ferrous sulfate 325 (65 FE) MG Oral Tab EC, Take 325 mg by mouth daily. aspirin 81 MG Oral Tab, Take 81 mg by mouth daily.   Blood Glucose Monitoring Suppl (TRUE METRI 8 oz (56.9 kg).     Medicare Hearing Assessment  (Required for AWV/SWV)    Hearing Screening    Time taken:  8/13/2020  1:09 PM  Entry User:  Rajan Helms MA  Screening Method:  Questionnaire  I have a problem hearing over the telephone:  Yes I have trou normal, atraumatic, no cyanosis or edema; no erythema or tenderness in calves bilaterally  Pulses: 2+ and symmetric  Vaccination History     Immunization History   Administered Date(s) Administered   • FLU VACC High Dose 72 YRS & Older PRSV Free (67087) [facility])  -Follow-up cardiology  Other iron deficiency anemia  - CBC  Age-related nuclear cataract of both eyes  - Ophthalmology referral given   Coronary artery disease involving native coronary artery of native heart without angina pectoris  -Follow-up card 10/11/2019 50        EKG - w/ Initial Preventative Physical Exam only, or if medically necessary Electrocardiogram date12/17/2019       Colorectal Cancer Screening      Colonoscopy Screen every 10 years Colonoscopy due on 03/11/2024 Update [facility] drug abusers     Tetanus Toxoid  Only covered with a cut with metal- TD and TDaP Not covered by Medicare Part B No vaccine history found This may be covered with your prescription benefits, but Medicare does not cover unless Medically needed    Zoster  Not

## 2020-08-14 ENCOUNTER — LAB ENCOUNTER (OUTPATIENT)
Dept: LAB | Age: 80
End: 2020-08-14
Attending: FAMILY MEDICINE
Payer: MEDICARE

## 2020-08-14 DIAGNOSIS — N18.30 TYPE 2 DIABETES MELLITUS WITH STAGE 3 CHRONIC KIDNEY DISEASE, WITHOUT LONG-TERM CURRENT USE OF INSULIN (HCC): ICD-10-CM

## 2020-08-14 DIAGNOSIS — E11.22 TYPE 2 DIABETES MELLITUS WITH STAGE 3 CHRONIC KIDNEY DISEASE, WITHOUT LONG-TERM CURRENT USE OF INSULIN (HCC): ICD-10-CM

## 2020-08-14 DIAGNOSIS — Z13.1 SCREENING FOR DIABETES MELLITUS (DM): ICD-10-CM

## 2020-08-14 DIAGNOSIS — Z76.89 ENCOUNTER TO ESTABLISH CARE: ICD-10-CM

## 2020-08-14 DIAGNOSIS — Z13.6 SCREENING FOR CARDIOVASCULAR CONDITION: ICD-10-CM

## 2020-08-14 DIAGNOSIS — E78.5 HYPERLIPIDEMIA, UNSPECIFIED HYPERLIPIDEMIA TYPE: ICD-10-CM

## 2020-08-14 DIAGNOSIS — E87.1 HYPONATREMIA: ICD-10-CM

## 2020-08-14 DIAGNOSIS — I10 ESSENTIAL HYPERTENSION: ICD-10-CM

## 2020-08-14 DIAGNOSIS — Z00.00 ENCOUNTER FOR ANNUAL HEALTH EXAMINATION: ICD-10-CM

## 2020-08-14 LAB
ALBUMIN SERPL-MCNC: 3.6 G/DL (ref 3.4–5)
ALBUMIN/GLOB SERPL: 1.2 {RATIO} (ref 1–2)
ALP LIVER SERPL-CCNC: 61 U/L (ref 55–142)
ALT SERPL-CCNC: 24 U/L (ref 13–56)
ANION GAP SERPL CALC-SCNC: 5 MMOL/L (ref 0–18)
AST SERPL-CCNC: 17 U/L (ref 15–37)
BASOPHILS # BLD AUTO: 0.02 X10(3) UL (ref 0–0.2)
BASOPHILS NFR BLD AUTO: 0.5 %
BILIRUB SERPL-MCNC: 0.3 MG/DL (ref 0.1–2)
BUN BLD-MCNC: 15 MG/DL (ref 7–18)
BUN/CREAT SERPL: 14.6 (ref 10–20)
CALCIUM BLD-MCNC: 9 MG/DL (ref 8.5–10.1)
CHLORIDE SERPL-SCNC: 101 MMOL/L (ref 98–112)
CHOLEST SMN-MCNC: 155 MG/DL (ref ?–200)
CO2 SERPL-SCNC: 26 MMOL/L (ref 21–32)
CREAT BLD-MCNC: 1.03 MG/DL (ref 0.55–1.02)
CREAT UR-SCNC: 53.5 MG/DL
DEPRECATED RDW RBC AUTO: 48.1 FL (ref 35.1–46.3)
EOSINOPHIL # BLD AUTO: 0.15 X10(3) UL (ref 0–0.7)
EOSINOPHIL NFR BLD AUTO: 3.6 %
ERYTHROCYTE [DISTWIDTH] IN BLOOD BY AUTOMATED COUNT: 13.4 % (ref 11–15)
EST. AVERAGE GLUCOSE BLD GHB EST-MCNC: 126 MG/DL (ref 68–126)
GLOBULIN PLAS-MCNC: 3.1 G/DL (ref 2.8–4.4)
GLUCOSE BLD-MCNC: 160 MG/DL (ref 70–99)
HBA1C MFR BLD HPLC: 6 % (ref ?–5.7)
HCT VFR BLD AUTO: 30 % (ref 35–48)
HDLC SERPL-MCNC: 44 MG/DL (ref 40–59)
HGB BLD-MCNC: 10.2 G/DL (ref 12–16)
IMM GRANULOCYTES # BLD AUTO: 0.01 X10(3) UL (ref 0–1)
IMM GRANULOCYTES NFR BLD: 0.2 %
LDLC SERPL CALC-MCNC: 61 MG/DL (ref ?–100)
LYMPHOCYTES # BLD AUTO: 0.91 X10(3) UL (ref 1–4)
LYMPHOCYTES NFR BLD AUTO: 21.8 %
M PROTEIN MFR SERPL ELPH: 6.7 G/DL (ref 6.4–8.2)
MCH RBC QN AUTO: 33.2 PG (ref 26–34)
MCHC RBC AUTO-ENTMCNC: 34 G/DL (ref 31–37)
MCV RBC AUTO: 97.7 FL (ref 80–100)
MICROALBUMIN UR-MCNC: 1.71 MG/DL
MICROALBUMIN/CREAT 24H UR-RTO: 32 UG/MG (ref ?–30)
MONOCYTES # BLD AUTO: 0.42 X10(3) UL (ref 0.1–1)
MONOCYTES NFR BLD AUTO: 10.1 %
NEUTROPHILS # BLD AUTO: 2.66 X10 (3) UL (ref 1.5–7.7)
NEUTROPHILS # BLD AUTO: 2.66 X10(3) UL (ref 1.5–7.7)
NEUTROPHILS NFR BLD AUTO: 63.8 %
NONHDLC SERPL-MCNC: 111 MG/DL (ref ?–130)
OSMOLALITY SERPL CALC.SUM OF ELEC: 278 MOSM/KG (ref 275–295)
PATIENT FASTING Y/N/NP: YES
PATIENT FASTING Y/N/NP: YES
PLATELET # BLD AUTO: 231 10(3)UL (ref 150–450)
POTASSIUM SERPL-SCNC: 4.8 MMOL/L (ref 3.5–5.1)
RBC # BLD AUTO: 3.07 X10(6)UL (ref 3.8–5.3)
SODIUM SERPL-SCNC: 132 MMOL/L (ref 136–145)
TRIGL SERPL-MCNC: 252 MG/DL (ref 30–149)
TSI SER-ACNC: 1.78 MIU/ML (ref 0.36–3.74)
VLDLC SERPL CALC-MCNC: 50 MG/DL (ref 0–30)
WBC # BLD AUTO: 4.2 X10(3) UL (ref 4–11)

## 2020-08-14 PROCEDURE — 80053 COMPREHEN METABOLIC PANEL: CPT

## 2020-08-14 PROCEDURE — 85025 COMPLETE CBC W/AUTO DIFF WBC: CPT

## 2020-08-14 PROCEDURE — 82570 ASSAY OF URINE CREATININE: CPT

## 2020-08-14 PROCEDURE — 36415 COLL VENOUS BLD VENIPUNCTURE: CPT

## 2020-08-14 PROCEDURE — 82043 UR ALBUMIN QUANTITATIVE: CPT

## 2020-08-14 PROCEDURE — 83036 HEMOGLOBIN GLYCOSYLATED A1C: CPT

## 2020-08-14 PROCEDURE — 80061 LIPID PANEL: CPT

## 2020-08-14 PROCEDURE — 84443 ASSAY THYROID STIM HORMONE: CPT

## 2020-08-31 ENCOUNTER — OFFICE VISIT (OUTPATIENT)
Dept: AUDIOLOGY | Facility: CLINIC | Age: 80
End: 2020-08-31
Payer: MEDICARE

## 2020-08-31 DIAGNOSIS — H90.3 SENSORINEURAL HEARING LOSS (SNHL) OF BOTH EARS: Primary | ICD-10-CM

## 2020-08-31 PROCEDURE — 92553 AUDIOMETRY AIR & BONE: CPT | Performed by: AUDIOLOGIST

## 2020-08-31 PROCEDURE — 92567 TYMPANOMETRY: CPT | Performed by: AUDIOLOGIST

## 2020-08-31 NOTE — PROGRESS NOTES
AUDIOLOGY REPORT      Dante Leavitt is a 79year old female     Referring Provider: Layo Helms   YOB: 1940  Medical Record: [id number]      Patient Hearing History:  Patient seen today for audiologic evaluation.    Japanese is her first

## 2020-09-05 NOTE — TELEPHONE ENCOUNTER
Patient's  is requesting a refill for [address]     Glucose Blood (TRUE METRIX BLOOD GLUCOSE TEST) In Vitro Strip 200 strip 2 5/14/2018    Sig:   CHECK BLOOD GLUCOSE TWICE DAILY     Route:   (none)     Order #:   [id number]

## 2020-09-06 RX ORDER — CALCIUM CITRATE/VITAMIN D3 200MG-6.25
TABLET ORAL
Qty: 200 STRIP | Refills: 2 | Status: SHIPPED | OUTPATIENT
Start: 2020-09-06 | End: 2021-12-15

## 2020-09-09 ENCOUNTER — NURSE ONLY (OUTPATIENT)
Dept: HEMATOLOGY/ONCOLOGY | Facility: HOSPITAL | Age: 80
End: 2020-09-09
Attending: INTERNAL MEDICINE
Payer: MEDICARE

## 2020-09-09 VITALS
SYSTOLIC BLOOD PRESSURE: 160 MMHG | HEIGHT: 57 IN | HEART RATE: 59 BPM | DIASTOLIC BLOOD PRESSURE: 46 MMHG | BODY MASS INDEX: 27.61 KG/M2 | OXYGEN SATURATION: 100 % | TEMPERATURE: 98 F | WEIGHT: 128 LBS | RESPIRATION RATE: 16 BRPM

## 2020-09-09 DIAGNOSIS — D50.8 OTHER IRON DEFICIENCY ANEMIA: ICD-10-CM

## 2020-09-09 DIAGNOSIS — Z51.81 MEDICATION MONITORING ENCOUNTER: ICD-10-CM

## 2020-09-09 DIAGNOSIS — D63.1 ANEMIA IN STAGE 2 CHRONIC KIDNEY DISEASE: ICD-10-CM

## 2020-09-09 DIAGNOSIS — D63.8 ANEMIA, CHRONIC DISEASE: ICD-10-CM

## 2020-09-09 DIAGNOSIS — K90.9 IMPAIRED INTESTINAL ABSORPTION: ICD-10-CM

## 2020-09-09 DIAGNOSIS — D50.9 IRON DEFICIENCY ANEMIA, UNSPECIFIED IRON DEFICIENCY ANEMIA TYPE: Primary | ICD-10-CM

## 2020-09-09 DIAGNOSIS — N18.2 ANEMIA IN STAGE 2 CHRONIC KIDNEY DISEASE: ICD-10-CM

## 2020-09-09 LAB
BASOPHILS # BLD AUTO: 0.02 X10(3) UL (ref 0–0.2)
BASOPHILS NFR BLD AUTO: 0.4 %
DEPRECATED HBV CORE AB SER IA-ACNC: 168.9 NG/ML (ref 18–340)
DEPRECATED RDW RBC AUTO: 45.2 FL (ref 35.1–46.3)
EOSINOPHIL # BLD AUTO: 0.14 X10(3) UL (ref 0–0.7)
EOSINOPHIL NFR BLD AUTO: 3 %
ERYTHROCYTE [DISTWIDTH] IN BLOOD BY AUTOMATED COUNT: 13 % (ref 11–15)
HCT VFR BLD AUTO: 29.9 % (ref 35–48)
HGB BLD-MCNC: 10 G/DL (ref 12–16)
IMM GRANULOCYTES # BLD AUTO: 0.03 X10(3) UL (ref 0–1)
IMM GRANULOCYTES NFR BLD: 0.6 %
IRON SATURATION: 15 % (ref 15–50)
IRON SERPL-MCNC: 55 UG/DL (ref 50–170)
LYMPHOCYTES # BLD AUTO: 1.01 X10(3) UL (ref 1–4)
LYMPHOCYTES NFR BLD AUTO: 21.7 %
MCH RBC QN AUTO: 31.5 PG (ref 26–34)
MCHC RBC AUTO-ENTMCNC: 33.4 G/DL (ref 31–37)
MCV RBC AUTO: 94.3 FL (ref 80–100)
MONOCYTES # BLD AUTO: 0.3 X10(3) UL (ref 0.1–1)
MONOCYTES NFR BLD AUTO: 6.4 %
NEUTROPHILS # BLD AUTO: 3.16 X10 (3) UL (ref 1.5–7.7)
NEUTROPHILS # BLD AUTO: 3.16 X10(3) UL (ref 1.5–7.7)
NEUTROPHILS NFR BLD AUTO: 67.9 %
PLATELET # BLD AUTO: 250 10(3)UL (ref 150–450)
RBC # BLD AUTO: 3.17 X10(6)UL (ref 3.8–5.3)
TOTAL IRON BINDING CAPACITY: 377 UG/DL (ref 240–450)
TRANSFERRIN SERPL-MCNC: 253 MG/DL (ref 200–360)
WBC # BLD AUTO: 4.7 X10(3) UL (ref 4–11)

## 2020-09-09 PROCEDURE — 82728 ASSAY OF FERRITIN: CPT

## 2020-09-09 PROCEDURE — 99214 OFFICE O/P EST MOD 30 MIN: CPT | Performed by: INTERNAL MEDICINE

## 2020-09-09 PROCEDURE — 84466 ASSAY OF TRANSFERRIN: CPT

## 2020-09-09 PROCEDURE — 83540 ASSAY OF IRON: CPT

## 2020-09-09 PROCEDURE — 85025 COMPLETE CBC W/AUTO DIFF WBC: CPT

## 2020-09-09 PROCEDURE — 36415 COLL VENOUS BLD VENIPUNCTURE: CPT

## 2020-09-09 NOTE — PROGRESS NOTES
KT     Jorge Leger is a 79year old female who is here today for f/u of Iron deficiency anemia, unspecified iron deficiency anemia type  (primary encounter diagnosis)  Impaired intestinal absorption  Medication monitoring encounter  Anemia in stage Sig Dispense Refill   • Glucose Blood (TRUE METRIX BLOOD GLUCOSE TEST) In Vitro Strip CHECK BLOOD GLUCOSE TWICE DAILY 200 strip 2   • Clopidogrel Bisulfate 75 MG Oral Tab Take 1 tablet (75 mg total) by mouth daily.  90 tablet 3   • Oxybutynin Chloride ER 10 screening for osteoporosis 1/17/2013    Dexa Scan   • Stress incontinence    • Type II or unspecified type diabetes mellitus without mention of complication, not stated as uncontrolled 2010    Pills only     Past Surgical History:   Procedure Laterality Da distress. HEENT: EOMs intact. PERRL. Oropharynx is clear. Neck: No JVD. No palpable lymphadenopathy. Neck is supple. Chest: Clear to auscultation. Heart: Regular rate and rhythm. Abdomen: Soft, + tender at BLQ w/o R/R/G with good bowel sounds.   Extr Capacity 377 240 - 450 ug/dL    % Saturation 15 15 - 50 %   CBC W/ DIFFERENTIAL    Collection Time: 09/09/20 10:55 AM   Result Value Ref Range    WBC 4.7 4.0 - 11.0 x10(3) uL    RBC 3.17 (L) 3.80 - 5.30 x10(6)uL    HGB 10.0 (L) 12.0 - 16.0 g/dL    HCT 29. 9 0. 15 0.08   Basophils Absolute      0.00 - 0.20 x10(3) uL 0.02 0.02 0.02 0.01   Immature Granulocyte Absolute      0.00 - 1.00 x10(3) uL 0.03 0.01 0.02 0.02   Neutrophils %      % 67.9 63.8 64.9 72.4   Lymphocytes %      % 21.7 21.8 21.6 16.6   Monocytes % 55 - 142 U/L 61     Total Bilirubin      0.1 - 2.0 mg/dL 0.3     TOTAL PROTEIN      6.4 - 8.2 g/dL 6.7     Albumin      3.4 - 5.0 g/dL 3.6     Globulin      2.8 - 4.4 g/dL 3.1     A/G Ratio      1.0 - 2.0 1.2     ANION GAP      0 - 18 mmol/L 5  3   BUN/CRE

## 2020-09-24 ENCOUNTER — OFFICE VISIT (OUTPATIENT)
Dept: OTOLARYNGOLOGY | Facility: CLINIC | Age: 80
End: 2020-09-24
Payer: MEDICARE

## 2020-09-24 VITALS
HEIGHT: 57 IN | SYSTOLIC BLOOD PRESSURE: 165 MMHG | WEIGHT: 128 LBS | BODY MASS INDEX: 27.61 KG/M2 | DIASTOLIC BLOOD PRESSURE: 81 MMHG

## 2020-09-24 DIAGNOSIS — H61.21 IMPACTED CERUMEN OF RIGHT EAR: Primary | ICD-10-CM

## 2020-09-24 PROCEDURE — 3008F BODY MASS INDEX DOCD: CPT | Performed by: OTOLARYNGOLOGY

## 2020-09-24 PROCEDURE — 3079F DIAST BP 80-89 MM HG: CPT | Performed by: OTOLARYNGOLOGY

## 2020-09-24 PROCEDURE — 69210 REMOVE IMPACTED EAR WAX UNI: CPT | Performed by: OTOLARYNGOLOGY

## 2020-09-24 PROCEDURE — 3077F SYST BP >= 140 MM HG: CPT | Performed by: OTOLARYNGOLOGY

## 2020-09-24 NOTE — PROGRESS NOTES
Latosha Bell is a 79year old female.   Patient presents with:  Ear Problem: Patient Presents with Bilateral Ear wax Impaction       HISTORY OF PRESENT ILLNESS    Patient presents for cerumen removal. No other complaints or concerns at this time    [facility] ADULT MENTAL [facility] • CHOLECYSTECTOMY     • COLONOSCOPY  2016   • COLONOSCOPY N/A 3/11/2019    Performed by Mellissa Ortiz MD at [address] [place] [address] ENDOSCOPY   • EGD  2018   • ELECTROCARDIOGRAM, COMPLETE  09-    Scanned to Media Tab - Date of Service 09-   • ESOPHAGOGASTR Left: Normal tympanic membrane. TM Visualized Method:   Right TM examined via otomicroscopy. Left TM examined via otomicroscopy. PROCEDURE:    Removal of cerumen impaction   The cerumen impaction was completely removed using microscopy.    Rem once daily. , Disp: , Rfl:   ASSESSMENT AND PLAN    1. Impacted cerumen of right ear  I did recommend that they look into possibly checking on the function of the hearing aids as they seem to be having issues with hearing at times.   Aids are about 4 to 5 ye

## 2020-09-25 NOTE — PROGRESS NOTES
Please assist patient with making an appointment with Dr. Clement/gastroenterology. Referral was given to her at last office visit. She needs to follow-up for scheduling endoscopy.

## 2020-09-29 NOTE — PROGRESS NOTES
Talked to [facility]  of patient via language line told him message below and understood and he states that his daughter called Dr Helen James already to make an appointment.

## 2020-09-30 ENCOUNTER — TELEPHONE (OUTPATIENT)
Dept: GASTROENTEROLOGY | Facility: CLINIC | Age: 80
End: 2020-09-30

## 2020-09-30 DIAGNOSIS — K31.819 GAVE (GASTRIC ANTRAL VASCULAR ECTASIA): ICD-10-CM

## 2020-09-30 DIAGNOSIS — D64.9 ANEMIA, UNSPECIFIED TYPE: Primary | ICD-10-CM

## 2020-10-05 NOTE — TELEPHONE ENCOUNTER
Dr. Mariola Hinojosa pt's daughter calling to schedule recall EGD. Please advise on sedation & diagnosis. Thank you!

## 2020-10-06 NOTE — TELEPHONE ENCOUNTER
Ok to schedule for EGD for dx of GAVE/anemia  MAC sedation    Hold plavix 5 days before if ok with pcp  Hold iron 5 days before

## 2020-10-06 NOTE — TELEPHONE ENCOUNTER
Nehemias Burroughs    Patient is scheduled for endoscopic procedure on 11/27/220 with Dr. Helen James. Gi is requesting to hold Plavix for 5 days prior to procedure. Please advise if ok to do so.     Thank you

## 2020-10-06 NOTE — TELEPHONE ENCOUNTER
Scheduled for:  EGD - [address]  Provider Name:  Dr. Ashley Brito  Date:  11/27/20  Location:  [facility]  Sedation:  MAC  Time:  9:15 am (pt is aware to arrive at 8:15 am)  Prep:  NPO after midnight, Prep instructions were given to pt's daughter over the phone, pt verbaliz

## 2020-10-06 NOTE — TELEPHONE ENCOUNTER
Schedulers:  Please assist with scheduling EGD per below orders from Dr. Ruben Squires. Please route back to RN pool once scheduled so that anticoagulation orders can be obtained.

## 2020-10-13 NOTE — TELEPHONE ENCOUNTER
Per patient's request I reviewed the Plavix instructions with her and her Spouse over the phone and voiced understanding.     I had an  Abner Barraza #[id number], but patient chose to speak English instead of using

## 2020-11-05 ENCOUNTER — OFFICE VISIT (OUTPATIENT)
Dept: OPHTHALMOLOGY | Facility: CLINIC | Age: 80
End: 2020-11-05
Payer: MEDICARE

## 2020-11-05 DIAGNOSIS — H02.886 MEIBOMIAN GLAND DYSFUNCTION (MGD) OF BOTH EYES: ICD-10-CM

## 2020-11-05 DIAGNOSIS — E11.9 DIABETES MELLITUS TYPE 2 WITHOUT RETINOPATHY (HCC): Primary | ICD-10-CM

## 2020-11-05 DIAGNOSIS — H25.13 AGE-RELATED NUCLEAR CATARACT OF BOTH EYES: ICD-10-CM

## 2020-11-05 DIAGNOSIS — H02.883 MEIBOMIAN GLAND DYSFUNCTION (MGD) OF BOTH EYES: ICD-10-CM

## 2020-11-05 PROCEDURE — 92014 COMPRE OPH EXAM EST PT 1/>: CPT | Performed by: OPHTHALMOLOGY

## 2020-11-05 PROCEDURE — 92015 DETERMINE REFRACTIVE STATE: CPT | Performed by: OPHTHALMOLOGY

## 2020-11-05 NOTE — PATIENT INSTRUCTIONS
Diabetes mellitus type 2 without retinopathy ([facility]  Diabetes type II: no background of retinopathy, no signs of neovascularization noted. Discussed ocular and systemic benefits of blood sugar control.   Diagnosis and treatment discussed in detail with tayo

## 2020-11-05 NOTE — PROGRESS NOTES
Lynda Guerrero is a 79year old female.     HPI:     HPI     Diabetic Eye Exam      Additional comments: Pt has been a diabetic for 8-10 years       Pt's diabetes is currently controlled by diet   Pt checks BS once a day   Pt's last blood sugar was  145 to • Diabetes Brother    • Other (Other) Brother 47   • Other (Other) Brother 59   • Glaucoma Neg    • Macular degeneration Neg        Social History: Social History    Tobacco Use      Smoking status: Never Smoker      Smokeless tobacco: Never Used    Alco Near cc 20/30 20/60          Tonometry (Icare, 1:58 PM)       Right Left    Pressure 16 17          Pupils       Pupils    Right PERRL    Left PERRL          Visual Fields       Left Right     Full Full          Extraocular Movement       Right Left     Fu recommended at this time. New glasses today; update as needed. Discussed that new prescription is only a slight change.        Meibomian gland dysfunction (MGD) of both eyes  Patient was instructed to use warm compresses to the eyelids twice a day everyd

## 2020-11-09 ENCOUNTER — OFFICE VISIT (OUTPATIENT)
Dept: FAMILY MEDICINE CLINIC | Facility: CLINIC | Age: 80
End: 2020-11-09
Payer: MEDICARE

## 2020-11-09 ENCOUNTER — APPOINTMENT (OUTPATIENT)
Dept: LAB | Age: 80
End: 2020-11-09
Attending: FAMILY MEDICINE
Payer: MEDICARE

## 2020-11-09 VITALS
BODY MASS INDEX: 26.75 KG/M2 | TEMPERATURE: 98 F | HEIGHT: 57 IN | HEART RATE: 60 BPM | WEIGHT: 124 LBS | SYSTOLIC BLOOD PRESSURE: 144 MMHG | DIASTOLIC BLOOD PRESSURE: 71 MMHG

## 2020-11-09 DIAGNOSIS — N18.31 TYPE 2 DIABETES MELLITUS WITH STAGE 3A CHRONIC KIDNEY DISEASE, WITHOUT LONG-TERM CURRENT USE OF INSULIN (HCC): ICD-10-CM

## 2020-11-09 DIAGNOSIS — I25.118 CORONARY ARTERY DISEASE OF NATIVE HEART WITH STABLE ANGINA PECTORIS, UNSPECIFIED VESSEL OR LESION TYPE (HCC): ICD-10-CM

## 2020-11-09 DIAGNOSIS — N18.31 TYPE 2 DIABETES MELLITUS WITH STAGE 3A CHRONIC KIDNEY DISEASE, WITHOUT LONG-TERM CURRENT USE OF INSULIN (HCC): Primary | ICD-10-CM

## 2020-11-09 DIAGNOSIS — E11.22 TYPE 2 DIABETES MELLITUS WITH STAGE 3A CHRONIC KIDNEY DISEASE, WITHOUT LONG-TERM CURRENT USE OF INSULIN (HCC): Primary | ICD-10-CM

## 2020-11-09 DIAGNOSIS — I10 ESSENTIAL HYPERTENSION: ICD-10-CM

## 2020-11-09 DIAGNOSIS — E11.22 TYPE 2 DIABETES MELLITUS WITH STAGE 3A CHRONIC KIDNEY DISEASE, WITHOUT LONG-TERM CURRENT USE OF INSULIN (HCC): ICD-10-CM

## 2020-11-09 PROCEDURE — 80053 COMPREHEN METABOLIC PANEL: CPT

## 2020-11-09 PROCEDURE — 83036 HEMOGLOBIN GLYCOSYLATED A1C: CPT

## 2020-11-09 PROCEDURE — 3077F SYST BP >= 140 MM HG: CPT | Performed by: FAMILY MEDICINE

## 2020-11-09 PROCEDURE — 82570 ASSAY OF URINE CREATININE: CPT

## 2020-11-09 PROCEDURE — 99213 OFFICE O/P EST LOW 20 MIN: CPT | Performed by: FAMILY MEDICINE

## 2020-11-09 PROCEDURE — 36415 COLL VENOUS BLD VENIPUNCTURE: CPT

## 2020-11-09 PROCEDURE — 3008F BODY MASS INDEX DOCD: CPT | Performed by: FAMILY MEDICINE

## 2020-11-09 PROCEDURE — 82043 UR ALBUMIN QUANTITATIVE: CPT

## 2020-11-09 PROCEDURE — 3078F DIAST BP <80 MM HG: CPT | Performed by: FAMILY MEDICINE

## 2020-11-09 NOTE — PROGRESS NOTES
Patient presents with: Follow - Up: DM; discuss diet changes and tx plan. Elevated glucose levels    HPI:   Claudette Olvera is a 79year old female who presents to clinic for DM follow-up.      Prior HbA1C: 6.0% on 08/2020  Dietary compliance: fair  Exerci unspecified vessel or lesion type [facility])  - stress tests scheduled. RTC if no improvement in symptoms. Red flags discussed to go to ER. Follow up visit scheduled.      Audrey Castillo MD  11/9/2020  1:06 PM

## 2020-11-12 ENCOUNTER — HOSPITAL ENCOUNTER (OUTPATIENT)
Dept: MAMMOGRAPHY | Facility: HOSPITAL | Age: 80
Discharge: HOME | End: 2020-11-12
Attending: FAMILY MEDICINE
Payer: MEDICARE

## 2020-11-12 DIAGNOSIS — Z00.00 ENCOUNTER FOR ANNUAL HEALTH EXAMINATION: ICD-10-CM

## 2020-11-12 PROCEDURE — 77067 SCR MAMMO BI INCL CAD: CPT | Performed by: FAMILY MEDICINE

## 2020-11-12 PROCEDURE — 77063 BREAST TOMOSYNTHESIS BI: CPT | Performed by: FAMILY MEDICINE

## 2020-11-13 ENCOUNTER — ORDER TRANSCRIPTION (OUTPATIENT)
Dept: ADMINISTRATIVE | Facility: HOSPITAL | Age: 80
End: 2020-11-13

## 2020-11-13 DIAGNOSIS — B33.8 OTHER SPECIFIED VIRAL DISEASES: ICD-10-CM

## 2020-11-13 DIAGNOSIS — Z01.818 PRE-OP EXAMINATION: Primary | ICD-10-CM

## 2020-11-14 ENCOUNTER — APPOINTMENT (OUTPATIENT)
Dept: LAB | Age: 80
End: 2020-11-14
Attending: INTERNAL MEDICINE
Payer: MEDICARE

## 2020-11-14 DIAGNOSIS — B33.8 OTHER SPECIFIED VIRAL DISEASES: ICD-10-CM

## 2020-11-14 DIAGNOSIS — Z01.818 PRE-OP EXAMINATION: ICD-10-CM

## 2020-11-16 ENCOUNTER — ORDER TRANSCRIPTION (OUTPATIENT)
Dept: ADMINISTRATIVE | Facility: HOSPITAL | Age: 80
End: 2020-11-16

## 2020-11-16 DIAGNOSIS — Z01.818 PRE-PROCEDURAL EXAMINATION: Primary | ICD-10-CM

## 2020-11-16 DIAGNOSIS — Z11.59 SPECIAL SCREENING EXAMINATION FOR VIRAL DISEASE: ICD-10-CM

## 2020-11-17 ENCOUNTER — TELEPHONE (OUTPATIENT)
Dept: CARDIOLOGY CLINIC | Facility: CLINIC | Age: 80
End: 2020-11-17

## 2020-11-17 NOTE — TELEPHONE ENCOUNTER
Patients daughter/Courtney calling to speak with nurse to find out why patient stress test was cancelled today, please advise at:[phone],thanks.

## 2020-11-18 NOTE — TELEPHONE ENCOUNTER
Spoke with daughter (HIPAA verified) explained that it appears that the stress test and echo were rescheduled due to insurance verification. She had COVID19 test (negative) 11/16/20. She had been rescheduled for the cardiac testing for Monday 11/30.  She is

## 2020-11-20 DIAGNOSIS — I10 ESSENTIAL HYPERTENSION: ICD-10-CM

## 2020-11-20 RX ORDER — TELMISARTAN 40 MG/1
40 TABLET ORAL DAILY
Qty: 90 TABLET | Refills: 0 | Status: SHIPPED | OUTPATIENT
Start: 2020-11-20 | End: 2021-02-25

## 2020-11-23 ENCOUNTER — TELEPHONE (OUTPATIENT)
Dept: CARDIOLOGY CLINIC | Facility: CLINIC | Age: 80
End: 2020-11-23

## 2020-11-23 ENCOUNTER — TELEPHONE (OUTPATIENT)
Dept: GASTROENTEROLOGY | Facility: CLINIC | Age: 80
End: 2020-11-23

## 2020-11-23 DIAGNOSIS — K31.819 GAVE (GASTRIC ANTRAL VASCULAR ECTASIA): ICD-10-CM

## 2020-11-23 DIAGNOSIS — D64.9 ANEMIA, UNSPECIFIED TYPE: Primary | ICD-10-CM

## 2020-11-23 NOTE — TELEPHONE ENCOUNTER
Bontia/Endoscopy called to speak to RN about cardiac clearance for EGD that is scheduled for 11/27/20. Please call.

## 2020-11-23 NOTE — TELEPHONE ENCOUNTER
Schedulers:  Patient unable to get cardiac clearance in time for procedure this Friday so it was cancelled per Shu Mills in preadmissions. Patient needs to be seen by cardiology first.    Per Shu Mills, patient's daughter already notified to the cancellation.

## 2020-11-23 NOTE — TELEPHONE ENCOUNTER
S/w Gi YARON Blanco, patient has not been seen since 12/2019. Stress test and echo scheduled for 11/30, will not be able to give clearance with being seen.

## 2020-11-27 ENCOUNTER — APPOINTMENT (OUTPATIENT)
Dept: LAB | Facility: HOSPITAL | Age: 80
End: 2020-11-27
Attending: INTERNAL MEDICINE
Payer: MEDICARE

## 2020-11-27 DIAGNOSIS — Z01.818 PRE-PROCEDURAL EXAMINATION: ICD-10-CM

## 2020-11-27 DIAGNOSIS — Z11.59 SPECIAL SCREENING EXAMINATION FOR VIRAL DISEASE: ICD-10-CM

## 2020-11-27 LAB
SARS-COV-2 RNA SPEC QL NAA+PROBE: NOT DETECTED
SPECIMEN SOURCE: NORMAL

## 2020-11-30 ENCOUNTER — HOSPITAL ENCOUNTER (OUTPATIENT)
Dept: CV DIAGNOSTICS | Facility: HOSPITAL | Age: 80
Discharge: HOME | End: 2020-11-30
Attending: INTERNAL MEDICINE
Payer: MEDICARE

## 2020-11-30 ENCOUNTER — HOSPITAL ENCOUNTER (OUTPATIENT)
Dept: NUCLEAR MEDICINE | Facility: HOSPITAL | Age: 80
Discharge: HOME | End: 2020-11-30
Attending: INTERNAL MEDICINE
Payer: MEDICARE

## 2020-11-30 DIAGNOSIS — I25.10 CORONARY ARTERY DISEASE INVOLVING NATIVE CORONARY ARTERY OF NATIVE HEART WITHOUT ANGINA PECTORIS: ICD-10-CM

## 2020-11-30 DIAGNOSIS — R01.1 MURMUR: ICD-10-CM

## 2020-11-30 PROCEDURE — 78452 HT MUSCLE IMAGE SPECT MULT: CPT | Performed by: INTERNAL MEDICINE

## 2020-11-30 PROCEDURE — 93306 TTE W/DOPPLER COMPLETE: CPT | Performed by: INTERNAL MEDICINE

## 2020-11-30 PROCEDURE — 93016 CV STRESS TEST SUPVJ ONLY: CPT | Performed by: INTERNAL MEDICINE

## 2020-11-30 PROCEDURE — 93018 CV STRESS TEST I&R ONLY: CPT | Performed by: INTERNAL MEDICINE

## 2020-11-30 PROCEDURE — 93017 CV STRESS TEST TRACING ONLY: CPT | Performed by: INTERNAL MEDICINE

## 2020-11-30 NOTE — IMAGING NOTE
Patient is here for nuclear walking stress test  Patient has unsteady gait and not able to exercise on treadmill  Dr Demetri Juarez called and test changed to [address] the new test plan to patient and she agreed to proceed  Denies any caffeine intake

## 2020-12-01 NOTE — TELEPHONE ENCOUNTER
With  Kobe Villalta [id number], offered sooner appointment with Dr. Mike Baer (annual/GI procedure clearance). 12/8/20 1 pm scheduled.

## 2020-12-04 NOTE — TELEPHONE ENCOUNTER
GI/RN--    This patient is scheduled. Please contact Dr. Izzy Owen office for cardiac clearance and Plavix orders.  Thank you

## 2020-12-04 NOTE — TELEPHONE ENCOUNTER
Scheduled for:  [facility] [address]  Provider Name:  Dr. Melanie Kang  Date:  2/15/21  Location:    [facility]  Sedation:  MAC  Time:  6475 (pt is aware to arrive at 0630)   Prep:  NPO after midnight, mailed new Angolan instructions on 12/7/20  Meds/Allergies Reconciled?:  Physic

## 2020-12-04 NOTE — TELEPHONE ENCOUNTER
Patient has appointment with Dr. Brayan Aguiar on 12/8/20 at 1pm for annual/Gi procedure clearance per 11/23/2020 encounter. I will follow up after this appointment for clearance.

## 2020-12-08 ENCOUNTER — OFFICE VISIT (OUTPATIENT)
Dept: CARDIOLOGY CLINIC | Facility: CLINIC | Age: 80
End: 2020-12-08
Payer: MEDICARE

## 2020-12-08 VITALS
TEMPERATURE: 98 F | RESPIRATION RATE: 18 BRPM | HEART RATE: 60 BPM | HEIGHT: 57 IN | DIASTOLIC BLOOD PRESSURE: 77 MMHG | BODY MASS INDEX: 27.32 KG/M2 | WEIGHT: 126.63 LBS | SYSTOLIC BLOOD PRESSURE: 152 MMHG

## 2020-12-08 DIAGNOSIS — R01.1 MURMUR: ICD-10-CM

## 2020-12-08 DIAGNOSIS — I25.10 CORONARY ARTERY DISEASE INVOLVING NATIVE CORONARY ARTERY OF NATIVE HEART WITHOUT ANGINA PECTORIS: Primary | ICD-10-CM

## 2020-12-08 DIAGNOSIS — I10 ESSENTIAL HYPERTENSION: ICD-10-CM

## 2020-12-08 PROCEDURE — 99214 OFFICE O/P EST MOD 30 MIN: CPT | Performed by: INTERNAL MEDICINE

## 2020-12-08 PROCEDURE — 3077F SYST BP >= 140 MM HG: CPT | Performed by: INTERNAL MEDICINE

## 2020-12-08 PROCEDURE — 3008F BODY MASS INDEX DOCD: CPT | Performed by: INTERNAL MEDICINE

## 2020-12-08 PROCEDURE — 3078F DIAST BP <80 MM HG: CPT | Performed by: INTERNAL MEDICINE

## 2020-12-08 NOTE — PATIENT INSTRUCTIONS
Doing well  Continue same medicines  Call if changes    Monitor and record resting blood pressure and pulse after 5 minutes of rest for 1 week and call with results.   When check blood pressure check 3 readings and throw away the first 1 and average the las

## 2020-12-08 NOTE — PROGRESS NOTES
[address] [place] [address] NOTE    Iban Watt is a 79year old female. Patient presents with:  Pre-Op Exam: EGD scheduled on 2/15/2020. patient denies any palpitations, chest pain, SOB.      HPI:   This is a pleasant 79year old female with coronary dise by mouth daily. • aspirin 81 MG Oral Tab Take 81 mg by mouth daily. • Blood Glucose Monitoring Suppl (TRUE METRIX METER) W/DEVICE Does not apply Kit 1 kit by Does not apply route 2 (two) times daily.  1 kit 0   • Milk Thistle Extract 175 MG Oral Ta kg)   BMI 27.40 kg/m²   GENERAL: well developed, well nourished,in no apparent distress  SKIN: no rashes,no suspicious lesions  HEENT: atraumatic, normocephalic,ears and throat are clear  NECK: supple,no adenopathy,no bruits  LUNGS: clear to auscultation

## 2020-12-14 DIAGNOSIS — D63.1 ANEMIA IN STAGE 2 CHRONIC KIDNEY DISEASE: ICD-10-CM

## 2020-12-14 DIAGNOSIS — N18.2 ANEMIA IN STAGE 2 CHRONIC KIDNEY DISEASE: ICD-10-CM

## 2020-12-14 DIAGNOSIS — D50.8 OTHER IRON DEFICIENCY ANEMIA: Primary | ICD-10-CM

## 2020-12-14 NOTE — TELEPHONE ENCOUNTER
See office visit with Dr. Phyllistine Burkitt from 12/8/2020 for cardiac clearance. \"She is stable from a cardiac standpoint for additional GI testing. \"

## 2020-12-15 ENCOUNTER — NURSE ONLY (OUTPATIENT)
Dept: HEMATOLOGY/ONCOLOGY | Facility: HOSPITAL | Age: 80
End: 2020-12-15
Attending: INTERNAL MEDICINE
Payer: MEDICARE

## 2020-12-15 VITALS
WEIGHT: 125 LBS | BODY MASS INDEX: 26.97 KG/M2 | DIASTOLIC BLOOD PRESSURE: 36 MMHG | RESPIRATION RATE: 16 BRPM | TEMPERATURE: 99 F | SYSTOLIC BLOOD PRESSURE: 136 MMHG | HEIGHT: 57 IN | HEART RATE: 72 BPM | OXYGEN SATURATION: 100 %

## 2020-12-15 DIAGNOSIS — D63.1 ANEMIA IN STAGE 2 CHRONIC KIDNEY DISEASE: ICD-10-CM

## 2020-12-15 DIAGNOSIS — D50.8 OTHER IRON DEFICIENCY ANEMIA: ICD-10-CM

## 2020-12-15 DIAGNOSIS — N18.2 ANEMIA IN STAGE 2 CHRONIC KIDNEY DISEASE: ICD-10-CM

## 2020-12-15 DIAGNOSIS — D63.8 ANEMIA, CHRONIC DISEASE: ICD-10-CM

## 2020-12-15 DIAGNOSIS — Z51.81 ENCOUNTER FOR MEDICATION MONITORING: Primary | ICD-10-CM

## 2020-12-15 PROCEDURE — 83540 ASSAY OF IRON: CPT

## 2020-12-15 PROCEDURE — 84466 ASSAY OF TRANSFERRIN: CPT

## 2020-12-15 PROCEDURE — 36415 COLL VENOUS BLD VENIPUNCTURE: CPT

## 2020-12-15 PROCEDURE — 85025 COMPLETE CBC W/AUTO DIFF WBC: CPT

## 2020-12-15 PROCEDURE — 99214 OFFICE O/P EST MOD 30 MIN: CPT | Performed by: INTERNAL MEDICINE

## 2020-12-15 PROCEDURE — 82728 ASSAY OF FERRITIN: CPT

## 2020-12-15 NOTE — PROGRESS NOTES
HPI     Jayden Layne is a 79year old female who is here today for f/u of Anemia in stage 2 chronic kidney disease  Anemia, chronic disease  Other iron deficiency anemia  Encounter for medication monitoring  (primary encounter diagnosis)      She is h Chloride ER 10 MG Oral Tablet 24 Hr Take 1 tablet (10 mg total) by mouth daily.  90 tablet 3   • atorvastatin 20 MG Oral Tab TAKE 1 TABLET(20 MG) BY MOUTH DAILY 90 tablet 2   • B Complex Vitamins (VITAMIN B COMPLEX) Oral Tab Take 1 tablet by mouth once bar 4/11/2019    Performed by Yusuf Brooks MD at [address] [place] [address] ENDOSCOPY   • CHOLECYSTECTOMY     • COLONOSCOPY  2016   • COLONOSCOPY N/A 3/11/2019    Performed by Yusuf Brooks MD at [address] [place] [address] ENDOSCOPY   • EGD  2018   • ELECTROCARDIOGRAM, COMPLETE  09-    Sca Lymphatics: There is no palpable lymphadenopathy throughout in the cervical, supraclavicular, or axillary regions.   Psych/Depression: normal        ASSESSMENT/PLAN:   Anemia in stage 2 chronic kidney disease  Anemia, chronic disease  Other iron deficienc HCT 23.8 (L) 35.0 - 48.0 %    .1 (H) 80.0 - 100.0 fL    MCH 31.3 26.0 - 34.0 pg    MCHC 30.7 (L) 31.0 - 37.0 g/dL    RDW-SD 53.3 (H) 35.1 - 46.3 fL    RDW 14.6 11.0 - 15.0 %    .0 150.0 - 450.0 10(3)uL    Neutrophil Absolute Prelim 3.06 1.50 21.6 16.6   Monocytes %      % 6.4 10.1 9.8 8.8   Eosinophils %      % 3.0 3.6 2.9 1.6   Basophils %      % 0.4 0.5 0.4 0.2   Immature Granulocyte %      % 0.6 0.2 0.4 0.4       Imaging & Referrals:  None   No orders of the defined types were placed in [facility]

## 2020-12-15 NOTE — TELEPHONE ENCOUNTER
Kaykay Muro, Dr. Reinier Pack cleared Vickey Witt for upcoming GI procedure but want to know if she can hold plavix and how many days. No history of CVA noted.

## 2020-12-16 ENCOUNTER — TELEPHONE (OUTPATIENT)
Dept: GASTROENTEROLOGY | Facility: CLINIC | Age: 80
End: 2020-12-16

## 2020-12-16 DIAGNOSIS — D64.9 ANEMIA, UNSPECIFIED TYPE: Primary | ICD-10-CM

## 2020-12-16 DIAGNOSIS — K31.819 GAVE (GASTRIC ANTRAL VASCULAR ECTASIA): ICD-10-CM

## 2020-12-16 NOTE — TELEPHONE ENCOUNTER
I spoke with Lucinda Alonso to give her an update that I am waiting for Dr. Ricki Irvin reply if 12/31/20 will work for him and I will call her as soon as I hear from Dr. Dari Monaco. She agreed with that plan.

## 2020-12-16 NOTE — TELEPHONE ENCOUNTER
Schedulers:  Please assist with scheduling patient for EGD as soon as possible per below order from Dr. Jose Kaur.       Patient will need to hold plavix for 5 days per [facility] GENERAL GEORGE MARIE route back to RN once scheduled so I can review plavix instructions

## 2020-12-16 NOTE — TELEPHONE ENCOUNTER
Severiano Earing calling to speak with  regarding changes to procedure dates. Please call. Thank you.

## 2020-12-16 NOTE — TELEPHONE ENCOUNTER
Dr. Page Alu    Patient is scheduled for EGD on 2/15/2021. Do you want this done sooner? If so, would it be ok to use MD Approval spot?     Please advise    Thank you    Author: Adriana Dos Santos MD Service: Kamilla Jenkins Author Type: Physician   Filed: 12/15/2020  4:12

## 2020-12-16 NOTE — TELEPHONE ENCOUNTER
Dr. Lorrie Kaplan--    I spoke with Samuel Juarez and he stated that the soonest I can schedule this patient is 12/31/20 which is your on call day, you have 4 pt on this would be your 5th.  Please advise

## 2020-12-17 ENCOUNTER — TELEPHONE (OUTPATIENT)
Dept: FAMILY MEDICINE CLINIC | Facility: CLINIC | Age: 80
End: 2020-12-17

## 2020-12-17 ENCOUNTER — TELEPHONE (OUTPATIENT)
Dept: HEMATOLOGY/ONCOLOGY | Facility: HOSPITAL | Age: 80
End: 2020-12-17

## 2020-12-17 DIAGNOSIS — D50.9 IRON DEFICIENCY ANEMIA, UNSPECIFIED IRON DEFICIENCY ANEMIA TYPE: Primary | ICD-10-CM

## 2020-12-17 DIAGNOSIS — N18.2 ANEMIA IN STAGE 2 CHRONIC KIDNEY DISEASE: ICD-10-CM

## 2020-12-17 DIAGNOSIS — D63.1 ANEMIA IN STAGE 2 CHRONIC KIDNEY DISEASE: ICD-10-CM

## 2020-12-17 NOTE — TELEPHONE ENCOUNTER
Dr. Paulette Koo office authorization called and notified requesting referrals to see Dr. Louisa Huntley since June. Office provided with [facility] fax number. no

## 2020-12-17 NOTE — TELEPHONE ENCOUNTER
Michelle Bello from Dr. Dalton Quintana office states patient has been seen in their office three times and have not yet received a referral.    Office visits were 12/15, 9/8 and 6/3.     Chetna's fax number:  [phone]

## 2020-12-18 NOTE — TELEPHONE ENCOUNTER
Referral to hematology oncology placed. Okay to fax referral to number noted in previous message for Conrad Yanez. Seems that patient has been an established patient of Dr. Leslie Alston and previous PCP had given referral in 2018.

## 2020-12-18 NOTE — TELEPHONE ENCOUNTER
Dr. Magnolia Franklin    Can we add this patient 12/31/2020? Please let us know because patient would need to hold blood thinner 5 days before-routed high priority.     Thank you

## 2020-12-21 NOTE — TELEPHONE ENCOUNTER
Spoke with Ger Fee; who speaks [place]. No  needed. OK to give information per VEE. Relied Plavix orders for pt noted below. Verbalized understanding. TE closed.

## 2020-12-21 NOTE — TELEPHONE ENCOUNTER
Patients daughter/Courtney returning call, please call at home# [phone], mobile# [phone], thanks.

## 2020-12-21 NOTE — TELEPHONE ENCOUNTER
Rescheduled for:  EGD [address]  Provider Name:  Dr. Lex Lebron  Date:    From-2/15/21  To-12/31/20  Location:    [address] [place] [address]  Sedation:  MAC  Time:    From-0730   [id number] (pt is aware to arrive at 0745)   Prep:  NPO after midnight, patient will  new instructions  M

## 2020-12-28 ENCOUNTER — LAB ENCOUNTER (OUTPATIENT)
Dept: LAB | Facility: HOSPITAL | Age: 80
End: 2020-12-28
Attending: INTERNAL MEDICINE
Payer: MEDICARE

## 2020-12-28 DIAGNOSIS — Z01.818 PRE-OP TESTING: ICD-10-CM

## 2020-12-28 LAB — SARS-COV-2 RNA RESP QL NAA+PROBE: NOT DETECTED

## 2020-12-31 ENCOUNTER — TELEPHONE (OUTPATIENT)
Dept: GASTROENTEROLOGY | Facility: CLINIC | Age: 80
End: 2020-12-31

## 2020-12-31 ENCOUNTER — HOSPITAL ENCOUNTER (OUTPATIENT)
Facility: HOSPITAL | Age: 80
Setting detail: HOSPITAL OUTPATIENT SURGERY
Discharge: HOME | End: 2020-12-31
Attending: INTERNAL MEDICINE | Admitting: INTERNAL MEDICINE
Payer: MEDICARE

## 2020-12-31 ENCOUNTER — ANESTHESIA (OUTPATIENT)
Dept: ENDOSCOPY | Facility: HOSPITAL | Age: 80
End: 2020-12-31
Payer: MEDICARE

## 2020-12-31 ENCOUNTER — ANESTHESIA EVENT (OUTPATIENT)
Dept: ENDOSCOPY | Facility: HOSPITAL | Age: 80
End: 2020-12-31
Payer: MEDICARE

## 2020-12-31 VITALS
HEIGHT: 56 IN | WEIGHT: 125 LBS | TEMPERATURE: 99 F | SYSTOLIC BLOOD PRESSURE: 114 MMHG | DIASTOLIC BLOOD PRESSURE: 46 MMHG | BODY MASS INDEX: 28.12 KG/M2 | OXYGEN SATURATION: 99 % | HEART RATE: 57 BPM | RESPIRATION RATE: 16 BRPM

## 2020-12-31 DIAGNOSIS — D64.9 ANEMIA, UNSPECIFIED TYPE: ICD-10-CM

## 2020-12-31 DIAGNOSIS — K31.819 GAVE (GASTRIC ANTRAL VASCULAR ECTASIA): ICD-10-CM

## 2020-12-31 DIAGNOSIS — Z01.818 PRE-OP TESTING: Primary | ICD-10-CM

## 2020-12-31 LAB — GLUCOSE BLDC GLUCOMTR-MCNC: 174 MG/DL (ref 70–99)

## 2020-12-31 PROCEDURE — 0DJ08ZZ INSPECTION OF UPPER INTESTINAL TRACT, VIA NATURAL OR ARTIFICIAL OPENING ENDOSCOPIC: ICD-10-PCS | Performed by: INTERNAL MEDICINE

## 2020-12-31 PROCEDURE — 43270 EGD LESION ABLATION: CPT | Performed by: INTERNAL MEDICINE

## 2020-12-31 RX ORDER — SUCRALFATE ORAL 1 G/10ML
1 SUSPENSION ORAL
Qty: 600 ML | Refills: 0 | Status: ON HOLD | OUTPATIENT
Start: 2020-12-31 | End: 2021-12-10

## 2020-12-31 RX ORDER — SODIUM CHLORIDE, SODIUM LACTATE, POTASSIUM CHLORIDE, CALCIUM CHLORIDE 600; 310; 30; 20 MG/100ML; MG/100ML; MG/100ML; MG/100ML
INJECTION, SOLUTION INTRAVENOUS CONTINUOUS
Status: DISCONTINUED | OUTPATIENT
Start: 2020-12-31 | End: 2020-12-31

## 2020-12-31 RX ORDER — DEXTROSE MONOHYDRATE 25 G/50ML
50 INJECTION, SOLUTION INTRAVENOUS
Status: DISCONTINUED | OUTPATIENT
Start: 2020-12-31 | End: 2020-12-31

## 2020-12-31 RX ORDER — LIDOCAINE HYDROCHLORIDE 10 MG/ML
INJECTION, SOLUTION EPIDURAL; INFILTRATION; INTRACAUDAL; PERINEURAL AS NEEDED
Status: DISCONTINUED | OUTPATIENT
Start: 2020-12-31 | End: 2020-12-31 | Stop reason: SURG

## 2020-12-31 RX ORDER — NALOXONE HYDROCHLORIDE 0.4 MG/ML
80 INJECTION, SOLUTION INTRAMUSCULAR; INTRAVENOUS; SUBCUTANEOUS AS NEEDED
Status: DISCONTINUED | OUTPATIENT
Start: 2020-12-31 | End: 2020-12-31

## 2020-12-31 RX ADMIN — LIDOCAINE HYDROCHLORIDE 100 MG: 10 INJECTION, SOLUTION EPIDURAL; INFILTRATION; INTRACAUDAL; PERINEURAL at 08:51:00

## 2020-12-31 RX ADMIN — SODIUM CHLORIDE, SODIUM LACTATE, POTASSIUM CHLORIDE, CALCIUM CHLORIDE: 600; 310; 30; 20 INJECTION, SOLUTION INTRAVENOUS at 08:47:00

## 2020-12-31 NOTE — ANESTHESIA PREPROCEDURE EVALUATION
Anesthesia PreOp Note    HPI:     Jose G Sanders is a 79year old female who presents for preoperative consultation requested by: Akbar Weston MD    Date of Surgery: 12/31/2020    Procedure(s):  ESOPHAGOGASTRODUODENOSCOPY (EGD)  Indication: Anemia, u Coronary artery disease involving native coronary artery of native heart without angina pectoris         Date Noted: 06/28/2016      Murmur         Date Noted: 06/28/2016      Anemia, iron deficiency         Date Noted: 10/05/2015      Age-related nuclear Disp: 90 tablet, Rfl: 0, 12/30/2020 at 0800    •  atorvastatin 20 MG Oral Tab, TAKE 1 TABLET(20 MG) BY MOUTH DAILY, Disp: 90 tablet, Rfl: 2, 12/30/2020 at 2100    •  ferrous sulfate 325 (65 FE) MG Oral Tab EC, Take 325 mg by mouth daily.  , Disp: , Rfl: , 1 (cause of death)   • Diabetes Brother    • Other (Other) Brother 47   • Other (Other) Brother 59   • Glaucoma Neg    • Macular degeneration Neg      Social History    Socioeconomic History      Marital status:       Spouse name: Not on file      N Helmet: Not Asked        Seat Belt: Not Asked        Self-Exams: Not Asked    Social History Narrative      Not on file      Available pre-op labs reviewed.   Lab Results   Component Value Date    WBC 4.5 12/15/2020    RBC 2.33 (L) 12/15/2020    HGB 7.3 (L)

## 2020-12-31 NOTE — H&P
History & Physical Examination    Patient Name: Amara Serna  MRN: [id number]  CSN: [id number]  YOB: 1940    Diagnosis: anemia  History of GAVE        •  telmisartan 40 MG Oral Tab, Take 1 tablet (40 mg total) by mouth daily. , Disp: 90 tab SWELLING  Bactrim [Sulfametho*    NAUSEA AND VOMITING    Past Medical History:   Diagnosis Date   • Anemia    • Arthritis    • Cholelithiasis    • CKD (chronic kidney disease) stage 3, GFR 30-59 ml/min    • Coronary atherosclerosis    • Depression    • Taya drinks      SYSTEM Check if Review is Normal Check if Physical Exam is Normal If not normal, please explain:   HEENT [x ] [ x]    NECK & BACK [x ] [x ]    HEART [x ] [ x]    LUNGS [x ] [ x]    ABDOMEN [x ] [x ]    UROGENITAL [ ] [ ]    EXTREMITIES [x ] [x

## 2020-12-31 NOTE — ANESTHESIA POSTPROCEDURE EVALUATION
Patient: Tara Richardson    Procedure Summary     Date: 12/31/20 Room / Location: [facility] ENDOSCOPY 04 / [facility] ENDOSCOPY    Anesthesia Start: 8594 Anesthesia Stop: 7427    Procedure: ESOPHAGOGASTRODUODENOSCOPY (EGD) (N/A ) Diagnosis:       Anemia, unspecified ty

## 2020-12-31 NOTE — OPERATIVE REPORT
[facility] HOSP - [facility] Endoscopy Report        Preoperative Diagnosis:  - anemia  - AVMs antrum stomach        Postoperative Diagnosis:  - Antral telangiectasias s/p APC treatment  - Gastric polyps ( fundic gland)       Procedure:    Esophagogastroduod

## 2020-12-31 NOTE — ADDENDUM NOTE
Addendum  created 12/31/20 8616 by Blanca Mcguire CRNA    Visit Navigator Flowsheet section accepted

## 2020-12-31 NOTE — TELEPHONE ENCOUNTER
6 month EGD recall entered into patient outreach in [address] [place] [address] Rd. Next EGD due 6/31/2021. Snapshot updated with this information as well.

## 2020-12-31 NOTE — TELEPHONE ENCOUNTER
Son called because pharmacy needed clarification on sucralfate. I called [facility] and spoke with pharmacist Leopoldo Haynes and clarified the order from Dr. Derrick London post op note.     Recommendations:  - Post treatment instructions given - carafate twice daily x 4

## 2021-01-12 ENCOUNTER — OFFICE VISIT (OUTPATIENT)
Dept: HEMATOLOGY/ONCOLOGY | Facility: HOSPITAL | Age: 81
End: 2021-01-12
Attending: INTERNAL MEDICINE
Payer: MEDICARE

## 2021-01-12 VITALS
DIASTOLIC BLOOD PRESSURE: 54 MMHG | SYSTOLIC BLOOD PRESSURE: 200 MMHG | RESPIRATION RATE: 16 BRPM | TEMPERATURE: 97 F | HEART RATE: 54 BPM | OXYGEN SATURATION: 99 %

## 2021-01-12 DIAGNOSIS — K90.9 IMPAIRED INTESTINAL ABSORPTION: Primary | ICD-10-CM

## 2021-01-12 DIAGNOSIS — D50.9 IRON DEFICIENCY ANEMIA, UNSPECIFIED IRON DEFICIENCY ANEMIA TYPE: ICD-10-CM

## 2021-01-12 PROCEDURE — 96365 THER/PROPH/DIAG IV INF INIT: CPT

## 2021-01-12 PROCEDURE — 96366 THER/PROPH/DIAG IV INF ADDON: CPT

## 2021-01-12 NOTE — PROGRESS NOTES
Pt presents to infusion today for IV venofer 400 mg  1 of 3. Pt appears well and denies any shortness of breath, dizziness or light headedness. Pt has had Venofer 200 mg in the past and tolerated it well.   Labs reviewed from 12/15     PIV started in [facility]

## 2021-01-19 ENCOUNTER — OFFICE VISIT (OUTPATIENT)
Dept: HEMATOLOGY/ONCOLOGY | Facility: HOSPITAL | Age: 81
End: 2021-01-19
Attending: INTERNAL MEDICINE
Payer: MEDICARE

## 2021-01-19 VITALS
DIASTOLIC BLOOD PRESSURE: 47 MMHG | HEART RATE: 56 BPM | TEMPERATURE: 99 F | OXYGEN SATURATION: 99 % | SYSTOLIC BLOOD PRESSURE: 193 MMHG | RESPIRATION RATE: 16 BRPM

## 2021-01-19 DIAGNOSIS — K90.9 IMPAIRED INTESTINAL ABSORPTION: Primary | ICD-10-CM

## 2021-01-19 DIAGNOSIS — D50.9 IRON DEFICIENCY ANEMIA, UNSPECIFIED IRON DEFICIENCY ANEMIA TYPE: ICD-10-CM

## 2021-01-19 PROCEDURE — 96365 THER/PROPH/DIAG IV INF INIT: CPT

## 2021-01-19 PROCEDURE — 96366 THER/PROPH/DIAG IV INF ADDON: CPT

## 2021-01-19 NOTE — PROGRESS NOTES
Pt presents to infusion today for IV Venofer 400 mg  2 of 3. Pt appears well and denies any issues today. States no issues with past infusion last week. Labs reviewed from 12/15/20. PIV started in right AC, attempt X 1, blood return noted.  IV venofe

## 2021-01-26 ENCOUNTER — OFFICE VISIT (OUTPATIENT)
Dept: HEMATOLOGY/ONCOLOGY | Facility: HOSPITAL | Age: 81
End: 2021-01-26
Attending: INTERNAL MEDICINE
Payer: MEDICARE

## 2021-01-26 VITALS
SYSTOLIC BLOOD PRESSURE: 189 MMHG | RESPIRATION RATE: 16 BRPM | HEART RATE: 61 BPM | DIASTOLIC BLOOD PRESSURE: 51 MMHG | OXYGEN SATURATION: 99 % | TEMPERATURE: 98 F

## 2021-01-26 DIAGNOSIS — K90.9 IMPAIRED INTESTINAL ABSORPTION: Primary | ICD-10-CM

## 2021-01-26 DIAGNOSIS — D50.9 IRON DEFICIENCY ANEMIA, UNSPECIFIED IRON DEFICIENCY ANEMIA TYPE: ICD-10-CM

## 2021-01-26 PROCEDURE — 96366 THER/PROPH/DIAG IV INF ADDON: CPT

## 2021-01-26 PROCEDURE — 96365 THER/PROPH/DIAG IV INF INIT: CPT

## 2021-01-26 NOTE — PROGRESS NOTES
Pt presents to infusion today for IV venofer 400 mg  3 of 3. Pt appears well and denies any shortness of breath, dizziness or light headedness. .  Labs reviewed from 12/15     PIV started in left  ac, attempt X1, blood return noted.  Pt observed post infus

## 2021-01-27 NOTE — TELEPHONE ENCOUNTER
•  Pantoprazole Sodium 40 MG Oral Tab EC, Take 1 tablet (40 mg total) by mouth every morning before breakfast., Disp: 90 tablet, Rfl: 0

## 2021-01-28 RX ORDER — PANTOPRAZOLE SODIUM 40 MG/1
40 TABLET, DELAYED RELEASE ORAL
Qty: 90 TABLET | Refills: 0 | Status: SHIPPED | OUTPATIENT
Start: 2021-01-28 | End: 2021-04-15

## 2021-02-01 DIAGNOSIS — Z23 NEED FOR VACCINATION: ICD-10-CM

## 2021-02-11 ENCOUNTER — IMMUNIZATION (OUTPATIENT)
Dept: LAB | Facility: HOSPITAL | Age: 81
End: 2021-02-11
Attending: HOSPITALIST
Payer: MEDICARE

## 2021-02-11 DIAGNOSIS — Z23 NEED FOR VACCINATION: Primary | ICD-10-CM

## 2021-02-11 PROCEDURE — 0011A SARSCOV2 VAC 100MCG/0.5ML IM: CPT

## 2021-02-18 NOTE — TELEPHONE ENCOUNTER
Dr. Reeves/Marilyn/Cardiology    Patient is scheduled for an EGD with Dr. Sheela Torres on 2/15/2021. Gi defers to prescribing physician to advise IF patient may hold Plavix. If so, please advise on how many days the patient may hold.     Thank you    Tessa BRADLEY Star Wedge Flap Text: The defect edges were debeveled with a #15 scalpel blade.  Given the location of the defect, shape of the defect and the proximity to free margins a star wedge flap was deemed most appropriate.  Using a sterile surgical marker, an appropriate rotation flap was drawn incorporating the defect and placing the expected incisions within the relaxed skin tension lines where possible. The area thus outlined was incised deep to adipose tissue with a #15 scalpel blade.  The skin margins were undermined to an appropriate distance in all directions utilizing iris scissors.

## 2021-02-24 DIAGNOSIS — I10 ESSENTIAL HYPERTENSION: ICD-10-CM

## 2021-02-24 NOTE — TELEPHONE ENCOUNTER
Current Outpatient Medications:     •  telmisartan 40 MG Oral Tab, Take 1 tablet (40 mg total) by mouth daily. , Disp: 90 tablet, Rfl: 0

## 2021-02-25 RX ORDER — TELMISARTAN 40 MG/1
40 TABLET ORAL DAILY
Qty: 90 TABLET | Refills: 0 | Status: SHIPPED | OUTPATIENT
Start: 2021-02-25 | End: 2021-05-28

## 2021-03-11 ENCOUNTER — IMMUNIZATION (OUTPATIENT)
Dept: LAB | Facility: HOSPITAL | Age: 81
End: 2021-03-11
Attending: EMERGENCY MEDICINE
Payer: MEDICARE

## 2021-03-11 DIAGNOSIS — Z23 NEED FOR VACCINATION: Primary | ICD-10-CM

## 2021-03-11 PROCEDURE — 0012A SARSCOV2 VAC 100MCG/0.5ML IM: CPT

## 2021-03-17 ENCOUNTER — NURSE ONLY (OUTPATIENT)
Dept: HEMATOLOGY/ONCOLOGY | Facility: HOSPITAL | Age: 81
End: 2021-03-17
Attending: INTERNAL MEDICINE
Payer: MEDICARE

## 2021-03-17 VITALS
TEMPERATURE: 98 F | BODY MASS INDEX: 27.18 KG/M2 | DIASTOLIC BLOOD PRESSURE: 37 MMHG | OXYGEN SATURATION: 99 % | WEIGHT: 126 LBS | HEIGHT: 57 IN | HEART RATE: 56 BPM | RESPIRATION RATE: 16 BRPM | SYSTOLIC BLOOD PRESSURE: 153 MMHG

## 2021-03-17 DIAGNOSIS — D63.1 ANEMIA IN STAGE 2 CHRONIC KIDNEY DISEASE: ICD-10-CM

## 2021-03-17 DIAGNOSIS — D50.8 OTHER IRON DEFICIENCY ANEMIA: ICD-10-CM

## 2021-03-17 DIAGNOSIS — K90.9 IMPAIRED INTESTINAL ABSORPTION: ICD-10-CM

## 2021-03-17 DIAGNOSIS — D50.9 IRON DEFICIENCY ANEMIA, UNSPECIFIED IRON DEFICIENCY ANEMIA TYPE: Primary | ICD-10-CM

## 2021-03-17 DIAGNOSIS — Z51.81 ENCOUNTER FOR MEDICATION MONITORING: ICD-10-CM

## 2021-03-17 DIAGNOSIS — N18.2 ANEMIA IN STAGE 2 CHRONIC KIDNEY DISEASE: ICD-10-CM

## 2021-03-17 LAB
BASOPHILS # BLD AUTO: 0.01 X10(3) UL (ref 0–0.2)
BASOPHILS NFR BLD AUTO: 0.2 %
DEPRECATED HBV CORE AB SER IA-ACNC: 35.7 NG/ML
DEPRECATED RDW RBC AUTO: 54.4 FL (ref 35.1–46.3)
EOSINOPHIL # BLD AUTO: 0.17 X10(3) UL (ref 0–0.7)
EOSINOPHIL NFR BLD AUTO: 4 %
ERYTHROCYTE [DISTWIDTH] IN BLOOD BY AUTOMATED COUNT: 14.8 % (ref 11–15)
HCT VFR BLD AUTO: 25.3 %
HGB BLD-MCNC: 8.3 G/DL
IMM GRANULOCYTES # BLD AUTO: 0.01 X10(3) UL (ref 0–1)
IMM GRANULOCYTES NFR BLD: 0.2 %
IRON SATURATION: 20 %
IRON SERPL-MCNC: 81 UG/DL
LYMPHOCYTES # BLD AUTO: 1.03 X10(3) UL (ref 1–4)
LYMPHOCYTES NFR BLD AUTO: 24 %
MCH RBC QN AUTO: 33.2 PG (ref 26–34)
MCHC RBC AUTO-ENTMCNC: 32.8 G/DL (ref 31–37)
MCV RBC AUTO: 101.2 FL
MONOCYTES # BLD AUTO: 0.29 X10(3) UL (ref 0.1–1)
MONOCYTES NFR BLD AUTO: 6.8 %
NEUTROPHILS # BLD AUTO: 2.78 X10 (3) UL (ref 1.5–7.7)
NEUTROPHILS # BLD AUTO: 2.78 X10(3) UL (ref 1.5–7.7)
NEUTROPHILS NFR BLD AUTO: 64.8 %
PLATELET # BLD AUTO: 251 10(3)UL (ref 150–450)
RBC # BLD AUTO: 2.5 X10(6)UL
TOTAL IRON BINDING CAPACITY: 404 UG/DL (ref 240–450)
TRANSFERRIN SERPL-MCNC: 271 MG/DL (ref 200–360)
VIT B12 SERPL-MCNC: >2000 PG/ML (ref 193–986)
WBC # BLD AUTO: 4.3 X10(3) UL (ref 4–11)

## 2021-03-17 PROCEDURE — 99213 OFFICE O/P EST LOW 20 MIN: CPT | Performed by: INTERNAL MEDICINE

## 2021-03-17 PROCEDURE — 83540 ASSAY OF IRON: CPT

## 2021-03-17 PROCEDURE — 84466 ASSAY OF TRANSFERRIN: CPT

## 2021-03-17 PROCEDURE — 82607 VITAMIN B-12: CPT

## 2021-03-17 PROCEDURE — 85025 COMPLETE CBC W/AUTO DIFF WBC: CPT

## 2021-03-17 PROCEDURE — 82728 ASSAY OF FERRITIN: CPT

## 2021-03-17 PROCEDURE — 36415 COLL VENOUS BLD VENIPUNCTURE: CPT

## 2021-03-17 NOTE — PROGRESS NOTES
HPI     Fang Chase is a 79year old female who is here today for f/u of Iron deficiency anemia, unspecified iron deficiency anemia type  (primary encounter diagnosis)  Impaired intestinal absorption  Encounter for medication monitoring      She is h Pantoprazole Sodium 40 MG Oral Tab EC Take 1 tablet (40 mg total) by mouth every morning before breakfast. 90 tablet 0   • sucralfate 1 GM/10ML Oral Suspension Take 10 mL (1 g total) by mouth 2 (two) times daily before meals.  Take 4 times daily on an empty LAD   • Pregnancy        • Renal disorder     CKD 3   • Special screening for osteoporosis 2013    Dexa Scan   • Stress incontinence    • Type II or unspecified type diabetes mellitus without mention of complication, not stated as uncontrolled (125 lb)  12/15/20 : 56.7 kg (125 lb)  12/08/20 : 57.4 kg (126 lb 9.6 oz)  11/09/20 : 56.2 kg (124 lb)  09/24/20 : 58.1 kg (128 lb)      Physical Exam  General: Patient is alert and oriented x 3, not in acute distress. HEENT: EOMs intact. PERRL.  Darron Jarrettit Collection Time: 03/17/21  2:17 PM   Result Value Ref Range    Ferritin 35.7 18.0 - 340.0 ng/mL   IRON AND TIBC    Collection Time: 03/17/21  2:17 PM   Result Value Ref Range    Iron 81 50 - 170 ug/dL    Transferrin 271 200 - 360 mg/dL    Total Iron Bindin x10(3) uL 0.29 0.30   Eosinophils Absolute      0.00 - 0.70 x10(3) uL 0.17 0.14   Basophils Absolute      0.00 - 0.20 x10(3) uL 0.01 0.02   Immature Granulocyte Absolute      0.00 - 1.00 x10(3) uL 0.01 0.03   Neutrophils %      % 64.8 67.9   Lymphocytes %

## 2021-03-17 NOTE — TELEPHONE ENCOUNTER
Fax received at [facility]. Refill request on the following.     Current Outpatient Medications   Medication Sig Dispense Refill   •       •       •       •       •       •       •       •       • ERGOCALCIFEROL 68122 units Oral Cap TAKE 1 CAPSULE BY MOUTH MARVA

## 2021-03-18 RX ORDER — ERGOCALCIFEROL 1.25 MG/1
50000 CAPSULE ORAL WEEKLY
Qty: 12 CAPSULE | Refills: 11 | Status: SHIPPED | OUTPATIENT
Start: 2021-03-18

## 2021-03-24 ENCOUNTER — TELEPHONE (OUTPATIENT)
Dept: GASTROENTEROLOGY | Facility: CLINIC | Age: 81
End: 2021-03-24

## 2021-03-24 NOTE — TELEPHONE ENCOUNTER
Patient outreach message received. Recall reminder letter mailed out to patient. \"6 month EGD recall entered into patient outreach in Count includes the [facility]   Next EGD due 6/31/2021\"

## 2021-04-02 ENCOUNTER — TELEPHONE (OUTPATIENT)
Dept: CASE MANAGEMENT | Age: 81
End: 2021-04-02

## 2021-04-02 NOTE — TELEPHONE ENCOUNTER
Patient is eligible for a 2021 Medicare Annual Wellness visit. Language Karyle Pia [phone] left msg to call back.

## 2021-04-14 NOTE — TELEPHONE ENCOUNTER
Pantoprazole Sodium 40 MG Oral Tab EC, Take 1 tablet (40 mg total) by mouth every morning before breakfast., Disp: 90 tablet, Rfl: 0

## 2021-04-15 RX ORDER — PANTOPRAZOLE SODIUM 40 MG/1
40 TABLET, DELAYED RELEASE ORAL
Qty: 90 TABLET | Refills: 0 | Status: SHIPPED | OUTPATIENT
Start: 2021-04-15 | End: 2021-07-29

## 2021-04-16 ENCOUNTER — OFFICE VISIT (OUTPATIENT)
Dept: FAMILY MEDICINE CLINIC | Facility: CLINIC | Age: 81
End: 2021-04-16
Payer: MEDICARE

## 2021-04-16 VITALS
BODY MASS INDEX: 26.97 KG/M2 | HEART RATE: 75 BPM | SYSTOLIC BLOOD PRESSURE: 111 MMHG | HEIGHT: 57 IN | DIASTOLIC BLOOD PRESSURE: 68 MMHG | TEMPERATURE: 98 F | WEIGHT: 125 LBS

## 2021-04-16 DIAGNOSIS — D50.8 OTHER IRON DEFICIENCY ANEMIA: ICD-10-CM

## 2021-04-16 DIAGNOSIS — I25.10 CORONARY ARTERY DISEASE INVOLVING NATIVE CORONARY ARTERY OF NATIVE HEART WITHOUT ANGINA PECTORIS: ICD-10-CM

## 2021-04-16 DIAGNOSIS — H90.3 SENSORINEURAL HEARING LOSS (SNHL) OF BOTH EARS: ICD-10-CM

## 2021-04-16 DIAGNOSIS — E11.9 DIABETES MELLITUS TYPE 2 WITHOUT RETINOPATHY (HCC): ICD-10-CM

## 2021-04-16 DIAGNOSIS — E11.22 TYPE 2 DIABETES MELLITUS WITH STAGE 3A CHRONIC KIDNEY DISEASE, WITHOUT LONG-TERM CURRENT USE OF INSULIN (HCC): ICD-10-CM

## 2021-04-16 DIAGNOSIS — D50.9 IRON DEFICIENCY ANEMIA, UNSPECIFIED IRON DEFICIENCY ANEMIA TYPE: ICD-10-CM

## 2021-04-16 DIAGNOSIS — D63.8 ANEMIA, CHRONIC DISEASE: ICD-10-CM

## 2021-04-16 DIAGNOSIS — I10 ESSENTIAL HYPERTENSION: ICD-10-CM

## 2021-04-16 DIAGNOSIS — I73.9 PERIPHERAL VASCULAR DISEASE (HCC): ICD-10-CM

## 2021-04-16 DIAGNOSIS — M85.89 OSTEOPENIA OF MULTIPLE SITES: ICD-10-CM

## 2021-04-16 DIAGNOSIS — E55.9 VITAMIN D DEFICIENCY: ICD-10-CM

## 2021-04-16 DIAGNOSIS — F33.41 RECURRENT MAJOR DEPRESSIVE DISORDER, IN PARTIAL REMISSION (HCC): ICD-10-CM

## 2021-04-16 DIAGNOSIS — I25.118 CORONARY ARTERY DISEASE OF NATIVE HEART WITH STABLE ANGINA PECTORIS, UNSPECIFIED VESSEL OR LESION TYPE (HCC): ICD-10-CM

## 2021-04-16 DIAGNOSIS — I70.0 ATHEROSCLEROSIS OF AORTA (HCC): ICD-10-CM

## 2021-04-16 DIAGNOSIS — F33.0 MILD EPISODE OF RECURRENT MAJOR DEPRESSIVE DISORDER (HCC): ICD-10-CM

## 2021-04-16 DIAGNOSIS — Z13.6 SCREENING FOR CARDIOVASCULAR CONDITION: ICD-10-CM

## 2021-04-16 DIAGNOSIS — K90.9 IMPAIRED INTESTINAL ABSORPTION: ICD-10-CM

## 2021-04-16 DIAGNOSIS — E22.2 SIADH (SYNDROME OF INAPPROPRIATE ADH PRODUCTION) (HCC): ICD-10-CM

## 2021-04-16 DIAGNOSIS — R01.1 MURMUR: ICD-10-CM

## 2021-04-16 DIAGNOSIS — Z13.1 SCREENING FOR DIABETES MELLITUS (DM): ICD-10-CM

## 2021-04-16 DIAGNOSIS — N18.31 TYPE 2 DIABETES MELLITUS WITH STAGE 3A CHRONIC KIDNEY DISEASE, WITHOUT LONG-TERM CURRENT USE OF INSULIN (HCC): ICD-10-CM

## 2021-04-16 DIAGNOSIS — R09.89 BILATERAL CAROTID BRUITS: ICD-10-CM

## 2021-04-16 DIAGNOSIS — H25.13 AGE-RELATED NUCLEAR CATARACT OF BOTH EYES: ICD-10-CM

## 2021-04-16 DIAGNOSIS — Z00.00 ENCOUNTER FOR ANNUAL HEALTH EXAMINATION: Primary | ICD-10-CM

## 2021-04-16 DIAGNOSIS — H02.886 MEIBOMIAN GLAND DYSFUNCTION (MGD) OF BOTH EYES: ICD-10-CM

## 2021-04-16 DIAGNOSIS — H02.883 MEIBOMIAN GLAND DYSFUNCTION (MGD) OF BOTH EYES: ICD-10-CM

## 2021-04-16 PROBLEM — Z51.81 MEDICATION MONITORING ENCOUNTER: Status: RESOLVED | Noted: 2020-09-09 | Resolved: 2021-04-16

## 2021-04-16 PROBLEM — N18.6 TYPE 2 DIABETES MELLITUS WITH ESRD (END-STAGE RENAL DISEASE) (HCC): Status: ACTIVE | Noted: 2018-03-06

## 2021-04-16 PROBLEM — Z51.81 ENCOUNTER FOR MEDICATION MONITORING: Status: RESOLVED | Noted: 2020-12-15 | Resolved: 2021-04-16

## 2021-04-16 PROCEDURE — 3078F DIAST BP <80 MM HG: CPT | Performed by: FAMILY MEDICINE

## 2021-04-16 PROCEDURE — 99397 PER PM REEVAL EST PAT 65+ YR: CPT | Performed by: FAMILY MEDICINE

## 2021-04-16 PROCEDURE — 3074F SYST BP LT 130 MM HG: CPT | Performed by: FAMILY MEDICINE

## 2021-04-16 PROCEDURE — 3008F BODY MASS INDEX DOCD: CPT | Performed by: FAMILY MEDICINE

## 2021-04-16 PROCEDURE — G0439 PPPS, SUBSEQ VISIT: HCPCS | Performed by: FAMILY MEDICINE

## 2021-04-16 PROCEDURE — 96160 PT-FOCUSED HLTH RISK ASSMT: CPT | Performed by: FAMILY MEDICINE

## 2021-04-16 NOTE — PATIENT INSTRUCTIONS
Brinda Nieves's SCREENING SCHEDULE   Tests on this list are recommended by your physician but may not be covered, or covered at this frequency, by your insurer. Please check with your insurance carrier before scheduling to verify coverage.    PREVENTATI Covered every 10 years- more often if abnormal Colonoscopy due on 03/11/2024 Update Health Maintenance if applicable    Flex Sigmoidoscopy Screen  Covered every 5 years No results found for this or any previous visit. No flowsheet data found.      Fecal O FREE INC ANTIG    Please get every year    Pneumococcal 13 (Prevnar)  Covered Once after 65 Orders placed or performed in visit on 03/06/18   • PNEUMOCOCCAL VACC, 13 YOVANNY IM    Please get once after your 65th birthday    Pneumococcal 23 (Pneumovax)  Covered

## 2021-04-16 NOTE — PROGRESS NOTES
HPI:   Iban Watt is a [de-identified]year old female who presents for a Medicare Subsequent Annual Wellness visit (Pt already had Initial Annual Wellness). Patient feels well and is at her baseline.   Annual Physical due on 04/16/2022        Fall/Risk Assess low risk of alcohol abuse.         Patient Care Team: Patient Care Team:  Erin Chester MD as PCP - General (Family Medicine)    Patient Active Problem List:     Age-related nuclear cataract of both eyes     Diabetes mellitus type 2 without retinopathy Pantoprazole Sodium 40 MG Oral Tab EC, Take 1 tablet (40 mg total) by mouth every morning before breakfast.  ergocalciferol 1.25 MG (75320 UT) Oral Cap, Take 1 capsule (50,000 Units total) by mouth once a week.   telmisartan 40 MG Oral Tab, Take 1 tablet alcohol and does not use drugs.      REVIEW OF SYSTEMS:      Negative except per HPI    EXAM:   /68 (BP Location: Right arm, Patient Position: Sitting, Cuff Size: large)   Pulse 75   Temp 98 °F (36.7 °C) (Tympanic)   Ht 4' 9\" (1.448 m)   Wt 125 lb (5 respirations unlabored, clear to auscultation bilaterally  Chest wall: nontenderHeart: regular rate and rhythm, S1, S2 normal, no murmur  Abdomen: normoactive bowel sounds x4; soft, non-distended; nontender; no masses  Extremities: extremities normal, atra disorder ([facility]  Stable  Type 2 diabetes mellitus with stage 3a chronic kidney disease, without long-term current use of insulin ([facility]  -     LIPID PANEL; Future  -     COMP METABOLIC PANEL (14);  Future  -     HEMOGLOBIN A1C; Future  -     MICROALB/CREAT RA No  How does the patient maintain a good energy level?: Appropriate Exercise;Daily Walks  How would you describe your daily physical activity?: Moderate  How would you describe your current health state?: Fair  How do you maintain positive mental well-bein discussed There are no preventive care reminders to display for this patient.  Update Health Maintenance if applicable     Immunizations (Update Immunization Activity if applicable)     Influenza  Covered Annually 9/12/2020 Please get every year    Pneumoco (mg/dL)   Date Value   08/14/2020 61    No flowsheet data found. Dilated Eye exam  Annually Data entered on: 11/5/2020   Last Dilated Eye Exam 11/5/2020     No flowsheet data found.              Template: [employer]INA [employer] MEDICARE ANNUAL ASSESSMENT FEMALE [01499

## 2021-04-22 PROBLEM — N18.6 TYPE 2 DIABETES MELLITUS WITH ESRD (END-STAGE RENAL DISEASE) (HCC): Status: RESOLVED | Noted: 2018-03-06 | Resolved: 2021-04-22

## 2021-04-22 PROBLEM — E11.22 TYPE 2 DIABETES MELLITUS WITH ESRD (END-STAGE RENAL DISEASE) (HCC): Status: RESOLVED | Noted: 2018-03-06 | Resolved: 2021-04-22

## 2021-04-22 PROBLEM — D63.1 ANEMIA IN STAGE 2 CHRONIC KIDNEY DISEASE: Status: RESOLVED | Noted: 2020-09-09 | Resolved: 2021-04-22

## 2021-04-22 PROBLEM — N18.2 ANEMIA IN STAGE 2 CHRONIC KIDNEY DISEASE: Status: RESOLVED | Noted: 2020-09-09 | Resolved: 2021-04-22

## 2021-05-28 DIAGNOSIS — I10 ESSENTIAL HYPERTENSION: ICD-10-CM

## 2021-05-28 DIAGNOSIS — E78.5 HYPERLIPIDEMIA, UNSPECIFIED HYPERLIPIDEMIA TYPE: ICD-10-CM

## 2021-05-28 RX ORDER — TELMISARTAN 40 MG/1
40 TABLET ORAL DAILY
Qty: 90 TABLET | Refills: 0 | Status: SHIPPED | OUTPATIENT
Start: 2021-05-28 | End: 2021-08-30

## 2021-05-28 RX ORDER — ATORVASTATIN CALCIUM 20 MG/1
TABLET, FILM COATED ORAL
Qty: 90 TABLET | Refills: 2 | Status: SHIPPED | OUTPATIENT
Start: 2021-05-28

## 2021-05-28 NOTE — TELEPHONE ENCOUNTER
atorvastatin 20 MG Oral Tab, TAKE 1 TABLET(20 MG) BY MOUTH DAILY, Disp: 90 tablet, Rfl: 2    telmisartan 40 MG Oral Tab, Take 1 tablet (40 mg total) by mouth daily. , Disp: 90 tablet, Rfl: 0

## 2021-06-11 ENCOUNTER — TELEPHONE (OUTPATIENT)
Dept: GASTROENTEROLOGY | Facility: CLINIC | Age: 81
End: 2021-06-11

## 2021-06-11 DIAGNOSIS — K55.20 AVM (ARTERIOVENOUS MALFORMATION) OF COLON: Primary | ICD-10-CM

## 2021-06-11 DIAGNOSIS — D64.9 ANEMIA, UNSPECIFIED TYPE: ICD-10-CM

## 2021-06-11 NOTE — TELEPHONE ENCOUNTER
Dr. Joy Echeverria    Patient had EGD 12/31/2021 and was told to have a repeat EGD in 6 months. Please advise on orders so that we may schedule the patient.     Thank you    [facility] Endoscopy Report        Preoperative Diagnosis:  - anemia  - AVM

## 2021-06-14 NOTE — TELEPHONE ENCOUNTER
Schedulers:  Please contact patient to schedule EGD per below orders from Dr. He Mcfarland. Please route message back to RN once patient scheduled so we may obtain Plavix orders.     Thank you

## 2021-06-14 NOTE — TELEPHONE ENCOUNTER
Ok to schedule EGD   - dx /AVMs and anemia  - see prior instructions check if we can hold the plavix 5 days before ok to stay on aspirin  - hold iron 5 days before  MAC

## 2021-06-16 ENCOUNTER — NURSE ONLY (OUTPATIENT)
Dept: HEMATOLOGY/ONCOLOGY | Facility: HOSPITAL | Age: 81
End: 2021-06-16
Attending: INTERNAL MEDICINE
Payer: MEDICARE

## 2021-06-16 VITALS
WEIGHT: 128 LBS | OXYGEN SATURATION: 100 % | TEMPERATURE: 98 F | SYSTOLIC BLOOD PRESSURE: 182 MMHG | HEART RATE: 68 BPM | HEIGHT: 57.01 IN | RESPIRATION RATE: 16 BRPM | DIASTOLIC BLOOD PRESSURE: 44 MMHG | BODY MASS INDEX: 27.61 KG/M2

## 2021-06-16 DIAGNOSIS — D63.8 ANEMIA, CHRONIC DISEASE: ICD-10-CM

## 2021-06-16 DIAGNOSIS — D50.8 OTHER IRON DEFICIENCY ANEMIA: ICD-10-CM

## 2021-06-16 DIAGNOSIS — D63.1 ANEMIA IN STAGE 2 CHRONIC KIDNEY DISEASE: ICD-10-CM

## 2021-06-16 DIAGNOSIS — N18.2 ANEMIA IN STAGE 2 CHRONIC KIDNEY DISEASE: ICD-10-CM

## 2021-06-16 PROCEDURE — 83540 ASSAY OF IRON: CPT

## 2021-06-16 PROCEDURE — 36415 COLL VENOUS BLD VENIPUNCTURE: CPT

## 2021-06-16 PROCEDURE — 85025 COMPLETE CBC W/AUTO DIFF WBC: CPT

## 2021-06-16 PROCEDURE — 82728 ASSAY OF FERRITIN: CPT

## 2021-06-16 PROCEDURE — 84466 ASSAY OF TRANSFERRIN: CPT

## 2021-06-16 PROCEDURE — 99213 OFFICE O/P EST LOW 20 MIN: CPT | Performed by: INTERNAL MEDICINE

## 2021-06-16 NOTE — PROGRESS NOTES
HPI     Fang Chase is a [de-identified]year old female who is here today for f/u of Anemia, chronic disease  Other iron deficiency anemia      She is here today for follow-up. Last course of venofer in January 2021. Has h/o GAVE.   She had last EGD in Dece METRIX BLOOD GLUCOSE TEST) In Vitro Strip CHECK BLOOD GLUCOSE TWICE DAILY 200 strip 2   • Clopidogrel Bisulfate 75 MG Oral Tab Take 1 tablet (75 mg total) by mouth daily.  90 tablet 3   • Oxybutynin Chloride ER 10 MG Oral Tablet 24 Hr Take 1 tablet (10 mg t Elise Ragland MD;  Location: [address] [place] [address] ENDOSCOPY   • EGD  2018   • ELECTROCARDIOGRAM, COMPLETE  09-    Scanned to Media Tab - Date of Service 09-     Social History    Tobacco Use      Smoking status: Never Smoker      Smokeless tobacco: Never Used GI as recommended. Needs f/u with Dr. Grupo Langston for EGD. She had 1200mg of venofer over 3 days in January 2021. Current iron studies again with no iron deficiency. Continue daily po iron, repeat levels in 3 months.   This will be 9 mo from last replace WBC      4.0 - 11.0 x10(3) uL 6.3 4.3 4.5   RBC      3.80 - 5.30 x10(6)uL 2.90 (L) 2.50 (L) 2.33 (L)   Hemoglobin      12.0 - 16.0 g/dL 9.5 (L) 8.3 (L) 7.3 (L)   Hematocrit      35.0 - 48.0 % 28.4 (L) 25.3 (L) 23.8 (L)   MCV      80.0 - 100.0 fL 97.9 101 A/G Ratio      1.0 - 2.0      Patient Fasting? Iron, Serum      50 - 170 ug/dL  81 33 (L)   Transferrin      200 - 360 mg/dL  271 314   Iron Bind. Cap.(TIBC)      240 - 450 ug/dL  404 468 (H)   Iron Saturation      15 - 50 %  20 7 (L)   FERRITI AST (SGOT)      15 - 37 U/L 19    ALKALINE PHOSPHATASE      55 - 142 U/L 78    Total Bilirubin      0.1 - 2.0 mg/dL 0.3    TOTAL PROTEIN      6.4 - 8.2 g/dL 6.6    Albumin      3.4 - 5.0 g/dL 3.6    Globulin      2.8 - 4.4 g/dL 3.0    A/G Ratio      1.0

## 2021-06-22 RX ORDER — CLOPIDOGREL BISULFATE 75 MG/1
TABLET ORAL
Qty: 90 TABLET | Refills: 3 | Status: SHIPPED | OUTPATIENT
Start: 2021-06-22

## 2021-06-22 RX ORDER — OXYBUTYNIN CHLORIDE 10 MG/1
TABLET, EXTENDED RELEASE ORAL
Qty: 90 TABLET | Refills: 3 | Status: SHIPPED | OUTPATIENT
Start: 2021-06-22

## 2021-07-09 NOTE — TELEPHONE ENCOUNTER
Patient is calling to get her xray results from 7/8/2021. Please call to discuss, [phone]    This procedure was schedule by the patients daughter Melissa Fresh    Rescheduled for:  EGD 74738 w/ERBE treatment  Provider Name: Dr. Sourav Bangura  Date:    From-4/17/19  To-4/19/19  Location:  [facility]  Sedation:  MAC  Time:    [id number]   VM-[id number] (pt is aware to arrive at

## 2021-07-27 NOTE — TELEPHONE ENCOUNTER
I called and left a voicemail message for patient to return call to schedule EGD with Dr Rubens Bach. PHONE ROOM STAFF, please transfer call to [address] [place] [address].

## 2021-07-29 RX ORDER — PANTOPRAZOLE SODIUM 40 MG/1
TABLET, DELAYED RELEASE ORAL
Qty: 90 TABLET | Refills: 0 | Status: SHIPPED | OUTPATIENT
Start: 2021-07-29 | End: 2021-10-28

## 2021-07-29 NOTE — TELEPHONE ENCOUNTER
Scheduled for:  EGD 62033  Provider Name:  Dr. Page Alu  Date:   11/26/21  [address]  4268 (pt is aware to arrive at 1215)   Prep:  Nothing to eat after midnight   Nothing to drink after 1000 the day of the procedure   Sent via

## 2021-08-30 DIAGNOSIS — I10 ESSENTIAL HYPERTENSION: ICD-10-CM

## 2021-08-30 RX ORDER — TELMISARTAN 40 MG/1
40 TABLET ORAL DAILY
Qty: 90 TABLET | Refills: 1 | Status: SHIPPED | OUTPATIENT
Start: 2021-08-30

## 2021-08-31 NOTE — TELEPHONE ENCOUNTER
Refill passed per [facility] protocol.   Requested Prescriptions   Pending Prescriptions Disp Refills    TELMISARTAN 40 MG Oral Tab [Pharmacy Med Name: TELMISARTAN 40MG TABLETS] 90 tablet 0     Sig: TAKE 1 TABLET(40 MG) BY MOUTH DAILY        Hypertensi

## 2021-10-28 RX ORDER — PANTOPRAZOLE SODIUM 40 MG/1
40 TABLET, DELAYED RELEASE ORAL
Qty: 90 TABLET | Refills: 1 | Status: SHIPPED | OUTPATIENT
Start: 2021-10-28 | End: 2022-04-29

## 2021-11-03 ENCOUNTER — TELEPHONE (OUTPATIENT)
Dept: GASTROENTEROLOGY | Facility: CLINIC | Age: 81
End: 2021-11-03

## 2021-11-03 DIAGNOSIS — D64.9 ANEMIA, UNSPECIFIED TYPE: Primary | ICD-10-CM

## 2021-11-03 DIAGNOSIS — K55.20 AVM (ARTERIOVENOUS MALFORMATION) OF COLON: ICD-10-CM

## 2021-11-03 NOTE — TELEPHONE ENCOUNTER
Contacted and spoke to patient's daughter, William Senior (ok per VEE). Rescheduled EGD on 11/26/21 due to Dr. Hosea Patel being out of the office that week. See below for reschedule information.        RE-Scheduled for:  EGD [id number]  Provider Name:  Dr. Hosea Patel  Date:  FROM:

## 2021-11-05 ENCOUNTER — IMMUNIZATION (OUTPATIENT)
Dept: LAB | Facility: HOSPITAL | Age: 81
End: 2021-11-05
Attending: EMERGENCY MEDICINE
Payer: MEDICARE

## 2021-11-05 DIAGNOSIS — Z23 NEED FOR VACCINATION: Primary | ICD-10-CM

## 2021-11-05 PROCEDURE — 0064A SARSCOV2 VAC 50MCG/0.25ML IM: CPT

## 2021-11-30 NOTE — LETTER
[facility]  Administration of Anesthesia  1. I, Deya Christiansen, or _________________________________ acting on her behalf, (Patient) (Dependent/Representative) request to receive anesthesia for my pending procedure/operation/treatment.   A infections, high spinal block, spinal bleeding, seizure, cardiac arrest and death. 7. AWARENESS: I understand that it is possible (but unlikely) to have explicit memory of events from the operating room while under general anesthesia.   8. ELECTROCONVULSIV unconscious pt /Relationship    My signature below affirms that prior to the time of the procedure, I have explained to the patient and/or his/her guardian, the risks and benefits of undergoing anesthesia, as well as any reasonable alternatives.     _______ Fellow Resident

## 2021-12-07 ENCOUNTER — LAB ENCOUNTER (OUTPATIENT)
Dept: LAB | Facility: HOSPITAL | Age: 81
End: 2021-12-07
Attending: INTERNAL MEDICINE
Payer: MEDICARE

## 2021-12-07 DIAGNOSIS — Z01.818 PRE-OP TESTING: ICD-10-CM

## 2021-12-10 ENCOUNTER — ANESTHESIA EVENT (OUTPATIENT)
Dept: ENDOSCOPY | Facility: HOSPITAL | Age: 81
End: 2021-12-10
Payer: MEDICARE

## 2021-12-10 ENCOUNTER — TELEPHONE (OUTPATIENT)
Dept: GASTROENTEROLOGY | Facility: CLINIC | Age: 81
End: 2021-12-10

## 2021-12-10 ENCOUNTER — HOSPITAL ENCOUNTER (OUTPATIENT)
Facility: HOSPITAL | Age: 81
Setting detail: HOSPITAL OUTPATIENT SURGERY
Discharge: HOME | End: 2021-12-10
Attending: INTERNAL MEDICINE | Admitting: INTERNAL MEDICINE
Payer: MEDICARE

## 2021-12-10 ENCOUNTER — ANESTHESIA (OUTPATIENT)
Dept: ENDOSCOPY | Facility: HOSPITAL | Age: 81
End: 2021-12-10
Payer: MEDICARE

## 2021-12-10 VITALS
HEART RATE: 71 BPM | SYSTOLIC BLOOD PRESSURE: 170 MMHG | HEIGHT: 56 IN | RESPIRATION RATE: 16 BRPM | OXYGEN SATURATION: 98 % | WEIGHT: 120 LBS | TEMPERATURE: 99 F | DIASTOLIC BLOOD PRESSURE: 72 MMHG | BODY MASS INDEX: 26.99 KG/M2

## 2021-12-10 DIAGNOSIS — K31.819 AVM (ARTERIOVENOUS MALFORMATION) OF STOMACH, ACQUIRED: ICD-10-CM

## 2021-12-10 DIAGNOSIS — D64.9 ANEMIA, UNSPECIFIED TYPE: ICD-10-CM

## 2021-12-10 DIAGNOSIS — K55.20 AVM (ARTERIOVENOUS MALFORMATION) OF COLON: ICD-10-CM

## 2021-12-10 DIAGNOSIS — Z01.818 PRE-OP TESTING: Primary | ICD-10-CM

## 2021-12-10 PROCEDURE — 43270 EGD LESION ABLATION: CPT | Performed by: INTERNAL MEDICINE

## 2021-12-10 PROCEDURE — 0DJ08ZZ INSPECTION OF UPPER INTESTINAL TRACT, VIA NATURAL OR ARTIFICIAL OPENING ENDOSCOPIC: ICD-10-PCS | Performed by: INTERNAL MEDICINE

## 2021-12-10 RX ORDER — SUCRALFATE 1 G/1
1 TABLET ORAL
Qty: 56 TABLET | Refills: 0 | Status: SHIPPED | OUTPATIENT
Start: 2021-12-10

## 2021-12-10 RX ORDER — LIDOCAINE HYDROCHLORIDE 10 MG/ML
INJECTION, SOLUTION EPIDURAL; INFILTRATION; INTRACAUDAL; PERINEURAL AS NEEDED
Status: DISCONTINUED | OUTPATIENT
Start: 2021-12-10 | End: 2021-12-10 | Stop reason: SURG

## 2021-12-10 RX ORDER — SODIUM CHLORIDE, SODIUM LACTATE, POTASSIUM CHLORIDE, CALCIUM CHLORIDE 600; 310; 30; 20 MG/100ML; MG/100ML; MG/100ML; MG/100ML
INJECTION, SOLUTION INTRAVENOUS CONTINUOUS
Status: DISCONTINUED | OUTPATIENT
Start: 2021-12-10 | End: 2021-12-10

## 2021-12-10 RX ORDER — DEXTROSE MONOHYDRATE 25 G/50ML
50 INJECTION, SOLUTION INTRAVENOUS
Status: DISCONTINUED | OUTPATIENT
Start: 2021-12-10 | End: 2021-12-10

## 2021-12-10 RX ORDER — NALOXONE HYDROCHLORIDE 0.4 MG/ML
80 INJECTION, SOLUTION INTRAMUSCULAR; INTRAVENOUS; SUBCUTANEOUS AS NEEDED
Status: DISCONTINUED | OUTPATIENT
Start: 2021-12-10 | End: 2021-12-10

## 2021-12-10 RX ORDER — GLYCOPYRROLATE 0.2 MG/ML
INJECTION, SOLUTION INTRAMUSCULAR; INTRAVENOUS AS NEEDED
Status: DISCONTINUED | OUTPATIENT
Start: 2021-12-10 | End: 2021-12-10 | Stop reason: SURG

## 2021-12-10 RX ADMIN — GLYCOPYRROLATE 0.2 MG: 0.2 INJECTION, SOLUTION INTRAMUSCULAR; INTRAVENOUS at 07:43:00

## 2021-12-10 RX ADMIN — SODIUM CHLORIDE, SODIUM LACTATE, POTASSIUM CHLORIDE, CALCIUM CHLORIDE: 600; 310; 30; 20 INJECTION, SOLUTION INTRAVENOUS at 07:38:00

## 2021-12-10 RX ADMIN — SODIUM CHLORIDE, SODIUM LACTATE, POTASSIUM CHLORIDE, CALCIUM CHLORIDE: 600; 310; 30; 20 INJECTION, SOLUTION INTRAVENOUS at 07:57:00

## 2021-12-10 RX ADMIN — LIDOCAINE HYDROCHLORIDE 50 MG: 10 INJECTION, SOLUTION EPIDURAL; INFILTRATION; INTRACAUDAL; PERINEURAL at 07:43:00

## 2021-12-10 NOTE — ANESTHESIA POSTPROCEDURE EVALUATION
Patient: Za Avilez    Procedure Summary     Date: 12/10/21 Room / Location: [address] [place] [address] ENDOSCOPY 01 / [address] [place] [address] ENDOSCOPY    Anesthesia Start: 9364 Anesthesia Stop: 2237    Procedure: ESOPHAGOGASTRODUODENOSCOPY (N/A ) Diagnosis:       AVM (arteriovenous malformat

## 2021-12-10 NOTE — OPERATIVE REPORT
[facility]P - [facility] Endoscopy Report        Preoperative Diagnosis:  - anemia  - AVMs antrum stomach        Postoperative Diagnosis:  - Antral telangiectasias s/p APC treatment  - Gastric polyps ( fundic gland)       Procedure:    Esophagogastroduod

## 2021-12-10 NOTE — ANESTHESIA PREPROCEDURE EVALUATION
Anesthesia PreOp Note    HPI:     Iban Watt is a [de-identified]year old female who presents for preoperative consultation requested by: Yahir Stanford MD    Date of Surgery: 12/10/2021    Procedure(s):  ESOPHAGOGASTRODUODENOSCOPY  Indication: AVM (arterioven Diabetes mellitus type 2 without retinopathy ([facility]         Date Noted: 03/19/2015        Past Medical History:   Diagnosis Date   • Anemia    • Arthritis    • Cholelithiasis    • CKD (chronic kidney disease) stage 3, GFR 30-59 ml/min (HCC)    • Coronary at 12/9/2021  ergocalciferol 1.25 MG (48807 UT) Oral Cap, Take 1 capsule (50,000 Units total) by mouth once a week., Disp: 12 capsule, Rfl: 11, 12/8/2021  Glucose Blood (TRUE METRIX BLOOD GLUCOSE TEST) In Vitro Strip, CHECK BLOOD GLUCOSE TWICE DAILY, Disp: 20 Substance and Sexual Activity      Alcohol use: No        Alcohol/week: 0.0 standard drinks      Drug use: No      Sexual activity: Not on file    Other Topics      Concerns:         Service: Not Asked        Blood Transfusions: Not Asked        Ca - normal exam  Exercise tolerance: poor (METS<4, gets fatigued easily    )  (+) hypertension, CAD, CABG/stent (stent x4, on plavix, last taken 12/4),     Neuro/Psych - negative ROS   (+)  depression,       Comments: [place]      GI/Hepatic/Renal    (+) GERD,

## 2021-12-10 NOTE — H&P
History & Physical Examination    Patient Name: Leonid Ball  MRN: [id number]  [facility]: [id number]  YOB: 1940    Diagnosis:     anemia      sucralfate 1 GM/10ML Oral Suspension, Take 10 mL (1 g total) by mouth 2 (two) times daily before meals. Intravenous, Continuous        Allergies:   Zocor [Simvastatin]     SWELLING  Bactrim [Sulfametho*    NAUSEA AND VOMITING    Past Medical History:   Diagnosis Date   • Anemia    • Arthritis    • Cholelithiasis    • CKD (chronic kidney disease) stage 3, GFR risks and benefits and alternatives with the patient/family. They understand and agree to proceed with plan of care. [ x ] I have reviewed the History and Physical done within the last 30 days. Any changes noted above. Alejo Preston.  Helen James MD  12/10/202

## 2021-12-15 RX ORDER — CALCIUM CITRATE/VITAMIN D3 200MG-6.25
TABLET ORAL
Qty: 200 STRIP | Refills: 3 | Status: SHIPPED | OUTPATIENT
Start: 2021-12-15

## 2021-12-15 NOTE — TELEPHONE ENCOUNTER
Addended byLillian Avina on: 8/12/2020 02:39 PM     Modules accepted: Orders Refill passed per [address] protocol.   Requested Prescriptions   Pending Prescriptions Disp Refills    TRUE METRIX BLOOD GLUCOSE TEST In Vitro Strip [facility] Med Name: TRUE METRIX SELF MONITORING BLOOD GLUCOSE STRIPS   Strip] 200 strip 2     Sig: CHECK BLOOD GLUCOSE TWICE DAILY        Diabetic Supplies Protocol Passed - 12/15/2021 12:55 PM        Passed - Appointment in past 12 or next 3 months               Recent Outpatient Visits              6 months ago Anemia, chronic disease    [facility] Hematology Oncology Stella Richey MD    Office Visit    6 months ago Other iron deficiency anemia    [address] - Infusion    Nurse Only    8 months ago Encounter for annual health examination    [address] Nad[address] Infante MD    Office Visit    9 months ago Other iron deficiency [address] Hematology Oncology    Nurse Only    9 months ago Iron deficiency anemia, unspecified iron deficiency anemia type    [facility] Hematology Oncology Stella Richey MD    Office Visit

## 2022-01-05 ENCOUNTER — TELEPHONE (OUTPATIENT)
Dept: FAMILY MEDICINE CLINIC | Facility: CLINIC | Age: 82
End: 2022-01-05

## 2022-01-05 NOTE — TELEPHONE ENCOUNTER
----- Message from Ghazal Sosa sent at 1/5/2022  9:54 AM CST -----  Regarding: Covid-19 question  Good morning, I'm reaching out as I took a Covid-19 home test and tested positive.  I'm having some chills, cough with slight throat irritation, running n

## 2022-01-05 NOTE — TELEPHONE ENCOUNTER
Called patient to discuss with  via language line. Patient declined to speak with nurse/ and requested and that we call her daughter Anita Hartman at [phone]. No answer at daughters number, message was left.

## 2022-01-07 NOTE — TELEPHONE ENCOUNTER
Spoke with pt daughter who states pt symptoms are very mild. Per daughter pt does not have fever, weakness, chest pain, diarrhea, nausea. SOB. Per daughter pt has mild cough.     Reviewed CDC guidelines for pt    Reviewed red flag symptoms for ER    R

## 2022-01-11 ENCOUNTER — TELEPHONE (OUTPATIENT)
Dept: CASE MANAGEMENT | Age: 82
End: 2022-01-11

## 2022-01-11 NOTE — TELEPHONE ENCOUNTER
Patient is eligible for a 2022 Medicare Annual Wellness visit. This visit can be scheduled any time in the calendar year. Language Line  [id number] Amanda Watters called pt. Home # disconnected. Left msg on mobile phone.

## 2022-03-03 RX ORDER — ATORVASTATIN CALCIUM 20 MG/1
TABLET, FILM COATED ORAL
Qty: 90 TABLET | Refills: 2 | Status: SHIPPED | OUTPATIENT
Start: 2022-03-03

## 2022-03-03 RX ORDER — TELMISARTAN 40 MG/1
TABLET ORAL
Qty: 90 TABLET | Refills: 1 | Status: SHIPPED | OUTPATIENT
Start: 2022-03-03

## 2022-03-03 NOTE — TELEPHONE ENCOUNTER
Requested prescription sent to pharmacy, please help patient schedule Medicare annual wellness visit.   Last AWV was April 2021

## 2022-03-10 ENCOUNTER — OFFICE VISIT (OUTPATIENT)
Dept: FAMILY MEDICINE CLINIC | Facility: CLINIC | Age: 82
End: 2022-03-10
Payer: MEDICARE

## 2022-03-10 ENCOUNTER — TELEPHONE (OUTPATIENT)
Dept: FAMILY MEDICINE CLINIC | Facility: CLINIC | Age: 82
End: 2022-03-10

## 2022-03-10 VITALS
TEMPERATURE: 98 F | WEIGHT: 122 LBS | DIASTOLIC BLOOD PRESSURE: 71 MMHG | HEIGHT: 56 IN | SYSTOLIC BLOOD PRESSURE: 178 MMHG | HEART RATE: 62 BPM | BODY MASS INDEX: 27.44 KG/M2

## 2022-03-10 DIAGNOSIS — I10 ESSENTIAL HYPERTENSION: ICD-10-CM

## 2022-03-10 DIAGNOSIS — Z23 NEED FOR SHINGLES VACCINE: ICD-10-CM

## 2022-03-10 DIAGNOSIS — E11.9 DIABETES MELLITUS TYPE 2 WITHOUT RETINOPATHY (HCC): Primary | ICD-10-CM

## 2022-03-10 DIAGNOSIS — E78.5 HYPERLIPIDEMIA, UNSPECIFIED HYPERLIPIDEMIA TYPE: ICD-10-CM

## 2022-03-10 PROBLEM — E11.22 TYPE 2 DIABETES MELLITUS WITH DIABETIC CHRONIC KIDNEY DISEASE (HCC): Status: ACTIVE | Noted: 2022-03-10

## 2022-03-10 LAB
CARTRIDGE LOT#: ABNORMAL NUMERIC
HEMOGLOBIN A1C: 7 % (ref 4.3–5.6)

## 2022-03-10 PROCEDURE — 3008F BODY MASS INDEX DOCD: CPT | Performed by: FAMILY MEDICINE

## 2022-03-10 PROCEDURE — 99214 OFFICE O/P EST MOD 30 MIN: CPT | Performed by: FAMILY MEDICINE

## 2022-03-10 PROCEDURE — 83036 HEMOGLOBIN GLYCOSYLATED A1C: CPT | Performed by: FAMILY MEDICINE

## 2022-03-10 PROCEDURE — 3078F DIAST BP <80 MM HG: CPT | Performed by: FAMILY MEDICINE

## 2022-03-10 PROCEDURE — 3077F SYST BP >= 140 MM HG: CPT | Performed by: FAMILY MEDICINE

## 2022-03-10 RX ORDER — ZOSTER VACCINE RECOMBINANT, ADJUVANTED 50 MCG/0.5
0.5 KIT INTRAMUSCULAR ONCE
Qty: 1 EACH | Refills: 0 | Status: SHIPPED | OUTPATIENT
Start: 2022-03-10 | End: 2022-03-10

## 2022-03-10 RX ORDER — HYDROCHLOROTHIAZIDE 12.5 MG/1
12.5 TABLET ORAL DAILY
Qty: 90 TABLET | Refills: 1 | Status: SHIPPED | OUTPATIENT
Start: 2022-03-10 | End: 2022-03-10

## 2022-03-10 RX ORDER — AMLODIPINE BESYLATE 5 MG/1
5 TABLET ORAL DAILY
Qty: 90 TABLET | Refills: 1 | Status: SHIPPED | OUTPATIENT
Start: 2022-03-10

## 2022-03-10 NOTE — TELEPHONE ENCOUNTER
Dr. Lesley Nikc - Please advise on medication. Thank you! Received call from Katelyn Taylor, Pharmacist with [employer]. Reporting a Cross Reactivity with hydrochlorothiazide and sulfa allergy.      She states people who are allergic to Sulfa derivatives can be allergic to hydrochlorothiazide

## 2022-03-10 NOTE — TELEPHONE ENCOUNTER
Spoke to pharmacy and son (on VEE). Advised of Dr. Magdaleno Mcdaniel note. They both verbalized understanding.

## 2022-03-10 NOTE — TELEPHONE ENCOUNTER
Please let patient know that we have discontinued this medication due to possible allergy. We will send amlodipine 5 mg to the pharmacy instead. We will follow-up with patient at her upcoming scheduled visit on 4/11. Please notify pharmacy to discontinue hydrochlorothiazide prescription and to fill amlodipine instead. Thank you    Essential hypertension  - amLODIPine 5 MG Oral Tab; Take 1 tablet (5 mg total) by mouth daily. Dispense: 90 tablet;  Refill: 1

## 2022-03-11 NOTE — TELEPHONE ENCOUNTER
Dariel Gomez, [place] [id number]:    Patient is heard of hearing, and was confused, she asked that her niece Sreedhar Go talk to this nurse. Sreedhar Go voiced understanding and agrees with plan. Will ensure that patient stops taking Hydrochlorathiazied and will start taking Amlodipine.

## 2022-03-11 NOTE — TELEPHONE ENCOUNTER
Patient and patient's  is requesting a call back to confirm which medication patient needs to stop and which medication she should start. Patient states she got confused.

## 2022-04-11 ENCOUNTER — TELEPHONE (OUTPATIENT)
Dept: INTERNAL MEDICINE CLINIC | Facility: CLINIC | Age: 82
End: 2022-04-11

## 2022-04-11 ENCOUNTER — OFFICE VISIT (OUTPATIENT)
Dept: FAMILY MEDICINE CLINIC | Facility: CLINIC | Age: 82
End: 2022-04-11
Payer: MEDICARE

## 2022-04-11 VITALS
HEART RATE: 71 BPM | HEIGHT: 56 IN | TEMPERATURE: 98 F | DIASTOLIC BLOOD PRESSURE: 74 MMHG | WEIGHT: 122 LBS | BODY MASS INDEX: 27.44 KG/M2 | SYSTOLIC BLOOD PRESSURE: 151 MMHG

## 2022-04-11 DIAGNOSIS — E55.9 VITAMIN D DEFICIENCY: ICD-10-CM

## 2022-04-11 DIAGNOSIS — H02.886 MEIBOMIAN GLAND DYSFUNCTION (MGD) OF BOTH EYES: ICD-10-CM

## 2022-04-11 DIAGNOSIS — R09.89 BILATERAL CAROTID BRUITS: ICD-10-CM

## 2022-04-11 DIAGNOSIS — E22.2 SIADH (SYNDROME OF INAPPROPRIATE ADH PRODUCTION) (HCC): ICD-10-CM

## 2022-04-11 DIAGNOSIS — H90.3 SENSORINEURAL HEARING LOSS (SNHL) OF BOTH EARS: ICD-10-CM

## 2022-04-11 DIAGNOSIS — E11.22 TYPE 2 DIABETES MELLITUS WITH STAGE 3A CHRONIC KIDNEY DISEASE, WITHOUT LONG-TERM CURRENT USE OF INSULIN (HCC): ICD-10-CM

## 2022-04-11 DIAGNOSIS — R01.1 MURMUR: ICD-10-CM

## 2022-04-11 DIAGNOSIS — E11.9 DIABETES MELLITUS TYPE 2 WITHOUT RETINOPATHY (HCC): ICD-10-CM

## 2022-04-11 DIAGNOSIS — I73.9 PERIPHERAL VASCULAR DISEASE (HCC): ICD-10-CM

## 2022-04-11 DIAGNOSIS — I25.118 CORONARY ARTERY DISEASE OF NATIVE HEART WITH STABLE ANGINA PECTORIS, UNSPECIFIED VESSEL OR LESION TYPE (HCC): ICD-10-CM

## 2022-04-11 DIAGNOSIS — H02.883 MEIBOMIAN GLAND DYSFUNCTION (MGD) OF BOTH EYES: ICD-10-CM

## 2022-04-11 DIAGNOSIS — D63.8 ANEMIA, CHRONIC DISEASE: ICD-10-CM

## 2022-04-11 DIAGNOSIS — Z00.00 ENCOUNTER FOR ANNUAL HEALTH EXAMINATION: Primary | ICD-10-CM

## 2022-04-11 DIAGNOSIS — I70.0 ATHEROSCLEROSIS OF AORTA (HCC): ICD-10-CM

## 2022-04-11 DIAGNOSIS — K90.9 IMPAIRED INTESTINAL ABSORPTION: ICD-10-CM

## 2022-04-11 DIAGNOSIS — D50.8 OTHER IRON DEFICIENCY ANEMIA: ICD-10-CM

## 2022-04-11 DIAGNOSIS — I25.10 CORONARY ARTERY DISEASE INVOLVING NATIVE CORONARY ARTERY OF NATIVE HEART WITHOUT ANGINA PECTORIS: ICD-10-CM

## 2022-04-11 DIAGNOSIS — Z23 NEED FOR SHINGLES VACCINE: ICD-10-CM

## 2022-04-11 DIAGNOSIS — H25.13 AGE-RELATED NUCLEAR CATARACT OF BOTH EYES: ICD-10-CM

## 2022-04-11 DIAGNOSIS — K31.819 GAVE (GASTRIC ANTRAL VASCULAR ECTASIA): ICD-10-CM

## 2022-04-11 DIAGNOSIS — F33.0 MILD EPISODE OF RECURRENT MAJOR DEPRESSIVE DISORDER (HCC): ICD-10-CM

## 2022-04-11 DIAGNOSIS — M85.89 OSTEOPENIA OF MULTIPLE SITES: ICD-10-CM

## 2022-04-11 DIAGNOSIS — N18.31 TYPE 2 DIABETES MELLITUS WITH STAGE 3A CHRONIC KIDNEY DISEASE, WITHOUT LONG-TERM CURRENT USE OF INSULIN (HCC): ICD-10-CM

## 2022-04-11 DIAGNOSIS — I10 ESSENTIAL HYPERTENSION: ICD-10-CM

## 2022-04-11 PROBLEM — D50.9 IRON DEFICIENCY ANEMIA: Status: RESOLVED | Noted: 2020-09-09 | Resolved: 2022-04-11

## 2022-04-11 PROCEDURE — 3077F SYST BP >= 140 MM HG: CPT | Performed by: FAMILY MEDICINE

## 2022-04-11 PROCEDURE — 99397 PER PM REEVAL EST PAT 65+ YR: CPT | Performed by: FAMILY MEDICINE

## 2022-04-11 PROCEDURE — 3008F BODY MASS INDEX DOCD: CPT | Performed by: FAMILY MEDICINE

## 2022-04-11 PROCEDURE — 96160 PT-FOCUSED HLTH RISK ASSMT: CPT | Performed by: FAMILY MEDICINE

## 2022-04-11 PROCEDURE — G0439 PPPS, SUBSEQ VISIT: HCPCS | Performed by: FAMILY MEDICINE

## 2022-04-11 PROCEDURE — 3078F DIAST BP <80 MM HG: CPT | Performed by: FAMILY MEDICINE

## 2022-04-11 RX ORDER — ZOSTER VACCINE RECOMBINANT, ADJUVANTED 50 MCG/0.5
0.5 KIT INTRAMUSCULAR ONCE
Qty: 1 EACH | Refills: 0 | Status: SHIPPED | OUTPATIENT
Start: 2022-05-11 | End: 2022-05-11

## 2022-04-11 RX ORDER — MINERAL OIL 100 MG/100ML
OIL ORAL; TOPICAL
Qty: 1 EACH | Refills: 0 | Status: SHIPPED | OUTPATIENT
Start: 2022-04-11

## 2022-04-11 NOTE — TELEPHONE ENCOUNTER
Informed Willis Mahoney is [facility]'s preferred lab and she voiced understanding. She will help her mom schedule appointment.

## 2022-04-11 NOTE — TELEPHONE ENCOUNTER
Patients daughter calling to ask why labs were sent to [facility] instead of hospital. She is also asking if the results from [address] [place] can be seen in [address] Please advise.

## 2022-04-15 LAB
% SATURATION: 8 % (CALC) (ref 16–45)
ABSOLUTE BASOPHILS: 19 CELLS/UL (ref 0–200)
ABSOLUTE EOSINOPHILS: 192 CELLS/UL (ref 15–500)
ABSOLUTE LYMPHOCYTES: 1114 CELLS/UL (ref 850–3900)
ABSOLUTE MONOCYTES: 370 CELLS/UL (ref 200–950)
ABSOLUTE NEUTROPHILS: 3106 CELLS/UL (ref 1500–7800)
ALBUMIN/GLOBULIN RATIO: 1.8 (CALC) (ref 1–2.5)
ALBUMIN: 4.4 G/DL (ref 3.6–5.1)
ALKALINE PHOSPHATASE: 71 U/L (ref 37–153)
ALT: 20 U/L (ref 6–29)
AST: 23 U/L (ref 10–35)
BASOPHILS: 0.4 %
BILIRUBIN, TOTAL: 0.3 MG/DL (ref 0.2–1.2)
BUN/CREATININE RATIO: 16 (CALC) (ref 6–22)
BUN: 15 MG/DL (ref 7–25)
CALCIUM: 9.5 MG/DL (ref 8.6–10.4)
CARBON DIOXIDE: 20 MMOL/L (ref 20–32)
CHLORIDE: 101 MMOL/L (ref 98–110)
CHOL/HDLC RATIO: 3.7 (CALC)
CHOLESTEROL, TOTAL: 159 MG/DL
CREATININE: 0.92 MG/DL (ref 0.6–0.88)
EGFR IF AFRICN AM: 68 ML/MIN/1.73M2
EGFR IF NONAFRICN AM: 58 ML/MIN/1.73M2
EOSINOPHILS: 4 %
GLOBULIN: 2.5 G/DL (CALC) (ref 1.9–3.7)
GLUCOSE: 131 MG/DL (ref 65–99)
HDL CHOLESTEROL: 43 MG/DL
HEMATOCRIT: 31.4 % (ref 35–45)
HEMOGLOBIN A1C: 6.6 % OF TOTAL HGB
HEMOGLOBIN: 10.4 G/DL (ref 11.7–15.5)
IRON BINDING CAPACITY: 386 MCG/DL (CALC) (ref 250–450)
IRON, TOTAL: 31 MCG/DL (ref 45–160)
LDL-CHOLESTEROL: 78 MG/DL (CALC)
LYMPHOCYTES: 23.2 %
MCH: 31.4 PG (ref 27–33)
MCHC: 33.1 G/DL (ref 32–36)
MCV: 94.9 FL (ref 80–100)
MONOCYTES: 7.7 %
MPV: 9.8 FL (ref 7.5–12.5)
NEUTROPHILS: 64.7 %
NON-HDL CHOLESTEROL: 116 MG/DL (CALC)
PLATELET COUNT: 277 THOUSAND/UL (ref 140–400)
POTASSIUM: 4.9 MMOL/L (ref 3.5–5.3)
PROTEIN, TOTAL: 6.9 G/DL (ref 6.1–8.1)
RDW: 12.7 % (ref 11–15)
RED BLOOD CELL COUNT: 3.31 MILLION/UL (ref 3.8–5.1)
SODIUM: 137 MMOL/L (ref 135–146)
TRIGLYCERIDES: 315 MG/DL
TSH W/REFLEX TO FT4: 2.17 MIU/L (ref 0.4–4.5)
VITAMIN B12: >2000 PG/ML (ref 200–1100)
VITAMIN D, 25-OH, TOTAL: 100 NG/ML (ref 30–100)
WHITE BLOOD CELL COUNT: 4.8 THOUSAND/UL (ref 3.8–10.8)

## 2022-04-28 ENCOUNTER — NURSE TRIAGE (OUTPATIENT)
Dept: FAMILY MEDICINE CLINIC | Facility: CLINIC | Age: 82
End: 2022-04-28

## 2022-04-28 ENCOUNTER — TELEPHONE (OUTPATIENT)
Dept: HEMATOLOGY/ONCOLOGY | Facility: HOSPITAL | Age: 82
End: 2022-04-28

## 2022-04-28 NOTE — TELEPHONE ENCOUNTER
Sounds like this may be a new pain is what we did we discussed was more a right lower extremity swelling from the amlodipine. Given new right leg pain will order x-ray. Will hold off on ultrasound given that patient not complaining of swelling currently. If she starts to notice redness or swelling will order ultrasound. 1. Right leg pain  - XR TIBIA + FIBULA (2 VIEWS), RIGHT (CPT=73590);  Future

## 2022-04-28 NOTE — TELEPHONE ENCOUNTER
Called patient with Language Line  Yon Arzola ID# [id number]      Identified  patient's name and , spoke with  Gerard Leavitt  ( on VEE )     Informed of Dr. Victor Manuel Michele instructions below, will have the x-ray done tomorrow

## 2022-04-28 NOTE — TELEPHONE ENCOUNTER
Patient called and left message using language line [place] Emiliano Moody # [phone]). Left message Dr. Chace Frye office request patient to schedule follow up with Dr. tOoniel Lopez for low iron. Instructed  to please call Dr. Andrea Graham office [phone] to schedule Surinder Jon appointment.

## 2022-04-28 NOTE — TELEPHONE ENCOUNTER
Spouse indicated that patient saw Dr Florentin Lucas on 4/11/2022 and mentioned her right leg pain from the knee down to the ankle. Has been having the pain for 1 month now. Pain is more constant and getting worse. Pain was not as bad at the visit. Pain level currently 5/10. No swelling. No chest pain. No shortness of breath. No injury or bruising. Not sure if rheumatoid arthritis. No other symptoms. Spouse wanted to know what to do next for the leg pain? Please advise. Advised spouse that if any swelling, shortness of breath, chest pain, or symptoms worse to go to the ER. Spouse agreed.

## 2022-04-29 ENCOUNTER — HOSPITAL ENCOUNTER (OUTPATIENT)
Dept: GENERAL RADIOLOGY | Age: 82
Discharge: HOME | End: 2022-04-29
Attending: FAMILY MEDICINE
Payer: MEDICARE

## 2022-04-29 DIAGNOSIS — M79.604 RIGHT LEG PAIN: ICD-10-CM

## 2022-04-29 PROCEDURE — 73590 X-RAY EXAM OF LOWER LEG: CPT | Performed by: FAMILY MEDICINE

## 2022-04-29 RX ORDER — PANTOPRAZOLE SODIUM 40 MG/1
40 TABLET, DELAYED RELEASE ORAL
Qty: 90 TABLET | Refills: 1 | Status: SHIPPED | OUTPATIENT
Start: 2022-04-29 | End: 2022-11-22

## 2022-04-29 NOTE — TELEPHONE ENCOUNTER
Refill passed per [employer] protocol.     Requested Prescriptions   Pending Prescriptions Disp Refills    PANTOPRAZOLE 40 MG Oral Tab EC [Pharmacy Med Name: PANTOPRAZOLE 40MG TABLETS] 90 tablet 1     Sig: TAKE 1 TABLET(40 MG) BY MOUTH BEFORE BREAKFAST        Gastrointestional Medication Protocol Passed - 4/29/2022 10:54 AM        Passed - Appointment in past 12 or next 3 months              Recent Outpatient Visits              2 weeks ago Encounter for annual health examination    Norma Sanchez MD    Office Visit    1 month ago Diabetes mellitus type 2 without retinopathy [facility])    [employer], Norma Rosenthal MD    Office Visit    10 months ago Anemia, chronic disease    [facility] Yon Linares MD    Office Visit    10 months ago Other iron deficiency anemia    [address] [place] [address]    Nurse Only    1 year ago Encounter for annual health examination    Norma Sanchez MD    Office Visit            Future Appointments         Provider Department Appt Notes    In 3 days Saranya Garcia MD [employer], Eduar Rosenthal right leg pain

## 2022-05-02 ENCOUNTER — NURSE ONLY (OUTPATIENT)
Dept: HEMATOLOGY/ONCOLOGY | Facility: HOSPITAL | Age: 82
End: 2022-05-02
Attending: INTERNAL MEDICINE
Payer: MEDICARE

## 2022-05-02 ENCOUNTER — OFFICE VISIT (OUTPATIENT)
Dept: FAMILY MEDICINE CLINIC | Facility: CLINIC | Age: 82
End: 2022-05-02
Payer: MEDICARE

## 2022-05-02 VITALS
RESPIRATION RATE: 16 BRPM | TEMPERATURE: 98 F | SYSTOLIC BLOOD PRESSURE: 147 MMHG | HEIGHT: 56 IN | DIASTOLIC BLOOD PRESSURE: 49 MMHG | BODY MASS INDEX: 27.9 KG/M2 | WEIGHT: 124 LBS | HEART RATE: 64 BPM | OXYGEN SATURATION: 99 %

## 2022-05-02 VITALS
WEIGHT: 124 LBS | TEMPERATURE: 98 F | BODY MASS INDEX: 27.9 KG/M2 | HEART RATE: 64 BPM | HEIGHT: 56 IN | DIASTOLIC BLOOD PRESSURE: 80 MMHG | SYSTOLIC BLOOD PRESSURE: 133 MMHG

## 2022-05-02 DIAGNOSIS — N18.2 ANEMIA IN STAGE 2 CHRONIC KIDNEY DISEASE: ICD-10-CM

## 2022-05-02 DIAGNOSIS — K90.9 IMPAIRED INTESTINAL ABSORPTION: ICD-10-CM

## 2022-05-02 DIAGNOSIS — D63.8 ANEMIA, CHRONIC DISEASE: ICD-10-CM

## 2022-05-02 DIAGNOSIS — I10 ESSENTIAL HYPERTENSION: ICD-10-CM

## 2022-05-02 DIAGNOSIS — M79.604 RIGHT LEG PAIN: Primary | ICD-10-CM

## 2022-05-02 DIAGNOSIS — R22.41 MASS OF RIGHT LOWER LEG: ICD-10-CM

## 2022-05-02 DIAGNOSIS — D50.8 OTHER IRON DEFICIENCY ANEMIA: Primary | ICD-10-CM

## 2022-05-02 DIAGNOSIS — D50.8 OTHER IRON DEFICIENCY ANEMIA: ICD-10-CM

## 2022-05-02 DIAGNOSIS — Z51.81 ENCOUNTER FOR MEDICATION MONITORING: ICD-10-CM

## 2022-05-02 DIAGNOSIS — D63.1 ANEMIA IN STAGE 2 CHRONIC KIDNEY DISEASE: ICD-10-CM

## 2022-05-02 LAB
BASOPHILS # BLD AUTO: 0.02 X10(3) UL (ref 0–0.2)
BASOPHILS NFR BLD AUTO: 0.4 %
DEPRECATED HBV CORE AB SER IA-ACNC: 19.1 NG/ML
DEPRECATED RDW RBC AUTO: 46.1 FL (ref 35.1–46.3)
EOSINOPHIL # BLD AUTO: 0.28 X10(3) UL (ref 0–0.7)
EOSINOPHIL NFR BLD AUTO: 5.5 %
ERYTHROCYTE [DISTWIDTH] IN BLOOD BY AUTOMATED COUNT: 13 % (ref 11–15)
HCT VFR BLD AUTO: 31.5 %
HGB BLD-MCNC: 10 G/DL
IMM GRANULOCYTES # BLD AUTO: 0.02 X10(3) UL (ref 0–1)
IMM GRANULOCYTES NFR BLD: 0.4 %
IRON SATN MFR SERPL: 8 %
IRON SERPL-MCNC: 32 UG/DL
LYMPHOCYTES # BLD AUTO: 0.84 X10(3) UL (ref 1–4)
LYMPHOCYTES NFR BLD AUTO: 16.5 %
MCH RBC QN AUTO: 31 PG (ref 26–34)
MCHC RBC AUTO-ENTMCNC: 31.7 G/DL (ref 31–37)
MCV RBC AUTO: 97.5 FL
MONOCYTES # BLD AUTO: 0.41 X10(3) UL (ref 0.1–1)
MONOCYTES NFR BLD AUTO: 8.1 %
NEUTROPHILS # BLD AUTO: 3.52 X10 (3) UL (ref 1.5–7.7)
NEUTROPHILS # BLD AUTO: 3.52 X10(3) UL (ref 1.5–7.7)
NEUTROPHILS NFR BLD AUTO: 69.1 %
PLATELET # BLD AUTO: 240 10(3)UL (ref 150–450)
RBC # BLD AUTO: 3.23 X10(6)UL
TIBC SERPL-MCNC: 395 UG/DL (ref 240–450)
TRANSFERRIN SERPL-MCNC: 265 MG/DL (ref 200–360)
WBC # BLD AUTO: 5.1 X10(3) UL (ref 4–11)

## 2022-05-02 PROCEDURE — 84466 ASSAY OF TRANSFERRIN: CPT

## 2022-05-02 PROCEDURE — 83540 ASSAY OF IRON: CPT

## 2022-05-02 PROCEDURE — 3008F BODY MASS INDEX DOCD: CPT | Performed by: FAMILY MEDICINE

## 2022-05-02 PROCEDURE — 3079F DIAST BP 80-89 MM HG: CPT | Performed by: FAMILY MEDICINE

## 2022-05-02 PROCEDURE — 3075F SYST BP GE 130 - 139MM HG: CPT | Performed by: FAMILY MEDICINE

## 2022-05-02 PROCEDURE — 85025 COMPLETE CBC W/AUTO DIFF WBC: CPT

## 2022-05-02 PROCEDURE — 99214 OFFICE O/P EST MOD 30 MIN: CPT | Performed by: FAMILY MEDICINE

## 2022-05-02 PROCEDURE — 82728 ASSAY OF FERRITIN: CPT

## 2022-05-02 PROCEDURE — 36415 COLL VENOUS BLD VENIPUNCTURE: CPT

## 2022-05-02 PROCEDURE — 99214 OFFICE O/P EST MOD 30 MIN: CPT | Performed by: INTERNAL MEDICINE

## 2022-05-02 RX ORDER — MINERAL OIL 100 MG/100ML
OIL ORAL; TOPICAL
Qty: 1 EACH | Refills: 0 | Status: SHIPPED | OUTPATIENT
Start: 2022-05-02 | End: 2022-05-02

## 2022-05-02 RX ORDER — MINERAL OIL 100 MG/100ML
OIL ORAL; TOPICAL
Qty: 1 EACH | Refills: 0 | Status: SHIPPED | OUTPATIENT
Start: 2022-05-02

## 2022-05-06 NOTE — PROGRESS NOTES
Susan Calderonis to infusion today for IV Venofer 3 of 5. She arrives alert and independent. Patient denies complaints today, is feeling pretty well. Denies any shortness of breath, dizziness or light headedness.       Most recent labs 12/27-  Hgb/Hct: 9.4/28.2  12/2
unk

## 2022-05-16 ENCOUNTER — HOSPITAL ENCOUNTER (OUTPATIENT)
Dept: MRI IMAGING | Age: 82
Discharge: HOME | End: 2022-05-16
Attending: FAMILY MEDICINE
Payer: MEDICARE

## 2022-05-16 DIAGNOSIS — M79.604 RIGHT LEG PAIN: ICD-10-CM

## 2022-05-16 DIAGNOSIS — R22.41 MASS OF RIGHT LOWER LEG: ICD-10-CM

## 2022-05-16 PROCEDURE — 73718 MRI LOWER EXTREMITY W/O DYE: CPT | Performed by: FAMILY MEDICINE

## 2022-05-18 ENCOUNTER — OFFICE VISIT (OUTPATIENT)
Dept: HEMATOLOGY/ONCOLOGY | Facility: HOSPITAL | Age: 82
End: 2022-05-18
Attending: INTERNAL MEDICINE
Payer: MEDICARE

## 2022-05-18 VITALS
SYSTOLIC BLOOD PRESSURE: 146 MMHG | RESPIRATION RATE: 16 BRPM | HEART RATE: 61 BPM | DIASTOLIC BLOOD PRESSURE: 46 MMHG | TEMPERATURE: 98 F | OXYGEN SATURATION: 97 %

## 2022-05-18 DIAGNOSIS — D50.9 IRON DEFICIENCY ANEMIA, UNSPECIFIED IRON DEFICIENCY ANEMIA TYPE: Primary | ICD-10-CM

## 2022-05-18 DIAGNOSIS — K90.9 IMPAIRED INTESTINAL ABSORPTION: ICD-10-CM

## 2022-05-18 PROCEDURE — 96366 THER/PROPH/DIAG IV INF ADDON: CPT

## 2022-05-18 PROCEDURE — 96365 THER/PROPH/DIAG IV INF INIT: CPT

## 2022-05-18 NOTE — PROGRESS NOTES
Pt presents to infusion today for IV venofer 300 mg  1 of 3. Pt appears well and denies any shortness of breath, dizziness or light headedness. PIV started in left  ac, attempt X1, blood return noted. Pt observed post infusion for 30 minutes, VSS . Pt tolerated well with no signs/symptoms of adverse reaction noted. PIV removed , 2 x 2 and coban to site. Shaina Alvarez   discharged stable

## 2022-05-23 ENCOUNTER — TELEPHONE (OUTPATIENT)
Dept: FAMILY MEDICINE CLINIC | Facility: CLINIC | Age: 82
End: 2022-05-23

## 2022-05-23 NOTE — TELEPHONE ENCOUNTER
Please inform patient/daughter that there are no bony abnormalities, the MRI does show some swelling in her calf muscle from a mild strain. There are no muscle tears. On the back of her calf there is a thin long lipoma which is a fatty mass and is benign, measures 9 x 2 cm. Can occasionally become inflamed, would recommend just icing or ibuprofen/tylenol if tolerated.

## 2022-05-23 NOTE — TELEPHONE ENCOUNTER
Verified name and  of patient. Daughter of patient (on VEE), calling to follow up on test results for MRI lower right leg completed on 22. Upon chart review, no result notes from Dr. Brett Chadwick at this time. She states they are supposed to go on a trip on Friday and would like results before then. Please advise and thank you.

## 2022-05-25 ENCOUNTER — OFFICE VISIT (OUTPATIENT)
Dept: HEMATOLOGY/ONCOLOGY | Facility: HOSPITAL | Age: 82
End: 2022-05-25
Attending: INTERNAL MEDICINE
Payer: MEDICARE

## 2022-05-25 VITALS
OXYGEN SATURATION: 99 % | RESPIRATION RATE: 16 BRPM | SYSTOLIC BLOOD PRESSURE: 163 MMHG | TEMPERATURE: 99 F | DIASTOLIC BLOOD PRESSURE: 58 MMHG | HEART RATE: 58 BPM

## 2022-05-25 DIAGNOSIS — D50.9 IRON DEFICIENCY ANEMIA, UNSPECIFIED IRON DEFICIENCY ANEMIA TYPE: ICD-10-CM

## 2022-05-25 DIAGNOSIS — K90.9 IMPAIRED INTESTINAL ABSORPTION: Primary | ICD-10-CM

## 2022-05-25 PROCEDURE — 96365 THER/PROPH/DIAG IV INF INIT: CPT

## 2022-05-25 NOTE — PROGRESS NOTES
Pt presents to infusion today for IV venofer 300 mg  2 of 3. Pt appears well and offers no complaints. PIV started in right ac, attempt X1, blood return noted. Pt observed post infusion for 30 minutes, VSS . Pt tolerated well with no signs/symptoms of adverse reaction noted. PIV removed , 2 x 2 and coban to site. Margo Strange   discharged stable

## 2022-06-01 ENCOUNTER — OFFICE VISIT (OUTPATIENT)
Dept: HEMATOLOGY/ONCOLOGY | Facility: HOSPITAL | Age: 82
End: 2022-06-01
Attending: INTERNAL MEDICINE
Payer: MEDICARE

## 2022-06-01 VITALS
OXYGEN SATURATION: 98 % | TEMPERATURE: 98 F | DIASTOLIC BLOOD PRESSURE: 45 MMHG | HEART RATE: 64 BPM | SYSTOLIC BLOOD PRESSURE: 144 MMHG | RESPIRATION RATE: 16 BRPM

## 2022-06-01 DIAGNOSIS — K90.9 IMPAIRED INTESTINAL ABSORPTION: Primary | ICD-10-CM

## 2022-06-01 DIAGNOSIS — D50.9 IRON DEFICIENCY ANEMIA, UNSPECIFIED IRON DEFICIENCY ANEMIA TYPE: ICD-10-CM

## 2022-06-01 PROCEDURE — 96366 THER/PROPH/DIAG IV INF ADDON: CPT

## 2022-06-01 PROCEDURE — 96365 THER/PROPH/DIAG IV INF INIT: CPT

## 2022-06-01 NOTE — PROGRESS NOTES
Pt presents to infusion today for IV venofer 300 mg  3 of 3. Pt appears well and offers no complaints. PIV started in right ac, attempt X1, blood return noted. Pt observed post infusion for 30 minutes, VSS . Pt tolerated well with no signs/symptoms of adverse reaction noted. PIV removed , 2 x 2 and coban to site. Heaven Brar   discharged stable

## 2022-06-03 DIAGNOSIS — E11.9 DIABETES MELLITUS TYPE 2 WITHOUT RETINOPATHY (HCC): ICD-10-CM

## 2022-06-03 DIAGNOSIS — I10 ESSENTIAL HYPERTENSION: ICD-10-CM

## 2022-06-03 RX ORDER — TELMISARTAN 40 MG/1
TABLET ORAL
Qty: 90 TABLET | Refills: 1 | OUTPATIENT
Start: 2022-06-03

## 2022-06-03 NOTE — TELEPHONE ENCOUNTER
Dr. Sherry Iniguez - okay to continue [facility] [place]? (Restarted in March)  Please address pended Rx.

## 2022-06-06 ENCOUNTER — TELEPHONE (OUTPATIENT)
Dept: FAMILY MEDICINE CLINIC | Facility: CLINIC | Age: 82
End: 2022-06-06

## 2022-06-06 RX ORDER — SITAGLIPTIN 100 MG/1
TABLET, FILM COATED ORAL
Qty: 90 TABLET | Refills: 0 | Status: SHIPPED | OUTPATIENT
Start: 2022-06-06

## 2022-06-06 NOTE — TELEPHONE ENCOUNTER
SITagliptin Phosphate 100 MG Oral Tab, Take 1 tablet (100 mg total) by mouth daily. , Disp: 90 tablet, Rfl: 0

## 2022-06-06 NOTE — TELEPHONE ENCOUNTER
Spoke with Gena Delgado at [facility], states telmisartan rx is ready to be picked up.     Left message with patient's  Audrey Warren relaying this information

## 2022-06-29 NOTE — TELEPHONE ENCOUNTER
Dr. Rex Martinez, patient is requesting for refill on Oxybutin.   Last refill: 6/22/21  Last office visit: 5/2/22

## 2022-06-30 RX ORDER — OXYBUTYNIN CHLORIDE 10 MG/1
10 TABLET, EXTENDED RELEASE ORAL DAILY
Qty: 90 TABLET | Refills: 0 | Status: SHIPPED | OUTPATIENT
Start: 2022-06-30

## 2022-06-30 RX ORDER — CLOPIDOGREL BISULFATE 75 MG/1
TABLET ORAL
Qty: 90 TABLET | Refills: 3 | Status: SHIPPED | OUTPATIENT
Start: 2022-06-30

## 2022-06-30 RX ORDER — CLOPIDOGREL BISULFATE 75 MG/1
TABLET ORAL
Qty: 90 TABLET | Refills: 3 | OUTPATIENT
Start: 2022-06-30

## 2022-07-11 ENCOUNTER — TELEPHONE (OUTPATIENT)
Dept: FAMILY MEDICINE CLINIC | Facility: CLINIC | Age: 82
End: 2022-07-11

## 2022-07-11 DIAGNOSIS — M79.89 LEG SWELLING: ICD-10-CM

## 2022-07-11 DIAGNOSIS — M79.604 PAIN OF RIGHT LOWER EXTREMITY: Primary | ICD-10-CM

## 2022-07-11 RX ORDER — TRAMADOL HYDROCHLORIDE 50 MG/1
50 TABLET ORAL 2 TIMES DAILY PRN
Qty: 60 TABLET | Refills: 0 | Status: SHIPPED | OUTPATIENT
Start: 2022-07-11

## 2022-07-11 NOTE — TELEPHONE ENCOUNTER
Leg pain seems to be out of proportion for a muscle strain. Sending tramadol to the pharmacy and would also have her do a leg ultrasound to rule out clot. 1. Pain of right lower extremity  - traMADol 50 MG Oral Tab; Take 1 tablet (50 mg total) by mouth 2 (two) times daily as needed for Pain. Dispense: 60 tablet; Refill: 0  - US VENOUS DOPPLER LEG RIGHT - DIAG IMG ([id number]); Future    2. Leg swelling  - traMADol 50 MG Oral Tab; Take 1 tablet (50 mg total) by mouth 2 (two) times daily as needed for Pain. Dispense: 60 tablet; Refill: 0  - US VENOUS DOPPLER LEG RIGHT - DIAG IMG ([id number]);  Future      If all negative will send for PT.    thanks

## 2022-07-11 NOTE — TELEPHONE ENCOUNTER
With Language Line  Daysi stevens  ID# [id number]        Patient/   calling ( identified name and  )  States patient's leg is not improving, had MRI in May  16   Found 9 inch cyst on right leg     Has been taking Tylenol for the pain resting her leg as directed  but is not helping much     Rates pain at 8/10 most days     Requesting recommendations or pain medication      Allergies reviewed and pharmacy confirmed    Please advise and thank you.         Best call back number:  Daughter Juwan [facility] [phone] ( speaks  [place] )

## 2022-07-12 ENCOUNTER — HOSPITAL ENCOUNTER (OUTPATIENT)
Dept: ULTRASOUND IMAGING | Facility: HOSPITAL | Age: 82
Discharge: HOME | End: 2022-07-12
Attending: FAMILY MEDICINE
Payer: MEDICARE

## 2022-07-12 DIAGNOSIS — M79.604 PAIN OF RIGHT LOWER EXTREMITY: ICD-10-CM

## 2022-07-12 DIAGNOSIS — M79.89 LEG SWELLING: ICD-10-CM

## 2022-07-12 PROCEDURE — 93971 EXTREMITY STUDY: CPT | Performed by: FAMILY MEDICINE

## 2022-07-12 NOTE — TELEPHONE ENCOUNTER
Called patient; spouse on VEE answered. He states his daughter was made aware of US doppler order and aware of pain medication. Patient Has a scheduled appt already today at 1pm.     No further action needed.

## 2022-07-25 RX ORDER — ERGOCALCIFEROL 1.25 MG/1
CAPSULE ORAL
Qty: 12 CAPSULE | Refills: 11 | Status: SHIPPED | OUTPATIENT
Start: 2022-07-25

## 2022-08-04 ENCOUNTER — APPOINTMENT (OUTPATIENT)
Dept: HEMATOLOGY/ONCOLOGY | Facility: HOSPITAL | Age: 82
End: 2022-08-04
Attending: INTERNAL MEDICINE
Payer: MEDICARE

## 2022-08-15 ENCOUNTER — TELEPHONE (OUTPATIENT)
Dept: FAMILY MEDICINE CLINIC | Facility: CLINIC | Age: 82
End: 2022-08-15

## 2022-08-15 DIAGNOSIS — M79.604 RIGHT LEG PAIN: Primary | ICD-10-CM

## 2022-08-15 DIAGNOSIS — R53.81 PHYSICAL DECONDITIONING: ICD-10-CM

## 2022-08-15 NOTE — TELEPHONE ENCOUNTER
Lara Mackenzie called asking if we can proceed with PT feel patient is showing sign of failure to thrive    Patient slowly deteriorating due to lack of movement with leg pain    Patient is asking her daughter to obtain walker/handicap permit/wheelchair for her.   Daughter is NOT in agreement with those request.  Feels patient is exaggerating the pain    Please advise on PT

## 2022-08-16 RX ORDER — ERGOCALCIFEROL 1.25 MG/1
50000 CAPSULE ORAL WEEKLY
Qty: 12 CAPSULE | Refills: 11 | Status: SHIPPED | OUTPATIENT
Start: 2022-08-16

## 2022-08-16 NOTE — TELEPHONE ENCOUNTER
Managed care, please advise of referral.    Office staff, assist with appt and request daughters presence.

## 2022-08-16 NOTE — TELEPHONE ENCOUNTER
PT order placed, would have patient follow up in office after starting to determine need for walker/ parking permit. If dtr could accompany her that would be good.

## 2022-08-18 ENCOUNTER — OFFICE VISIT (OUTPATIENT)
Dept: HEMATOLOGY/ONCOLOGY | Facility: HOSPITAL | Age: 82
End: 2022-08-18
Attending: INTERNAL MEDICINE
Payer: MEDICARE

## 2022-08-18 VITALS
BODY MASS INDEX: 27.53 KG/M2 | SYSTOLIC BLOOD PRESSURE: 154 MMHG | WEIGHT: 122.38 LBS | TEMPERATURE: 98 F | HEART RATE: 82 BPM | RESPIRATION RATE: 16 BRPM | HEIGHT: 56 IN | DIASTOLIC BLOOD PRESSURE: 46 MMHG | OXYGEN SATURATION: 98 %

## 2022-08-18 DIAGNOSIS — D50.8 OTHER IRON DEFICIENCY ANEMIA: Primary | ICD-10-CM

## 2022-08-18 DIAGNOSIS — Z51.81 MEDICATION MONITORING ENCOUNTER: ICD-10-CM

## 2022-08-18 DIAGNOSIS — D50.8 OTHER IRON DEFICIENCY ANEMIA: ICD-10-CM

## 2022-08-18 LAB
BASOPHILS # BLD AUTO: 0.01 X10(3) UL (ref 0–0.2)
BASOPHILS NFR BLD AUTO: 0.2 %
DEPRECATED HBV CORE AB SER IA-ACNC: 41.4 NG/ML
DEPRECATED RDW RBC AUTO: 48.8 FL (ref 35.1–46.3)
EOSINOPHIL # BLD AUTO: 0.11 X10(3) UL (ref 0–0.7)
EOSINOPHIL NFR BLD AUTO: 2 %
ERYTHROCYTE [DISTWIDTH] IN BLOOD BY AUTOMATED COUNT: 14.2 % (ref 11–15)
HCT VFR BLD AUTO: 31.9 %
HGB BLD-MCNC: 10.4 G/DL
IMM GRANULOCYTES # BLD AUTO: 0.02 X10(3) UL (ref 0–1)
IMM GRANULOCYTES NFR BLD: 0.4 %
IRON SATN MFR SERPL: 13 %
IRON SERPL-MCNC: 48 UG/DL
LYMPHOCYTES # BLD AUTO: 1.18 X10(3) UL (ref 1–4)
LYMPHOCYTES NFR BLD AUTO: 21 %
MCH RBC QN AUTO: 30.8 PG (ref 26–34)
MCHC RBC AUTO-ENTMCNC: 32.6 G/DL (ref 31–37)
MCV RBC AUTO: 94.4 FL
MONOCYTES # BLD AUTO: 0.32 X10(3) UL (ref 0.1–1)
MONOCYTES NFR BLD AUTO: 5.7 %
NEUTROPHILS # BLD AUTO: 3.97 X10 (3) UL (ref 1.5–7.7)
NEUTROPHILS # BLD AUTO: 3.97 X10(3) UL (ref 1.5–7.7)
NEUTROPHILS NFR BLD AUTO: 70.7 %
PLATELET # BLD AUTO: 276 10(3)UL (ref 150–450)
RBC # BLD AUTO: 3.38 X10(6)UL
TIBC SERPL-MCNC: 368 UG/DL (ref 240–450)
TRANSFERRIN SERPL-MCNC: 247 MG/DL (ref 200–360)
WBC # BLD AUTO: 5.6 X10(3) UL (ref 4–11)

## 2022-08-18 PROCEDURE — 83540 ASSAY OF IRON: CPT

## 2022-08-18 PROCEDURE — 82728 ASSAY OF FERRITIN: CPT

## 2022-08-18 PROCEDURE — 36415 COLL VENOUS BLD VENIPUNCTURE: CPT

## 2022-08-18 PROCEDURE — 84466 ASSAY OF TRANSFERRIN: CPT

## 2022-08-18 PROCEDURE — 85025 COMPLETE CBC W/AUTO DIFF WBC: CPT

## 2022-08-18 PROCEDURE — 99213 OFFICE O/P EST LOW 20 MIN: CPT | Performed by: NURSE PRACTITIONER

## 2022-08-22 ENCOUNTER — IMMUNIZATION (OUTPATIENT)
Dept: LAB | Age: 82
End: 2022-08-22
Attending: EMERGENCY MEDICINE
Payer: MEDICARE

## 2022-08-22 DIAGNOSIS — Z23 NEED FOR VACCINATION: Primary | ICD-10-CM

## 2022-08-22 PROCEDURE — 0064A SARSCOV2 VAC 50MCG/0.25ML IM: CPT

## 2022-09-01 DIAGNOSIS — E11.9 DIABETES MELLITUS TYPE 2 WITHOUT RETINOPATHY (HCC): ICD-10-CM

## 2022-09-01 DIAGNOSIS — I10 ESSENTIAL HYPERTENSION: ICD-10-CM

## 2022-09-02 RX ORDER — TELMISARTAN 40 MG/1
TABLET ORAL
Qty: 90 TABLET | Refills: 1 | Status: SHIPPED | OUTPATIENT
Start: 2022-09-02

## 2022-09-02 RX ORDER — SITAGLIPTIN 100 MG/1
TABLET, FILM COATED ORAL
Qty: 90 TABLET | Refills: 0 | Status: SHIPPED | OUTPATIENT
Start: 2022-09-02

## 2022-09-06 ENCOUNTER — TELEPHONE (OUTPATIENT)
Dept: FAMILY MEDICINE CLINIC | Facility: CLINIC | Age: 82
End: 2022-09-06

## 2022-09-06 ENCOUNTER — OFFICE VISIT (OUTPATIENT)
Dept: OPHTHALMOLOGY | Facility: CLINIC | Age: 82
End: 2022-09-06
Payer: MEDICARE

## 2022-09-06 DIAGNOSIS — E11.9 DIABETES MELLITUS TYPE 2 WITHOUT RETINOPATHY (HCC): Primary | ICD-10-CM

## 2022-09-06 DIAGNOSIS — H25.13 AGE-RELATED NUCLEAR CATARACT OF BOTH EYES: ICD-10-CM

## 2022-09-06 PROCEDURE — 92015 DETERMINE REFRACTIVE STATE: CPT | Performed by: OPHTHALMOLOGY

## 2022-09-06 PROCEDURE — 92014 COMPRE OPH EXAM EST PT 1/>: CPT | Performed by: OPHTHALMOLOGY

## 2022-09-06 NOTE — TELEPHONE ENCOUNTER
[facility] called stating patient is requesting a [employer] True [employer] air meter kit. Would like to discuss with staff for verbal orders.     Fax - [phone]

## 2022-09-06 NOTE — ASSESSMENT & PLAN NOTE
Diabetes type II: no background of retinopathy, no signs of neovascularization noted. Discussed ocular and systemic benefits of blood sugar control. Diagnosis and treatment discussed in detail with patient.     Will see patient in 1 year for a diabetic exam

## 2022-09-06 NOTE — ASSESSMENT & PLAN NOTE
Discussed early cataracts with patient. No treatment recommended at this time.      New glasses today; recommend update because patient wants flat top bifocals

## 2022-09-06 NOTE — TELEPHONE ENCOUNTER
Verbal/ Aloma Leak order provided to pharmacist, Rosario Hernandez regarding note below. Ordered test strips and lancets as well.

## 2022-09-06 NOTE — PATIENT INSTRUCTIONS
Diabetes mellitus type 2 without retinopathy ([facility] [address]  Diabetes type II: no background of retinopathy, no signs of neovascularization noted. Discussed ocular and systemic benefits of blood sugar control. Diagnosis and treatment discussed in detail with patient. Will see patient in 1 year for a diabetic exam    Age-related nuclear cataract of both eyes  Discussed early cataracts with patient. No treatment recommended at this time.      New glasses today; recommend update because patient wants flat top bifocals

## 2022-09-08 NOTE — PROGRESS NOTES
Pt presents to infusion today for IV venofer 2 of 5. Pt appears well and denies any shortness of breath, dizziness or light headedness. PIV started in right ac, attempt X1, blood return noted. IV venofer given as slow IV push.  Pt observed post infusio Pt owns a straight cane and it's in good working condition./rolling walker/toileting

## 2022-10-02 RX ORDER — OXYBUTYNIN CHLORIDE 10 MG/1
10 TABLET, EXTENDED RELEASE ORAL DAILY
Qty: 90 TABLET | Refills: 1 | Status: SHIPPED | OUTPATIENT
Start: 2022-10-02 | End: 2023-02-24

## 2022-10-02 NOTE — TELEPHONE ENCOUNTER
Refill passed per [place] [facility] protocol. Requested Prescriptions   Pending Prescriptions Disp Refills    OXYBUTYNIN ER 10 MG Oral Tablet 24 Hr [Pharmacy Med Name: OXYBUTYNIN ER 10MG TABLETS] 90 tablet 0     Sig: TAKE 1 TABLET(10 MG) BY MOUTH DAILY        Genitourinary Medications Passed - 10/1/2022 10:46 AM        Passed - Patient does not have pulmonary hypertension on problem list        Passed - In person appointment or virtual visit in the past 12 mos or appointment in next 3 mos       Recent Outpatient Visits              3 weeks ago Diabetes mellitus type 2 without retinopathy [facility])    [facility] Ophthalmology Ghulam Tillman MD    Office Visit    1 month ago Other iron deficiency anemia    [facility] Hematology Oncology Memory MAHAD Gaston    Office Visit    1 month ago Other iron deficiency [facility]    Nurse Only    4 months ago Impaired intestinal absorption    [address] Visit    4 months ago Impaired intestinal absorption    [address] [facility] [address] [facility] [address] - Infusion    Office Visit     Future Appointments         Provider Department Appt Notes    In 1 month [address] Hematology Oncology FT LAB. PIV.CL  iron ferritin cbc    In 1 month [facility] [address] [facility] Hematology Oncology FOLLOW UP VISIT. CL  3M per Sharbrenna Magallon                     Recent Outpatient Visits              3 weeks ago Diabetes mellitus type 2 without retinopathy [facility])    [facility] Ophthalmology Ghulam Tillman MD    Office Visit    1 month ago Other iron deficiency anemia    [facility] Hematology Oncology Memory MAHAD Gaston    Office Visit    1 month ago Other iron deficiency anemia    [facility] Hematology Oncology    Nurse Only    4 months ago Impaired intestinal absorption    [address] Visit    4 months ago Impaired intestinal absorption    [address] - Infusion    Office Visit            Future Appointments         Provider Department Appt Notes    In 1 month [address] Hematology Oncology FT LAB. PIV.CL  iron ferritin cbc    In 1 month [facility] [address] [facility] Hematology Oncology FOLLOW UP VISIT. CL  3M per Justino Cota

## 2022-10-07 ENCOUNTER — OFFICE VISIT (OUTPATIENT)
Dept: FAMILY MEDICINE CLINIC | Facility: CLINIC | Age: 82
End: 2022-10-07
Payer: MEDICARE

## 2022-10-07 VITALS
HEIGHT: 56 IN | HEART RATE: 71 BPM | SYSTOLIC BLOOD PRESSURE: 149 MMHG | WEIGHT: 121 LBS | BODY MASS INDEX: 27.22 KG/M2 | DIASTOLIC BLOOD PRESSURE: 68 MMHG

## 2022-10-07 DIAGNOSIS — M79.661 PAIN IN RIGHT LOWER LEG: ICD-10-CM

## 2022-10-07 DIAGNOSIS — Z23 NEED FOR VACCINATION: Primary | ICD-10-CM

## 2022-10-07 DIAGNOSIS — H91.93 BILATERAL HEARING LOSS, UNSPECIFIED HEARING LOSS TYPE: ICD-10-CM

## 2022-10-07 PROCEDURE — G0008 ADMIN INFLUENZA VIRUS VAC: HCPCS | Performed by: NURSE PRACTITIONER

## 2022-10-07 PROCEDURE — 3078F DIAST BP <80 MM HG: CPT | Performed by: NURSE PRACTITIONER

## 2022-10-07 PROCEDURE — 99213 OFFICE O/P EST LOW 20 MIN: CPT | Performed by: NURSE PRACTITIONER

## 2022-10-07 PROCEDURE — 90662 IIV NO PRSV INCREASED AG IM: CPT | Performed by: NURSE PRACTITIONER

## 2022-10-07 PROCEDURE — 3077F SYST BP >= 140 MM HG: CPT | Performed by: NURSE PRACTITIONER

## 2022-10-07 PROCEDURE — 3008F BODY MASS INDEX DOCD: CPT | Performed by: NURSE PRACTITIONER

## 2022-10-07 NOTE — ASSESSMENT & PLAN NOTE
Pt wears bilat hearing aids and can't hear well with them in   Ear canals are clear  Refer ENT for adjustment of hearing aids

## 2022-10-07 NOTE — ASSESSMENT & PLAN NOTE
Right lower ext ultrasound reviewed w pt and   Pt is very nons-specific as to what worsens or improves pain  Has not tried any treatment  Advised to take tyelnol 2 325 mg tabs three times per day as needed  Refer podiatry for evaluation-may need PT or ortho eval

## 2022-10-18 ENCOUNTER — OFFICE VISIT (OUTPATIENT)
Dept: OTOLARYNGOLOGY | Facility: CLINIC | Age: 82
End: 2022-10-18
Payer: MEDICARE

## 2022-10-18 ENCOUNTER — OFFICE VISIT (OUTPATIENT)
Dept: AUDIOLOGY | Facility: CLINIC | Age: 82
End: 2022-10-18
Payer: MEDICARE

## 2022-10-18 VITALS — TEMPERATURE: 97 F

## 2022-10-18 DIAGNOSIS — H90.3 SENSORINEURAL HEARING LOSS (SNHL) OF BOTH EARS: Primary | ICD-10-CM

## 2022-10-18 DIAGNOSIS — H90.3 SENSORINEURAL HEARING LOSS, BILATERAL: Primary | ICD-10-CM

## 2022-10-18 PROCEDURE — 92567 TYMPANOMETRY: CPT | Performed by: AUDIOLOGIST

## 2022-10-18 PROCEDURE — 99213 OFFICE O/P EST LOW 20 MIN: CPT | Performed by: OTOLARYNGOLOGY

## 2022-10-18 PROCEDURE — 92557 COMPREHENSIVE HEARING TEST: CPT | Performed by: AUDIOLOGIST

## 2022-10-28 ENCOUNTER — HOSPITAL ENCOUNTER (OUTPATIENT)
Dept: GENERAL RADIOLOGY | Age: 82
Discharge: HOME | End: 2022-10-28
Attending: PODIATRIST
Payer: MEDICARE

## 2022-10-28 ENCOUNTER — OFFICE VISIT (OUTPATIENT)
Dept: PODIATRY CLINIC | Facility: CLINIC | Age: 82
End: 2022-10-28
Payer: MEDICARE

## 2022-10-28 DIAGNOSIS — M25.571 CHRONIC PAIN OF RIGHT ANKLE: Primary | ICD-10-CM

## 2022-10-28 DIAGNOSIS — R26.81 GAIT INSTABILITY: ICD-10-CM

## 2022-10-28 DIAGNOSIS — M76.71 PERONEAL TENDONITIS OF RIGHT LOWER EXTREMITY: ICD-10-CM

## 2022-10-28 DIAGNOSIS — M25.571 CHRONIC PAIN OF RIGHT ANKLE: ICD-10-CM

## 2022-10-28 DIAGNOSIS — G89.29 CHRONIC PAIN OF RIGHT ANKLE: Primary | ICD-10-CM

## 2022-10-28 DIAGNOSIS — G89.29 CHRONIC PAIN OF RIGHT ANKLE: ICD-10-CM

## 2022-10-28 DIAGNOSIS — M21.6X1 EQUINUS DEFORMITY OF RIGHT FOOT: ICD-10-CM

## 2022-10-28 PROCEDURE — 73610 X-RAY EXAM OF ANKLE: CPT | Performed by: PODIATRIST

## 2022-10-28 PROCEDURE — 1125F AMNT PAIN NOTED PAIN PRSNT: CPT | Performed by: PODIATRIST

## 2022-10-28 PROCEDURE — 99203 OFFICE O/P NEW LOW 30 MIN: CPT | Performed by: PODIATRIST

## 2022-11-10 ENCOUNTER — TELEPHONE (OUTPATIENT)
Dept: CASE MANAGEMENT | Age: 82
End: 2022-11-10

## 2022-11-10 ENCOUNTER — TELEPHONE (OUTPATIENT)
Dept: FAMILY MEDICINE CLINIC | Facility: CLINIC | Age: 82
End: 2022-11-10

## 2022-11-10 DIAGNOSIS — H91.93 BILATERAL HEARING LOSS, UNSPECIFIED HEARING LOSS TYPE: Primary | ICD-10-CM

## 2022-11-10 NOTE — TELEPHONE ENCOUNTER
Dr. Mannie Riedel,    The patient's daughter is requesting an order for hearing aides at [address] [place] [address] referral please review diagnosis and sign off if you agree. Thank you.   Conrado Nevarez

## 2022-11-10 NOTE — TELEPHONE ENCOUNTER
Triage Support, please assist with requested referral, is this under DME? Thank you      Daughter states patient has appointment this afternoon at [facility] to receive new hearing aids. Per [facility], referral needs to be from PCP (not Dr. Jomar Garcia)    Also states \"they need the referral from two years ago, can it be back-dated\".   Audiology referral from 8/13/20 faxed to [place] per request ([phone])

## 2022-11-18 ENCOUNTER — OFFICE VISIT (OUTPATIENT)
Dept: HEMATOLOGY/ONCOLOGY | Facility: HOSPITAL | Age: 82
End: 2022-11-18
Attending: INTERNAL MEDICINE
Payer: MEDICARE

## 2022-11-18 VITALS
TEMPERATURE: 99 F | SYSTOLIC BLOOD PRESSURE: 152 MMHG | OXYGEN SATURATION: 100 % | HEIGHT: 56 IN | RESPIRATION RATE: 16 BRPM | DIASTOLIC BLOOD PRESSURE: 54 MMHG | WEIGHT: 121 LBS | BODY MASS INDEX: 27.22 KG/M2 | HEART RATE: 77 BPM

## 2022-11-18 DIAGNOSIS — D63.8 ANEMIA, CHRONIC DISEASE: ICD-10-CM

## 2022-11-18 DIAGNOSIS — N18.2 ANEMIA IN STAGE 2 CHRONIC KIDNEY DISEASE: ICD-10-CM

## 2022-11-18 DIAGNOSIS — D63.1 ANEMIA IN STAGE 2 CHRONIC KIDNEY DISEASE: ICD-10-CM

## 2022-11-18 DIAGNOSIS — D50.8 OTHER IRON DEFICIENCY ANEMIA: Primary | ICD-10-CM

## 2022-11-18 DIAGNOSIS — D50.8 OTHER IRON DEFICIENCY ANEMIA: ICD-10-CM

## 2022-11-18 LAB
BASOPHILS # BLD AUTO: 0.02 X10(3) UL (ref 0–0.2)
BASOPHILS NFR BLD AUTO: 0.4 %
DEPRECATED HBV CORE AB SER IA-ACNC: 24.4 NG/ML
DEPRECATED RDW RBC AUTO: 47.6 FL (ref 35.1–46.3)
EOSINOPHIL # BLD AUTO: 0.17 X10(3) UL (ref 0–0.7)
EOSINOPHIL NFR BLD AUTO: 3.1 %
ERYTHROCYTE [DISTWIDTH] IN BLOOD BY AUTOMATED COUNT: 14 % (ref 11–15)
HCT VFR BLD AUTO: 34.7 %
HGB BLD-MCNC: 11.3 G/DL
IMM GRANULOCYTES # BLD AUTO: 0.02 X10(3) UL (ref 0–1)
IMM GRANULOCYTES NFR BLD: 0.4 %
IRON SATN MFR SERPL: 10 %
IRON SERPL-MCNC: 42 UG/DL
LYMPHOCYTES # BLD AUTO: 1.35 X10(3) UL (ref 1–4)
LYMPHOCYTES NFR BLD AUTO: 25 %
MCH RBC QN AUTO: 30.4 PG (ref 26–34)
MCHC RBC AUTO-ENTMCNC: 32.6 G/DL (ref 31–37)
MCV RBC AUTO: 93.3 FL
MONOCYTES # BLD AUTO: 0.48 X10(3) UL (ref 0.1–1)
MONOCYTES NFR BLD AUTO: 8.9 %
NEUTROPHILS # BLD AUTO: 3.36 X10 (3) UL (ref 1.5–7.7)
NEUTROPHILS # BLD AUTO: 3.36 X10(3) UL (ref 1.5–7.7)
NEUTROPHILS NFR BLD AUTO: 62.2 %
PLATELET # BLD AUTO: 244 10(3)UL (ref 150–450)
RBC # BLD AUTO: 3.72 X10(6)UL
TIBC SERPL-MCNC: 408 UG/DL (ref 240–450)
TRANSFERRIN SERPL-MCNC: 274 MG/DL (ref 200–360)
WBC # BLD AUTO: 5.4 X10(3) UL (ref 4–11)

## 2022-11-18 PROCEDURE — 85025 COMPLETE CBC W/AUTO DIFF WBC: CPT

## 2022-11-18 PROCEDURE — 84466 ASSAY OF TRANSFERRIN: CPT

## 2022-11-18 PROCEDURE — 82728 ASSAY OF FERRITIN: CPT

## 2022-11-18 PROCEDURE — 36415 COLL VENOUS BLD VENIPUNCTURE: CPT

## 2022-11-18 PROCEDURE — 99213 OFFICE O/P EST LOW 20 MIN: CPT | Performed by: INTERNAL MEDICINE

## 2022-11-18 PROCEDURE — 83540 ASSAY OF IRON: CPT

## 2022-12-01 DIAGNOSIS — E78.5 HYPERLIPIDEMIA, UNSPECIFIED HYPERLIPIDEMIA TYPE: ICD-10-CM

## 2022-12-01 DIAGNOSIS — E11.9 DIABETES MELLITUS TYPE 2 WITHOUT RETINOPATHY (HCC): ICD-10-CM

## 2022-12-05 RX ORDER — SITAGLIPTIN 100 MG/1
TABLET, FILM COATED ORAL
Qty: 90 TABLET | Refills: 0 | Status: SHIPPED | OUTPATIENT
Start: 2022-12-05

## 2022-12-05 RX ORDER — ATORVASTATIN CALCIUM 20 MG/1
TABLET, FILM COATED ORAL
Qty: 90 TABLET | Refills: 2 | Status: SHIPPED | OUTPATIENT
Start: 2022-12-05

## 2023-02-24 ENCOUNTER — TELEPHONE (OUTPATIENT)
Dept: FAMILY MEDICINE CLINIC | Facility: CLINIC | Age: 83
End: 2023-02-24

## 2023-02-24 DIAGNOSIS — E78.5 HYPERLIPIDEMIA, UNSPECIFIED HYPERLIPIDEMIA TYPE: ICD-10-CM

## 2023-02-24 DIAGNOSIS — E11.9 DIABETES MELLITUS TYPE 2 WITHOUT RETINOPATHY (HCC): ICD-10-CM

## 2023-02-24 DIAGNOSIS — I10 ESSENTIAL HYPERTENSION: ICD-10-CM

## 2023-02-24 RX ORDER — ERGOCALCIFEROL 1.25 MG/1
50000 CAPSULE ORAL WEEKLY
Qty: 12 CAPSULE | Refills: 1 | Status: SHIPPED | OUTPATIENT
Start: 2023-02-24

## 2023-02-24 RX ORDER — PANTOPRAZOLE SODIUM 40 MG/1
40 TABLET, DELAYED RELEASE ORAL
Qty: 90 TABLET | Refills: 0 | Status: SHIPPED | OUTPATIENT
Start: 2023-02-24

## 2023-02-24 RX ORDER — ATORVASTATIN CALCIUM 20 MG/1
20 TABLET, FILM COATED ORAL DAILY
Qty: 90 TABLET | Refills: 1 | Status: SHIPPED | OUTPATIENT
Start: 2023-02-24

## 2023-02-24 RX ORDER — OXYBUTYNIN CHLORIDE 10 MG/1
10 TABLET, EXTENDED RELEASE ORAL DAILY
Qty: 90 TABLET | Refills: 1 | Status: SHIPPED | OUTPATIENT
Start: 2023-02-24

## 2023-02-24 RX ORDER — CLOPIDOGREL BISULFATE 75 MG/1
75 TABLET ORAL DAILY
Qty: 90 TABLET | Refills: 1 | Status: SHIPPED | OUTPATIENT
Start: 2023-02-24

## 2023-02-24 RX ORDER — TELMISARTAN 40 MG/1
40 TABLET ORAL DAILY
Qty: 90 TABLET | Refills: 1 | Status: SHIPPED | OUTPATIENT
Start: 2023-02-24

## 2023-03-07 ENCOUNTER — OFFICE VISIT (OUTPATIENT)
Dept: FAMILY MEDICINE CLINIC | Facility: CLINIC | Age: 83
End: 2023-03-07

## 2023-03-07 VITALS
DIASTOLIC BLOOD PRESSURE: 77 MMHG | BODY MASS INDEX: 28 KG/M2 | TEMPERATURE: 99 F | SYSTOLIC BLOOD PRESSURE: 156 MMHG | WEIGHT: 124 LBS | HEART RATE: 72 BPM

## 2023-03-07 DIAGNOSIS — F33.0 MILD EPISODE OF RECURRENT MAJOR DEPRESSIVE DISORDER (HCC): ICD-10-CM

## 2023-03-07 DIAGNOSIS — I25.118 CORONARY ARTERY DISEASE OF NATIVE HEART WITH STABLE ANGINA PECTORIS, UNSPECIFIED VESSEL OR LESION TYPE (HCC): ICD-10-CM

## 2023-03-07 DIAGNOSIS — I70.0 ATHEROSCLEROSIS OF AORTA (HCC): ICD-10-CM

## 2023-03-07 DIAGNOSIS — G89.29 CHRONIC PAIN OF RIGHT ANKLE: ICD-10-CM

## 2023-03-07 DIAGNOSIS — M79.661 PAIN IN RIGHT LOWER LEG: ICD-10-CM

## 2023-03-07 DIAGNOSIS — R01.1 MURMUR: ICD-10-CM

## 2023-03-07 DIAGNOSIS — E11.22 TYPE 2 DIABETES MELLITUS WITH STAGE 3B CHRONIC KIDNEY DISEASE, WITHOUT LONG-TERM CURRENT USE OF INSULIN (HCC): ICD-10-CM

## 2023-03-07 DIAGNOSIS — H02.886 MEIBOMIAN GLAND DYSFUNCTION (MGD) OF BOTH EYES: ICD-10-CM

## 2023-03-07 DIAGNOSIS — H02.883 MEIBOMIAN GLAND DYSFUNCTION (MGD) OF BOTH EYES: ICD-10-CM

## 2023-03-07 DIAGNOSIS — E11.9 DIABETES MELLITUS TYPE 2 WITHOUT RETINOPATHY (HCC): ICD-10-CM

## 2023-03-07 DIAGNOSIS — I73.9 PERIPHERAL VASCULAR DISEASE (HCC): ICD-10-CM

## 2023-03-07 DIAGNOSIS — M25.571 CHRONIC PAIN OF RIGHT ANKLE: ICD-10-CM

## 2023-03-07 DIAGNOSIS — K31.819 GAVE (GASTRIC ANTRAL VASCULAR ECTASIA): ICD-10-CM

## 2023-03-07 DIAGNOSIS — E55.9 VITAMIN D DEFICIENCY: ICD-10-CM

## 2023-03-07 DIAGNOSIS — E11.22 TYPE 2 DIABETES MELLITUS WITH STAGE 3A CHRONIC KIDNEY DISEASE, WITHOUT LONG-TERM CURRENT USE OF INSULIN (HCC): ICD-10-CM

## 2023-03-07 DIAGNOSIS — R09.89 BILATERAL CAROTID BRUITS: ICD-10-CM

## 2023-03-07 DIAGNOSIS — M85.89 OSTEOPENIA OF MULTIPLE SITES: ICD-10-CM

## 2023-03-07 DIAGNOSIS — I10 ESSENTIAL HYPERTENSION: ICD-10-CM

## 2023-03-07 DIAGNOSIS — K90.9 IMPAIRED INTESTINAL ABSORPTION: ICD-10-CM

## 2023-03-07 DIAGNOSIS — H90.3 SENSORINEURAL HEARING LOSS (SNHL) OF BOTH EARS: ICD-10-CM

## 2023-03-07 DIAGNOSIS — N18.32 TYPE 2 DIABETES MELLITUS WITH STAGE 3B CHRONIC KIDNEY DISEASE, WITHOUT LONG-TERM CURRENT USE OF INSULIN (HCC): ICD-10-CM

## 2023-03-07 DIAGNOSIS — H25.13 AGE-RELATED NUCLEAR CATARACT OF BOTH EYES: ICD-10-CM

## 2023-03-07 DIAGNOSIS — E22.2 SIADH (SYNDROME OF INAPPROPRIATE ADH PRODUCTION) (HCC): ICD-10-CM

## 2023-03-07 DIAGNOSIS — D63.8 ANEMIA, CHRONIC DISEASE: ICD-10-CM

## 2023-03-07 DIAGNOSIS — N18.31 TYPE 2 DIABETES MELLITUS WITH STAGE 3A CHRONIC KIDNEY DISEASE, WITHOUT LONG-TERM CURRENT USE OF INSULIN (HCC): ICD-10-CM

## 2023-03-07 DIAGNOSIS — D50.8 OTHER IRON DEFICIENCY ANEMIA: ICD-10-CM

## 2023-03-07 DIAGNOSIS — Z00.00 ENCOUNTER FOR ANNUAL HEALTH EXAMINATION: Primary | ICD-10-CM

## 2023-03-07 PROBLEM — H91.93 BILATERAL HEARING LOSS: Status: RESOLVED | Noted: 2022-10-07 | Resolved: 2023-03-07

## 2023-03-07 PROBLEM — Z23 NEED FOR VACCINATION: Status: RESOLVED | Noted: 2022-10-07 | Resolved: 2023-03-07

## 2023-03-07 RX ORDER — TELMISARTAN 80 MG/1
80 TABLET ORAL DAILY
Qty: 90 TABLET | Refills: 3 | Status: SHIPPED | OUTPATIENT
Start: 2023-04-10

## 2023-03-07 NOTE — PATIENT INSTRUCTIONS
PARA LE PRESION TOME 80 MG DE TELMISARTAN - PUEDE NILESH DOS TABLETAS DE 40 MG MIENTRAS  LE MANDE LUKE RECETA NUEVA DEL TELMISARTAN 80 MG PARA QUE SE LA SURTEN EN EL [facility] 4/10/23

## 2023-03-08 ENCOUNTER — LAB ENCOUNTER (OUTPATIENT)
Dept: LAB | Age: 83
End: 2023-03-08
Attending: FAMILY MEDICINE
Payer: MEDICARE

## 2023-03-08 LAB
ALBUMIN SERPL-MCNC: 3.9 G/DL (ref 3.4–5)
ALBUMIN/GLOB SERPL: 1.2 {RATIO} (ref 1–2)
ALP LIVER SERPL-CCNC: 72 U/L
ALT SERPL-CCNC: 30 U/L
ANION GAP SERPL CALC-SCNC: 4 MMOL/L (ref 0–18)
AST SERPL-CCNC: 22 U/L (ref 15–37)
BASOPHILS # BLD AUTO: 0.01 X10(3) UL (ref 0–0.2)
BASOPHILS NFR BLD AUTO: 0.2 %
BILIRUB SERPL-MCNC: 0.4 MG/DL (ref 0.1–2)
BUN BLD-MCNC: 15 MG/DL (ref 7–18)
BUN/CREAT SERPL: 14.4 (ref 10–20)
CALCIUM BLD-MCNC: 9.4 MG/DL (ref 8.5–10.1)
CHLORIDE SERPL-SCNC: 98 MMOL/L (ref 98–112)
CHOLEST SERPL-MCNC: 153 MG/DL (ref ?–200)
CO2 SERPL-SCNC: 29 MMOL/L (ref 21–32)
CREAT BLD-MCNC: 1.04 MG/DL
CREAT UR-SCNC: 56 MG/DL
DEPRECATED RDW RBC AUTO: 47 FL (ref 35.1–46.3)
EOSINOPHIL # BLD AUTO: 0.18 X10(3) UL (ref 0–0.7)
EOSINOPHIL NFR BLD AUTO: 3.4 %
ERYTHROCYTE [DISTWIDTH] IN BLOOD BY AUTOMATED COUNT: 13.6 % (ref 11–15)
EST. AVERAGE GLUCOSE BLD GHB EST-MCNC: 134 MG/DL (ref 68–126)
FASTING PATIENT LIPID ANSWER: YES
FASTING STATUS PATIENT QL REPORTED: YES
GFR SERPLBLD BASED ON 1.73 SQ M-ARVRAT: 54 ML/MIN/1.73M2 (ref 60–?)
GLOBULIN PLAS-MCNC: 3.2 G/DL (ref 2.8–4.4)
GLUCOSE BLD-MCNC: 197 MG/DL (ref 70–99)
HBA1C MFR BLD: 6.3 % (ref ?–5.7)
HCT VFR BLD AUTO: 32 %
HDLC SERPL-MCNC: 50 MG/DL (ref 40–59)
HGB BLD-MCNC: 10.4 G/DL
IMM GRANULOCYTES # BLD AUTO: 0.01 X10(3) UL (ref 0–1)
IMM GRANULOCYTES NFR BLD: 0.2 %
LDLC SERPL CALC-MCNC: 53 MG/DL (ref ?–100)
LYMPHOCYTES # BLD AUTO: 1.1 X10(3) UL (ref 1–4)
LYMPHOCYTES NFR BLD AUTO: 20.8 %
MCH RBC QN AUTO: 31 PG (ref 26–34)
MCHC RBC AUTO-ENTMCNC: 32.5 G/DL (ref 31–37)
MCV RBC AUTO: 95.5 FL
MICROALBUMIN UR-MCNC: 7 MG/DL
MICROALBUMIN/CREAT 24H UR-RTO: 125 UG/MG (ref ?–30)
MONOCYTES # BLD AUTO: 0.35 X10(3) UL (ref 0.1–1)
MONOCYTES NFR BLD AUTO: 6.6 %
NEUTROPHILS # BLD AUTO: 3.64 X10 (3) UL (ref 1.5–7.7)
NEUTROPHILS # BLD AUTO: 3.64 X10(3) UL (ref 1.5–7.7)
NEUTROPHILS NFR BLD AUTO: 68.8 %
NONHDLC SERPL-MCNC: 103 MG/DL (ref ?–130)
OSMOLALITY SERPL CALC.SUM OF ELEC: 278 MOSM/KG (ref 275–295)
PLATELET # BLD AUTO: 255 10(3)UL (ref 150–450)
POTASSIUM SERPL-SCNC: 4.5 MMOL/L (ref 3.5–5.1)
PROT SERPL-MCNC: 7.1 G/DL (ref 6.4–8.2)
RBC # BLD AUTO: 3.35 X10(6)UL
SODIUM SERPL-SCNC: 131 MMOL/L (ref 136–145)
TRIGL SERPL-MCNC: 326 MG/DL (ref 30–149)
TSI SER-ACNC: 2.17 MIU/ML (ref 0.36–3.74)
VLDLC SERPL CALC-MCNC: 47 MG/DL (ref 0–30)
WBC # BLD AUTO: 5.3 X10(3) UL (ref 4–11)

## 2023-03-08 PROCEDURE — 85025 COMPLETE CBC W/AUTO DIFF WBC: CPT | Performed by: FAMILY MEDICINE

## 2023-03-08 PROCEDURE — 84443 ASSAY THYROID STIM HORMONE: CPT | Performed by: FAMILY MEDICINE

## 2023-03-08 PROCEDURE — 83036 HEMOGLOBIN GLYCOSYLATED A1C: CPT | Performed by: FAMILY MEDICINE

## 2023-03-08 PROCEDURE — 80061 LIPID PANEL: CPT | Performed by: FAMILY MEDICINE

## 2023-03-08 PROCEDURE — 82570 ASSAY OF URINE CREATININE: CPT | Performed by: FAMILY MEDICINE

## 2023-03-08 PROCEDURE — 80053 COMPREHEN METABOLIC PANEL: CPT | Performed by: FAMILY MEDICINE

## 2023-03-08 PROCEDURE — 36415 COLL VENOUS BLD VENIPUNCTURE: CPT | Performed by: FAMILY MEDICINE

## 2023-03-08 PROCEDURE — 82043 UR ALBUMIN QUANTITATIVE: CPT | Performed by: FAMILY MEDICINE

## 2023-03-15 ENCOUNTER — TELEPHONE (OUTPATIENT)
Dept: FAMILY MEDICINE CLINIC | Facility: CLINIC | Age: 83
End: 2023-03-15

## 2023-03-15 PROBLEM — F33.0 MILD EPISODE OF RECURRENT MAJOR DEPRESSIVE DISORDER: Status: ACTIVE | Noted: 2023-03-15

## 2023-03-15 PROBLEM — G89.29 CHRONIC PAIN OF RIGHT ANKLE: Status: ACTIVE | Noted: 2023-03-15

## 2023-03-15 PROBLEM — F32.4 MAJOR DEPRESSION IN PARTIAL REMISSION: Chronic | Status: RESOLVED | Noted: 2017-06-29 | Resolved: 2023-03-15

## 2023-03-15 PROBLEM — F32.4 MAJOR DEPRESSION IN PARTIAL REMISSION (HCC): Chronic | Status: RESOLVED | Noted: 2017-06-29 | Resolved: 2023-03-15

## 2023-03-15 PROBLEM — F33.0 MILD EPISODE OF RECURRENT MAJOR DEPRESSIVE DISORDER (HCC): Status: ACTIVE | Noted: 2023-03-15

## 2023-03-15 PROBLEM — M25.571 CHRONIC PAIN OF RIGHT ANKLE: Status: ACTIVE | Noted: 2023-03-15

## 2023-03-15 NOTE — TELEPHONE ENCOUNTER
Managed care - status \"open\" do referrals for podiatry #[id number] and Tita Damon #[id number] need authorization?  - Thank you
Patient's chart shows PPO. PPO plans do not need referrals. Thank you.
Per patient request, please print and mail GI and podiatry referrals to patient's home address.   Thank you
Talked to Joseluis (VEE) told him message below via language line and understood.
Thank you! - [facility]    Phone call made to inform patient of message below per managed care, if she returns call please let her know she does not need referrals, however, I am mailing out the orders she requested.
Admission Reconciliation is Completed  Discharge Reconciliation is Completed

## 2023-04-07 ENCOUNTER — NURSE TRIAGE (OUTPATIENT)
Dept: FAMILY MEDICINE CLINIC | Facility: CLINIC | Age: 83
End: 2023-04-07

## 2023-04-07 DIAGNOSIS — U07.1 COVID: Primary | ICD-10-CM

## 2023-04-07 LAB — AMB EXT COVID-19 RESULT: DETECTED

## 2023-04-07 RX ORDER — NIRMATRELVIR AND RITONAVIR 300-100 MG
1 KIT ORAL DAILY
Qty: 30 EACH | Refills: 0 | Status: SHIPPED | OUTPATIENT
Start: 2023-04-07

## 2023-04-07 NOTE — TELEPHONE ENCOUNTER
Spoke with patient  Daughter Lazara Segal , (  Name and  verified ) informed of Dr. Ayaz Fischer instructions below    Advised to call back with any questions/ concerns     Patient daughter  verbalizes understanding and agrees with plan.

## 2023-04-26 ENCOUNTER — TELEPHONE (OUTPATIENT)
Facility: CLINIC | Age: 83
End: 2023-04-26

## 2023-04-26 NOTE — TELEPHONE ENCOUNTER
Patients daughter Mamadou Travis requesting sooner appointment than 8/9 for bowel issues. Please call at [phone],NEGRAZA.

## 2023-04-26 NOTE — TELEPHONE ENCOUNTER
I spoke to [place] (ok per Gilberto Connors)  She told me that Francesco Cuellar always has bowel issues. However, has more frequent diarrhea depending on what she eats. She recently had COVID  She wants her seen sooner than August.  Offered 5/11/23 appointment which was declined due to having a graduation to attend. Zuhair garcia accepted below appointment on patient's behalf and given detailed office directions.     Your Appointments       Tuesday May 30, 2023 11:00 AM  Follow Up Visit with Hailey Neri PA-C  [address], [address] [facility],[address], [place] ([facility]) [address] [facility],[address] Floors  [phone]

## 2023-05-08 ENCOUNTER — OFFICE VISIT (OUTPATIENT)
Dept: PODIATRY CLINIC | Facility: CLINIC | Age: 83
End: 2023-05-08

## 2023-05-08 DIAGNOSIS — M25.571 CHRONIC PAIN OF RIGHT ANKLE: Primary | ICD-10-CM

## 2023-05-08 DIAGNOSIS — M76.71 PERONEAL TENDONITIS OF RIGHT LOWER EXTREMITY: ICD-10-CM

## 2023-05-08 DIAGNOSIS — G89.29 CHRONIC PAIN OF RIGHT ANKLE: Primary | ICD-10-CM

## 2023-05-08 DIAGNOSIS — R26.81 GAIT INSTABILITY: ICD-10-CM

## 2023-05-08 DIAGNOSIS — M21.6X1 EQUINUS DEFORMITY OF RIGHT FOOT: ICD-10-CM

## 2023-05-08 PROCEDURE — 99213 OFFICE O/P EST LOW 20 MIN: CPT | Performed by: PODIATRIST

## 2023-05-08 PROCEDURE — 1159F MED LIST DOCD IN RCRD: CPT | Performed by: PODIATRIST

## 2023-05-08 PROCEDURE — 1125F AMNT PAIN NOTED PAIN PRSNT: CPT | Performed by: PODIATRIST

## 2023-05-16 DIAGNOSIS — D50.8 OTHER IRON DEFICIENCY ANEMIA: Primary | ICD-10-CM

## 2023-05-18 ENCOUNTER — OFFICE VISIT (OUTPATIENT)
Dept: HEMATOLOGY/ONCOLOGY | Facility: HOSPITAL | Age: 83
End: 2023-05-18
Attending: INTERNAL MEDICINE
Payer: MEDICARE

## 2023-05-18 VITALS
RESPIRATION RATE: 18 BRPM | OXYGEN SATURATION: 100 % | HEIGHT: 56 IN | TEMPERATURE: 99 F | SYSTOLIC BLOOD PRESSURE: 153 MMHG | BODY MASS INDEX: 27.22 KG/M2 | WEIGHT: 121 LBS | HEART RATE: 66 BPM | DIASTOLIC BLOOD PRESSURE: 46 MMHG

## 2023-05-18 DIAGNOSIS — D50.8 OTHER IRON DEFICIENCY ANEMIA: ICD-10-CM

## 2023-05-18 DIAGNOSIS — D50.8 OTHER IRON DEFICIENCY ANEMIA: Primary | ICD-10-CM

## 2023-05-18 LAB
BASOPHILS # BLD AUTO: 0.01 X10(3) UL (ref 0–0.2)
BASOPHILS NFR BLD AUTO: 0.2 %
DEPRECATED HBV CORE AB SER IA-ACNC: 26.5 NG/ML
DEPRECATED RDW RBC AUTO: 45.9 FL (ref 35.1–46.3)
EOSINOPHIL # BLD AUTO: 0.08 X10(3) UL (ref 0–0.7)
EOSINOPHIL NFR BLD AUTO: 1.5 %
ERYTHROCYTE [DISTWIDTH] IN BLOOD BY AUTOMATED COUNT: 13.1 % (ref 11–15)
HCT VFR BLD AUTO: 31.2 %
HGB BLD-MCNC: 10.3 G/DL
IMM GRANULOCYTES # BLD AUTO: 0.02 X10(3) UL (ref 0–1)
IMM GRANULOCYTES NFR BLD: 0.4 %
IRON SATN MFR SERPL: 5 %
IRON SERPL-MCNC: 23 UG/DL
LYMPHOCYTES # BLD AUTO: 1.23 X10(3) UL (ref 1–4)
LYMPHOCYTES NFR BLD AUTO: 23 %
MCH RBC QN AUTO: 31.5 PG (ref 26–34)
MCHC RBC AUTO-ENTMCNC: 33 G/DL (ref 31–37)
MCV RBC AUTO: 95.4 FL
MONOCYTES # BLD AUTO: 0.31 X10(3) UL (ref 0.1–1)
MONOCYTES NFR BLD AUTO: 5.8 %
NEUTROPHILS # BLD AUTO: 3.69 X10 (3) UL (ref 1.5–7.7)
NEUTROPHILS # BLD AUTO: 3.69 X10(3) UL (ref 1.5–7.7)
NEUTROPHILS NFR BLD AUTO: 69.1 %
PLATELET # BLD AUTO: 241 10(3)UL (ref 150–450)
RBC # BLD AUTO: 3.27 X10(6)UL
TIBC SERPL-MCNC: 420 UG/DL (ref 240–450)
TRANSFERRIN SERPL-MCNC: 282 MG/DL (ref 200–360)
WBC # BLD AUTO: 5.3 X10(3) UL (ref 4–11)

## 2023-05-18 PROCEDURE — 84466 ASSAY OF TRANSFERRIN: CPT

## 2023-05-18 PROCEDURE — 83540 ASSAY OF IRON: CPT

## 2023-05-18 PROCEDURE — 82728 ASSAY OF FERRITIN: CPT

## 2023-05-18 PROCEDURE — 36415 COLL VENOUS BLD VENIPUNCTURE: CPT

## 2023-05-18 PROCEDURE — 85025 COMPLETE CBC W/AUTO DIFF WBC: CPT

## 2023-05-18 PROCEDURE — 99215 OFFICE O/P EST HI 40 MIN: CPT | Performed by: INTERNAL MEDICINE

## 2023-05-19 ENCOUNTER — TELEPHONE (OUTPATIENT)
Dept: PHYSICAL THERAPY | Facility: HOSPITAL | Age: 83
End: 2023-05-19

## 2023-05-23 ENCOUNTER — OFFICE VISIT (OUTPATIENT)
Dept: PHYSICAL THERAPY | Facility: HOSPITAL | Age: 83
End: 2023-05-23
Attending: PODIATRIST
Payer: MEDICARE

## 2023-05-23 ENCOUNTER — TELEPHONE (OUTPATIENT)
Dept: PHYSICAL THERAPY | Facility: HOSPITAL | Age: 83
End: 2023-05-23

## 2023-05-23 DIAGNOSIS — M21.6X1 EQUINUS DEFORMITY OF RIGHT FOOT: ICD-10-CM

## 2023-05-23 DIAGNOSIS — R26.81 GAIT INSTABILITY: ICD-10-CM

## 2023-05-23 DIAGNOSIS — M25.571 CHRONIC PAIN OF RIGHT ANKLE: ICD-10-CM

## 2023-05-23 DIAGNOSIS — M76.71 PERONEAL TENDONITIS OF RIGHT LOWER EXTREMITY: ICD-10-CM

## 2023-05-23 DIAGNOSIS — G89.29 CHRONIC PAIN OF RIGHT ANKLE: ICD-10-CM

## 2023-05-23 PROCEDURE — 97162 PT EVAL MOD COMPLEX 30 MIN: CPT

## 2023-05-23 PROCEDURE — 97110 THERAPEUTIC EXERCISES: CPT

## 2023-06-01 ENCOUNTER — OFFICE VISIT (OUTPATIENT)
Dept: PHYSICAL THERAPY | Facility: HOSPITAL | Age: 83
End: 2023-06-01
Attending: FAMILY MEDICINE
Payer: MEDICARE

## 2023-06-01 ENCOUNTER — TELEPHONE (OUTPATIENT)
Dept: PHYSICAL THERAPY | Facility: HOSPITAL | Age: 83
End: 2023-06-01

## 2023-06-01 PROCEDURE — 97140 MANUAL THERAPY 1/> REGIONS: CPT

## 2023-06-01 PROCEDURE — 97110 THERAPEUTIC EXERCISES: CPT

## 2023-06-05 ENCOUNTER — OFFICE VISIT (OUTPATIENT)
Dept: PHYSICAL THERAPY | Facility: HOSPITAL | Age: 83
End: 2023-06-05
Attending: PODIATRIST
Payer: MEDICARE

## 2023-06-05 PROCEDURE — 97110 THERAPEUTIC EXERCISES: CPT

## 2023-06-05 PROCEDURE — 97140 MANUAL THERAPY 1/> REGIONS: CPT

## 2023-06-07 ENCOUNTER — OFFICE VISIT (OUTPATIENT)
Dept: HEMATOLOGY/ONCOLOGY | Facility: HOSPITAL | Age: 83
End: 2023-06-07
Attending: INTERNAL MEDICINE
Payer: MEDICARE

## 2023-06-07 ENCOUNTER — APPOINTMENT (OUTPATIENT)
Dept: PHYSICAL THERAPY | Facility: HOSPITAL | Age: 83
End: 2023-06-07
Attending: PODIATRIST
Payer: MEDICARE

## 2023-06-07 VITALS
RESPIRATION RATE: 18 BRPM | SYSTOLIC BLOOD PRESSURE: 158 MMHG | TEMPERATURE: 98 F | OXYGEN SATURATION: 99 % | HEART RATE: 68 BPM | DIASTOLIC BLOOD PRESSURE: 55 MMHG

## 2023-06-07 DIAGNOSIS — K90.9 IMPAIRED INTESTINAL ABSORPTION: Primary | ICD-10-CM

## 2023-06-07 DIAGNOSIS — D50.9 IRON DEFICIENCY ANEMIA, UNSPECIFIED IRON DEFICIENCY ANEMIA TYPE: ICD-10-CM

## 2023-06-07 PROCEDURE — 96366 THER/PROPH/DIAG IV INF ADDON: CPT

## 2023-06-07 PROCEDURE — 96365 THER/PROPH/DIAG IV INF INIT: CPT

## 2023-06-07 NOTE — PROGRESS NOTES
Pt arrived for Venofer 1 of 3. Yairis translated for patient. Discussed possible side effects. Given over 90 min. Observed for 30 min. Tolerated well. Discharged home ambulatory. Pt was given an AVS with future appointments.

## 2023-06-10 RX ORDER — PANTOPRAZOLE SODIUM 40 MG/1
40 TABLET, DELAYED RELEASE ORAL
Qty: 90 TABLET | Refills: 0 | Status: SHIPPED | OUTPATIENT
Start: 2023-06-10

## 2023-06-10 NOTE — TELEPHONE ENCOUNTER
Refill passed per [facility] protocol. Requested Prescriptions   Pending Prescriptions Disp Refills    pantoprazole 40 MG Oral Tab EC 90 tablet 0     Sig: Take 1 tablet (40 mg total) by mouth before breakfast.       Gastrointestional Medication Protocol Passed - 6/10/2023 10:59 AM        Passed - In person appointment or virtual visit in the past 12 mos or appointment in next 3 mos     Recent Outpatient Visits              3 days ago Impaired intestinal absorption    [address] Visit    5 days ago     [address] [place], [place]    Office Visit    1 week ago     [address], [place]    Office Visit    2 weeks ago Chronic pain of right ankle    [place], [place]    Office Visit    3 weeks ago Other iron deficiency anemia    [facility] Hematology Oncology Marvel Dee MD    Office Visit          Future Appointments         Provider Department Appt Notes    In 2 days [facility], [address] [place] [address]  no c/p, no auth, no limit    In 3 days EM CC INFRN [address] [facility] [id number] 2/3    In 4 days [address]  no c/p, no auth, no limit    In 1 week [facility], [address] [place] [address]  no c/p, no auth, no limit    In 1 week EM CC INFRN [address] [facility] [id number] 3/3    In 2 months Arslan Cornelius MD [address] [facility],[address], [address], [place] bowel concerns, scheduled by Annette Curry    In 5 months [address] Hematology Oncology FT LAB. PIV.CL  iron ferritin cbc    In 5 months [facility] Hematology Oncology FOLLOW UP VISIT. CL  6m                     Recent Outpatient Visits              3 days ago Impaired intestinal absorption    [address] - Infusion    Office Visit    5 days ago     [address] [place], [place]    Office Visit    1 week ago     [address] [facility] [place], [place]    Office Visit    2 weeks ago Chronic pain of right ankle    [facility] Stephania Solomon    Office Visit    3 weeks ago Other iron deficiency anemia    [facility] Hematology Oncology Kelly Salcido MD    Office Visit            Future Appointments         Provider Department Appt Notes    In 2 days Girtha Pitch, [address] [facility]  no c/p, no auth, no limit    In 3 days EM CC INFRN [address] [facility] [id number] 2/3    In 4 days Girtha Pitch, [address] [facility]  no c/p, no auth, no limit    In 1 week Girtha Pitch, [address] [facility]  no c/p, no auth, no limit    In 1 week EM CC INFRN [address] [facility] [id number] 3/3    In 2 months Thanh Solorio MD [address] [facility],[address], [address], [place] bowel concerns, scheduled by Avelina Sapp    In 5 months [facility] [address] Hematology Oncology FT LAB. PIV.CL  iron ferritin cbc    In 5 months Promise [facility] 19 Hematology Oncology FOLLOW UP VISIT. CL  6m

## 2023-06-10 NOTE — TELEPHONE ENCOUNTER
Patient's daughter requesting refill of:          pantoprazole 40 MG Oral Tab EC   She is out of medication

## 2023-06-12 ENCOUNTER — OFFICE VISIT (OUTPATIENT)
Dept: PHYSICAL THERAPY | Facility: HOSPITAL | Age: 83
End: 2023-06-12
Attending: PODIATRIST
Payer: MEDICARE

## 2023-06-12 PROCEDURE — 97140 MANUAL THERAPY 1/> REGIONS: CPT

## 2023-06-12 PROCEDURE — 97110 THERAPEUTIC EXERCISES: CPT

## 2023-06-13 ENCOUNTER — OFFICE VISIT (OUTPATIENT)
Dept: HEMATOLOGY/ONCOLOGY | Facility: HOSPITAL | Age: 83
End: 2023-06-13
Attending: INTERNAL MEDICINE
Payer: MEDICARE

## 2023-06-13 VITALS
TEMPERATURE: 98 F | RESPIRATION RATE: 18 BRPM | SYSTOLIC BLOOD PRESSURE: 165 MMHG | OXYGEN SATURATION: 98 % | HEART RATE: 58 BPM | DIASTOLIC BLOOD PRESSURE: 50 MMHG

## 2023-06-13 DIAGNOSIS — D50.9 IRON DEFICIENCY ANEMIA, UNSPECIFIED IRON DEFICIENCY ANEMIA TYPE: ICD-10-CM

## 2023-06-13 DIAGNOSIS — K90.9 IMPAIRED INTESTINAL ABSORPTION: Primary | ICD-10-CM

## 2023-06-13 PROCEDURE — 96365 THER/PROPH/DIAG IV INF INIT: CPT

## 2023-06-14 ENCOUNTER — OFFICE VISIT (OUTPATIENT)
Dept: PHYSICAL THERAPY | Facility: HOSPITAL | Age: 83
End: 2023-06-14
Attending: PODIATRIST
Payer: MEDICARE

## 2023-06-14 PROCEDURE — 97110 THERAPEUTIC EXERCISES: CPT

## 2023-06-14 PROCEDURE — 97140 MANUAL THERAPY 1/> REGIONS: CPT

## 2023-06-19 ENCOUNTER — OFFICE VISIT (OUTPATIENT)
Dept: PHYSICAL THERAPY | Facility: HOSPITAL | Age: 83
End: 2023-06-19
Attending: PODIATRIST
Payer: MEDICARE

## 2023-06-19 PROCEDURE — 97110 THERAPEUTIC EXERCISES: CPT

## 2023-06-19 PROCEDURE — 97140 MANUAL THERAPY 1/> REGIONS: CPT

## 2023-06-22 ENCOUNTER — OFFICE VISIT (OUTPATIENT)
Dept: HEMATOLOGY/ONCOLOGY | Facility: HOSPITAL | Age: 83
End: 2023-06-22
Attending: INTERNAL MEDICINE
Payer: MEDICARE

## 2023-06-22 VITALS
HEART RATE: 66 BPM | DIASTOLIC BLOOD PRESSURE: 46 MMHG | OXYGEN SATURATION: 100 % | RESPIRATION RATE: 18 BRPM | TEMPERATURE: 99 F | SYSTOLIC BLOOD PRESSURE: 189 MMHG

## 2023-06-22 DIAGNOSIS — K90.9 IMPAIRED INTESTINAL ABSORPTION: Primary | ICD-10-CM

## 2023-06-22 DIAGNOSIS — D50.9 IRON DEFICIENCY ANEMIA, UNSPECIFIED IRON DEFICIENCY ANEMIA TYPE: ICD-10-CM

## 2023-06-22 PROCEDURE — 96366 THER/PROPH/DIAG IV INF ADDON: CPT

## 2023-06-22 PROCEDURE — 96365 THER/PROPH/DIAG IV INF INIT: CPT

## 2023-06-22 NOTE — PROGRESS NOTES
Pt arrives to infusion for Venofer 300 3/3 for anemia. Pt Kazakh speaking primarily but refusing . States understanding. BP remains elevated-pt states she will take her BP medication at 1500 with lunch. Educated pt to notify PCP regarding uncontrolled BP while on medication-states understanding. Re educated pt on venofer dosing and possible side effects and states understanding. PIV started with good blood return by 2nd RN. Venofer given over 1.5hrs with 30 mins observation, VSS and denies complaints. PIV dc'd and wrapped in coban. Dc'd ambulating aware of next visits.

## 2023-08-09 ENCOUNTER — TELEPHONE (OUTPATIENT)
Dept: GASTROENTEROLOGY | Facility: CLINIC | Age: 83
End: 2023-08-09

## 2023-08-09 ENCOUNTER — OFFICE VISIT (OUTPATIENT)
Facility: CLINIC | Age: 83
End: 2023-08-09

## 2023-08-09 VITALS
WEIGHT: 123 LBS | HEART RATE: 62 BPM | DIASTOLIC BLOOD PRESSURE: 54 MMHG | SYSTOLIC BLOOD PRESSURE: 171 MMHG | BODY MASS INDEX: 28.46 KG/M2 | HEIGHT: 55 IN

## 2023-08-09 DIAGNOSIS — D50.9 IRON DEFICIENCY ANEMIA, UNSPECIFIED IRON DEFICIENCY ANEMIA TYPE: ICD-10-CM

## 2023-08-09 DIAGNOSIS — R19.7 DIARRHEA, UNSPECIFIED TYPE: Primary | ICD-10-CM

## 2023-08-09 PROCEDURE — 99214 OFFICE O/P EST MOD 30 MIN: CPT | Performed by: INTERNAL MEDICINE

## 2023-08-09 PROCEDURE — 3078F DIAST BP <80 MM HG: CPT | Performed by: INTERNAL MEDICINE

## 2023-08-09 PROCEDURE — 1126F AMNT PAIN NOTED NONE PRSNT: CPT | Performed by: INTERNAL MEDICINE

## 2023-08-09 PROCEDURE — 3077F SYST BP >= 140 MM HG: CPT | Performed by: INTERNAL MEDICINE

## 2023-08-09 PROCEDURE — 1159F MED LIST DOCD IN RCRD: CPT | Performed by: INTERNAL MEDICINE

## 2023-08-09 PROCEDURE — 3008F BODY MASS INDEX DOCD: CPT | Performed by: INTERNAL MEDICINE

## 2023-08-09 PROCEDURE — 1160F RVW MEDS BY RX/DR IN RCRD: CPT | Performed by: INTERNAL MEDICINE

## 2023-08-09 NOTE — PROGRESS NOTES
Palomo Cunningham is a 81year old female. HPI:   Patient presents with:  Diarrhea  Follow - Up    The patient is an 56-year-old female has history of chronic anemia, gastric AVMs, prior cholecystectomy, GERD, prior MI, type 2 diabetes, chronic irregular bowel habits/diarrhea. She reports chronic epigastric pain. She is also been experiencing some looser stools for about the last month or so. She does eat fairly healthy, patient with bread or cereal for lunch and some fruit throughout the day. The patient is here today with her daughter who assists in translation.     HISTORY:  Past Medical History:   Diagnosis Date    Anemia     Arthritis     Cholelithiasis     CKD (chronic kidney disease) stage 3, GFR 30-59 ml/min ([facility])     Coronary atherosclerosis     Depression     Diabetes ([facility] [address]     Management:  Medication    Gastritis     GERD (gastroesophageal reflux disease)     High blood pressure     High cholesterol     Non-ST elevation MI (NSTEMI) ([facility] [address] 2016    stenting of the proximal LAD and ptca of mid LAD    Pregnancy         Renal disorder     CKD 3    Special screening for osteoporosis 2013    Dexa Scan    Stress incontinence     Type II or unspecified type diabetes mellitus without mention of complication, not stated as uncontrolled 2010    Pills only      Past Surgical History:   Procedure Laterality Date    ANESTH,ANGIOPLASTY            4X    CHOLECYSTECTOMY      COLONOSCOPY  2016    COLONOSCOPY N/A 3/11/2019    Procedure: COLONOSCOPY;  Surgeon: Arron Griffin MD;  Location: [address] [place] [address] ENDOSCOPY    EGD  2018    ELECTROCARDIOGRAM, COMPLETE  2013    Scanned to Media Tab - Date of Service 2013      Family History   Problem Relation Age of Onset    Diabetes Father     Other (Other) Father 58    Diabetes Mother 59        (cause of death)    Diabetes Brother     Other (Other) Brother 47    Other (Other) Brother 59    Glaucoma Neg     Macular degeneration Neg       Social History: Social History     Socioeconomic History    Marital status:    Tobacco Use    Smoking status: Never    Smokeless tobacco: Never   Vaping Use    Vaping Use: Never used   Substance and Sexual Activity    Alcohol use: No     Alcohol/week: 0.0 standard drinks of alcohol    Drug use: No   Other Topics Concern    Caffeine Concern Yes     Comment: Coffee, 1 cup daily        Medications (Active prior to today's visit):  Current Outpatient Medications   Medication Sig Dispense Refill    pantoprazole 40 MG Oral Tab EC Take 1 tablet (40 mg total) by mouth before breakfast. 90 tablet 0    telmisartan 80 MG Oral Tab Take 1 tablet (80 mg total) by mouth daily. 90 tablet 3    atorvastatin 20 MG Oral Tab Take 1 tablet (20 mg total) by mouth daily. 90 tablet 1    ergocalciferol 1.25 MG (52014 UT) Oral Cap Take 1 capsule (50,000 Units total) by mouth once a week. 12 capsule 1    clopidogrel 75 MG Oral Tab Take 1 tablet (75 mg total) by mouth daily. 90 tablet 1    SITagliptin Phosphate (JANUVIA) 100 MG Oral Tab Take 1 tablet (100 mg total) by mouth daily. 90 tablet 1    oxybutynin ER 10 MG Oral Tablet 24 Hr Take 1 tablet (10 mg total) by mouth daily. 90 tablet 1    Blood Pressure Monitor Does not apply Device Use daily. 1 each 0    Blood Pressure Monitor Does not apply Device Use daily. 1 each 0    Glucose Blood (TRUE METRIX BLOOD GLUCOSE TEST) In Vitro Strip CHECK BLOOD GLUCOSE TWICE DAILY 200 strip 3    sucralfate 1 g Oral Tab Take 1 tablet (1 g total) by mouth 4 (four) times daily before meals and nightly. 56 tablet 0    B Complex Vitamins (VITAMIN B COMPLEX) Oral Tab Take 1 tablet by mouth once daily. 90 tablet 0    Blood Pressure Monitoring Does not apply Misc Use as needed 1 Device 0    TRUEPLUS LANCETS 28G Does not apply Misc CHECK BLOOD GLUCOSE TWICE DAILY 200 each 4    ferrous sulfate 325 (65 FE) MG Oral Tab EC Take 1 tablet (325 mg total) by mouth daily.       aspirin 81 MG Oral Tab Take 1 tablet (81 mg total) by mouth daily. Blood Glucose Monitoring Suppl (TRUE METRIX METER) W/DEVICE Does not apply Kit 1 kit by Does not apply route 2 (two) times daily. 1 kit 0    Milk Thistle Extract 175 MG Oral Tab Take 1 tablet by mouth once daily. Allergies:    Zocor [Simvastatin]     SWELLING  Bactrim [Sulfametho*    NAUSEA AND VOMITING      ROS:   The patient denies any chest pain or shortness of breath,  No neurologic or dermatologic symptoms. PHYSICAL EXAM:   Blood pressure (!) 171/54, pulse 62, height 4' 7\" (1.397 m), weight 123 lb (55.8 kg), not currently breastfeeding. The patient appears their stated age and is in no acute distress  HEENT- anicteric sclera, neck no lymphadnopathy, OP- clear with no masses or lesions  Chest- Clear bilaterally, no wheezing,  Heart- regular rate, no murmur or gallop  Abdomen- Soft and nontender, nondistended  Ext- no clubbing or cyanosis  Skin- no rashes or lesions  Neuro- appropriate response, alert, no confusion  . ASSESSMENT/PLAN:   Assessment     Abdominal pain  anemia   Diarrhea    Patient is an 81-year-old female has a history of chronic abdominal pain and irregular bowel habits/diarrhea. Possible functional issue discussed dietary adjustments, avoidance of dairy products and food changes. Patient does have a history of gastric AVMs and chronic anemia. Plan upper endoscopy, risks and benefits reviewed and agreed to proceed. Plan  Anemia /AVMs stomach   Diarrhea  - EGD with MAC, hold plavix 5 days Anum das 1 week before   - blood work and stool tests   - ok to use imodium as needed   - avoid dairy products         Orders This Visit:  No orders of the defined types were placed in this encounter.       Meds This Visit:  Requested Prescriptions      No prescriptions requested or ordered in this encounter       Imaging & Referrals:  None       Priti Stearns, [address] [place] [address] Gastroenterology

## 2023-08-09 NOTE — PATIENT INSTRUCTIONS
Anemia /AVMs stomach   Diarrhea  - EGD with MAC, hold plavix 5 days Alnikita Massed mandiuvia 1 week before   - blood work and stool tests   - ok to use imodium as needed   - avoid dairy products

## 2023-08-09 NOTE — TELEPHONE ENCOUNTER
Scheduled for:  EGD [address]  Provider Name:  Dr Fatmata Ness  Date:  10/12/2023  Location:    [facility]  Sedation:  MAC  Time:  1:15pm (pt is aware to arrive at 12:15pm  Prep:  NPO after midnight   Meds/Allergies Reconciled?:  Physician reviewed   Diagnosis with codes:  Anemia D50.9  Diarrhea R19.7  Was patient informed to call insurance with codes (Y/N):  Yes  Referral sent?:  N/A  [facility] or [facility] notified?:  I sent an electronic request to Endo Scheduling and received a confirmation today. Medication Orders:   This patient verbally confirmed that she is not taking:   Iron, blood thinners, BP meds, and is not diabetic   Not latex allergy, Not PCN allergy and does not have a pacemaker  Patient is aware to HOLD Plavix for 5 days prior to the procedure per Dr Deb Thorpe orders   Patient is aware to [address] the day of the procedure   Pt is aware to NOT take iron pills, herbal meds and diet supplements for 7 days before exam. Also to NOT take any form of alcohol, recreational drugs and any forms of ED meds 24 hours before exam.       Misc Orders:  I discussed the prep instructions with the patient at the time of the appointment which she [facility]     Further instructions given by staff:

## 2023-08-15 DIAGNOSIS — E11.9 DIABETES MELLITUS TYPE 2 WITHOUT RETINOPATHY (HCC): ICD-10-CM

## 2023-08-15 DIAGNOSIS — E78.5 HYPERLIPIDEMIA, UNSPECIFIED HYPERLIPIDEMIA TYPE: ICD-10-CM

## 2023-08-15 RX ORDER — ATORVASTATIN CALCIUM 20 MG/1
20 TABLET, FILM COATED ORAL DAILY
Qty: 90 TABLET | Refills: 3 | Status: SHIPPED | OUTPATIENT
Start: 2023-08-15

## 2023-08-15 RX ORDER — PANTOPRAZOLE SODIUM 40 MG/1
40 TABLET, DELAYED RELEASE ORAL
Qty: 90 TABLET | Refills: 3 | Status: SHIPPED | OUTPATIENT
Start: 2023-08-15

## 2023-08-15 NOTE — TELEPHONE ENCOUNTER
Please review. Protocol failed / No protocol. Requested Prescriptions   Pending Prescriptions Disp Refills    clopidogrel 75 MG Oral Tab 90 tablet 3     Sig: Take 1 tablet (75 mg total) by mouth daily. There is no refill protocol information for this order      Signed Prescriptions Disp Refills    atorvastatin 20 MG Oral Tab 90 tablet 3     Sig: Take 1 tablet (20 mg total) by mouth daily. Cholesterol Medication Protocol Passed - 8/15/2023  1:54 PM        Passed - ALT in past 12 months        Passed - LDL in past 12 months        Passed - Last ALT < 80     Lab Results   Component Value Date    ALT 30 03/08/2023             Passed - Last LDL < 130     Lab Results   Component Value Date    LDL 53 03/08/2023             Passed - In person appointment or virtual visit in the past 12 mos or appointment in next 3 mos     Recent Outpatient Visits              6 days ago Diarrhea, unspecified type    [facility], [address], Shelly Jackson MD    Office Visit    1 month ago Impaired intestinal absorption    [address] Visit    1 month ago     [address], [place]    Office Visit    2 months ago     [address], [place]    Office Visit    2 months ago Impaired intestinal absorption    [address] Visit          Future Appointments         Provider Department Appt Notes    In 1 month [address], [address], [email or web address]    In 3 months Cynthia 25 Hematology Oncology FT LAB. PIV.CL  iron ferritin cbc    In 3 months Promise Young 19 Hematology Oncology FOLLOW UP VISIT. CL  6m  6/22/23    3/3 venofer                 SITagliptin Phosphate (JANUVIA) 100 MG Oral Tab 90 tablet 3     Sig: Take 1 tablet (100 mg total) by mouth daily.        Diabetes Medication Protocol Passed - 8/15/2023  1:54 PM        Passed - Last A1C < 7.5 and within past 6 months     Lab Results   Component Value Date    A1C 6.3 (H) 03/08/2023             Passed - In person appointment or virtual visit in the past 6 mos or appointment in next 3 mos     Recent Outpatient Visits              6 days ago Diarrhea, unspecified type    [facility], [address], Gemma Jackson MD    Office Visit    1 month ago Impaired intestinal absorption    [address] Visit    1 month ago     [address], [place]    Office Visit    2 months ago     [address], [place]    Office Visit    2 months ago Impaired intestinal absorption    [address] Visit          Future Appointments         Provider Department Appt Notes    In 1 month [address], [address], [email or web address]    In 3 months Cynthia 25 Hematology Oncology FT LAB. PIV.CL  iron ferritin cbc    In 3 months Promise Lewis 19 Hematology Oncology FOLLOW UP VISIT. CL  6m  6/22/23    3/3 venofer               Passed - EGFRCR or GFRNAA > 50     GFR Evaluation  EGFRCR: 54 , resulted on 3/8/2023          Passed - GFR in the past 12 months          pantoprazole 40 MG Oral Tab EC 90 tablet 3     Sig: Take 1 tablet (40 mg total) by mouth before breakfast.       Gastrointestional Medication Protocol Passed - 8/15/2023  1:54 PM        Passed - In person appointment or virtual visit in the past 12 mos or appointment in next 3 mos     Recent Outpatient Visits              6 days ago Diarrhea, unspecified type    [facility], [address], Roberto Zhang MD    Office Visit    1 month ago Impaired intestinal absorption    [address] Visit    1 month ago [facility] Sharri Frausto, [place]    Office Visit    2 months ago     [address], [place]    Office Visit    2 months ago Impaired intestinal absorption    [address] - Infusion    Office Visit          Future Appointments         Provider Department Appt Notes    In 1 month [address], [address], Kimberly [email or web address]    In 3 months Magali-Viru 25 Hematology Oncology FT LAB. PIV.CL  iron ferritin cbc    In 3 months Promise Quispe 19 Hematology Oncology FOLLOW UP VISIT. CL  6m  6/22/23    3/3 venofer                     Recent Outpatient Visits              6 days ago Diarrhea, unspecified type    [facility], [address], [place] Adolfo Woodard MD    Office Visit    1 month ago Impaired intestinal absorption    [address] Visit    1 month ago     [address], [place]    Office Visit    2 months ago     [address], [place]    Office Visit    2 months ago Impaired intestinal absorption    [address] - Infusion    Office Visit             Future Appointments         Provider Department Appt Notes    In 1 month [address], [address], Kimberly [email or web address]    In 3 months Magali-Viru 25 Hematology Oncology FT LAB. PIV.CL  iron ferritin cbc    In 3 months Promise Quispe 19 Hematology Oncology FOLLOW UP VISIT. CL  6m  6/22/23    3/3 venofer

## 2023-08-15 NOTE — TELEPHONE ENCOUNTER
Refill passed per [place] [employer] protocol. Requested Prescriptions   Pending Prescriptions Disp Refills    atorvastatin 20 MG Oral Tab 90 tablet 3     Sig: Take 1 tablet (20 mg total) by mouth daily. Cholesterol Medication Protocol Passed - 8/15/2023  1:54 PM        Passed - ALT in past 12 months        Passed - LDL in past 12 months        Passed - Last ALT < 80     Lab Results   Component Value Date    ALT 30 03/08/2023             Passed - Last LDL < 130     Lab Results   Component Value Date    LDL 53 03/08/2023             Passed - In person appointment or virtual visit in the past 12 mos or appointment in next 3 mos     Recent Outpatient Visits              6 days ago Diarrhea, unspecified type    [facility], [address], Faviola Jackson MD    Office Visit    1 month ago Impaired intestinal absorption    [address] Visit    1 month ago     [address], [address]    Office Visit    2 months ago     [address], [address]    Office Visit    2 months ago Impaired intestinal absorption    [address] Visit          Future Appointments         Provider Department Appt Notes    In 1 month [address], [address], [email or web address]    In 3 months Cynthia 25 Hematology Oncology FT LAB. PIV.CL  iron ferritin cbc    In 3 months Korey Owen, [facility] Hematology Oncology FOLLOW UP VISIT. CL  6m  6/22/23    3/3 venofer                 clopidogrel 75 MG Oral Tab 90 tablet 3     Sig: Take 1 tablet (75 mg total) by mouth daily. There is no refill protocol information for this order       SITagliptin Phosphate (JANUVIA) 100 MG Oral Tab 90 tablet 3     Sig: Take 1 tablet (100 mg total) by mouth daily.        Diabetes Medication Protocol Passed - 8/15/2023  1:54 PM Passed - Last A1C < 7.5 and within past 6 months     Lab Results   Component Value Date    A1C 6.3 (H) 03/08/2023             Passed - In person appointment or virtual visit in the past 6 mos or appointment in next 3 mos     Recent Outpatient Visits              6 days ago Diarrhea, unspecified type    [facility], [address], Vitaliy Jackson MD    Office Visit    1 month ago Impaired intestinal absorption    [address] Visit    1 month ago     [address], [place]    Office Visit    2 months ago     [address], [place]    Office Visit    2 months ago Impaired intestinal absorption    [address] Visit          Future Appointments         Provider Department Appt Notes    In 1 month [address], [address], [email or web address]    In 3 months Cynthia 25 Hematology Oncology FT LAB. PIV.CL  iron ferritin cbc    In 3 months Promise Carter 19 Hematology Oncology FOLLOW UP VISIT. CL  6m  6/22/23    3/3 venofer               Passed - EGFRCR or GFRNAA > 50     GFR Evaluation  EGFRCR: 54 , resulted on 3/8/2023          Passed - GFR in the past 12 months          pantoprazole 40 MG Oral Tab EC 90 tablet 3     Sig: Take 1 tablet (40 mg total) by mouth before breakfast.       Gastrointestional Medication Protocol Passed - 8/15/2023  1:54 PM        Passed - In person appointment or virtual visit in the past 12 mos or appointment in next 3 mos     Recent Outpatient Visits              6 days ago Diarrhea, unspecified type    [facility], [address], Khushboo Rooney MD    Office Visit    1 month ago Impaired intestinal absorption    [address] Visit    1 month ago     [address], [place]    Office Visit    2 months ago     [address], [place]    Office Visit    2 months ago Impaired intestinal absorption    [address] - Infusion    Office Visit          Future Appointments         Provider Department Appt Notes    In 1 month [address], [address], [email or web address]    In 3 months Magali-Viru 25 Hematology Oncology FT LAB. PIV.CL  iron ferritin cbc    In 3 months Promise Clayton 19 Hematology Oncology FOLLOW UP VISIT. CL  6m  6/22/23    3/3 venofer                     Recent Outpatient Visits              6 days ago Diarrhea, unspecified type    [facility], [address], [place] Maame Guerra MD    Office Visit    1 month ago Impaired intestinal absorption    [address] Visit    1 month ago     [address], [place]    Office Visit    2 months ago     [address], [place]    Office Visit    2 months ago Impaired intestinal absorption    [address] - Infusion    Office Visit             Future Appointments         Provider Department Appt Notes    In 1 month [address], [address], [email or web address]    In 3 months Magali-Viru 25 Hematology Oncology FT LAB. PIV.CL  iron ferritin cbc    In 3 months Promise Clayton 19 Hematology Oncology FOLLOW UP VISIT. CL  6m  6/22/23    3/3 venofer

## 2023-08-15 NOTE — TELEPHONE ENCOUNTER
Patient called requesting a refill request for the medications below stated is running low on the medications    Current Outpatient Medications:     pantoprazole 40 MG Oral Tab EC, Take 1 tablet (40 mg total) by mouth before breakfast., Disp: 90 tablet, Rfl: 0      atorvastatin 20 MG Oral Tab, Take 1 tablet (20 mg total) by mouth daily. , Disp: 90 tablet, Rfl: 1      clopidogrel 75 MG Oral Tab, Take 1 tablet (75 mg total) by mouth daily. , Disp: 90 tablet, Rfl: 1    SITagliptin Phosphate (JANUVIA) 100 MG Oral Tab, Take 1 tablet (100 mg total) by mouth daily. , Disp: 90 tablet, Rfl: 1

## 2023-08-17 RX ORDER — CLOPIDOGREL BISULFATE 75 MG/1
75 TABLET ORAL DAILY
Qty: 90 TABLET | Refills: 3 | Status: SHIPPED | OUTPATIENT
Start: 2023-08-17

## 2023-10-12 ENCOUNTER — ANESTHESIA EVENT (OUTPATIENT)
Dept: ENDOSCOPY | Facility: HOSPITAL | Age: 83
End: 2023-10-12
Payer: MEDICARE

## 2023-10-12 ENCOUNTER — HOSPITAL ENCOUNTER (OUTPATIENT)
Facility: HOSPITAL | Age: 83
Setting detail: HOSPITAL OUTPATIENT SURGERY
Discharge: HOME | End: 2023-10-12
Attending: INTERNAL MEDICINE | Admitting: INTERNAL MEDICINE
Payer: MEDICARE

## 2023-10-12 ENCOUNTER — ANESTHESIA (OUTPATIENT)
Dept: ENDOSCOPY | Facility: HOSPITAL | Age: 83
End: 2023-10-12
Payer: MEDICARE

## 2023-10-12 VITALS
OXYGEN SATURATION: 100 % | HEART RATE: 55 BPM | SYSTOLIC BLOOD PRESSURE: 141 MMHG | WEIGHT: 120 LBS | BODY MASS INDEX: 26.99 KG/M2 | RESPIRATION RATE: 14 BRPM | TEMPERATURE: 98 F | DIASTOLIC BLOOD PRESSURE: 64 MMHG | HEIGHT: 56 IN

## 2023-10-12 LAB — GLUCOSE BLDC GLUCOMTR-MCNC: 186 MG/DL (ref 70–99)

## 2023-10-12 PROCEDURE — 0W3P8ZZ CONTROL BLEEDING IN GASTROINTESTINAL TRACT, VIA NATURAL OR ARTIFICIAL OPENING ENDOSCOPIC: ICD-10-PCS | Performed by: INTERNAL MEDICINE

## 2023-10-12 PROCEDURE — 43270 EGD LESION ABLATION: CPT | Performed by: INTERNAL MEDICINE

## 2023-10-12 RX ORDER — LIDOCAINE HYDROCHLORIDE 10 MG/ML
INJECTION, SOLUTION EPIDURAL; INFILTRATION; INTRACAUDAL; PERINEURAL AS NEEDED
Status: DISCONTINUED | OUTPATIENT
Start: 2023-10-12 | End: 2023-10-12 | Stop reason: SURG

## 2023-10-12 RX ORDER — SODIUM CHLORIDE, SODIUM LACTATE, POTASSIUM CHLORIDE, CALCIUM CHLORIDE 600; 310; 30; 20 MG/100ML; MG/100ML; MG/100ML; MG/100ML
INJECTION, SOLUTION INTRAVENOUS CONTINUOUS
Status: DISCONTINUED | OUTPATIENT
Start: 2023-10-12 | End: 2023-10-12

## 2023-10-12 RX ADMIN — SODIUM CHLORIDE, SODIUM LACTATE, POTASSIUM CHLORIDE, CALCIUM CHLORIDE: 600; 310; 30; 20 INJECTION, SOLUTION INTRAVENOUS at 13:35:00

## 2023-10-12 RX ADMIN — LIDOCAINE HYDROCHLORIDE 50 MG: 10 INJECTION, SOLUTION EPIDURAL; INFILTRATION; INTRACAUDAL; PERINEURAL at 13:35:00

## 2023-10-12 NOTE — H&P
History & Physical Examination    Patient Name: Pat Soto  MRN: [id number]  [facility]: [id number]  YOB: 1940    Diagnosis:   Anemia   Gastric AVMs        oxybutynin ER 10 MG Oral Tablet 24 Hr, Take 1 tablet (10 mg total) by mouth daily. , Disp: 90 tablet, Rfl: 3, 10/11/2023  clopidogrel 75 MG Oral Tab, Take 1 tablet (75 mg total) by mouth daily. , Disp: 90 tablet, Rfl: 3, 10/7/2023 at 1500  atorvastatin 20 MG Oral Tab, Take 1 tablet (20 mg total) by mouth daily. , Disp: 90 tablet, Rfl: 3, 10/11/2023  SITagliptin Phosphate (JANUVIA) 100 MG Oral Tab, Take 1 tablet (100 mg total) by mouth daily. , Disp: 90 tablet, Rfl: 3, 10/10/2023  pantoprazole 40 MG Oral Tab EC, Take 1 tablet (40 mg total) by mouth before breakfast., Disp: 90 tablet, Rfl: 3, 10/11/2023  telmisartan 80 MG Oral Tab, Take 1 tablet (80 mg total) by mouth daily. , Disp: 90 tablet, Rfl: 3, 10/11/2023  ergocalciferol 1.25 MG (36498 UT) Oral Cap, Take 1 capsule (50,000 Units total) by mouth once a week., Disp: 12 capsule, Rfl: 1  B Complex Vitamins (VITAMIN B COMPLEX) Oral Tab, Take 1 tablet by mouth once daily. , Disp: 90 tablet, Rfl: 0, 10/11/2023  ferrous sulfate 325 (65 FE) MG Oral Tab EC, Take 1 tablet (325 mg total) by mouth daily. , Disp: , Rfl: , 10/7/2023  aspirin 81 MG Oral Tab, Take 1 tablet (81 mg total) by mouth daily. , Disp: , Rfl: , 10/11/2023 at 1400  Blood Pressure Monitor Does not apply Device, Use daily. , Disp: 1 each, Rfl: 0  Blood Pressure Monitor Does not apply Device, Use daily. , Disp: 1 each, Rfl: 0  Glucose Blood (TRUE METRIX BLOOD GLUCOSE TEST) In Vitro Strip, CHECK BLOOD GLUCOSE TWICE DAILY, Disp: 200 strip, Rfl: 3  sucralfate 1 g Oral Tab, Take 1 tablet (1 g total) by mouth 4 (four) times daily before meals and nightly., Disp: 56 tablet, Rfl: 0  Blood Pressure Monitoring Does not apply Misc, Use as needed, Disp: 1 Device, Rfl: 0  TRUEPLUS LANCETS 28G Does not apply Misc, CHECK BLOOD GLUCOSE TWICE DAILY, Disp: 200 each, Rfl: 4  Blood Glucose Monitoring Suppl (TRUE METRIX METER) W/DEVICE Does not apply Kit, 1 kit by Does not apply route 2 (two) times daily. , Disp: 1 kit, Rfl: 0  Milk Thistle Extract 175 MG Oral Tab, Take 1 tablet by mouth once daily. , Disp: , Rfl: , 10/11/2023      lactated ringers infusion, , Intravenous, Continuous        Allergies:   Zocor [Simvastatin]     SWELLING  Bactrim [Sulfametho*    NAUSEA AND VOMITING    Past Medical History:   Diagnosis Date    Anemia     Arthritis     Cholelithiasis     CKD (chronic kidney disease) stage 3, GFR 30-59 ml/min ([facility])     Coronary atherosclerosis     Depression     Diabetes ([facility] [address]     Management:  Medication    Gastritis     GERD (gastroesophageal reflux disease)     High blood pressure     High cholesterol     Non-ST elevation MI (NSTEMI) ([facility] [address] 2016    stenting of the proximal LAD and ptca of mid LAD    Pregnancy         Renal disorder     CKD 3    Special screening for osteoporosis 2013    Dexa Scan    Stress incontinence     Type II or unspecified type diabetes mellitus without mention of complication, not stated as uncontrolled     Pills only     Past Surgical History:   Procedure Laterality Date    ANESTH,ANGIOPLASTY            4X    CATH DRUG ELUTING STENT      CHOLECYSTECTOMY      COLONOSCOPY  2016    COLONOSCOPY N/A 2019    Procedure: COLONOSCOPY;  Surgeon: Espinoza Ruiz MD;  Location: [address] [place] [address] ENDOSCOPY    EGD  2018    ELECTROCARDIOGRAM, COMPLETE  2013    Scanned to Media Tab - Date of Service 2013     Family History   Problem Relation Age of Onset    Diabetes Father     Other (Other) Father 58    Diabetes Mother 59        (cause of death)    Diabetes Brother     Other (Other) Brother 47    Other (Other) Brother 59    Glaucoma Neg     Macular degeneration Neg      Social History    Tobacco Use      Smoking status: Never      Smokeless tobacco: Never    Alcohol use: No      Alcohol/week: 0.0 standard drinks of alcohol      SYSTEM Check if Review is Normal Check if Physical Exam is Normal If not normal, please explain:   HEENT [x ] [ x]    NECK & BACK [x ] [x ]    HEART [x ] [ x]    LUNGS [x ] [ x]    ABDOMEN [x ] [x ]    UROGENITAL [ ] [ ]    EXTREMITIES [x ] [x ]    OTHER        [ x ] I have discussed the risks and benefits and alternatives with the patient/family. They understand and agree to proceed with plan of care. [ x ] I have reviewed the History and Physical done within the last 30 days. Any changes noted above. Shelly Bai MD  10/12/2023  1:24 PM

## 2023-10-12 NOTE — OPERATIVE REPORT
[facility] Endoscopy Report  Date of procedure October 12, 2023    Preoperative Diagnosis:  -Anemia and history of gastric AVMs      Postoperative Diagnosis:  -GAVE       Procedure:    Esophagogastroduodenoscopy with APC treatment       Surgeon:  Juliocesar Colon M.D. Anesthesia:  MAC     Technique:  After informed consent, the patient was placed in the left lateral recumbent position. An Olympus adult HD gastroscope was inserted into the hypopharynx and advanced under direct vision into the esophagus, stomach and duodenum. The endoscope was withdrawn to the stomach where retroflexion of the annulus, body, cardia and fundus was performed. The instrument was straightened, insufflated air and fluid were suctioned and the endoscope was withdrawn. The procedure was well tolerated without immediate complication. Findings: The esophagus was normal.  The GE junction and diaphragmatic impression were at 38 cm. The stomach distended appropriately with insufflated air. 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 Small gastric fundic polyp in the body of the stomach. Not biopsied  The duodenal bulb and post bulbar regions were normal.    Estimated blood loss-insignificant  Specimens-none      Impression:  -GAVE    Recommendations:  - Post procedure instructions given  - Follow up with blood counts, retreatment of GAVE if ongoing issures        [employer].  Karrie Ramirez MD  10/12/2023  1:51 PM

## 2023-10-12 NOTE — DISCHARGE INSTRUCTIONS
RESUME: [address] Instructions for Gastroscopy with Sedation    Diet:  - Resume your regular diet as tolerated unless otherwise instructed. - Start with light meals to minimize bloating.  - Do not drink alcohol today. Medication:  - If you have questions about resuming your normal medications, please contact your Primary Care Physician. Activities:  - Take it easy today. Do not return to work today. - Do not drive today. - Do not operate any machinery today (including kitchen equipment). Gastroscopy:  - You may have a sore throat for 2-3 days following the exam. This is normal. Gargling with warm salt water (1/2 tsp salt to 1 glass warm water) or using throat lozenges will help. - If you experience any sharp pain in your neck, abdomen or chest, vomiting of blood, oral temperature over 100 degrees Fahrenheit, light-headedness or dizziness, or any other problems, contact your doctor. **If unable to reach your doctor, please go to the [facility] Emergency Room**    - Your referring physician will receive a full report of your examination.  - If you do not hear from your doctor's office within two weeks of your biopsy, please call them for your results. You may be able to see your laboratory results in [facility] between 4 and 7 business days. In some cases, your physician may not have viewed the results before they are released to [employer]. If you have questions regarding your results contact the physician who ordered the test/exam by phone or via [employer] by choosing \"Ask a Medical Question. \"

## 2023-11-13 ENCOUNTER — TELEPHONE (OUTPATIENT)
Facility: CLINIC | Age: 83
End: 2023-11-13

## 2023-11-13 NOTE — TELEPHONE ENCOUNTER
First Reminder letter was sent to patient mychart for orders pending:   CBC W Differential W Platelet (Order #[id number]) on 8/9/23     C. diff toxigenic PCR (OPT) (Order #[id number]) on 8/9/23     Calprotectin, Fecal (Order #[id number]) on 3/1/82     Comp Metabolic Panel (14) (Order #[id number]) on 8/9/23     Ova and Parasites Non-traveler Giardia + Crypto Antigen, Stool (Order #[id number]) on 8/9/23

## 2023-11-20 ENCOUNTER — OFFICE VISIT (OUTPATIENT)
Dept: HEMATOLOGY/ONCOLOGY | Facility: HOSPITAL | Age: 83
End: 2023-11-20
Attending: INTERNAL MEDICINE
Payer: MEDICARE

## 2023-11-20 VITALS
DIASTOLIC BLOOD PRESSURE: 45 MMHG | RESPIRATION RATE: 16 BRPM | OXYGEN SATURATION: 100 % | HEART RATE: 68 BPM | HEIGHT: 56 IN | WEIGHT: 123.38 LBS | SYSTOLIC BLOOD PRESSURE: 148 MMHG | TEMPERATURE: 98 F | BODY MASS INDEX: 27.75 KG/M2

## 2023-11-20 DIAGNOSIS — D50.9 IRON DEFICIENCY ANEMIA, UNSPECIFIED IRON DEFICIENCY ANEMIA TYPE: Primary | ICD-10-CM

## 2023-11-20 DIAGNOSIS — N18.2 ANEMIA IN STAGE 2 CHRONIC KIDNEY DISEASE: ICD-10-CM

## 2023-11-20 DIAGNOSIS — D63.1 ANEMIA IN STAGE 2 CHRONIC KIDNEY DISEASE: ICD-10-CM

## 2023-11-20 DIAGNOSIS — K90.9 IMPAIRED INTESTINAL ABSORPTION: ICD-10-CM

## 2023-11-20 DIAGNOSIS — D50.8 OTHER IRON DEFICIENCY ANEMIA: ICD-10-CM

## 2023-11-20 LAB
BASOPHILS # BLD AUTO: 0 X10(3) UL (ref 0–0.2)
BASOPHILS NFR BLD AUTO: 0 %
DEPRECATED HBV CORE AB SER IA-ACNC: 13.2 NG/ML
DEPRECATED RDW RBC AUTO: 54.6 FL (ref 35.1–46.3)
EOSINOPHIL # BLD AUTO: 0.09 X10(3) UL (ref 0–0.7)
EOSINOPHIL NFR BLD AUTO: 1.9 %
ERYTHROCYTE [DISTWIDTH] IN BLOOD BY AUTOMATED COUNT: 14.9 % (ref 11–15)
HCT VFR BLD AUTO: 24 %
HGB BLD-MCNC: 7.6 G/DL
IMM GRANULOCYTES # BLD AUTO: 0.02 X10(3) UL (ref 0–1)
IMM GRANULOCYTES NFR BLD: 0.4 %
IRON SATN MFR SERPL: 5 %
IRON SERPL-MCNC: 21 UG/DL
LYMPHOCYTES # BLD AUTO: 0.99 X10(3) UL (ref 1–4)
LYMPHOCYTES NFR BLD AUTO: 21.2 %
MCH RBC QN AUTO: 32.2 PG (ref 26–34)
MCHC RBC AUTO-ENTMCNC: 31.7 G/DL (ref 31–37)
MCV RBC AUTO: 101.7 FL
MONOCYTES # BLD AUTO: 0.46 X10(3) UL (ref 0.1–1)
MONOCYTES NFR BLD AUTO: 9.9 %
NEUTROPHILS # BLD AUTO: 3.11 X10 (3) UL (ref 1.5–7.7)
NEUTROPHILS # BLD AUTO: 3.11 X10(3) UL (ref 1.5–7.7)
NEUTROPHILS NFR BLD AUTO: 66.6 %
PLATELET # BLD AUTO: 297 10(3)UL (ref 150–450)
RBC # BLD AUTO: 2.36 X10(6)UL
TIBC SERPL-MCNC: 390 UG/DL (ref 250–425)
TRANSFERRIN SERPL-MCNC: 262 MG/DL (ref 250–380)
WBC # BLD AUTO: 4.7 X10(3) UL (ref 4–11)

## 2023-11-20 PROCEDURE — 36415 COLL VENOUS BLD VENIPUNCTURE: CPT

## 2023-11-20 PROCEDURE — 99215 OFFICE O/P EST HI 40 MIN: CPT | Performed by: INTERNAL MEDICINE

## 2023-11-20 PROCEDURE — 83540 ASSAY OF IRON: CPT

## 2023-11-20 PROCEDURE — 84466 ASSAY OF TRANSFERRIN: CPT

## 2023-11-20 PROCEDURE — 82728 ASSAY OF FERRITIN: CPT

## 2023-11-20 PROCEDURE — 85025 COMPLETE CBC W/AUTO DIFF WBC: CPT

## 2023-11-20 RX ORDER — FOLIC ACID 1 MG/1
1 TABLET ORAL DAILY
Qty: 90 TABLET | Refills: 3 | Status: SHIPPED | OUTPATIENT
Start: 2023-11-20

## 2023-11-22 ENCOUNTER — OFFICE VISIT (OUTPATIENT)
Dept: HEMATOLOGY/ONCOLOGY | Facility: HOSPITAL | Age: 83
End: 2023-11-22
Attending: INTERNAL MEDICINE
Payer: MEDICARE

## 2023-11-22 VITALS
BODY MASS INDEX: 28 KG/M2 | RESPIRATION RATE: 16 BRPM | HEART RATE: 61 BPM | DIASTOLIC BLOOD PRESSURE: 42 MMHG | OXYGEN SATURATION: 99 % | SYSTOLIC BLOOD PRESSURE: 153 MMHG | WEIGHT: 123.13 LBS | TEMPERATURE: 98 F

## 2023-11-22 DIAGNOSIS — D50.9 IRON DEFICIENCY ANEMIA, UNSPECIFIED IRON DEFICIENCY ANEMIA TYPE: Primary | ICD-10-CM

## 2023-11-22 DIAGNOSIS — K90.9 IMPAIRED INTESTINAL ABSORPTION: ICD-10-CM

## 2023-11-22 PROCEDURE — 96365 THER/PROPH/DIAG IV INF INIT: CPT

## 2023-11-22 PROCEDURE — 96366 THER/PROPH/DIAG IV INF ADDON: CPT

## 2023-11-22 NOTE — PROGRESS NOTES
Pt here for 400mg venofer . Ordering MD: Puma Barth  Order Exp: order one of three     Pt tolerated infusion without difficulty or complaint. Reviewed next apt date/time: yes      Education Record  Learner:  Patient  Disease / Diagnosis: jorge  Barriers / Limitations:  Language  Method:  Discussion  General Topics:  Side effects and symptom management and Plan of care reviewed  Outcome:  Shows understanding, post 30 minute observation vs wnl. Discharged stable.

## 2023-11-28 ENCOUNTER — OFFICE VISIT (OUTPATIENT)
Dept: HEMATOLOGY/ONCOLOGY | Facility: HOSPITAL | Age: 83
End: 2023-11-28
Attending: INTERNAL MEDICINE
Payer: MEDICARE

## 2023-11-28 VITALS
DIASTOLIC BLOOD PRESSURE: 49 MMHG | TEMPERATURE: 98 F | WEIGHT: 125.38 LBS | SYSTOLIC BLOOD PRESSURE: 185 MMHG | HEART RATE: 65 BPM | BODY MASS INDEX: 28 KG/M2 | OXYGEN SATURATION: 100 % | RESPIRATION RATE: 16 BRPM

## 2023-11-28 DIAGNOSIS — K90.9 IMPAIRED INTESTINAL ABSORPTION: ICD-10-CM

## 2023-11-28 DIAGNOSIS — D50.9 IRON DEFICIENCY ANEMIA, UNSPECIFIED IRON DEFICIENCY ANEMIA TYPE: Primary | ICD-10-CM

## 2023-11-28 PROCEDURE — 96365 THER/PROPH/DIAG IV INF INIT: CPT

## 2023-11-28 PROCEDURE — 96366 THER/PROPH/DIAG IV INF ADDON: CPT

## 2023-11-28 NOTE — PROGRESS NOTES
Pt here for 2 of 3 VENOFER 400mg . Pt denies any issues or concerns. Ordering MD: Rod Cisneros  Order Exp: 11/20/2024     Pt tolerated infusion without difficulty or complaint.  Reviewed next apt date/time: YES      Education Record  Learner:  Patient  Disease / Diagnosis: SVITLANA  Barriers / Limitations:  None  Method:  Discussion  General Topics:  Plan of care reviewed  Outcome:  Shows understanding

## 2023-12-06 ENCOUNTER — OFFICE VISIT (OUTPATIENT)
Dept: HEMATOLOGY/ONCOLOGY | Facility: HOSPITAL | Age: 83
End: 2023-12-06
Attending: INTERNAL MEDICINE
Payer: MEDICARE

## 2023-12-06 VITALS
RESPIRATION RATE: 20 BRPM | TEMPERATURE: 99 F | BODY MASS INDEX: 28 KG/M2 | HEART RATE: 55 BPM | WEIGHT: 126.63 LBS | SYSTOLIC BLOOD PRESSURE: 168 MMHG | DIASTOLIC BLOOD PRESSURE: 54 MMHG | OXYGEN SATURATION: 100 %

## 2023-12-06 DIAGNOSIS — K90.9 IMPAIRED INTESTINAL ABSORPTION: ICD-10-CM

## 2023-12-06 DIAGNOSIS — D50.9 IRON DEFICIENCY ANEMIA, UNSPECIFIED IRON DEFICIENCY ANEMIA TYPE: Primary | ICD-10-CM

## 2023-12-06 PROCEDURE — 96366 THER/PROPH/DIAG IV INF ADDON: CPT

## 2023-12-06 PROCEDURE — 96365 THER/PROPH/DIAG IV INF INIT: CPT

## 2023-12-06 NOTE — PROGRESS NOTES
Pt here for venofer 400mg . Pt denies any issues or concerns. Blood pressure elevated-takes daily medication for daily after lunch. Encouraged to follow up with PCP regarding. Ordering MD: Andreea Carolina Exp: this is the last infusion in this order, 3 of 3. Reviewed has follow up with Dr. Pooja Alicia in January. Pt tolerated infusion without difficulty or complaint. Education Record  Learner:  Patient  Disease / Diagnosis: SVITLANA  Barriers / Limitations:  None  Method:  Discussion  General Topics:  Plan of care reviewed  Outcome:  Shows understanding; post 30 minute observation vs wnl. Discharged stable.

## 2024-01-01 NOTE — TELEPHONE ENCOUNTER
# [id number] to Alysia and to inform pt that an Rx was sent to the pharmacy and that she should take the Rx as directed and to keep F/U appt 6/15/17    LMTCB    Please [id number] anytime. Thank you.     Rx for HCTZ 12.5mg 1 tablet daily sent to t pulse oximetry

## 2024-01-03 ENCOUNTER — APPOINTMENT (OUTPATIENT)
Dept: HEMATOLOGY/ONCOLOGY | Facility: HOSPITAL | Age: 84
End: 2024-01-03
Attending: INTERNAL MEDICINE
Payer: MEDICARE

## 2024-01-10 ENCOUNTER — TELEPHONE (OUTPATIENT)
Facility: CLINIC | Age: 84
End: 2024-01-10

## 2024-01-10 DIAGNOSIS — R13.10 DYSPHAGIA, UNSPECIFIED TYPE: Primary | ICD-10-CM

## 2024-01-10 NOTE — TELEPHONE ENCOUNTER
I contacted the patient's daughter normal, the patient's been having problems with swallowing for the last week or so has been on primarily liquids.  She feels like foods and solids are getting stuck.  Has difficult time getting these down.  Hemoglobin has drifted down as well.  Prior upper endoscopy was back in November which time we treated endoscopically GAVE with APC.  She did well after the procedure per the daughter.    Plan-nursing to see if we can schedule the patient on Friday down in the Endo lab with back sedation for dysphagia.  I told him to hold the Plavix for now and continue on the aspirin.

## 2024-01-10 NOTE — TELEPHONE ENCOUNTER
Dr. Clement    I called daughter Courtney at [phone].  She told me that around [place] had difficulty eating certain foods.  The past 5 days or so food has been getting stuck so she only wants to eat smoothies and soup.  She is ok with the solid contents in the soup because the liquid in the soup helps it pass.  She needs 1 full glass of water to get each bite of meat down.  She has no problems eating or speaking.  She has a little bit of pain in the esophagus which has lessened since she is now only eating smoothies and soup.  She is on iron so stools are dark.    I advised ER if food gets stuck and won't pass or unable to swallow liquids/saliva.  Advised if she does eat solids to stick with very soft food and alternate each bite with a sip of liquid.    Please advise     Thank you

## 2024-01-10 NOTE — TELEPHONE ENCOUNTER
Courtney/Pts daughter states that for the last 5 days pt only will eat smoothies or soup because she is having problems swallowing anything.  Please call.

## 2024-01-11 NOTE — TELEPHONE ENCOUNTER
Scheduled for:  EGD 55612  Provider Name:  Dr. Clement  Date:  1/12/2024  Location:  [facility]  Sedation:  MAC  Time:  1:00 PM (pt is aware to arrive at 12:00 PM)  Prep:  NPO  Meds/Allergies Reconciled?:  Physician Reviewed  Diagnosis with codes:  Dysphagia R13.10  Was patient informed to call insurance with codes (Y/N):  Yes, I confirmed [employer] insurance with patient.   Referral sent?:  Referral was sent at the time of electronic surgical scheduling.  [facility] or [facility] notified?:  I sent an electronic request to Endo Scheduling and received a confirmation today.   Medication Orders:  Hold Plavix prior to procedure.  Misc Orders:  N/A   Further instructions given by staff:  I discussed the prep instructions with the patient which she verbally understood and is aware that I will send the instructions today.

## 2024-01-11 NOTE — TELEPHONE ENCOUNTER
PPD -     Patient is scheduled for an EGD 39454 on 1/12/2024 with Dr. Clement.    Thank you!    Anny

## 2024-01-12 ENCOUNTER — ANESTHESIA EVENT (OUTPATIENT)
Dept: ENDOSCOPY | Facility: HOSPITAL | Age: 84
End: 2024-01-12
Payer: MEDICARE

## 2024-01-12 ENCOUNTER — OFFICE VISIT (OUTPATIENT)
Dept: HEMATOLOGY/ONCOLOGY | Facility: HOSPITAL | Age: 84
End: 2024-01-12
Attending: INTERNAL MEDICINE
Payer: MEDICARE

## 2024-01-12 ENCOUNTER — TELEPHONE (OUTPATIENT)
Facility: CLINIC | Age: 84
End: 2024-01-12

## 2024-01-12 ENCOUNTER — HOSPITAL ENCOUNTER (OUTPATIENT)
Facility: HOSPITAL | Age: 84
Setting detail: HOSPITAL OUTPATIENT SURGERY
Discharge: HOME | End: 2024-01-12
Attending: INTERNAL MEDICINE | Admitting: INTERNAL MEDICINE
Payer: MEDICARE

## 2024-01-12 ENCOUNTER — ANESTHESIA (OUTPATIENT)
Dept: ENDOSCOPY | Facility: HOSPITAL | Age: 84
End: 2024-01-12
Payer: MEDICARE

## 2024-01-12 VITALS
BODY MASS INDEX: 26.54 KG/M2 | HEIGHT: 56 IN | RESPIRATION RATE: 18 BRPM | OXYGEN SATURATION: 100 % | HEART RATE: 63 BPM | WEIGHT: 118 LBS | DIASTOLIC BLOOD PRESSURE: 53 MMHG | TEMPERATURE: 98 F | SYSTOLIC BLOOD PRESSURE: 190 MMHG

## 2024-01-12 VITALS
OXYGEN SATURATION: 100 % | HEART RATE: 57 BPM | WEIGHT: 118 LBS | BODY MASS INDEX: 26.54 KG/M2 | HEIGHT: 56 IN | SYSTOLIC BLOOD PRESSURE: 167 MMHG | DIASTOLIC BLOOD PRESSURE: 46 MMHG | RESPIRATION RATE: 15 BRPM

## 2024-01-12 DIAGNOSIS — D64.9 ANEMIA, UNSPECIFIED TYPE: ICD-10-CM

## 2024-01-12 DIAGNOSIS — R13.10 DYSPHAGIA, UNSPECIFIED TYPE: ICD-10-CM

## 2024-01-12 DIAGNOSIS — D50.8 OTHER IRON DEFICIENCY ANEMIA: ICD-10-CM

## 2024-01-12 DIAGNOSIS — Z51.81 MEDICATION MONITORING ENCOUNTER: ICD-10-CM

## 2024-01-12 DIAGNOSIS — D50.9 IRON DEFICIENCY ANEMIA, UNSPECIFIED IRON DEFICIENCY ANEMIA TYPE: Primary | ICD-10-CM

## 2024-01-12 LAB
ANION GAP SERPL CALC-SCNC: 5 MMOL/L (ref 0–18)
BASOPHILS # BLD AUTO: 0.02 X10(3) UL (ref 0–0.2)
BASOPHILS NFR BLD AUTO: 0.4 %
BUN BLD-MCNC: 10 MG/DL (ref 9–23)
BUN/CREAT SERPL: 10.1 (ref 10–20)
CALCIUM BLD-MCNC: 9.6 MG/DL (ref 8.7–10.4)
CHLORIDE SERPL-SCNC: 96 MMOL/L (ref 98–112)
CO2 SERPL-SCNC: 26 MMOL/L (ref 21–32)
CREAT BLD-MCNC: 0.99 MG/DL
DEPRECATED HBV CORE AB SER IA-ACNC: 47.9 NG/ML
DEPRECATED RDW RBC AUTO: 49.6 FL (ref 35.1–46.3)
EGFRCR SERPLBLD CKD-EPI 2021: 57 ML/MIN/1.73M2 (ref 60–?)
EOSINOPHIL # BLD AUTO: 0.14 X10(3) UL (ref 0–0.7)
EOSINOPHIL NFR BLD AUTO: 2.9 %
ERYTHROCYTE [DISTWIDTH] IN BLOOD BY AUTOMATED COUNT: 14.5 % (ref 11–15)
GLUCOSE BLD-MCNC: 138 MG/DL (ref 70–99)
GLUCOSE BLDC GLUCOMTR-MCNC: 133 MG/DL (ref 70–99)
HCT VFR BLD AUTO: 29.2 %
HGB BLD-MCNC: 9.3 G/DL
IMM GRANULOCYTES # BLD AUTO: 0.01 X10(3) UL (ref 0–1)
IMM GRANULOCYTES NFR BLD: 0.2 %
IRON SATN MFR SERPL: 11 %
IRON SERPL-MCNC: 44 UG/DL
LYMPHOCYTES # BLD AUTO: 1.11 X10(3) UL (ref 1–4)
LYMPHOCYTES NFR BLD AUTO: 23.3 %
MCH RBC QN AUTO: 29.8 PG (ref 26–34)
MCHC RBC AUTO-ENTMCNC: 31.8 G/DL (ref 31–37)
MCV RBC AUTO: 93.6 FL
MONOCYTES # BLD AUTO: 0.34 X10(3) UL (ref 0.1–1)
MONOCYTES NFR BLD AUTO: 7.1 %
NEUTROPHILS # BLD AUTO: 3.15 X10 (3) UL (ref 1.5–7.7)
NEUTROPHILS # BLD AUTO: 3.15 X10(3) UL (ref 1.5–7.7)
NEUTROPHILS NFR BLD AUTO: 66.1 %
OSMOLALITY SERPL CALC.SUM OF ELEC: 265 MOSM/KG (ref 275–295)
PLATELET # BLD AUTO: 304 10(3)UL (ref 150–450)
POTASSIUM SERPL-SCNC: 4.5 MMOL/L (ref 3.5–5.1)
RBC # BLD AUTO: 3.12 X10(6)UL
SODIUM SERPL-SCNC: 127 MMOL/L (ref 136–145)
TIBC SERPL-MCNC: 398 UG/DL (ref 250–425)
TRANSFERRIN SERPL-MCNC: 267 MG/DL (ref 250–380)
WBC # BLD AUTO: 4.8 X10(3) UL (ref 4–11)

## 2024-01-12 PROCEDURE — 0DB38ZX EXCISION OF LOWER ESOPHAGUS, VIA NATURAL OR ARTIFICIAL OPENING ENDOSCOPIC, DIAGNOSTIC: ICD-10-PCS | Performed by: INTERNAL MEDICINE

## 2024-01-12 PROCEDURE — 99213 OFFICE O/P EST LOW 20 MIN: CPT | Performed by: INTERNAL MEDICINE

## 2024-01-12 PROCEDURE — 85025 COMPLETE CBC W/AUTO DIFF WBC: CPT

## 2024-01-12 PROCEDURE — 84466 ASSAY OF TRANSFERRIN: CPT

## 2024-01-12 PROCEDURE — 36415 COLL VENOUS BLD VENIPUNCTURE: CPT

## 2024-01-12 PROCEDURE — 43239 EGD BIOPSY SINGLE/MULTIPLE: CPT | Performed by: INTERNAL MEDICINE

## 2024-01-12 PROCEDURE — 80048 BASIC METABOLIC PNL TOTAL CA: CPT

## 2024-01-12 PROCEDURE — 83540 ASSAY OF IRON: CPT

## 2024-01-12 PROCEDURE — 82728 ASSAY OF FERRITIN: CPT

## 2024-01-12 RX ORDER — PANTOPRAZOLE SODIUM 40 MG/1
40 TABLET, DELAYED RELEASE ORAL
Qty: 60 TABLET | Refills: 0 | Status: SHIPPED | OUTPATIENT
Start: 2024-01-12

## 2024-01-12 RX ORDER — SUCRALFATE 1 G/1
1 TABLET ORAL
Qty: 90 TABLET | Refills: 2 | OUTPATIENT
Start: 2024-01-12 | End: 2024-04-11

## 2024-01-12 RX ORDER — NALOXONE HYDROCHLORIDE 0.4 MG/ML
0.08 INJECTION, SOLUTION INTRAMUSCULAR; INTRAVENOUS; SUBCUTANEOUS ONCE AS NEEDED
Status: DISCONTINUED | OUTPATIENT
Start: 2024-01-12 | End: 2024-01-12

## 2024-01-12 RX ORDER — SODIUM CHLORIDE, SODIUM LACTATE, POTASSIUM CHLORIDE, CALCIUM CHLORIDE 600; 310; 30; 20 MG/100ML; MG/100ML; MG/100ML; MG/100ML
INJECTION, SOLUTION INTRAVENOUS CONTINUOUS
Status: DISCONTINUED | OUTPATIENT
Start: 2024-01-12 | End: 2024-01-12

## 2024-01-12 RX ORDER — SUCRALFATE 1 G/1
1 TABLET ORAL
Qty: 90 TABLET | Refills: 0 | Status: SHIPPED | OUTPATIENT
Start: 2024-01-12

## 2024-01-12 RX ORDER — LIDOCAINE HYDROCHLORIDE 10 MG/ML
INJECTION, SOLUTION EPIDURAL; INFILTRATION; INTRACAUDAL; PERINEURAL AS NEEDED
Status: DISCONTINUED | OUTPATIENT
Start: 2024-01-12 | End: 2024-01-12 | Stop reason: SURG

## 2024-01-12 RX ORDER — PANTOPRAZOLE SODIUM 40 MG/1
40 TABLET, DELAYED RELEASE ORAL
Qty: 90 TABLET | Refills: 0 | OUTPATIENT
Start: 2024-01-12 | End: 2024-04-11

## 2024-01-12 RX ADMIN — SODIUM CHLORIDE, SODIUM LACTATE, POTASSIUM CHLORIDE, CALCIUM CHLORIDE: 600; 310; 30; 20 INJECTION, SOLUTION INTRAVENOUS at 12:51:00

## 2024-01-12 RX ADMIN — LIDOCAINE HYDROCHLORIDE 40 MG: 10 INJECTION, SOLUTION EPIDURAL; INFILTRATION; INTRACAUDAL; PERINEURAL at 12:40:00

## 2024-01-12 NOTE — ANESTHESIA POSTPROCEDURE EVALUATION
Patient: Mercy Nieves    Procedure Summary       Date: 01/12/24 Room / Location: [facility] ENDOSCOPY 01 / [facility]    Anesthesia Start: 1234 Anesthesia Stop:     Procedure: ESOPHAGOGASTRODUODENOSCOPY (EGD) Diagnosis:       Dysphagia, unspecified type      (esophagitis,)    Surgeons: Thomas Clement MD Anesthesiologist: Lu Schneider CRNA    Anesthesia Type: MAC ASA Status: 3            Anesthesia Type: MAC    Vitals Value Taken Time   /37 01/12/24 1251   Temp 97 01/12/24 1251   Pulse 54 01/12/24 1251   Resp 16 01/12/24 1251   SpO2 100 01/12/24 1251       [facility] AN Post Evaluation:   Patient Evaluated in PACU  Patient Participation: waiting for patient participation  Level of Consciousness: sleepy but conscious  Pain Score: 0  Pain Management: adequate  Airway Patency:patent  Dental exam unchanged from preop  Yes    Cardiovascular Status: acceptable  Respiratory Status: acceptable  Postoperative Hydration acceptable      Lu Schneider CRNA  1/12/2024 12:51 PM

## 2024-01-12 NOTE — ANESTHESIA PREPROCEDURE EVALUATION
Anesthesia PreOp Note    HPI:     Mercy Nieves is a 83 year old female who presents for preoperative consultation requested by: Thomas Clement MD    Date of Surgery: 1/12/2024    Procedure(s):  ESOPHAGOGASTRODUODENOSCOPY (EGD)  Indication: Dysphagia, unspecified type    Relevant Problems   No relevant active problems       NPO:                         History Review:  Patient Active Problem List    Diagnosis Date Noted    Chronic pain of right ankle 03/15/2023    Mild episode of recurrent major depressive disorder ([facility]) 03/15/2023    Pain in right lower leg 10/07/2022    Type 2 diabetes mellitus with diabetic chronic kidney disease ([facility]) 03/10/2022    SIADH (syndrome of inappropriate ADH production) ([facility]) 04/16/2021    GAVE (gastric antral vascular ectasia) 12/31/2020    Anemia, chronic disease 09/09/2020    Sensorineural hearing loss (SNHL) of both ears 08/31/2020    Vitamin D deficiency 03/08/2019    Osteopenia of multiple sites 03/06/2019    Meibomian gland dysfunction (MGD) of both eyes 12/06/2018    Bilateral carotid bruits 08/09/2018    Impaired intestinal absorption 04/03/2018    Coronary artery disease of native heart with stable angina pectoris ([facility]) 03/06/2018    Atherosclerosis of aorta ([facility]) 07/14/2017    Peripheral vascular disease ([facility]) 07/14/2017    Essential hypertension 06/28/2016    Murmur 06/28/2016    Anemia, iron deficiency 10/05/2015    Age-related nuclear cataract of both eyes 03/19/2015    Diabetes mellitus type 2 without retinopathy ([facility]) 03/19/2015       Past Medical History:   Diagnosis Date    Anemia     Arthritis     Cholelithiasis     CKD (chronic kidney disease) stage 3, GFR 30-59 ml/min ([facility])     Coronary atherosclerosis     Depression     Diabetes ([facility])     Management:  Medication    Gastritis     GERD (gastroesophageal reflux disease)     High blood pressure     High cholesterol     Non-ST elevation MI (NSTEMI) ([facility]) 07/2016    stenting of the proximal LAD and ptca of mid LAD     Pregnancy         Renal disorder     CKD 3    Special screening for osteoporosis 2013    Dexa Scan    Stress incontinence     Type II or unspecified type diabetes mellitus without mention of complication, not stated as uncontrolled     Pills only       Past Surgical History:   Procedure Laterality Date    ANESTH,ANGIOPLASTY            4X    CATH DRUG ELUTING STENT      CHOLECYSTECTOMY      COLONOSCOPY  2016    COLONOSCOPY N/A 2019    Procedure: COLONOSCOPY;  Surgeon: Thomas Clement MD;  Location: [facility] ENDOSCOPY    EGD  2018    ELECTROCARDIOGRAM, COMPLETE  2013    Scanned to Media Tab - Date of Service 2013       Medications Prior to Admission   Medication Sig Dispense Refill Last Dose    folic acid 1 MG Oral Tab Take 1 tablet (1 mg total) by mouth daily. 90 tablet 3     oxybutynin ER 10 MG Oral Tablet 24 Hr Take 1 tablet (10 mg total) by mouth daily. 90 tablet 3     clopidogrel 75 MG Oral Tab Take 1 tablet (75 mg total) by mouth daily. 90 tablet 3     atorvastatin 20 MG Oral Tab Take 1 tablet (20 mg total) by mouth daily. 90 tablet 3     SITagliptin Phosphate (JANUVIA) 100 MG Oral Tab Take 1 tablet (100 mg total) by mouth daily. 90 tablet 3     pantoprazole 40 MG Oral Tab EC Take 1 tablet (40 mg total) by mouth before breakfast. 90 tablet 3     telmisartan 80 MG Oral Tab Take 1 tablet (80 mg total) by mouth daily. 90 tablet 3     ergocalciferol 1.25 MG (17671 UT) Oral Cap Take 1 capsule (50,000 Units total) by mouth once a week. (Patient not taking: Reported on 2024) 12 capsule 1     Blood Pressure Monitor Does not apply Device Use daily. 1 each 0     Blood Pressure Monitor Does not apply Device Use daily. 1 each 0     Glucose Blood (TRUE METRIX BLOOD GLUCOSE TEST) In Vitro Strip CHECK BLOOD GLUCOSE TWICE DAILY 200 strip 3     sucralfate 1 g Oral Tab Take 1 tablet (1 g total) by mouth 4 (four) times daily before meals and nightly. (Patient not taking: Reported  on 11/20/2023) 56 tablet 0     B Complex Vitamins (VITAMIN B COMPLEX) Oral Tab Take 1 tablet by mouth once daily. 90 tablet 0     Blood Pressure Monitoring Does not apply Misc Use as needed 1 Device 0     TRUEPLUS LANCETS 28G Does not apply Misc CHECK BLOOD GLUCOSE TWICE DAILY 200 each 4     ferrous sulfate 325 (65 FE) MG Oral Tab EC Take 1 tablet (325 mg total) by mouth daily.       aspirin 81 MG Oral Tab Take 1 tablet (81 mg total) by mouth daily.       Blood Glucose Monitoring Suppl (TRUE METRIX METER) W/DEVICE Does not apply Kit 1 kit by Does not apply route 2 (two) times daily. 1 kit 0     Milk Thistle Extract 175 MG Oral Tab Take 1 tablet by mouth once daily.        No current Epic-ordered facility-administered medications on file.     No current Epic-ordered outpatient medications on file.       Allergies   Allergen Reactions    Zocor [Simvastatin] SWELLING    Bactrim [Sulfamethoxazole W/Trimethoprim] NAUSEA AND VOMITING       Family History   Problem Relation Age of Onset    Diabetes Father     Other (Other) Father 62    Diabetes Mother 64        (cause of death)    Diabetes Brother     Other (Other) Brother 54    Other (Other) Brother 64    Glaucoma Neg     Macular degeneration Neg      Social History     Socioeconomic History    Marital status:    Tobacco Use    Smoking status: Never    Smokeless tobacco: Never   Vaping Use    Vaping Use: Never used   Substance and Sexual Activity    Alcohol use: No     Alcohol/week: 0.0 standard drinks of alcohol    Drug use: No   Other Topics Concern    Caffeine Concern Yes     Comment: Coffee, 1 cup daily       Available pre-op labs reviewed.  Lab Results   Component Value Date    WBC 4.8 01/12/2024    RBC 3.12 (L) 01/12/2024    HGB 9.3 (L) 01/12/2024    HCT 29.2 (L) 01/12/2024    MCV 93.6 01/12/2024    MCH 29.8 01/12/2024    MCHC 31.8 01/12/2024    RDW 14.5 01/12/2024    .0 01/12/2024     Lab Results   Component Value Date     (L) 01/12/2024    K  4.5 01/12/2024    CL 96 (L) 01/12/2024    CO2 26.0 01/12/2024    BUN 10 01/12/2024    CREATSERUM 0.99 01/12/2024     (H) 01/12/2024    CA 9.6 01/12/2024          Vital Signs:  There is no height or weight on file to calculate BMI.   vitals were not taken for this visit.   There were no vitals filed for this visit.     Anesthesia Evaluation     Patient summary reviewed and Nursing notes reviewed    Airway   Mallampati: III  TM distance: >3 FB  Neck ROM: full  Dental    (+) upper dentures and lower dentures    Pulmonary - normal exam   Cardiovascular - normal exam  (+) hypertension, CAD    Neuro/Psych    (+)   depression      GI/Hepatic/Renal    (+) GERD well controlled    Endo/Other    (+) diabetes mellitus well controlled  Abdominal   (+) obese                 Anesthesia Plan:   ASA:  3  Plan:   MAC      I have informed Mercy Nieves and/or legal guardian or family member of the nature of the anesthetic plan, benefits, risks including possible dental damage if relevant, major complications, and any alternative forms of anesthetic management.   All of the patient's questions were answered to the best of my ability. The patient desires the anesthetic management as planned.  Lu Schneider CRNA  1/12/2024 11:56 AM  Present on Admission:  **None**

## 2024-01-12 NOTE — OPERATIVE REPORT
[facility] Endoscopy Report  Date of procedure-January 12, 2024    Preoperative Diagnosis:  -Dysphagia/odynophagia      Postoperative Diagnosis:  -Esophagitis with erosion/ulceration about 1 cm above the GE junction  -Gastric polyps noted  -GAVE      Procedure:    Esophagogastroduodenoscopy       Surgeon:  Thomas Clement M.D.    Anesthesia:  MAC     Technique:  After informed consent, the patient was placed in the left lateral recumbent position.  An Olympus adult HD gastroscope was inserted into the hypopharynx and advanced under direct vision into the esophagus, stomach and duodenum.  The endoscope was withdrawn to the stomach where retroflexion of the annulus, body, cardia and fundus was performed.  The instrument was straightened, insufflated air and fluid were suctioned and the endoscope was withdrawn.  The procedure was well tolerated without immediate complication.      Findings:  The esophagus showed esophagitis/erosions/superficial ulceration extending circumferentially around the distal esophagus about 1 cm above the GE junction.  Biopsies were taken from the edge of this area.  The GE junction and diaphragmatic impression were at 38 cm.  The stomach distended appropriately with insufflated air.  The mucosa of the stomach showed streaks of GAVE in antrum and in the body the stomach gastric polyps were noted.  The duodenal bulb and post bulbar regions were normal.    Estimated blood loss-insignificant  Specimens-see above    Impression:  -Esophagitis with erosion/ulceration about 1 cm above the GE junction  -Gastric polyps noted  -GAVE    Recommendations:  - Post procedure instructions given/follow up on biopsies  - PPI BID x 1 month  - Carafate before meals  - Liquid diet x 3 days           Thomas Clement MD  1/12/2024  3:34 PM

## 2024-01-12 NOTE — PROGRESS NOTES
HPI     Mercy Nieves is a 83 year old female who is here today for f/u of   Encounter Diagnoses   Name Primary?    Iron deficiency anemia, unspecified iron deficiency anemia type Yes    Medication monitoring encounter          She is here today for follow-up.    Last course of venofer in June 2023, she had a total of 900 mg in 3 divided doses.    Has h/o GAVE.  Patient's last EGD was on 10/12/2023.  Mucosa showed GAVE in the antrum of the stomach extending out in multiple A's which was treated with APC on gastric settings with multiple applications with good overall results and no bleeding completion.  She also has small gastric fundic polyp in the body of stomach which was not biopsied.  She was recommended for follow-up with the blood counts, and retreatment of GAVE if ongoing issues.    Patient completed 2 doses of Venofer 400 mg on 11/2/2023, and 12/6/2023    States still chronic epigastric pain and LLQ pain, states better.  Difficulty swallowing food as below.     Stopped iron and stool no longer black but still liquid.  Still taking folic acid.      Not eating as much due to swallowing issue.    No pica.    Review of Systems:   Review of Systems   Constitutional:  Positive for appetite change (decreased taste since COVID.) and unexpected weight change (weight loss). Negative for fatigue.   HENT:   Positive for hearing loss and trouble swallowing (states worse than last time.  She is having sips of water with meals to eat.  She has EGD today at noon.).    Respiratory:  Negative for shortness of breath.    Cardiovascular:  Positive for chest pain (when swallowing food.). Negative for palpitations.   Gastrointestinal:  Positive for abdominal pain and diarrhea (freq ). Negative for blood in stool and constipation.   Genitourinary:  Positive for nocturia. Negative for hematuria.    Musculoskeletal:  Positive for arthralgias (leg pains tylenol). Negative for gait problem.   Neurological:  Negative for  dizziness, gait problem and headaches.   Hematological:  Bruises/bleeds easily (mild at arm and hands).   Psychiatric/Behavioral:  Positive for sleep disturbance.            Current Outpatient Medications   Medication Sig Dispense Refill    folic acid 1 MG Oral Tab Take 1 tablet (1 mg total) by mouth daily. 90 tablet 3    oxybutynin ER 10 MG Oral Tablet 24 Hr Take 1 tablet (10 mg total) by mouth daily. 90 tablet 3    clopidogrel 75 MG Oral Tab Take 1 tablet (75 mg total) by mouth daily. 90 tablet 3    atorvastatin 20 MG Oral Tab Take 1 tablet (20 mg total) by mouth daily. 90 tablet 3    SITagliptin Phosphate (JANUVIA) 100 MG Oral Tab Take 1 tablet (100 mg total) by mouth daily. 90 tablet 3    pantoprazole 40 MG Oral Tab EC Take 1 tablet (40 mg total) by mouth before breakfast. 90 tablet 3    telmisartan 80 MG Oral Tab Take 1 tablet (80 mg total) by mouth daily. 90 tablet 3    Blood Pressure Monitor Does not apply Device Use daily. 1 each 0    Blood Pressure Monitor Does not apply Device Use daily. 1 each 0    Glucose Blood (TRUE METRIX BLOOD GLUCOSE TEST) In Vitro Strip CHECK BLOOD GLUCOSE TWICE DAILY 200 strip 3    B Complex Vitamins (VITAMIN B COMPLEX) Oral Tab Take 1 tablet by mouth once daily. 90 tablet 0    Blood Pressure Monitoring Does not apply Misc Use as needed 1 Device 0    TRUEPLUS LANCETS 28G Does not apply Misc CHECK BLOOD GLUCOSE TWICE DAILY 200 each 4    ferrous sulfate 325 (65 FE) MG Oral Tab EC Take 1 tablet (325 mg total) by mouth daily.      aspirin 81 MG Oral Tab Take 1 tablet (81 mg total) by mouth daily.      Blood Glucose Monitoring Suppl (TRUE METRIX METER) W/DEVICE Does not apply Kit 1 kit by Does not apply route 2 (two) times daily. 1 kit 0    Milk Thistle Extract 175 MG Oral Tab Take 1 tablet by mouth once daily.      ergocalciferol 1.25 MG (24402 UT) Oral Cap Take 1 capsule (50,000 Units total) by mouth once a week. (Patient not taking: Reported on 1/12/2024) 12 capsule 1    sucralfate 1  g Oral Tab Take 1 tablet (1 g total) by mouth 4 (four) times daily before meals and nightly. (Patient not taking: Reported on 2023) 56 tablet 0     Allergies:   Allergies   Allergen Reactions    Zocor [Simvastatin] SWELLING    Bactrim [Sulfamethoxazole W/Trimethoprim] NAUSEA AND VOMITING       Past Medical History:   Diagnosis Date    Anemia     Arthritis     Cholelithiasis     CKD (chronic kidney disease) stage 3, GFR 30-59 ml/min ([facility])     Coronary atherosclerosis     Depression     Diabetes ([facility])     Management:  Medication    Gastritis     GERD (gastroesophageal reflux disease)     High blood pressure     High cholesterol     Non-ST elevation MI (NSTEMI) ([facility]) 2016    stenting of the proximal LAD and ptca of mid LAD    Pregnancy         Renal disorder     CKD 3    Special screening for osteoporosis 2013    Dexa Scan    Stress incontinence     Type II or unspecified type diabetes mellitus without mention of complication, not stated as uncontrolled     Pills only     Past Surgical History:   Procedure Laterality Date    ANESTH,ANGIOPLASTY            4X    CATH DRUG ELUTING STENT      CHOLECYSTECTOMY      COLONOSCOPY      COLONOSCOPY N/A 2019    Procedure: COLONOSCOPY;  Surgeon: Thomas Clement MD;  Location: [facility] ENDOSCOPY    EGD  2018    ELECTROCARDIOGRAM, COMPLETE  2013    Scanned to Media Tab - Date of Service 2013     Social History     Socioeconomic History    Marital status:    Tobacco Use    Smoking status: Never    Smokeless tobacco: Never   Vaping Use    Vaping Use: Never used   Substance and Sexual Activity    Alcohol use: No     Alcohol/week: 0.0 standard drinks of alcohol    Drug use: No   Other Topics Concern    Caffeine Concern Yes     Comment: Coffee, 1 cup daily       Family History   Problem Relation Age of Onset    Diabetes Father     Other (Other) Father 62    Diabetes Mother 64        (cause of death)    Diabetes Brother      Other (Other) Brother 54    Other (Other) Brother 64    Glaucoma Neg     Macular degeneration Neg          PHYSICAL EXAM:    BP (!) 190/53 (BP Location: Left arm, Patient Position: Sitting, Cuff Size: adult)   Pulse 63   Temp 98.3 °F (36.8 °C) (Oral)   Resp 18   Ht 1.422 m (4' 8\")   Wt 53.5 kg (118 lb)   SpO2 100%   BMI 26.46 kg/m²   Wt Readings from Last 6 Encounters:   01/12/24 53.5 kg (118 lb)   12/06/23 57.4 kg (126 lb 9.6 oz)   11/28/23 56.9 kg (125 lb 6.4 oz)   11/22/23 55.8 kg (123 lb 1.6 oz)   11/20/23 56 kg (123 lb 6.4 oz)   10/07/23 54.4 kg (120 lb)     Physical Exam  General: Patient is alert and oriented x 3, not in acute distress.  HEENT: EOMs intact. PERRL.  Neck: No JVD. No palpable lymphadenopathy. Neck is supple.  Chest: Clear to auscultation.  Heart: Regular rate and rhythm.   Abdomen: Soft, + epigastric tenderness, good bowel sounds.  Extremities: Pedal pulses are present. No edema.  Neurological: Grossly intact.   Lymphatics: There is no palpable lymphadenopathy throughout in the cervical, supraclavicular, or axillary regions.  Psych/Depression: normal        ASSESSMENT/PLAN:     Encounter Diagnoses   Name Primary?    Iron deficiency anemia, unspecified iron deficiency anemia type Yes    Medication monitoring encounter         Iron deficiency due to UGI blood loss from gastritis that was chronic, since prior to July 2018.  Most recent EGD showed telangiactasias (GAVE) which underwent APC on 10/12/2023..    Continue to follow with GI as recommended. Needs f/u with Dr. Clement for EGD recommended again for APC.    She had venofer most recent 900 mg over 3 days ending June 2023.  Given her worsening iron deficiency and anemia, repeated course of Venofer 400 mg x 2 days, from end of November to beginning of December 2023.  Patient is taking folic acid daily.    Patient's iron stores are improved.  However she still has significant anemia. Continue with folic acid and B12 supplementation.  Will  continue to replace with IV if worsening iron stores.  No oral due to loose dark stools.     Having EGD today.  Will follow on results.     D/w patient that the anemia is also related to chronic disease and CKD.  BMP added today, as last renal function evaluated March 2023.  Pending results, if still with CKDstage III, given that her hemoglobin is less than 10, she may meet criteria for TAQUERIA, pending.         F/u in 8 weeks with labs, if symptomatic or anemia/iron worsening follow sooner.      No orders of the defined types were placed in this encounter.   MDM low risk      No follow-ups on file.     Recent Results (from the past 24 hour(s))   Ferritin    Collection Time: 01/12/24  8:52 AM   Result Value Ref Range    Ferritin 47.9 18.0 - 340.0 ng/mL   Iron And Tibc    Collection Time: 01/12/24  8:52 AM   Result Value Ref Range    Iron 44 (L) 50 - 170 ug/dL    Transferrin 267 250 - 380 mg/dL    Total Iron Binding Capacity 398 250 - 425 ug/dL    % Saturation 11 (L) 15 - 50 %   CBC W/ DIFFERENTIAL    Collection Time: 01/12/24  8:52 AM   Result Value Ref Range    WBC 4.8 4.0 - 11.0 x10(3) uL    RBC 3.12 (L) 3.80 - 5.30 x10(6)uL    HGB 9.3 (L) 12.0 - 16.0 g/dL    HCT 29.2 (L) 35.0 - 48.0 %    MCV 93.6 80.0 - 100.0 fL    MCH 29.8 26.0 - 34.0 pg    MCHC 31.8 31.0 - 37.0 g/dL    RDW-SD 49.6 (H) 35.1 - 46.3 fL    RDW 14.5 11.0 - 15.0 %    .0 150.0 - 450.0 10(3)uL    Neutrophil Absolute Prelim 3.15 1.50 - 7.70 x10 (3) uL    Neutrophil Absolute 3.15 1.50 - 7.70 x10(3) uL    Lymphocyte Absolute 1.11 1.00 - 4.00 x10(3) uL    Monocyte Absolute 0.34 0.10 - 1.00 x10(3) uL    Eosinophil Absolute 0.14 0.00 - 0.70 x10(3) uL    Basophil Absolute 0.02 0.00 - 0.20 x10(3) uL    Immature Granulocyte Absolute 0.01 0.00 - 1.00 x10(3) uL    Neutrophil % 66.1 %    Lymphocyte % 23.3 %    Monocyte % 7.1 %    Eosinophil % 2.9 %    Basophil % 0.4 %    Immature Granulocyte % 0.2 %     Component      Latest Ref Rn 5/18/2023 11/20/2023  1/12/2024   WBC      4.0 - 11.0 x10(3) uL 5.3  4.7  4.8    RBC      3.80 - 5.30 x10(6)uL 3.27 (L)  2.36 (L)  3.12 (L)    Hemoglobin      12.0 - 16.0 g/dL 10.3 (L)  7.6 (L)  9.3 (L)    Hematocrit      35.0 - 48.0 % 31.2 (L)  24.0 (L)  29.2 (L)    MCV      80.0 - 100.0 fL 95.4  101.7 (H)  93.6    MCH      26.0 - 34.0 pg 31.5  32.2  29.8    MCHC      31.0 - 37.0 g/dL 33.0  31.7  31.8    RDW-SD      35.1 - 46.3 fL 45.9  54.6 (H)  49.6 (H)    RDW      11.0 - 15.0 % 13.1  14.9  14.5    Platelet Count      150.0 - 450.0 10(3)uL 241.0  297.0  304.0    Prelim Neutrophil Abs      1.50 - 7.70 x10 (3) uL 3.69  3.11  3.15    Neutrophils Absolute      1.50 - 7.70 x10(3) uL 3.69  3.11  3.15    Lymphocytes Absolute      1.00 - 4.00 x10(3) uL 1.23  0.99 (L)  1.11    Monocytes Absolute      0.10 - 1.00 x10(3) uL 0.31  0.46  0.34    Eosinophils Absolute      0.00 - 0.70 x10(3) uL 0.08  0.09  0.14    Basophils Absolute      0.00 - 0.20 x10(3) uL 0.01  0.00  0.02    Immature Granulocyte Absolute      0.00 - 1.00 x10(3) uL 0.02  0.02  0.01    Neutrophils %      % 69.1  66.6  66.1    Lymphocytes %      % 23.0  21.2  23.3    Monocytes %      % 5.8  9.9  7.1    Eosinophils %      % 1.5  1.9  2.9    Basophils %      % 0.2  0.0  0.4    Immature Granulocyte %      % 0.4  0.4  0.2    Iron, Serum      50 - 170 ug/dL 23 (L)  21 (L)  44 (L)    Transferrin      250 - 380 mg/dL 282  262  267    Iron Bind.Cap.(TIBC)      250 - 425 ug/dL 420  390  398    Iron Saturation      15 - 50 % 5 (L)  5 (L)  11 (L)    FERRITIN      18.0 - 340.0 ng/mL 26.5  13.2 (L)  47.9

## 2024-01-12 NOTE — H&P
History & Physical Examination    Patient Name: Mercy Nieves  MRN: [id number]  [facility]: [id number]  YOB: 1940    Diagnosis: dysphagia      Medications Prior to Admission   Medication Sig Dispense Refill Last Dose    folic acid 1 MG Oral Tab Take 1 tablet (1 mg total) by mouth daily. 90 tablet 3     oxybutynin ER 10 MG Oral Tablet 24 Hr Take 1 tablet (10 mg total) by mouth daily. 90 tablet 3     clopidogrel 75 MG Oral Tab Take 1 tablet (75 mg total) by mouth daily. 90 tablet 3     atorvastatin 20 MG Oral Tab Take 1 tablet (20 mg total) by mouth daily. 90 tablet 3     SITagliptin Phosphate (JANUVIA) 100 MG Oral Tab Take 1 tablet (100 mg total) by mouth daily. 90 tablet 3     pantoprazole 40 MG Oral Tab EC Take 1 tablet (40 mg total) by mouth before breakfast. 90 tablet 3     telmisartan 80 MG Oral Tab Take 1 tablet (80 mg total) by mouth daily. 90 tablet 3     ergocalciferol 1.25 MG (40423 UT) Oral Cap Take 1 capsule (50,000 Units total) by mouth once a week. (Patient not taking: Reported on 1/12/2024) 12 capsule 1     Blood Pressure Monitor Does not apply Device Use daily. 1 each 0     Blood Pressure Monitor Does not apply Device Use daily. 1 each 0     Glucose Blood (TRUE METRIX BLOOD GLUCOSE TEST) In Vitro Strip CHECK BLOOD GLUCOSE TWICE DAILY 200 strip 3     sucralfate 1 g Oral Tab Take 1 tablet (1 g total) by mouth 4 (four) times daily before meals and nightly. (Patient not taking: Reported on 11/20/2023) 56 tablet 0     B Complex Vitamins (VITAMIN B COMPLEX) Oral Tab Take 1 tablet by mouth once daily. 90 tablet 0     Blood Pressure Monitoring Does not apply Misc Use as needed 1 Device 0     TRUEPLUS LANCETS 28G Does not apply Misc CHECK BLOOD GLUCOSE TWICE DAILY 200 each 4     ferrous sulfate 325 (65 FE) MG Oral Tab EC Take 1 tablet (325 mg total) by mouth daily.       aspirin 81 MG Oral Tab Take 1 tablet (81 mg total) by mouth daily.       Blood Glucose Monitoring Suppl (TRUE METRIX METER) W/DEVICE  Does not apply Kit 1 kit by Does not apply route 2 (two) times daily. 1 kit 0     Milk Thistle Extract 175 MG Oral Tab Take 1 tablet by mouth once daily.        No current facility-administered medications for this encounter.       Allergies:   Allergies   Allergen Reactions    Zocor [Simvastatin] SWELLING    Bactrim [Sulfamethoxazole W/Trimethoprim] NAUSEA AND VOMITING       Past Medical History:   Diagnosis Date    Anemia     Arthritis     Cholelithiasis     CKD (chronic kidney disease) stage 3, GFR 30-59 ml/min ([facility])     Coronary atherosclerosis     Depression     Diabetes ([facility])     Management:  Medication    Gastritis     GERD (gastroesophageal reflux disease)     High blood pressure     High cholesterol     Non-ST elevation MI (NSTEMI) ([facility]) 2016    stenting of the proximal LAD and ptca of mid LAD    Pregnancy         Renal disorder     CKD 3    Special screening for osteoporosis 2013    Dexa Scan    Stress incontinence     Type II or unspecified type diabetes mellitus without mention of complication, not stated as uncontrolled     Pills only     Past Surgical History:   Procedure Laterality Date    ANESTH,ANGIOPLASTY            4X    CATH DRUG ELUTING STENT      CHOLECYSTECTOMY      COLONOSCOPY      COLONOSCOPY N/A 2019    Procedure: COLONOSCOPY;  Surgeon: Thomas Clement MD;  Location: [facility] ENDOSCOPY    EGD  2018    ELECTROCARDIOGRAM, COMPLETE  2013    Scanned to Media Tab - Date of Service 2013     Family History   Problem Relation Age of Onset    Diabetes Father     Other (Other) Father 62    Diabetes Mother 64        (cause of death)    Diabetes Brother     Other (Other) Brother 54    Other (Other) Brother 64    Glaucoma Neg     Macular degeneration Neg      Social History     Tobacco Use    Smoking status: Never    Smokeless tobacco: Never   Substance Use Topics    Alcohol use: No     Alcohol/week: 0.0 standard drinks of alcohol       SYSTEM Check if  Review is Normal Check if Physical Exam is Normal If not normal, please explain:   HEENT [x ] [ x]    NECK & BACK [x ] [x ]    HEART [x ] [ x]    LUNGS [x ] [ x]    ABDOMEN [x ] [x ]    UROGENITAL [ ] [ ]    EXTREMITIES [x ] [x ]    OTHER        [ x ] I have discussed the risks and benefits and alternatives with the patient/family.  They understand and agree to proceed with plan of care.  [ x ] I have reviewed the History and Physical done within the last 30 days.  Any changes noted above.    Thomas Clement MD  1/12/2024  11:34 AM

## 2024-01-12 NOTE — DISCHARGE INSTRUCTIONS
Home Care Instructions for Gastroscopy with Sedation    Diet:  - Resume your regular diet as tolerated unless otherwise instructed.  - Start with light meals to minimize bloating.  - Do not drink alcohol today.    Medication:  - If you have questions about resuming your normal medications, please contact your Primary Care Physician.    Activities:  - Take it easy today. Do not return to work today.  - Do not drive today.  - Do not operate any machinery today (including kitchen equipment).    Gastroscopy:  - You may have a sore throat for 2-3 days following the exam. This is normal. Gargling with warm salt water (1/2 tsp salt to 1 glass warm water) or using throat lozenges will help.  - If you experience any sharp pain in your neck, abdomen or chest, vomiting of blood, oral temperature over 100 degrees Fahrenheit, light-headedness or dizziness, or any other problems, contact your doctor.    **If unable to reach your doctor, please go to the [facility] Emergency Room**    - Your referring physician will receive a full report of your examination.  - If you do not hear from your doctor's office within two weeks of your biopsy, please call them for your results.    You may be able to see your laboratory results in [employer] between 4 and 7 business days.  In some cases, your physician may not have viewed the results before they are released to [employer].  If you have questions regarding your results contact the physician who ordered the test/exam by phone or via [employer] by choosing \"Ask a Medical Question.\"

## 2024-01-15 ENCOUNTER — TELEPHONE (OUTPATIENT)
Facility: CLINIC | Age: 84
End: 2024-01-15

## 2024-01-15 RX ORDER — ACYCLOVIR 400 MG/1
400 TABLET ORAL 3 TIMES DAILY
Qty: 30 TABLET | Refills: 0 | Status: SHIPPED | OUTPATIENT
Start: 2024-01-15 | End: 2024-02-27

## 2024-01-16 NOTE — TELEPHONE ENCOUNTER
Dr. Clement already spoke to patient/son Garth about results and treatment-see other encounter from 1/15/2024.

## 2024-01-16 NOTE — TELEPHONE ENCOUNTER
----- Message from Thomas Clement MD sent at 1/15/2024  3:06 PM CST -----  HSV esophageal ulcer /infection - advise treatment with acyclovir 200mg 5 x day for 10 days

## 2024-02-05 ENCOUNTER — TELEPHONE (OUTPATIENT)
Facility: CLINIC | Age: 84
End: 2024-02-05

## 2024-02-05 NOTE — TELEPHONE ENCOUNTER
Current Outpatient Medications   Medication Sig Dispense Refill    pantoprazole 40 MG Oral Tab EC Take 1 tablet (40 mg total) by mouth 2 (two) times daily before meals. 60 tablet 0

## 2024-02-05 NOTE — TELEPHONE ENCOUNTER
Current Outpatient Medications   Medication Sig Dispense Refill    sucralfate 1 g Oral Tab Take 1 tablet (1 g total) by mouth 3 (three) times daily before meals. 90 tablet 0

## 2024-02-05 NOTE — TELEPHONE ENCOUNTER
PPI (Proton Pump Inhibitors): Nexium (Esomeprazole), Protonix (Pantoprazole), Dexilant (Dexlansoprazole), Prevacid (Lansoprazole), Aciphex (Rabeprazole), Omperazole      Protocol Criteria  Review last office visit note(s), plan of care, for any change     Appointment scheduled within the past 12 months or in the next 3 months or had a procedure (if applicable) and is up to date with their recall     Date of Last Office Visit: 1/12/2024    Date of next scheduled appointment:     Date of last procedure (if applicable): 1/24/2024    Date of recall (if applicable):       If used for Danyel's patient is up to date with appointment     Date of Last Office Visit     Recommendations:  - Post procedure instructions given/follow up on biopsies  - PPI BID x 1 month  - Carafate before meals  - Liquid diet x 3 days

## 2024-02-05 NOTE — TELEPHONE ENCOUNTER
See other telephone encounter from 2/5/24 forwarded to Dr. Clement with request for both pantoprazole and carafate.

## 2024-02-05 NOTE — TELEPHONE ENCOUNTER
I tried to call patient x2 using  Manny ID # [id number]  No answer/disconnected not able to leave a voicemail.

## 2024-02-05 NOTE — TELEPHONE ENCOUNTER
If she is feeling better they can cancel her carafate.   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

## 2024-02-05 NOTE — TELEPHONE ENCOUNTER
Dr. Clement    Refill request received for both the carafate and pantoprazole.  Did anything change with recommendation since ulceration consistent with HSV or should she stop the pantoprazole and carafate after 1 month?    Thank you    Current Outpatient Medications   Medication Sig Dispense Refill    sucralfate 1 g Oral Tab Take 1 tablet (1 g total) by mouth 3 (three) times daily before meals.

## 2024-02-07 NOTE — TELEPHONE ENCOUNTER
Left message to call back using  Messi ID # [id number]    CSS:  Please transfer to RN if patient calls back.  Thank you.

## 2024-02-09 RX ORDER — PANTOPRAZOLE SODIUM 40 MG/1
40 TABLET, DELAYED RELEASE ORAL
Qty: 30 TABLET | Refills: 1 | Status: SHIPPED | OUTPATIENT
Start: 2024-02-09

## 2024-02-09 NOTE — TELEPHONE ENCOUNTER
Our office tried to contact patient on three separate occasions with no answer or call back.  No response letter mailed to home address.  Encounter closed per protocol.

## 2024-02-27 ENCOUNTER — OFFICE VISIT (OUTPATIENT)
Dept: FAMILY MEDICINE CLINIC | Facility: CLINIC | Age: 84
End: 2024-02-27

## 2024-02-27 VITALS
BODY MASS INDEX: 27 KG/M2 | SYSTOLIC BLOOD PRESSURE: 194 MMHG | DIASTOLIC BLOOD PRESSURE: 73 MMHG | WEIGHT: 119 LBS | HEART RATE: 71 BPM

## 2024-02-27 DIAGNOSIS — E55.9 VITAMIN D DEFICIENCY: ICD-10-CM

## 2024-02-27 DIAGNOSIS — Z00.00 ENCOUNTER FOR ANNUAL HEALTH EXAMINATION: ICD-10-CM

## 2024-02-27 DIAGNOSIS — I10 ESSENTIAL HYPERTENSION: ICD-10-CM

## 2024-02-27 DIAGNOSIS — E11.9 DIABETES MELLITUS TYPE 2 WITHOUT RETINOPATHY (HCC): ICD-10-CM

## 2024-02-27 DIAGNOSIS — Z12.11 COLON CANCER SCREENING: ICD-10-CM

## 2024-02-27 DIAGNOSIS — E78.5 HYPERLIPIDEMIA, UNSPECIFIED HYPERLIPIDEMIA TYPE: Primary | ICD-10-CM

## 2024-02-27 PROCEDURE — 99214 OFFICE O/P EST MOD 30 MIN: CPT | Performed by: FAMILY MEDICINE

## 2024-02-27 PROCEDURE — G2211 COMPLEX E/M VISIT ADD ON: HCPCS | Performed by: FAMILY MEDICINE

## 2024-02-27 RX ORDER — METOPROLOL SUCCINATE 25 MG/1
25 TABLET, EXTENDED RELEASE ORAL DAILY
Qty: 90 TABLET | Refills: 3 | Status: SHIPPED | OUTPATIENT
Start: 2024-02-27

## 2024-02-27 NOTE — PROGRESS NOTES
Chief Complaint   Patient presents with    ER F/U    Medication Follow-Up     HPI:   Mercy Nieves is a 83 year old female who presents to clinic for follow-up.  Was recently seen by GI for throat pain.  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  Throat pain has improved significantly after treatment with acyclovir.  She is regaining some of the weight she lost and her appetite is better.  Blood pressure uncontrolled in the office today.  No chest pain shortness of breath or dizziness.  Patient is consistent with taking her telmisartan but typically takes it in the afternoon with food.  Blood pressure at home is also elevated.      REVIEW OF SYSTEMS:   Negative, except per HPI.     EXAM:   BP (!) 194/73   Pulse 71   Wt 119 lb (54 kg)   BMI 26.68 kg/m²   Body mass index is 26.68 kg/m².  GENERAL: well developed, well nourished, in no apparent distress  SKIN: no rashes, no suspicious lesions  HEENT: atraumatic, normocephalic  EYES: PERRLA, EOMI,conjunctiva are clear  NECK: supple, no adenopathy  LUNGS: clear to auscultation  CARDIO: RRR without murmur  GI: good BS, no masses or tenderness  MUSCULOSKELETAL: back is not tender, FROM of the back  EXTREMITIES: no cyanosis or edema  NEURO: Oriented times three, motor and sensory are grossly intact    ASSESSMENT AND PLAN:     1. Hyperlipidemia, unspecified hyperlipidemia type  - Stable condition, continue present management.      2. Essential hypertension  Blood pressure uncontrolled, elevated in the office today.  Patient did not take her blood pressure medication today because she thought she was going to get lab work.  States that at home it is also uncontrolled with systolic typically in the 140s to 150s range.  She usually takes her telmisartan in the evening.  Okay to add on metoprolol in the morning or vice versa.  - metoprolol succinate ER 25 MG Oral Tablet 24 Hr; Take 1 tablet (25 mg total) by mouth daily.  Dispense: 90 tablet; Refill: 3    3. Diabetes mellitus  type 2 without retinopathy (HCC)  - Stable condition, continue present management.    - OPHTHALMOLOGY - INTERNAL  - Microalb/Creat Ratio, Random Urine; Future    4. Colon cancer screening  Saw Dr. Clement recently for EGD.  Colonoscopy not done.  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  She was treated with 10-day course of Provera.       RTC if no improvement in symptoms. Red flags discussed to go to ER.       Encounter for annual health examination  Lab work ordered early to be done after Dr. Parsons for labs.  Annual wellness visit scheduled  - CBC With Differential With Platelet; Future  - Comp Metabolic Panel (14); Future  - Hemoglobin A1C; Future  - Lipid Panel; Future  - TSH W Reflex To Free T4; Future  - Vitamin D; Future    Luann Chung MD  2/27/2024  10:50 AM

## 2024-03-07 DIAGNOSIS — I10 ESSENTIAL HYPERTENSION: ICD-10-CM

## 2024-03-07 NOTE — TELEPHONE ENCOUNTER
Please review, protocol failed/No protocol.    Requested Prescriptions   Pending Prescriptions Disp Refills    TELMISARTAN 80 MG Oral Tab [Pharmacy Med Name: TELMISARTAN 80 MG TABLET] 90 tablet 3     Sig: TAKE 1 TABLET BY MOUTH EVERY DAY       Hypertension Medications Protocol Failed - 3/7/2024 12:11 AM        Failed - Last BP reading less than 140/90     BP Readings from Last 1 Encounters:   02/27/24 (!) 194/73               Passed - CMP or BMP in past 12 months        Passed - In person appointment or virtual visit in the past 12 mos or appointment in next 3 mos     Recent Outpatient Visits              1 week ago Hyperlipidemia, unspecified hyperlipidemia type    [facility] Luann Chung MD    Office Visit    1 month ago Iron deficiency anemia, unspecified iron deficiency anemia type    [facility] Hematology Oncology Bryon Burger MD    Office Visit    1 month ago Other iron deficiency anemia    [facility]cology    Nurse Only    3 months ago Iron deficiency anemia, unspecified iron deficiency anemia type    [facility] - Infusion    Office Visit    3 months ago Iron deficiency anemia, unspecified iron deficiency anemia type    [facility]    Office Visit          Future Appointments         Provider Department Appt Notes    In 6 days [facility] Hematology Oncology FT LAB.PIV.CL  iron ferritin cbc    In 6 days Bryon Burger MD [facility] Hematology Oncology FOLLOW UP VISIT.CL  2M    In 1 week Luann Chung MD [facility]                Passed - EGFRCR or GFRNAA > 50     GFR Evaluation  EGFRCR: 57 , resulted on 1/12/2024               Future Appointments         Provider Department Appt Notes    In 6 days [facility] Hematology Oncology FT LAB.PIV.CL  iron ferritin cbc    In 6 days Bryon Burger MD  [facility] Hematology Oncology FOLLOW UP VISIT.CL  2M    In 1 week Luann Chung MD [facility]           Recent Outpatient Visits              1 week ago Hyperlipidemia, unspecified hyperlipidemia type    [facility] Luann Chung MD    Office Visit    1 month ago Iron deficiency anemia, unspecified iron deficiency anemia type    [facility] Hematology Oncology Bryon Burger MD    Office Visit    1 month ago Other iron deficiency anemia    [facility] Hematology Oncology    Nurse Only    3 months ago Iron deficiency anemia, unspecified iron deficiency anemia type    [facility]    Office Visit    3 months ago Iron deficiency anemia, unspecified iron deficiency anemia type    [facility] [place] [facility]    Office Visit

## 2024-03-08 RX ORDER — TELMISARTAN 80 MG/1
80 TABLET ORAL DAILY
Qty: 90 TABLET | Refills: 3 | Status: SHIPPED | OUTPATIENT
Start: 2024-03-08

## 2024-03-13 ENCOUNTER — OFFICE VISIT (OUTPATIENT)
Dept: HEMATOLOGY/ONCOLOGY | Facility: HOSPITAL | Age: 84
End: 2024-03-13
Attending: INTERNAL MEDICINE
Payer: MEDICARE

## 2024-03-13 VITALS
HEART RATE: 55 BPM | WEIGHT: 119.38 LBS | BODY MASS INDEX: 26.85 KG/M2 | OXYGEN SATURATION: 100 % | SYSTOLIC BLOOD PRESSURE: 216 MMHG | DIASTOLIC BLOOD PRESSURE: 43 MMHG | HEIGHT: 55.98 IN | TEMPERATURE: 98 F | RESPIRATION RATE: 16 BRPM

## 2024-03-13 DIAGNOSIS — D50.9 IRON DEFICIENCY ANEMIA, UNSPECIFIED IRON DEFICIENCY ANEMIA TYPE: Primary | ICD-10-CM

## 2024-03-13 DIAGNOSIS — D50.8 OTHER IRON DEFICIENCY ANEMIA: ICD-10-CM

## 2024-03-13 DIAGNOSIS — D63.1 ANEMIA IN STAGE 2 CHRONIC KIDNEY DISEASE: ICD-10-CM

## 2024-03-13 DIAGNOSIS — N18.2 ANEMIA IN STAGE 2 CHRONIC KIDNEY DISEASE: ICD-10-CM

## 2024-03-13 LAB
BASOPHILS # BLD AUTO: 0.02 X10(3) UL (ref 0–0.2)
BASOPHILS NFR BLD AUTO: 0.3 %
DEPRECATED HBV CORE AB SER IA-ACNC: 13.7 NG/ML
DEPRECATED RDW RBC AUTO: 48.4 FL (ref 35.1–46.3)
EOSINOPHIL # BLD AUTO: 0.14 X10(3) UL (ref 0–0.7)
EOSINOPHIL NFR BLD AUTO: 2.4 %
ERYTHROCYTE [DISTWIDTH] IN BLOOD BY AUTOMATED COUNT: 15.4 % (ref 11–15)
HCT VFR BLD AUTO: 32.8 %
HGB BLD-MCNC: 10.9 G/DL
IMM GRANULOCYTES # BLD AUTO: 0.02 X10(3) UL (ref 0–1)
IMM GRANULOCYTES NFR BLD: 0.3 %
IRON SATN MFR SERPL: 12 %
IRON SERPL-MCNC: 52 UG/DL
LYMPHOCYTES # BLD AUTO: 1.45 X10(3) UL (ref 1–4)
LYMPHOCYTES NFR BLD AUTO: 25.3 %
MCH RBC QN AUTO: 28.5 PG (ref 26–34)
MCHC RBC AUTO-ENTMCNC: 33.2 G/DL (ref 31–37)
MCV RBC AUTO: 85.9 FL
MONOCYTES # BLD AUTO: 0.41 X10(3) UL (ref 0.1–1)
MONOCYTES NFR BLD AUTO: 7.2 %
NEUTROPHILS # BLD AUTO: 3.68 X10 (3) UL (ref 1.5–7.7)
NEUTROPHILS # BLD AUTO: 3.68 X10(3) UL (ref 1.5–7.7)
NEUTROPHILS NFR BLD AUTO: 64.5 %
PLATELET # BLD AUTO: 238 10(3)UL (ref 150–450)
RBC # BLD AUTO: 3.82 X10(6)UL
TIBC SERPL-MCNC: 438 UG/DL (ref 250–425)
TRANSFERRIN SERPL-MCNC: 294 MG/DL (ref 250–380)
WBC # BLD AUTO: 5.7 X10(3) UL (ref 4–11)

## 2024-03-13 PROCEDURE — 82728 ASSAY OF FERRITIN: CPT

## 2024-03-13 PROCEDURE — 83540 ASSAY OF IRON: CPT

## 2024-03-13 PROCEDURE — 36415 COLL VENOUS BLD VENIPUNCTURE: CPT

## 2024-03-13 PROCEDURE — 84466 ASSAY OF TRANSFERRIN: CPT

## 2024-03-13 PROCEDURE — 99213 OFFICE O/P EST LOW 20 MIN: CPT | Performed by: INTERNAL MEDICINE

## 2024-03-13 PROCEDURE — 85025 COMPLETE CBC W/AUTO DIFF WBC: CPT

## 2024-03-13 NOTE — PROGRESS NOTES
HPI     Mercy Nieves is a 83 year old female who is here today for f/u of   Encounter Diagnoses   Name Primary?    Iron deficiency anemia, unspecified iron deficiency anemia type Yes    Anemia in stage 2 chronic kidney disease          She is here today for follow-up.    Last course of venofer in June 2023, she had a total of 900 mg in 3 divided doses.    Has h/o GAVE.  Patient's last EGD was on 10/12/2023.  Mucosa showed GAVE in the antrum of the stomach extending out in multiple A's which was treated with APC on gastric settings with multiple applications with good overall results and no bleeding completion.  She also has small gastric fundic polyp in the body of stomach which was not biopsied.  She was recommended for follow-up with the blood counts, and retreatment of GAVE if ongoing issues.    Patient completed 2 doses of Venofer 400 mg on 11/2/2023, and 12/6/2023    Patient had EGD on 1/12/2024, she was found to have esophagitis with erosion/ulceration about 1 cm above the GE junction, gastric polyps which were biopsied, and in the stomach mucosa streaks of GAVE in the antrum.  No APC anticoagulation was performed.  She was prescribed PPI twice daily for a month as well as Carafate before meals.  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  She was treated with acyclovir.  Still epigastric symptoms but not as bad, states was taking half the dose of sucralfate.    Still taking B12 and folic acid.     Still c/o liquid stools    No pica.    Review of Systems:   Review of Systems   Constitutional:  Negative for appetite change, fatigue and unexpected weight change.   HENT:   Positive for trouble swallowing (last week, food stuck and then resolved.).    Respiratory:  Negative for shortness of breath.    Cardiovascular:  Negative for chest pain.   Gastrointestinal:  Positive for abdominal pain (with BMs) and diarrhea. Negative  for blood in stool and constipation.   Genitourinary:  Negative for hematuria.    Neurological:  Negative for dizziness and headaches.   Hematological:  Bruises/bleeds easily.   Psychiatric/Behavioral:  Positive for sleep disturbance.            Current Outpatient Medications   Medication Sig Dispense Refill    telmisartan 80 MG Oral Tab Take 1 tablet (80 mg total) by mouth daily. 90 tablet 3    metoprolol succinate ER 25 MG Oral Tablet 24 Hr Take 1 tablet (25 mg total) by mouth daily. 90 tablet 3    pantoprazole 40 MG Oral Tab EC Take 1 tablet (40 mg total) by mouth every morning before breakfast. 30 tablet 1    folic acid 1 MG Oral Tab Take 1 tablet (1 mg total) by mouth daily. 90 tablet 3    oxybutynin ER 10 MG Oral Tablet 24 Hr Take 1 tablet (10 mg total) by mouth daily. 90 tablet 3    clopidogrel 75 MG Oral Tab Take 1 tablet (75 mg total) by mouth daily. 90 tablet 3    atorvastatin 20 MG Oral Tab Take 1 tablet (20 mg total) by mouth daily. 90 tablet 3    SITagliptin Phosphate (JANUVIA) 100 MG Oral Tab Take 1 tablet (100 mg total) by mouth daily. 90 tablet 3    Blood Pressure Monitor Does not apply Device Use daily. 1 each 0    Blood Pressure Monitor Does not apply Device Use daily. 1 each 0    Glucose Blood (TRUE METRIX BLOOD GLUCOSE TEST) In Vitro Strip CHECK BLOOD GLUCOSE TWICE DAILY 200 strip 3    B Complex Vitamins (VITAMIN B COMPLEX) Oral Tab Take 1 tablet by mouth once daily. 90 tablet 0    Blood Pressure Monitoring Does not apply Misc Use as needed 1 Device 0    TRUEPLUS LANCETS 28G Does not apply Misc CHECK BLOOD GLUCOSE TWICE DAILY 200 each 4    ferrous sulfate 325 (65 FE) MG Oral Tab EC Take 1 tablet (325 mg total) by mouth daily.      aspirin 81 MG Oral Tab Take 1 tablet (81 mg total) by mouth daily.      Blood Glucose Monitoring Suppl (TRUE METRIX METER) W/DEVICE Does not apply Kit 1 kit by Does not apply route 2 (two) times daily. 1 kit 0    Milk Thistle Extract 175 MG Oral Tab Take 1 tablet by  mouth once daily.      sucralfate 1 g Oral Tab Take 1 tablet (1 g total) by mouth 3 (three) times daily before meals. (Patient not taking: Reported on 2024) 90 tablet 0    ergocalciferol 1.25 MG (34177 UT) Oral Cap Take 1 capsule (50,000 Units total) by mouth once a week. (Patient not taking: Reported on 2024) 12 capsule 1     Allergies:   Allergies   Allergen Reactions    Zocor [Simvastatin] SWELLING    Bactrim [Sulfamethoxazole W/Trimethoprim] NAUSEA AND VOMITING       Past Medical History:   Diagnosis Date    Anemia     Arthritis     Cholelithiasis     CKD (chronic kidney disease) stage 3, GFR 30-59 ml/min ([facility])     Coronary atherosclerosis     Depression     Diabetes ([facility])     Management:  Medication    Gastritis     GERD (gastroesophageal reflux disease)     High blood pressure     High cholesterol     Non-ST elevation MI (NSTEMI) ([facility]) 2016    stenting of the proximal LAD and ptca of mid LAD    Pregnancy ([facility])         Renal disorder     CKD 3    Special screening for osteoporosis 2013    Dexa Scan    Stress incontinence     Type II or unspecified type diabetes mellitus without mention of complication, not stated as uncontrolled     Pills only     Past Surgical History:   Procedure Laterality Date    ANESTH,ANGIOPLASTY            4X    CATH DRUG ELUTING STENT      CHOLECYSTECTOMY      COLONOSCOPY  2016    COLONOSCOPY N/A 2019    Procedure: COLONOSCOPY;  Surgeon: Thomas Clement MD;  Location: [facility] ENDOSCOPY    EGD  2018    ELECTROCARDIOGRAM, COMPLETE  2013    Scanned to Media Tab - Date of Service 2013     Social History     Socioeconomic History    Marital status:    Tobacco Use    Smoking status: Never     Passive exposure: Never    Smokeless tobacco: Never   Vaping Use    Vaping Use: Never used   Substance and Sexual Activity    Alcohol use: No     Alcohol/week: 0.0 standard drinks of alcohol    Drug use: No   Other Topics Concern    Caffeine  Concern Yes     Comment: Coffee, 1 cup daily       Family History   Problem Relation Age of Onset    Diabetes Father     Other (Other) Father 62    Diabetes Mother 64        (cause of death)    Diabetes Brother     Other (Other) Brother 54    Other (Other) Brother 64    Glaucoma Neg     Macular degeneration Neg          PHYSICAL EXAM:    BP (!) 216/43 (BP Location: Right arm, Patient Position: Sitting, Cuff Size: adult)   Pulse 55   Temp 98 °F (36.7 °C) (Oral)   Resp 16   Ht 1.422 m (4' 7.98\")   Wt 54.2 kg (119 lb 6.4 oz)   SpO2 100%   BMI 26.78 kg/m²   Wt Readings from Last 6 Encounters:   03/13/24 54.2 kg (119 lb 6.4 oz)   02/27/24 54 kg (119 lb)   01/12/24 53.5 kg (118 lb)   01/12/24 53.5 kg (118 lb)   12/06/23 57.4 kg (126 lb 9.6 oz)   11/28/23 56.9 kg (125 lb 6.4 oz)     Physical Exam  General: Patient is alert and oriented x 3, not in acute distress.  HEENT: EOMs intact. PERRL.  Neck: No JVD. No palpable lymphadenopathy. Neck is supple.  Chest: Clear to auscultation.  Heart: Regular rate and rhythm.   Abdomen: Soft, + epigastric tenderness, good bowel sounds.  Extremities: Pedal pulses are present. No edema.  Neurological: Grossly intact.   Lymphatics: There is no palpable lymphadenopathy throughout in the cervical, supraclavicular, or axillary regions.  Psych/Depression: normal        ASSESSMENT/PLAN:     Encounter Diagnoses   Name Primary?    Iron deficiency anemia, unspecified iron deficiency anemia type Yes    Anemia in stage 2 chronic kidney disease         Iron deficiency due to UGI blood loss from gastritis that was chronic, since prior to July 2018.  Most recent EGD showed telangiactasias (GAVE) which underwent APC on 10/12/2023..    Continue to follow with GI as recommended. Needs f/u with Dr. Clement for EGD recommended again for APC.    She had venofer most recent 900 mg over 3 days ending June 2023.  Given her worsening iron deficiency and anemia, repeated course of Venofer 400 mg x 2 days,  from end of November to beginning of December 2023.  Patient is taking folic acid daily.    Patient's iron stores are improved.  Her anemia has improved.  Patient can continue off iron supplement and continue with B12 and folic acid daily.  If she has worsening anemia, or worsening iron stores in 3 months, may repeat course of intravenous Venofer.    EGD January 2024 with streaks of GAVE.  She also had esophageal ulcer above the GE junction, consistent with HSV esophagitis.  She was treated with PPI, Carafate, and acyclovir.    D/w patient that the anemia is also related to chronic disease and CKD.  Her CKD is now stage II per most recent BMP in January.  If patient ever has decreased of hemoglobin to 10 or less and her CKD is stage III or above, may benefit from TAQUERIA if anemia with replace iron stores.    F/u in 12 weeks with labs, if symptomatic or anemia/iron worsening follow sooner.      No orders of the defined types were placed in this encounter.   MDM low risk      No follow-ups on file.     Recent Results (from the past 24 hour(s))   Iron And Tibc    Collection Time: 03/13/24  9:45 AM   Result Value Ref Range    Iron 52 50 - 170 ug/dL    Transferrin 294 250 - 380 mg/dL    Total Iron Binding Capacity 438 (H) 250 - 425 ug/dL    % Saturation 12 (L) 15 - 50 %   Ferritin    Collection Time: 03/13/24  9:45 AM   Result Value Ref Range    Ferritin 13.7 (L) 18.0 - 340.0 ng/mL   CBC W/ DIFFERENTIAL    Collection Time: 03/13/24  9:45 AM   Result Value Ref Range    WBC 5.7 4.0 - 11.0 x10(3) uL    RBC 3.82 3.80 - 5.30 x10(6)uL    HGB 10.9 (L) 12.0 - 16.0 g/dL    HCT 32.8 (L) 35.0 - 48.0 %    MCV 85.9 80.0 - 100.0 fL    MCH 28.5 26.0 - 34.0 pg    MCHC 33.2 31.0 - 37.0 g/dL    RDW-SD 48.4 (H) 35.1 - 46.3 fL    RDW 15.4 (H) 11.0 - 15.0 %    .0 150.0 - 450.0 10(3)uL    Neutrophil Absolute Prelim 3.68 1.50 - 7.70 x10 (3) uL    Neutrophil Absolute 3.68 1.50 - 7.70 x10(3) uL    Lymphocyte Absolute 1.45 1.00 - 4.00 x10(3)  uL    Monocyte Absolute 0.41 0.10 - 1.00 x10(3) uL    Eosinophil Absolute 0.14 0.00 - 0.70 x10(3) uL    Basophil Absolute 0.02 0.00 - 0.20 x10(3) uL    Immature Granulocyte Absolute 0.02 0.00 - 1.00 x10(3) uL    Neutrophil % 64.5 %    Lymphocyte % 25.3 %    Monocyte % 7.2 %    Eosinophil % 2.4 %    Basophil % 0.3 %    Immature Granulocyte % 0.3 %     Component      Latest Ref Rn 11/20/2023 1/12/2024 3/13/2024   WBC      4.0 - 11.0 x10(3) uL 4.7  4.8  5.7    RBC      3.80 - 5.30 x10(6)uL 2.36 (L)  3.12 (L)  3.82    Hemoglobin      12.0 - 16.0 g/dL 7.6 (L)  9.3 (L)  10.9 (L)    Hematocrit      35.0 - 48.0 % 24.0 (L)  29.2 (L)  32.8 (L)    MCV      80.0 - 100.0 fL 101.7 (H)  93.6  85.9    MCH      26.0 - 34.0 pg 32.2  29.8  28.5    MCHC      31.0 - 37.0 g/dL 31.7  31.8  33.2    RDW-SD      35.1 - 46.3 fL 54.6 (H)  49.6 (H)  48.4 (H)    RDW      11.0 - 15.0 % 14.9  14.5  15.4 (H)    Platelet Count      150.0 - 450.0 10(3)uL 297.0  304.0  238.0    Prelim Neutrophil Abs      1.50 - 7.70 x10 (3) uL 3.11  3.15  3.68    Neutrophils Absolute      1.50 - 7.70 x10(3) uL 3.11  3.15  3.68    Lymphocytes Absolute      1.00 - 4.00 x10(3) uL 0.99 (L)  1.11  1.45    Monocytes Absolute      0.10 - 1.00 x10(3) uL 0.46  0.34  0.41    Eosinophils Absolute      0.00 - 0.70 x10(3) uL 0.09  0.14  0.14    Basophils Absolute      0.00 - 0.20 x10(3) uL 0.00  0.02  0.02    Immature Granulocyte Absolute      0.00 - 1.00 x10(3) uL 0.02  0.01  0.02    Neutrophils %      % 66.6  66.1  64.5    Lymphocytes %      % 21.2  23.3  25.3    Monocytes %      % 9.9  7.1  7.2    Eosinophils %      % 1.9  2.9  2.4    Basophils %      % 0.0  0.4  0.3    Immature Granulocyte %      % 0.4  0.2  0.3    Iron, Serum      50 - 170 ug/dL 21 (L)  44 (L)  52    Transferrin      250 - 380 mg/dL 262  267  294    Iron Bind.Cap.(TIBC)      250 - 425 ug/dL 390  398  438 (H)    Iron Saturation      15 - 50 % 5 (L)  11 (L)  12 (L)    FERRITIN      18.0 - 340.0 ng/mL 13.2 (L)   47.9  13.7 (L)

## 2024-03-19 ENCOUNTER — OFFICE VISIT (OUTPATIENT)
Dept: FAMILY MEDICINE CLINIC | Facility: CLINIC | Age: 84
End: 2024-03-19

## 2024-03-19 VITALS
DIASTOLIC BLOOD PRESSURE: 74 MMHG | WEIGHT: 120 LBS | BODY MASS INDEX: 27 KG/M2 | HEART RATE: 56 BPM | SYSTOLIC BLOOD PRESSURE: 180 MMHG

## 2024-03-19 DIAGNOSIS — H02.886 MEIBOMIAN GLAND DYSFUNCTION (MGD) OF BOTH EYES: ICD-10-CM

## 2024-03-19 DIAGNOSIS — N18.32 TYPE 2 DIABETES MELLITUS WITH STAGE 3B CHRONIC KIDNEY DISEASE, WITHOUT LONG-TERM CURRENT USE OF INSULIN (HCC): ICD-10-CM

## 2024-03-19 DIAGNOSIS — F33.0 MILD EPISODE OF RECURRENT MAJOR DEPRESSIVE DISORDER (HCC): ICD-10-CM

## 2024-03-19 DIAGNOSIS — I10 ESSENTIAL HYPERTENSION: ICD-10-CM

## 2024-03-19 DIAGNOSIS — K90.9: ICD-10-CM

## 2024-03-19 DIAGNOSIS — H02.883 MEIBOMIAN GLAND DYSFUNCTION (MGD) OF BOTH EYES: ICD-10-CM

## 2024-03-19 DIAGNOSIS — M79.661 PAIN IN RIGHT LOWER LEG: ICD-10-CM

## 2024-03-19 DIAGNOSIS — I25.118 CORONARY ARTERY DISEASE OF NATIVE HEART WITH STABLE ANGINA PECTORIS, UNSPECIFIED VESSEL OR LESION TYPE (HCC): ICD-10-CM

## 2024-03-19 DIAGNOSIS — E55.9 VITAMIN D DEFICIENCY: ICD-10-CM

## 2024-03-19 DIAGNOSIS — M25.571 CHRONIC PAIN OF RIGHT ANKLE: ICD-10-CM

## 2024-03-19 DIAGNOSIS — E11.9 DIABETES MELLITUS TYPE 2 WITHOUT RETINOPATHY (HCC): ICD-10-CM

## 2024-03-19 DIAGNOSIS — R01.1 MURMUR: ICD-10-CM

## 2024-03-19 DIAGNOSIS — D50.8 OTHER IRON DEFICIENCY ANEMIA: ICD-10-CM

## 2024-03-19 DIAGNOSIS — E11.22 TYPE 2 DIABETES MELLITUS WITH STAGE 3B CHRONIC KIDNEY DISEASE, WITHOUT LONG-TERM CURRENT USE OF INSULIN (HCC): ICD-10-CM

## 2024-03-19 DIAGNOSIS — H25.13 AGE-RELATED NUCLEAR CATARACT OF BOTH EYES: ICD-10-CM

## 2024-03-19 DIAGNOSIS — Z00.00 ENCOUNTER FOR ANNUAL HEALTH EXAMINATION: Primary | ICD-10-CM

## 2024-03-19 DIAGNOSIS — I70.0 ATHEROSCLEROSIS OF AORTA (HCC): ICD-10-CM

## 2024-03-19 DIAGNOSIS — I73.9 PERIPHERAL VASCULAR DISEASE (HCC): ICD-10-CM

## 2024-03-19 DIAGNOSIS — E22.2 SIADH (SYNDROME OF INAPPROPRIATE ADH PRODUCTION) (HCC): ICD-10-CM

## 2024-03-19 DIAGNOSIS — H90.3 SENSORINEURAL HEARING LOSS (SNHL) OF BOTH EARS: ICD-10-CM

## 2024-03-19 DIAGNOSIS — G89.29 CHRONIC PAIN OF RIGHT ANKLE: ICD-10-CM

## 2024-03-19 DIAGNOSIS — K31.819 GAVE (GASTRIC ANTRAL VASCULAR ECTASIA): ICD-10-CM

## 2024-03-19 DIAGNOSIS — M85.89 OSTEOPENIA OF MULTIPLE SITES: ICD-10-CM

## 2024-03-19 DIAGNOSIS — R09.89 BILATERAL CAROTID BRUITS: ICD-10-CM

## 2024-03-19 RX ORDER — METOPROLOL SUCCINATE 25 MG/1
25 TABLET, EXTENDED RELEASE ORAL 2 TIMES DAILY
Qty: 180 TABLET | Refills: 3 | Status: SHIPPED | OUTPATIENT
Start: 2024-03-19

## 2024-03-19 NOTE — PATIENT INSTRUCTIONS
[facility] [address] [facility]    SARITHA JAIMIE CON EL DR[address] SANCHEZ PARA PHILLIPS COLONOSCOPIA

## 2024-03-22 PROBLEM — D63.8 ANEMIA, CHRONIC DISEASE: Status: RESOLVED | Noted: 2020-09-09 | Resolved: 2024-03-22

## 2024-03-22 NOTE — PROGRESS NOTES
Subjective:   Mercy Nieves is a 83 year old female who presents for a Medicare Subsequent Annual Wellness visit (Pt already had Initial Annual Wellness) and scheduled follow up of multiple significant but stable problems.       History/Other:   Fall Risk Assessment:   She has been screened for Falls and is low risk.      Cognitive Assessment:   She had a completely normal cognitive assessment - see flowsheet entries     Functional Ability/Status:   Mercy Nieves has some abnormal functions as listed below:  She has Driving difficulties based on screening of functional status. She has Meal Preparation difficulties based on screening of functional status.She has difficulties Managing Money/Bills based on screening of functional status.She has Hearing problems based on screening of functional status.She has Vision problems based on screening of functional status. She has Walking problems based on screening of functional status. She has problems with Daily Activities based on screening of functional status.       Depression Screening (PHQ-2/PHQ-9): PHQ-2 SCORE: 2  , done 3/19/2024   Little interest or pleasure in doing things: 1    Feeling down, depressed, or hopeless: 1              Advanced Directives:   She does have a Living Will but we do NOT have it on file in Epic.    She does NOT have a Power of  for Health Care. [Do you have a healthcare power of ?: No]  Not discussed      Patient Active Problem List   Diagnosis    Age-related nuclear cataract of both eyes    Diabetes mellitus type 2 without retinopathy (HCC)    Anemia, iron deficiency    Essential hypertension    Murmur    Atherosclerosis of aorta (HCC)    Peripheral vascular disease (HCC)    Coronary artery disease of native heart with stable angina pectoris (HCC)    Impaired intestinal absorption (HCC)    Bilateral carotid bruits    Meibomian gland dysfunction (MGD) of both eyes    Osteopenia of multiple sites    Vitamin D deficiency     Sensorineural hearing loss (SNHL) of both ears    Anemia, chronic disease    SIADH (syndrome of inappropriate ADH production) ([facility])    GAVE (gastric antral vascular ectasia)    Type 2 diabetes mellitus with diabetic chronic kidney disease ([facility])    Pain in right lower leg    Chronic pain of right ankle    Mild episode of recurrent major depressive disorder ([facility])     Allergies:  She is allergic to zocor [simvastatin] and bactrim [sulfamethoxazole w/trimethoprim].    Current Medications:  Outpatient Medications Marked as Taking for the 3/19/24 encounter (Office Visit) with Luann Chung MD   Medication Sig    metoprolol succinate ER 25 MG Oral Tablet 24 Hr Take 1 tablet (25 mg total) by mouth in the morning and 1 tablet (25 mg total) before bedtime.    telmisartan 80 MG Oral Tab Take 1 tablet (80 mg total) by mouth daily.    pantoprazole 40 MG Oral Tab EC Take 1 tablet (40 mg total) by mouth every morning before breakfast.    folic acid 1 MG Oral Tab Take 1 tablet (1 mg total) by mouth daily.    oxybutynin ER 10 MG Oral Tablet 24 Hr Take 1 tablet (10 mg total) by mouth daily.    clopidogrel 75 MG Oral Tab Take 1 tablet (75 mg total) by mouth daily.    atorvastatin 20 MG Oral Tab Take 1 tablet (20 mg total) by mouth daily.    SITagliptin Phosphate (JANUVIA) 100 MG Oral Tab Take 1 tablet (100 mg total) by mouth daily.    B Complex Vitamins (VITAMIN B COMPLEX) Oral Tab Take 1 tablet by mouth once daily.    ferrous sulfate 325 (65 FE) MG Oral Tab EC Take 1 tablet (325 mg total) by mouth daily.    aspirin 81 MG Oral Tab Take 1 tablet (81 mg total) by mouth daily.    Milk Thistle Extract 175 MG Oral Tab Take 1 tablet by mouth once daily.       Medical History:  She  has a past medical history of Anemia, Arthritis, Cholelithiasis, CKD (chronic kidney disease) stage 3, GFR 30-59 ml/min ([facility]), Coronary atherosclerosis, Depression, Diabetes ([facility]), Gastritis, GERD (gastroesophageal reflux disease), High blood pressure, High  cholesterol, Non-ST elevation MI (NSTEMI) ([facility]) (2016), Pregnancy ([facility]), Renal disorder, Special screening for osteoporosis (2013), Stress incontinence, and Type II or unspecified type diabetes mellitus without mention of complication, not stated as uncontrolled ().  Surgical History:  She  has a past surgical history that includes cholecystectomy; electrocardiogram, complete (2013); anesth,angioplasty; colonoscopy (); egd (); ; colonoscopy (N/A, 2019); and cath drug eluting stent.   Family History:  Her family history includes Diabetes in her brother and father; Diabetes (age of onset: 64) in her mother; Other (age of onset: 54) in her brother; Other (age of onset: 62) in her father; Other (age of onset: 64) in her brother.  Social History:  She  reports that she has never smoked. She has never been exposed to tobacco smoke. She has never used smokeless tobacco. She reports that she does not drink alcohol and does not use drugs.    Tobacco:  She has never smoked tobacco.    CAGE Alcohol Screen:   CAGE screening score of 0 on 3/19/2024, showing low risk of alcohol abuse.      Patient Care Team:  Luann Chung MD as PCP - General (Family Medicine)  Mireya Ramachandran, PT as Physical Therapist (Physical Therapy)  Gayatri Kunz, THOR as Physical Therapist (Physical Therapy)    Review of Systems     Negative except per hpi     Objective:   Physical Exam  General appearance: alert  HEENT: Normocephalic, without obvious abnormality, atraumatic. PERRL, EOMI, pink conjunctiva.   Neck: supple, symmetrical, trachea midline, no adenopathy, no JVD and thyroid not enlarged,  Lungs: respirations unlabored, clear to auscultation bilaterally  Heart: regular rate and rhythm, S1, S2 normal, no murmur  Abdomen: normoactive bowel sounds x4; soft, non-distended; nontender; no masses  Extremities: extremities normal, atraumatic, no cyanosis or edema; no erythema or tenderness in calves  bilaterally  Neurologic: alert, oriented x3      BP (!) 180/74   Pulse 56   Wt 120 lb (54.4 kg)   BMI 26.92 kg/m²  Estimated body mass index is 26.92 kg/m² as calculated from the following:    Height as of 3/13/24: 4' 7.98\" (1.422 m).    Weight as of this encounter: 120 lb (54.4 kg).    Medicare Hearing Assessment:   Hearing Screening    Time taken: 3/19/2024  2:53 PM  Screening Method: Questionnaire  I have a problem hearing over the telephone: Yes I have trouble following the conversations when two or more people are talking at the same time: Yes   I have trouble understanding things on the TV: Yes I have to strain to understand conversations: Yes   I have to worry about missing the telephone ring or doorbell: Yes I have trouble hearing conversations in a noisy background such as a crowded room or restaurant: Yes   I get confused about where sounds come from: Yes I misunderstand some words in a sentence and need to ask people to repeat themselves: Yes   I especially have trouble understanding the speech of women and children: No I have trouble understanding the speaker in a large room such as at a meeting or place of [employer]: Yes   Many people I talk to seem to mumble (or don't speak clearly): Sometimes People get annoyed because I misunderstand what they say: Yes   I misunderstand what others are saying and make inappropriate responses: Yes I avoid social activities because I cannot hear well and fear I will reply improperly: No   Family members and friends have told me they think I may have hearing loss: Yes               Vision testing not required for subsequent Medicare annual exam    Assessment & Plan:   Mercy Nieves is a 83 year old female who presents for a Medicare Assessment.     1. Encounter for annual health examination  -Medical, surgical and social history, as well as medications and allergies were reviewed with patient    2. Essential hypertension  0 uncontrolled today, monitor at home and f/u    - metoprolol succinate ER 25 MG Oral Tablet 24 Hr; Take 1 tablet (25 mg total) by mouth in the morning and 1 tablet (25 mg total) before bedtime.  Dispense: 180 tablet; Refill: 3    3. Coronary artery disease of native heart with stable angina pectoris, unspecified vessel or lesion type ([facility])  - Cardio Referral - Internal    4. Type 2 diabetes mellitus with stage 3b chronic kidney disease, without long-term current use of insulin ([facility])  - Stable condition, continue present management.      5. SIADH (syndrome of inappropriate ADH production) ([facility])  - Stable condition, continue present management.    6. Peripheral vascular disease ([facility])  - Stable condition, continue present management.    7. Mild episode of recurrent major depressive disorder ([facility])  - Stable condition, continue present management.    8. Diabetes mellitus type 2 without retinopathy ([facility])  - Stable condition, continue present management.    9. Atherosclerosis of aorta ([facility])  - Stable condition, continue present management.    10. Murmur  - Stable condition, continue present management.    11. Bilateral carotid bruits  - Stable condition, continue present management.    12. Vitamin D deficiency  - Stable condition, continue present management.    13. Impaired intestinal absorption ([facility])  - Stable condition, continue present management.    14. GAVE (gastric antral vascular ectasia)  - Stable condition, continue present management.    15. Pain in right lower leg  - Stable condition, continue present management.    16. Osteopenia of multiple sites  - Stable condition, continue present management.    17. Chronic pain of right ankle  - Stable condition, continue present management.    18. Other iron deficiency anemia  - Stable condition, continue present management.    19. Sensorineural hearing loss (SNHL) of both ears  - Stable condition, continue present management.    20. Meibomian gland dysfunction (MGD) of both eyes  - Stable condition, continue  present management.    21. Age-related nuclear cataract of both eyes  - Stable condition, continue present management.    The patient indicates understanding of these issues and agrees to the plan.  Reinforced healthy diet, lifestyle, and exercise.      No follow-ups on file.     Luann Chung MD, 3/22/2024     Supplementary Documentation:   General Health:  In the past six months, have you lost more than 10 pounds without trying?: 2 - No  Has your appetite been poor?: No  Type of Diet: Balanced  How does the patient maintain a good energy level?: Stretching  How would you describe your daily physical activity?: Light  How would you describe your current health state?: Fair  How do you maintain positive mental well-being?: Visiting Family;Visiting Friends  On a scale of 0 to 10, with 0 being no pain and 10 being severe pain, what is your pain level?: 6 - (Moderate)  In the past six months, have you experienced urine leakage?: 1-Yes  At any time do you feel concerned for the safety/well-being of yourself and/or your children, in your home or elsewhere?: Yes  Have you had any immunizations at another office such as Influenza, Hepatitis B, Tetanus, or Pneumococcal?: Yes       Mercy Nieves's SCREENING SCHEDULE   Tests on this list are recommended by your physician but may not be covered, or covered at this frequency, by your insurer.   Please check with your insurance carrier before scheduling to verify coverage.   PREVENTATIVE SERVICES FREQUENCY &  COVERAGE DETAILS LAST COMPLETION DATE   Diabetes Screening    Fasting Blood Sugar /  Glucose    One screening every 12 months if never tested or if previously tested but not diagnosed with pre-diabetes   One screening every 6 months if diagnosed with pre-diabetes Lab Results   Component Value Date     (H) 01/12/2024        Cardiovascular Disease Screening    Lipid Panel  Cholesterol  Lipoprotein (HDL)  Triglycerides Covered every 5 years for all Medicare  beneficiaries without apparent signs or symptoms of cardiovascular disease Lab Results   Component Value Date    CHOLEST 153 03/08/2023    HDL 50 03/08/2023    LDL 53 03/08/2023    TRIG 326 (H) 03/08/2023         Electrocardiogram (EKG)   Covered if needed at Welcome to Medicare, and non-screening if indicated for medical reasons 12/17/2019      Ultrasound Screening for Abdominal Aortic Aneurysm (AAA) Covered once in a lifetime for one of the following risk factors    Men who are 65-75 years old and have ever smoked    Anyone with a family history -     Colorectal Cancer Screening  Covered for ages 50-85; only need ONE of the following:    Colonoscopy   Covered every 10 years    Covered every 2 years if patient is at high risk or previous colonoscopy was abnormal 03/11/2019    Health Maintenance   Topic Date Due    Colorectal Cancer Screening  03/11/2024       Flexible Sigmoidoscopy   Covered every 4 years -    Fecal Occult Blood Test Covered annually -   Bone Density Screening    Bone density screening    Covered every 2 years after age 65 if diagnosed with risk of osteoporosis or estrogen deficiency.    Covered yearly for long-term glucocorticoid medication use (Steroids) Last Dexa Scan:    XR DEXA BONE DENSITOMETRY (CPT=77080) 07/20/2017      No recommendations at this time   Pap and Pelvic    Pap   Covered every 2 years for women at normal risk; Annually if at high risk -  No recommendations at this time    Chlamydia Annually if high risk -  No recommendations at this time   Screening Mammogram    Mammogram     Recommend annually for all female patients aged 40 and older    One baseline mammogram covered for patients aged 35-39 -    No recommendations at this time    Immunizations    Influenza Covered once per flu season  Please get every year 09/15/2023  No recommendations at this time    Pneumococcal Each vaccine ([id number] & [id number]) covered once after 65 Prevnar 13: 03/06/2018    [id number]: 07/17/2017      No recommendations at this time    Hepatitis B One screening covered for patients with certain risk factors   -  No recommendations at this time    Tetanus Toxoid Not covered by Medicare Part B unless medically necessary (cut with metal); may be covered with your pharmacy prescription benefits -    Tetanus, Diptheria and Pertusis TD and TDaP Not covered by Medicare Part B -  No recommendations at this time    Zoster Not covered by Medicare Part B; may be covered with your pharmacy  prescription benefits -  No recommendations at this time     Diabetes      Hemoglobin A1C Annually; if last result is elevated, may be asked to retest more frequently.    Medicare covers every 3 months Lab Results   Component Value Date     (H) 03/08/2023    A1C 6.3 (H) 03/08/2023       Hemoglobin A1C Test due on 09/08/2023    Creat/alb ratio Annually Lab Results   Component Value Date    MICROALBCREA 125.0 (H) 03/08/2023       LDL Annually Lab Results   Component Value Date    LDL 53 03/08/2023       Dilated Eye Exam Annually Last Diabetic Eye Exam:  No data recorded  No data recorded       Annual Monitoring of Persistent Medications (ACE/ARB, digoxin diuretics, anticonvulsants)    Potassium Annually Lab Results   Component Value Date    K 4.5 01/12/2024         Creatinine   Annually Lab Results   Component Value Date    CREATSERUM 0.99 01/12/2024         BUN Annually Lab Results   Component Value Date    BUN 10 01/12/2024       Drug Serum Conc Annually No results found for: \"DIGOXIN\", \"DIG\", \"VALP\"

## 2024-06-20 ENCOUNTER — OFFICE VISIT (OUTPATIENT)
Dept: HEMATOLOGY/ONCOLOGY | Facility: HOSPITAL | Age: 84
End: 2024-06-20
Attending: INTERNAL MEDICINE

## 2024-06-20 VITALS
HEART RATE: 59 BPM | OXYGEN SATURATION: 99 % | HEIGHT: 56 IN | SYSTOLIC BLOOD PRESSURE: 154 MMHG | RESPIRATION RATE: 18 BRPM | WEIGHT: 120 LBS | TEMPERATURE: 99 F | DIASTOLIC BLOOD PRESSURE: 39 MMHG | BODY MASS INDEX: 26.99 KG/M2

## 2024-06-20 DIAGNOSIS — D50.9 IRON DEFICIENCY ANEMIA, UNSPECIFIED IRON DEFICIENCY ANEMIA TYPE: ICD-10-CM

## 2024-06-20 DIAGNOSIS — N18.2 ANEMIA IN STAGE 2 CHRONIC KIDNEY DISEASE: ICD-10-CM

## 2024-06-20 DIAGNOSIS — D50.9 IRON DEFICIENCY ANEMIA, UNSPECIFIED IRON DEFICIENCY ANEMIA TYPE: Primary | ICD-10-CM

## 2024-06-20 DIAGNOSIS — D63.1 ANEMIA IN STAGE 2 CHRONIC KIDNEY DISEASE: ICD-10-CM

## 2024-06-20 DIAGNOSIS — K90.9: ICD-10-CM

## 2024-06-20 LAB
BASOPHILS # BLD AUTO: 0.01 X10(3) UL (ref 0–0.2)
BASOPHILS NFR BLD AUTO: 0.2 %
DEPRECATED HBV CORE AB SER IA-ACNC: 3.8 NG/ML
DEPRECATED RDW RBC AUTO: 47 FL (ref 35.1–46.3)
EOSINOPHIL # BLD AUTO: 0.14 X10(3) UL (ref 0–0.7)
EOSINOPHIL NFR BLD AUTO: 2.4 %
ERYTHROCYTE [DISTWIDTH] IN BLOOD BY AUTOMATED COUNT: 17.4 % (ref 11–15)
HCT VFR BLD AUTO: 28.2 %
HGB BLD-MCNC: 9.1 G/DL
IMM GRANULOCYTES # BLD AUTO: 0.02 X10(3) UL (ref 0–1)
IMM GRANULOCYTES NFR BLD: 0.3 %
IRON SATN MFR SERPL: 4 %
IRON SERPL-MCNC: 20 UG/DL
LYMPHOCYTES # BLD AUTO: 1.47 X10(3) UL (ref 1–4)
LYMPHOCYTES NFR BLD AUTO: 25.3 %
MCH RBC QN AUTO: 23.8 PG (ref 26–34)
MCHC RBC AUTO-ENTMCNC: 32.3 G/DL (ref 31–37)
MCV RBC AUTO: 73.6 FL
MONOCYTES # BLD AUTO: 0.29 X10(3) UL (ref 0.1–1)
MONOCYTES NFR BLD AUTO: 5 %
NEUTROPHILS # BLD AUTO: 3.87 X10 (3) UL (ref 1.5–7.7)
NEUTROPHILS # BLD AUTO: 3.87 X10(3) UL (ref 1.5–7.7)
NEUTROPHILS NFR BLD AUTO: 66.8 %
PLATELET # BLD AUTO: 236 10(3)UL (ref 150–450)
RBC # BLD AUTO: 3.83 X10(6)UL
TIBC SERPL-MCNC: 481 UG/DL (ref 250–425)
TRANSFERRIN SERPL-MCNC: 323 MG/DL (ref 250–380)
WBC # BLD AUTO: 5.8 X10(3) UL (ref 4–11)

## 2024-06-20 PROCEDURE — 82728 ASSAY OF FERRITIN: CPT

## 2024-06-20 PROCEDURE — 99215 OFFICE O/P EST HI 40 MIN: CPT | Performed by: INTERNAL MEDICINE

## 2024-06-20 PROCEDURE — 83540 ASSAY OF IRON: CPT

## 2024-06-20 PROCEDURE — 36415 COLL VENOUS BLD VENIPUNCTURE: CPT

## 2024-06-20 PROCEDURE — 85025 COMPLETE CBC W/AUTO DIFF WBC: CPT

## 2024-06-20 PROCEDURE — 84466 ASSAY OF TRANSFERRIN: CPT

## 2024-06-20 NOTE — PROGRESS NOTES
HPI     Mercy Nieves is a 84 year old female who is here today for f/u of   Encounter Diagnoses   Name Primary?    Iron deficiency anemia, unspecified iron deficiency anemia type Yes    Anemia in stage 2 chronic kidney disease     Impaired intestinal absorption (HCC)          She is here today for follow-up.    Last course of venofer in June 2023, she had a total of 900 mg in 3 divided doses.    Has h/o GAVE.  Patient's last EGD was on 10/12/2023.  Mucosa showed GAVE in the antrum of the stomach extending out in multiple A's which was treated with APC on gastric settings with multiple applications with good overall results and no bleeding completion.  She also has small gastric fundic polyp in the body of stomach which was not biopsied.  She was recommended for follow-up with the blood counts, and retreatment of GAVE if ongoing issues.    Patient completed 2 doses of Venofer 400 mg on 11/2/2023, and 12/6/2023    Patient had EGD on 1/12/2024, she was found to have esophagitis with erosion/ulceration about 1 cm above the GE junction, gastric polyps which were biopsied, and in the stomach mucosa streaks of GAVE in the antrum.  No APC anticoagulation was performed.  She was prescribed PPI twice daily for a month as well as Carafate before meals.  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  She was treated with acyclovir.  Still epigastric symptoms but not as bad, states was taking half the dose of sucralfate.    Still taking B12 and folic acid.     Still c/o liquid stools, off iron and stool not dark.      No pica. No restless legs. Does have hair loss.    Review of Systems:   Review of Systems   Constitutional:  Positive for fatigue. Negative for appetite change and unexpected weight change.   HENT:   Positive for hearing loss and trouble swallowing (last week, food stuck and then resolved.).    Respiratory:  Negative for  shortness of breath.    Cardiovascular:  Negative for chest pain.   Gastrointestinal:  Positive for diarrhea. Negative for abdominal pain, blood in stool and constipation.   Genitourinary:  Negative for hematuria.    Neurological:  Negative for dizziness and headaches.   Hematological:  Bruises/bleeds easily.   Psychiatric/Behavioral:  Positive for sleep disturbance.            Current Outpatient Medications   Medication Sig Dispense Refill    metoprolol succinate ER 25 MG Oral Tablet 24 Hr Take 1 tablet (25 mg total) by mouth in the morning and 1 tablet (25 mg total) before bedtime. 180 tablet 3    telmisartan 80 MG Oral Tab Take 1 tablet (80 mg total) by mouth daily. 90 tablet 3    pantoprazole 40 MG Oral Tab EC Take 1 tablet (40 mg total) by mouth every morning before breakfast. 30 tablet 1    folic acid 1 MG Oral Tab Take 1 tablet (1 mg total) by mouth daily. 90 tablet 3    oxybutynin ER 10 MG Oral Tablet 24 Hr Take 1 tablet (10 mg total) by mouth daily. 90 tablet 3    clopidogrel 75 MG Oral Tab Take 1 tablet (75 mg total) by mouth daily. 90 tablet 3    atorvastatin 20 MG Oral Tab Take 1 tablet (20 mg total) by mouth daily. 90 tablet 3    SITagliptin Phosphate (JANUVIA) 100 MG Oral Tab Take 1 tablet (100 mg total) by mouth daily. 90 tablet 3    Blood Pressure Monitor Does not apply Device Use daily. 1 each 0    Blood Pressure Monitor Does not apply Device Use daily. 1 each 0    Glucose Blood (TRUE METRIX BLOOD GLUCOSE TEST) In Vitro Strip CHECK BLOOD GLUCOSE TWICE DAILY 200 strip 3    B Complex Vitamins (VITAMIN B COMPLEX) Oral Tab Take 1 tablet by mouth once daily. 90 tablet 0    Blood Pressure Monitoring Does not apply Misc Use as needed 1 Device 0    TRUEPLUS LANCETS 28G Does not apply Misc CHECK BLOOD GLUCOSE TWICE DAILY 200 each 4    aspirin 81 MG Oral Tab Take 1 tablet (81 mg total) by mouth daily.      Blood Glucose Monitoring Suppl (TRUE METRIX METER) W/DEVICE Does not apply Kit 1 kit by Does not apply  route 2 (two) times daily. 1 kit 0    Milk Thistle Extract 175 MG Oral Tab Take 1 tablet by mouth once daily.      ergocalciferol 1.25 MG (72035 UT) Oral Cap Take 1 capsule (50,000 Units total) by mouth once a week. (Patient not taking: Reported on 2024) 12 capsule 1    ferrous sulfate 325 (65 FE) MG Oral Tab EC Take 1 tablet (325 mg total) by mouth daily. (Patient not taking: Reported on 2024)       Allergies:   Allergies   Allergen Reactions    Zocor [Simvastatin] SWELLING    Bactrim [Sulfamethoxazole W/Trimethoprim] NAUSEA AND VOMITING       Past Medical History:    Anemia    Arthritis    Cholelithiasis    CKD (chronic kidney disease) stage 3, GFR 30-59 ml/min ([facility])    Coronary atherosclerosis    Depression    Diabetes ([facility])    Management:  Medication    Gastritis    GERD (gastroesophageal reflux disease)    High blood pressure    High cholesterol    Non-ST elevation MI (NSTEMI) ([facility])    stenting of the proximal LAD and ptca of mid LAD    Pregnancy ([facility])        Renal disorder    CKD 3    Special screening for osteoporosis    Dexa Scan    Stress incontinence    Type II or unspecified type diabetes mellitus without mention of complication, not stated as uncontrolled    Pills only     Past Surgical History:   Procedure Laterality Date    Anesth,angioplasty            4X    Cath drug eluting stent      Cholecystectomy      Colonoscopy  2016    Colonoscopy N/A 2019    Procedure: COLONOSCOPY;  Surgeon: Thomas Clement MD;  Location: [facility] ENDOSCOPY    Egd  2018    Electrocardiogram, complete  2013    Scanned to Media Tab - Date of Service 2013     Social History     Socioeconomic History    Marital status:    Tobacco Use    Smoking status: Never     Passive exposure: Never    Smokeless tobacco: Never   Vaping Use    Vaping status: Never Used   Substance and Sexual Activity    Alcohol use: No     Alcohol/week: 0.0 standard drinks of alcohol    Drug use: No   Other  Topics Concern    Caffeine Concern Yes     Comment: Coffee, 1 cup daily       Family History   Problem Relation Age of Onset    Diabetes Father     Other (Other) Father 62    Diabetes Mother 64        (cause of death)    Diabetes Brother     Other (Other) Brother 54    Other (Other) Brother 64    Glaucoma Neg     Macular degeneration Neg          PHYSICAL EXAM:    /39 (BP Location: Left arm, Patient Position: Sitting, Cuff Size: large)   Pulse 59   Temp 98.5 °F (36.9 °C) (Oral)   Resp 18   Ht 1.422 m (4' 8\")   Wt 54.4 kg (120 lb)   SpO2 99%   BMI 26.90 kg/m²   Wt Readings from Last 6 Encounters:   06/20/24 54.4 kg (120 lb)   03/19/24 54.4 kg (120 lb)   03/13/24 54.2 kg (119 lb 6.4 oz)   02/27/24 54 kg (119 lb)   01/12/24 53.5 kg (118 lb)   01/12/24 53.5 kg (118 lb)     Physical Exam  General: Patient is alert and oriented x 3, not in acute distress.  HEENT: EOMs intact. PERRL.  Neck: No JVD. No palpable lymphadenopathy. Neck is supple.  Chest: Clear to auscultation.  Heart: Regular rate and rhythm.   Abdomen: Soft, + epigastric tenderness, good bowel sounds.  Extremities: Pedal pulses are present. No edema.  Neurological: Grossly intact.   Lymphatics: There is no palpable lymphadenopathy throughout in the cervical, supraclavicular, or axillary regions.  Psych/Depression: normal        ASSESSMENT/PLAN:     Encounter Diagnoses   Name Primary?    Iron deficiency anemia, unspecified iron deficiency anemia type Yes    Anemia in stage 2 chronic kidney disease     Impaired intestinal absorption (HCC)         Iron deficiency due to UGI blood loss from gastritis that was chronic, since prior to July 2018.  Most recent EGD showed telangiactasias (GAVE) which underwent APC on 10/12/2023..    Continue to follow with GI as recommended. Needs f/u with Dr. Clement for EGD recommended again for APC.    She had venofer most recent 900 mg over 3 days ending June 2023.  Given her worsening iron deficiency and anemia,  repeated course of Venofer 400 mg x 2 days, from end of November to beginning of December 2023.  Patient is taking folic acid daily.    Patient's iron stores are improved.  Her anemia has improved.  Patient can continue off iron supplement and continue with B12 and folic acid daily.  If she has worsening anemia, or worsening iron stores in 3 months, may repeat course of intravenous Venofer.    EGD January 2024 with streaks of GAVE.  She also had esophageal ulcer above the GE junction, consistent with HSV esophagitis.  She was treated with PPI, Carafate, and acyclovir.    Today's labs with a decrease in her hemoglobin from baseline.  She has worsening of her iron stores.  Discussed with patient that she will again need intravenous iron replacement.  Discussed there is national shortage of Venofer, therefore we will replace with Feraheme 510 mg x 2 doses.  Discussed with patient that with his iron formulation, as with any iron formulation, there is risk of infusion/allergic reaction, but is comparable to Venofer.  She did tolerate high doses of Venofer in the past.    D/w patient that the anemia is also related to chronic disease and CKD.  Her CKD is now stage II per most recent BMP in January.  If patient ever has decreased of hemoglobin to 10 or less and her CKD is stage III or above, may benefit from TAQUERIA if anemia with replace iron stores.    F/u in 12 weeks with labs, if symptomatic or anemia/iron worsening follow sooner.      No orders of the defined types were placed in this encounter.   MDM high risk medication      No follow-ups on file.     Recent Results (from the past 24 hour(s))   Ferritin    Collection Time: 06/20/24 10:52 AM   Result Value Ref Range    Ferritin 3.8 (L) 18.0 - 340.0 ng/mL   Iron And Tibc    Collection Time: 06/20/24 10:52 AM   Result Value Ref Range    Iron 20 (L) 50 - 170 ug/dL    Transferrin 323 250 - 380 mg/dL    Total Iron Binding Capacity 481 (H) 250 - 425 ug/dL    % Saturation 4 (L)  15 - 50 %   CBC W/ DIFFERENTIAL    Collection Time: 06/20/24 10:52 AM   Result Value Ref Range    WBC 5.8 4.0 - 11.0 x10(3) uL    RBC 3.83 3.80 - 5.30 x10(6)uL    HGB 9.1 (L) 12.0 - 16.0 g/dL    HCT 28.2 (L) 35.0 - 48.0 %    MCV 73.6 (L) 80.0 - 100.0 fL    MCH 23.8 (L) 26.0 - 34.0 pg    MCHC 32.3 31.0 - 37.0 g/dL    RDW-SD 47.0 (H) 35.1 - 46.3 fL    RDW 17.4 (H) 11.0 - 15.0 %    .0 150.0 - 450.0 10(3)uL    Neutrophil Absolute Prelim 3.87 1.50 - 7.70 x10 (3) uL    Neutrophil Absolute 3.87 1.50 - 7.70 x10(3) uL    Lymphocyte Absolute 1.47 1.00 - 4.00 x10(3) uL    Monocyte Absolute 0.29 0.10 - 1.00 x10(3) uL    Eosinophil Absolute 0.14 0.00 - 0.70 x10(3) uL    Basophil Absolute 0.01 0.00 - 0.20 x10(3) uL    Immature Granulocyte Absolute 0.02 0.00 - 1.00 x10(3) uL    Neutrophil % 66.8 %    Lymphocyte % 25.3 %    Monocyte % 5.0 %    Eosinophil % 2.4 %    Basophil % 0.2 %    Immature Granulocyte % 0.3 %     Component      Latest Ref Rn 3/13/2024 6/20/2024   WBC      4.0 - 11.0 x10(3) uL 5.7  5.8    RBC      3.80 - 5.30 x10(6)uL 3.82  3.83    Hemoglobin      12.0 - 16.0 g/dL 10.9 (L)  9.1 (L)    Hematocrit      35.0 - 48.0 % 32.8 (L)  28.2 (L)    MCV      80.0 - 100.0 fL 85.9  73.6 (L)    MCH      26.0 - 34.0 pg 28.5  23.8 (L)    MCHC      31.0 - 37.0 g/dL 33.2  32.3    RDW-SD      35.1 - 46.3 fL 48.4 (H)  47.0 (H)    RDW      11.0 - 15.0 % 15.4 (H)  17.4 (H)    Platelet Count      150.0 - 450.0 10(3)uL 238.0  236.0    Prelim Neutrophil Abs      1.50 - 7.70 x10 (3) uL 3.68  3.87    Neutrophils Absolute      1.50 - 7.70 x10(3) uL 3.68  3.87    Lymphocytes Absolute      1.00 - 4.00 x10(3) uL 1.45  1.47    Monocytes Absolute      0.10 - 1.00 x10(3) uL 0.41  0.29    Eosinophils Absolute      0.00 - 0.70 x10(3) uL 0.14  0.14    Basophils Absolute      0.00 - 0.20 x10(3) uL 0.02  0.01    Immature Granulocyte Absolute      0.00 - 1.00 x10(3) uL 0.02  0.02    Neutrophils %      % 64.5  66.8    Lymphocytes %      % 25.3   25.3    Monocytes %      % 7.2  5.0    Eosinophils %      % 2.4  2.4    Basophils %      % 0.3  0.2    Immature Granulocyte %      % 0.3  0.3    Iron, Serum      50 - 170 ug/dL 52  20 (L)    Transferrin      250 - 380 mg/dL 294  323    Iron Bind.Cap.(TIBC)      250 - 425 ug/dL 438 (H)  481 (H)    Iron Saturation      15 - 50 % 12 (L)  4 (L)    FERRITIN      18.0 - 340.0 ng/mL 13.7 (L)  3.8 (L)

## 2024-07-01 ENCOUNTER — TELEPHONE (OUTPATIENT)
Dept: HEMATOLOGY/ONCOLOGY | Facility: HOSPITAL | Age: 84
End: 2024-07-01

## 2024-07-01 NOTE — TELEPHONE ENCOUNTER
Called patient to schedule appointment - first attempt.  Left message to call back.  Called with language line assistance. Patient did not answer message was left on voicemail

## 2024-07-22 ENCOUNTER — OFFICE VISIT (OUTPATIENT)
Dept: HEMATOLOGY/ONCOLOGY | Facility: HOSPITAL | Age: 84
End: 2024-07-22
Attending: INTERNAL MEDICINE
Payer: MEDICARE

## 2024-07-22 VITALS
SYSTOLIC BLOOD PRESSURE: 188 MMHG | RESPIRATION RATE: 18 BRPM | TEMPERATURE: 98 F | WEIGHT: 120 LBS | DIASTOLIC BLOOD PRESSURE: 55 MMHG | BODY MASS INDEX: 27 KG/M2 | HEART RATE: 58 BPM | OXYGEN SATURATION: 98 %

## 2024-07-22 DIAGNOSIS — D50.9 IRON DEFICIENCY ANEMIA, UNSPECIFIED IRON DEFICIENCY ANEMIA TYPE: Primary | ICD-10-CM

## 2024-07-22 DIAGNOSIS — K90.9: ICD-10-CM

## 2024-07-22 PROCEDURE — 96365 THER/PROPH/DIAG IV INF INIT: CPT

## 2024-07-22 NOTE — PROGRESS NOTES
Pt here for 1 of 2 FERAHEME infusions . Pt denies any issues or concerns. Blood pressure elevated upon arrival 227/58, patient denies any symptoms. MAHAD Sanches and Chetna WOODS RN notified and made aware. Ok to give feraheme infusion. Instructed patient to f/u with PCP, per patient she takes blood pressure medications.     Ordering Provider: Tao  Order Exp: 1 of 2 feraheme infusions     Pt tolerated infusion without difficulty or complaint. Reviewed next apt date/time: Patient aware of upcoming appts via printed AVS      Education Record  Learner:  Patient  Disease / Diagnosis: Iron deficiency anemia  Barriers / Limitations:  Language  Method:  Discussion and Printed material  General Topics:  Side effects and symptom management and Plan of care reviewed  Outcome:  Shows understanding

## 2024-07-29 ENCOUNTER — OFFICE VISIT (OUTPATIENT)
Dept: HEMATOLOGY/ONCOLOGY | Facility: HOSPITAL | Age: 84
End: 2024-07-29
Attending: INTERNAL MEDICINE
Payer: MEDICARE

## 2024-07-29 VITALS
SYSTOLIC BLOOD PRESSURE: 186 MMHG | WEIGHT: 118.81 LBS | OXYGEN SATURATION: 99 % | HEART RATE: 53 BPM | BODY MASS INDEX: 27 KG/M2 | DIASTOLIC BLOOD PRESSURE: 48 MMHG | RESPIRATION RATE: 18 BRPM | TEMPERATURE: 99 F

## 2024-07-29 DIAGNOSIS — K90.9: ICD-10-CM

## 2024-07-29 DIAGNOSIS — D50.9 IRON DEFICIENCY ANEMIA, UNSPECIFIED IRON DEFICIENCY ANEMIA TYPE: Primary | ICD-10-CM

## 2024-07-29 PROCEDURE — 96374 THER/PROPH/DIAG INJ IV PUSH: CPT

## 2024-07-29 NOTE — PROGRESS NOTES
Pt here for Feraheme 2 of 2 . Pt denies any issues or concerns. States tolerating previous infusions well.  PIV placed and Feraheme given as ordered.  Pt appeared to tolerate well.      Ordering Provider: Tao       Pt tolerated infusion without difficulty or complaint.       Education Record  Learner:  Patient  Disease / Diagnosis: Iron deficiency anemia  Barriers / Limitations:  None Irish primary language, understands and speaks English  Method:  Discussion  General Topics:  Plan of care reviewed  Outcome:  Shows understanding

## 2024-08-08 ENCOUNTER — TELEPHONE (OUTPATIENT)
Facility: CLINIC | Age: 84
End: 2024-08-08

## 2024-08-08 DIAGNOSIS — R19.7 DIARRHEA, UNSPECIFIED TYPE: Primary | ICD-10-CM

## 2024-08-08 NOTE — TELEPHONE ENCOUNTER
Msg reviewed, her diarrhea has been a chronic issue.   I would advise stool testing to exclude infection and check for malabsorption.

## 2024-08-08 NOTE — TELEPHONE ENCOUNTER
Dr. Clement    I spoke to patient's daughter Courtney Prescott at [phone] (she is on verbal release)  She said that her mother has a history of constant diarrhea, but she has had accidents when she leaves the house and has had to wear a diaper more frequently.  Hey have been unable to identify any trigger foods, no matter what she eats she has urgency/diarrhea and if she isn't at home where a toilet is close she is have an accident and soil herself.  These accidents happen randomly but happen more often in the morning.  She will get pain right before she moves her bowels, but once she finished moving her bowels the pain goes away.  Denies any blood.  Concerned about her nutrition status and weight.  She has been getting infusions through Dr. Burger's office.  She has an appointment with you on 9/11/24.  Do you want to see her sooner?  Please advise what she should do next.  She is already avoiding dairy.    I advised to make sure she is getting adequate hydration.    Please advise    Thank you

## 2024-08-08 NOTE — TELEPHONE ENCOUNTER
Patient's daughter calling states patient has been having diarrhea 4-5 days. Please call, scheduled for 9/11/24.

## 2024-08-09 NOTE — TELEPHONE ENCOUNTER
Dr. Clement    I spoke to Courtney and she will help her get these stool tests done.    She now told me that patient wanted to see you in office because she is having trouble getting food to pass again-she has to drink a lot of water.  She said her mother was going to just tell you in office because she is afraid of getting sedation again per daughter.    Per daughter she has been eating more soft/creamy things and has to drink a lot of water to get solids down like when you saw her back in the winter and she took the sucralfate.  The daughter wasn't aware of this until now and explains why her nutrition has not been as good.    Please advise-do you want to see her and have me add as overbook to get in sooner than 9/11?    Thank you

## 2024-08-10 DIAGNOSIS — E78.5 HYPERLIPIDEMIA, UNSPECIFIED HYPERLIPIDEMIA TYPE: ICD-10-CM

## 2024-08-10 DIAGNOSIS — E11.9 DIABETES MELLITUS TYPE 2 WITHOUT RETINOPATHY (HCC): ICD-10-CM

## 2024-08-12 NOTE — TELEPHONE ENCOUNTER
I spoke to Courtney.  Accepted below appointment on patient's behalf and given office directions.    Your Appointments      Wednesday August 14, 2024 1:00 PM  Follow Up Visit with Thomas Clement MD  [facility] ([place], [place]) ([facility]) [facility] [address] [address]  [phone]

## 2024-08-12 NOTE — TELEPHONE ENCOUNTER
Left message for Courtney to call back.    I have the ok to double book 9am 10am 1pm and 2pm on 8/14/24 with Dr. Clement (if not already double booked by the time she calls back)

## 2024-08-14 ENCOUNTER — TELEPHONE (OUTPATIENT)
Facility: CLINIC | Age: 84
End: 2024-08-14

## 2024-08-14 ENCOUNTER — OFFICE VISIT (OUTPATIENT)
Facility: CLINIC | Age: 84
End: 2024-08-14

## 2024-08-14 ENCOUNTER — LAB ENCOUNTER (OUTPATIENT)
Dept: LAB | Facility: HOSPITAL | Age: 84
End: 2024-08-14
Attending: INTERNAL MEDICINE
Payer: MEDICARE

## 2024-08-14 VITALS
SYSTOLIC BLOOD PRESSURE: 183 MMHG | HEIGHT: 56 IN | DIASTOLIC BLOOD PRESSURE: 76 MMHG | HEART RATE: 60 BPM | WEIGHT: 118 LBS | BODY MASS INDEX: 26.54 KG/M2

## 2024-08-14 DIAGNOSIS — R19.7 DIARRHEA, UNSPECIFIED TYPE: ICD-10-CM

## 2024-08-14 DIAGNOSIS — D50.9 IRON DEFICIENCY ANEMIA, UNSPECIFIED IRON DEFICIENCY ANEMIA TYPE: ICD-10-CM

## 2024-08-14 DIAGNOSIS — D50.9 IRON DEFICIENCY ANEMIA, UNSPECIFIED IRON DEFICIENCY ANEMIA TYPE: Primary | ICD-10-CM

## 2024-08-14 DIAGNOSIS — R13.10 DYSPHAGIA, UNSPECIFIED TYPE: Primary | ICD-10-CM

## 2024-08-14 LAB
C DIFF TOX B STL QL: NEGATIVE
CRYPTOSP AG STL QL IA: NEGATIVE
G LAMBLIA AG STL QL IA: NEGATIVE

## 2024-08-14 PROCEDURE — 83993 ASSAY FOR CALPROTECTIN FECAL: CPT

## 2024-08-14 PROCEDURE — 99214 OFFICE O/P EST MOD 30 MIN: CPT | Performed by: INTERNAL MEDICINE

## 2024-08-14 PROCEDURE — 87329 GIARDIA AG IA: CPT

## 2024-08-14 PROCEDURE — 82656 EL-1 FECAL QUAL/SEMIQ: CPT | Performed by: INTERNAL MEDICINE

## 2024-08-14 PROCEDURE — 87272 CRYPTOSPORIDIUM AG IF: CPT

## 2024-08-14 PROCEDURE — 87493 C DIFF AMPLIFIED PROBE: CPT

## 2024-08-14 RX ORDER — ACYCLOVIR 400 MG/1
400 TABLET ORAL 3 TIMES DAILY
Qty: 30 TABLET | Refills: 0 | Status: SHIPPED | OUTPATIENT
Start: 2024-08-14

## 2024-08-14 NOTE — PROGRESS NOTES
Mercy Nieves is a 84 year old female.    HPI:     Chief Complaint   Patient presents with    Follow - Up     The patient is an 84-year-old female who has a history of anemia, depression, GERD, esophageal ulcers/HSV status post treatment, anemia due to GAVE who follows up in the office today.  She reports that she has had ongoing issues and symptoms of difficulty swallowing.    As far as the patient's anemia goes, she has had no blood in the stools.    She has made dietary adjustments-off dairy and spicy     HISTORY:  Past Medical History:    Anemia    Arthritis    Cholelithiasis    CKD (chronic kidney disease) stage 3, GFR 30-59 ml/min ([facility])    Coronary atherosclerosis    Depression    Diabetes ([facility])    Management:  Medication    Gastritis    GERD (gastroesophageal reflux disease)    High blood pressure    High cholesterol    Non-ST elevation MI (NSTEMI) ([facility])    stenting of the proximal LAD and ptca of mid LAD    Pregnancy ([facility])        Renal disorder    CKD 3    Special screening for osteoporosis    Dexa Scan    Stress incontinence    Type II or unspecified type diabetes mellitus without mention of complication, not stated as uncontrolled    Pills only      Past Surgical History:   Procedure Laterality Date    Anesth,angioplasty            4X    Cath drug eluting stent      Cholecystectomy      Colonoscopy  2016    Colonoscopy N/A 2019    Procedure: COLONOSCOPY;  Surgeon: Thomas Clement MD;  Location: [facility] ENDOSCOPY    Egd  2018    Electrocardiogram, complete  2013    Scanned to Media Tab - Date of Service 2013      Family History   Problem Relation Age of Onset    Diabetes Father     Other (Other) Father 62    Diabetes Mother 64        (cause of death)    Diabetes Brother     Other (Other) Brother 54    Other (Other) Brother 64    Glaucoma Neg     Macular degeneration Neg       Social History:   Social History     Socioeconomic History    Marital status:    Tobacco  Use    Smoking status: Never     Passive exposure: Never    Smokeless tobacco: Never   Vaping Use    Vaping status: Never Used   Substance and Sexual Activity    Alcohol use: No     Alcohol/week: 0.0 standard drinks of alcohol    Drug use: No   Other Topics Concern    Caffeine Concern Yes     Comment: Coffee, 1 cup daily        Medications (Active prior to today's visit):  Current Outpatient Medications   Medication Sig Dispense Refill    metoprolol succinate ER 25 MG Oral Tablet 24 Hr Take 1 tablet (25 mg total) by mouth in the morning and 1 tablet (25 mg total) before bedtime. 180 tablet 3    telmisartan 80 MG Oral Tab Take 1 tablet (80 mg total) by mouth daily. 90 tablet 3    pantoprazole 40 MG Oral Tab EC Take 1 tablet (40 mg total) by mouth every morning before breakfast. 30 tablet 1    folic acid 1 MG Oral Tab Take 1 tablet (1 mg total) by mouth daily. 90 tablet 3    oxybutynin ER 10 MG Oral Tablet 24 Hr Take 1 tablet (10 mg total) by mouth daily. 90 tablet 3    clopidogrel 75 MG Oral Tab Take 1 tablet (75 mg total) by mouth daily. 90 tablet 3    atorvastatin 20 MG Oral Tab Take 1 tablet (20 mg total) by mouth daily. 90 tablet 3    SITagliptin Phosphate (JANUVIA) 100 MG Oral Tab Take 1 tablet (100 mg total) by mouth daily. 90 tablet 3    ergocalciferol 1.25 MG (75195 UT) Oral Cap Take 1 capsule (50,000 Units total) by mouth once a week. 12 capsule 1    Blood Pressure Monitor Does not apply Device Use daily. 1 each 0    Blood Pressure Monitor Does not apply Device Use daily. 1 each 0    Glucose Blood (TRUE METRIX BLOOD GLUCOSE TEST) In Vitro Strip CHECK BLOOD GLUCOSE TWICE DAILY 200 strip 3    B Complex Vitamins (VITAMIN B COMPLEX) Oral Tab Take 1 tablet by mouth once daily. 90 tablet 0    Blood Pressure Monitoring Does not apply Misc Use as needed 1 Device 0    TRUEPLUS LANCETS 28G Does not apply Misc CHECK BLOOD GLUCOSE TWICE DAILY 200 each 4    aspirin 81 MG Oral Tab Take 1 tablet (81 mg total) by mouth  daily.      Blood Glucose Monitoring Suppl (TRUE METRIX METER) W/DEVICE Does not apply Kit 1 kit by Does not apply route 2 (two) times daily. 1 kit 0    Milk Thistle Extract 175 MG Oral Tab Take 1 tablet by mouth once daily.      ferrous sulfate 325 (65 FE) MG Oral Tab EC Take 1 tablet (325 mg total) by mouth daily. (Patient not taking: Reported on 6/20/2024)         Allergies:  Allergies   Allergen Reactions    Zocor [Simvastatin] SWELLING    Bactrim [Sulfamethoxazole W/Trimethoprim] NAUSEA AND VOMITING         ROS:   The patient denies any chest pain or shortness of breath,  No neurologic or dermatologic symptoms.     PHYSICAL EXAM:   Blood pressure 153/73, pulse 60, height 4' 8\" (1.422 m), weight 118 lb (53.5 kg), not currently breastfeeding.    The patient appears their stated age and is in no acute distress  HEENT- anicteric sclera, neck no lymphadnopathy, OP- clear with no masses or lesions  Chest- Clear bilaterally, no wheezing,  Heart- regular rate, no murmur or gallop  Abdomen- Soft and nontender, nondistended  Ext- no clubbing or cyanosis  Skin- no rashes or lesions  Neuro- appropriate response, alert, no confusion  .  ASSESSMENT/PLAN:   Assessment     Anemia  -EGD with MAC at the [facility] for GAVE and anemia with APC treatment  -Follow up with Dr. Burger for monitoring and iron treatments    Diarrhea  -Await stool testing  -Avoid dairy    Dysphagia/painful swallowing  -EGD as per above  -Given recent history of HSV esophageal ulcers - restart on acyclovir   -continue pantoprazole         Orders This Visit:  No orders of the defined types were placed in this encounter.      Meds This Visit:  Requested Prescriptions      No prescriptions requested or ordered in this encounter       Imaging & Referrals:  None       Thomas Clement MD  [facility]

## 2024-08-14 NOTE — TELEPHONE ENCOUNTER
Please review.  Protocol failed / Has no protocol.    CAXA message sent to patient to complete labs pended from last office visit 2/27/24.     Requested Prescriptions   Pending Prescriptions Disp Refills    ATORVASTATIN 20 MG Oral Tab [Pharmacy Med Name: ATORVASTATIN 20 MG TABLET] 90 tablet 3     Sig: TAKE 1 TABLET BY MOUTH EVERY DAY       Cholesterol Medication Protocol Failed - 8/10/2024 12:33 AM        Failed - ALT < 80     Lab Results   Component Value Date    ALT 30 03/08/2023             Failed - ALT resulted within past year        Failed - Lipid panel within past 12 months     Lab Results   Component Value Date    CHOLEST 153 03/08/2023    TRIG 326 (H) 03/08/2023    HDL 50 03/08/2023    LDL 53 03/08/2023    VLDL 47 (H) 03/08/2023    TCHDLRATIO 3.7 04/14/2022    NONHDLC 103 03/08/2023             Passed - In person appointment or virtual visit in the past 12 mos or appointment in next 3 mos     Recent Outpatient Visits              2 weeks ago Iron deficiency anemia, unspecified iron deficiency anemia type    [facility]    Office Visit    3 weeks ago Iron deficiency anemia, unspecified iron deficiency anemia type    [place] [facility]    Office Visit    1 month ago Iron deficiency anemia, unspecified iron deficiency anemia type    [facility] Hematology Oncology Bryon Burger MD    Office Visit    1 month ago Iron deficiency anemia, unspecified iron deficiency anemia type    [facility] Hematology Oncology    Nurse Only    4 months ago [facility] health examination    [facility]    [facility]          Future Appointments         Provider Department Appt Notes    Today Thomas Clement MD [facility] see TE, ok to add per Dr. Clement.    In 4 weeks Thomas Clement MD [facility]  diarrhea (policy informed)    In 1 month [facility] Hematology Oncology FT LAB.PIV.CL  iron ferritin cbc    In 1 month Bryon Burger MD [facility] Hematology Oncology FOLLOW UP VISIT.CL  3m    In 3 months Roque Govea MD [facility] ep ee                      JANUVIA 100 MG Oral Tab [Pharmacy Med Name: JANUVIA 100 MG TABLET] 90 tablet 3     Sig: TAKE 1 TABLET BY MOUTH EVERY DAY       Diabetes Medication Protocol Failed - 8/10/2024 12:33 AM        Failed - Last A1C < 7.5 and within past 6 months     Lab Results   Component Value Date    A1C 6.3 (H) 03/08/2023             Passed - In person appointment or virtual visit in the past 6 mos or appointment in next 3 mos     Recent Outpatient Visits              2 weeks ago Iron deficiency anemia, unspecified iron deficiency anemia type    [facility]    Office Visit    3 weeks ago Iron deficiency anemia, unspecified iron deficiency anemia type    [place] [facility]    Office Visit    1 month ago Iron deficiency anemia, unspecified iron deficiency anemia type    [facility] Hematology Oncology Bryon Burger MD    Office Visit    1 month ago Iron deficiency anemia, unspecified iron deficiency anemia type    [facility] Hematology Oncology    Nurse Only    4 months ago Encounter for annual health examination    [facility], Luann Nguyen MD    Office Visit          Future Appointments         Provider Department Appt Notes    Today Thomas Clement MD [facility] see TE, ok to add per Dr. Clement.    In 4 weeks Thomas Clement MD [facility] diarrhea (policy informed)    In 1 month [facility] Hematology Oncology FT LAB.PIV.CL  iron ferritin cbc    In 1 month Bryon Burger MD [place]  [facility] Hematology Oncology FOLLOW UP VISIT.CL  3m    In 3 months Roque Govea MD [facility] ep ee                    Passed - Microalbumin procedure in past 12 months or taking ACE/ARB        Passed - EGFRCR or GFRNAA > 50     GFR Evaluation  EGFRCR: 57 , resulted on 1/12/2024          Passed - GFR in the past 12 months           Future Appointments         Provider Department Appt Notes    Today Thomas Clement MD [facility] see TE, ok to add per Dr. Clement.    In 4 weeks Thomas Clement MD [facility] diarrhea (policy informed)    In 1 month [facility] RESOURCE [facility] Hematology Oncology FT LAB.PIV.CL  iron ferritin cbc    In 1 month Bryon Burger MD [facility] Hematology Oncology FOLLOW UP VISIT.CL  3m    In 3 months Roque Govea MD [facility] ep ee          Recent Outpatient Visits              2 weeks ago Iron deficiency anemia, unspecified iron deficiency anemia type    [facility]    Office Visit    3 weeks ago Iron deficiency anemia, unspecified iron deficiency anemia type    [facility]    Office Visit    1 month ago Iron deficiency anemia, unspecified iron deficiency anemia type    [facility] Hematology Oncology Bryon Burger MD    Office Visit    1 month ago Iron deficiency anemia, unspecified iron deficiency anemia type    [facility] Hematology Oncology    Nurse Only    4 months ago Encounter for annual health examination    [facility], Luann Nguyen MD    Office Visit

## 2024-08-14 NOTE — TELEPHONE ENCOUNTER
Scheduled for:  EGD 92355  Provider Name:  Dr Clement  Date:  10/21/2024  Location:  [facility]  Sedation:  MAC  Time:  2:45 pm. (pt is aware that ENDO will call the day before the procedure to confirm arrival time)  Prep:  NPO after midnight  Meds/Allergies Reconciled?:  Physician Reviewed  Diagnosis with codes:    Anemia D50.9  Was patient informed to call insurance with codes (Y/N):  Yes  Referral sent?:  Referral was sent at the time of electronic surgical scheduling.  EM or [facility] notified?:  I sent an electronic request to Endo Scheduling and received a confirmation today.  Medication Orders:  Patient is aware to NOT take iron pills, herbal meds and diet supplements for 7 days before exam.   Hold Clopidogrel 5 days prior to the procedure  Also to NOT take any form of alcohol, recreational drugs and any forms of ED meds 24-72 hours before exam.   Misc Orders:  N/A   Further instructions given by staff:  I discussed the prep instructions with the patient which she verbally understood. I provided patient with prep instruction's sheet in office.      Patient was informed about the new cancellation policy for his/her procedure. Patient was also given a copy of the cancellation policy at the time of the appointment and verbalized understanding.

## 2024-08-14 NOTE — PATIENT INSTRUCTIONS
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 Burger for monitoring and iron treatments    Diarrhea  -Await stool testing  -Avoid dairy    Dysphagia/painful swallowing  -EGD as per above  -Given recent history of HSV esophageal ulcers - restart on acyclovir   -continue pantoprazole

## 2024-08-15 LAB — CALPROTECTIN STL-MCNT: 46.2 ΜG/G (ref ?–50)

## 2024-08-15 RX ORDER — ATORVASTATIN CALCIUM 20 MG/1
20 TABLET, FILM COATED ORAL DAILY
Qty: 90 TABLET | Refills: 3 | Status: SHIPPED | OUTPATIENT
Start: 2024-08-15

## 2024-08-16 LAB — PANCREATIC ELAST FECAL: 166 UG ELAST./G

## 2024-08-19 RX ORDER — CLOPIDOGREL BISULFATE 75 MG/1
75 TABLET ORAL DAILY
Qty: 90 TABLET | Refills: 3 | Status: SHIPPED | OUTPATIENT
Start: 2024-08-19

## 2024-08-19 RX ORDER — OXYBUTYNIN CHLORIDE 10 MG/1
10 TABLET, EXTENDED RELEASE ORAL DAILY
Qty: 90 TABLET | Refills: 3 | Status: SHIPPED | OUTPATIENT
Start: 2024-08-19

## 2024-08-19 NOTE — TELEPHONE ENCOUNTER
Please review. This medication has no protocol attached.    No future appointments with family medicine/internal medicine.  LAST OFFICE VISIT: 3/19/24    Requested Prescriptions   Pending Prescriptions Disp Refills    clopidogrel 75 MG Oral Tab [Pharmacy Med Name: CLOPIDOGREL 75 MG TABLET] 90 tablet 3     Sig: Take 1 tablet (75 mg total) by mouth daily.       There is no refill protocol information for this order      Signed Prescriptions Disp Refills    oxybutynin ER 10 MG Oral Tablet 24 Hr 90 tablet 3     Sig: Take 1 tablet (10 mg total) by mouth daily.       Genitourinary Medications Passed - 8/14/2024 12:14 AM        Passed - Patient does not have pulmonary hypertension on problem list        Passed - In person appointment or virtual visit in the past 12 mos or appointment in next 3 mos     Recent Outpatient Visits              5 days ago     [facility] Thomas Clement MD    Office Visit    3 weeks ago Iron deficiency anemia, unspecified iron deficiency anemia type    [facility]    Office Visit    4 weeks ago Iron deficiency anemia, unspecified iron deficiency anemia type    [facility]    Office Visit    2 months ago Iron deficiency anemia, unspecified iron deficiency anemia type    [facility] Hematology Oncology Bryon Burger MD    Office Visit    2 months ago Iron deficiency anemia, unspecified iron deficiency anemia type    [facility] Hematology Oncology    Nurse Only          Future Appointments         Provider Department Appt Notes    In 3 weeks Thomas Clement MD [facility] diarrhea (policy informed)    In 1 month [facility] RESOURCE [facility] Hematology Oncology FT LAB.PIV.CL  iron ferritin cbc    In 1 month Bryon Burger MD [facility] Hematology Oncology FOLLOW UP VISIT.CL  3m    In 2 months [facility]  [facility] EGD w/MAC @[facility]    In 3 months Roque Govea MD [facility] ep ee                         Future Appointments         Provider Department Appt Notes    In 3 weeks Thomas Clement MD [facility] diarrhea (policy informed)    In 1 month [facility] RESOURCE [facility] Hematology Oncology FT LAB.PIV.CL  iron ferritin cbc    In 1 month Bryon Burger MD [facility] Hematology Oncology FOLLOW UP VISIT.CL  3m    In 2 months KADEN CLEMENT [facility] EGD w/MAC @[facility]    In 3 months Roque Govea MD [facility] ep ee          Recent Outpatient Visits              5 days ago     [facility] Thomas Clement MD    Office Visit    3 weeks ago Iron deficiency anemia, unspecified iron deficiency anemia type    [facility]    Office Visit    4 weeks ago Iron deficiency anemia, unspecified iron deficiency anemia type    [facility]    Office Visit    2 months ago Iron deficiency anemia, unspecified iron deficiency anemia type    [facility] Hematology Oncology Bryon Burger MD    Office Visit    2 months ago Iron deficiency anemia, unspecified iron deficiency anemia type    [facility] Hematology Oncology    Nurse Only

## 2024-08-20 RX ORDER — PANTOPRAZOLE SODIUM 40 MG/1
40 TABLET, DELAYED RELEASE ORAL
Qty: 90 TABLET | Refills: 3 | Status: SHIPPED | OUTPATIENT
Start: 2024-08-20

## 2024-08-20 NOTE — TELEPHONE ENCOUNTER
Refill passed per [facility] protocol.  Requested Prescriptions   Pending Prescriptions Disp Refills    PANTOPRAZOLE 40 MG Oral Tab EC [Pharmacy Med Name: PANTOPRAZOLE SOD DR 40 MG TAB] 90 tablet 3     Sig: TAKE 1 TABLET BY MOUTH BEFORE BREAKFAST       Gastrointestional Medication Protocol Passed - 8/15/2024 12:40 AM        Passed - In person appointment or virtual visit in the past 12 mos or appointment in next 3 mos     Recent Outpatient Visits              6 days ago     [facility] Thomas Clement MD    Office Visit    3 weeks ago Iron deficiency anemia, unspecified iron deficiency anemia type    [facility] [facility]    Office Visit    4 weeks ago Iron deficiency anemia, unspecified iron deficiency anemia type    [place] [facility]    Office Visit    2 months ago Iron deficiency anemia, unspecified iron deficiency anemia type    [facility] Hematology Oncology Bryon Burger MD    Office Visit    2 months ago Iron deficiency anemia, unspecified iron deficiency anemia type    [facility] Hematology Oncology    Nurse Only          Future Appointments         Provider Department Appt Notes    In 3 weeks Thomas Clement MD [facility] diarrhea (policy informed)    In 1 month [facility] RESOURCE [facility] Hematology Oncology FT LAB.PIV.CL  iron ferritin cbc    In 1 month Bryon Burger MD [facility] Hematology Oncology FOLLOW UP VISIT.CL  3m    In 2 months KADEN CLEMENT [facility]MAC [facility]    In 3 months Roque Govea MD [facility] ep ee                       Recent Outpatient Visits              6 days ago     [facility] Thomas Clement MD    Office Visit    3 weeks ago Iron deficiency anemia, unspecified iron deficiency  anemia type    [facility]    Office Visit    4 weeks ago Iron deficiency anemia, unspecified iron deficiency anemia type    [facility]    Office Visit    2 months ago Iron deficiency anemia, unspecified iron deficiency anemia type    [facility] Hematology Oncology Bryon Burger MD    Office Visit    2 months ago Iron deficiency anemia, unspecified iron deficiency anemia type    [facility] Hematology Oncology    Nurse Only          Future Appointments         Provider Department Appt Notes    In 3 weeks Thomas Clement MD [facility] diarrhea (policy informed)    In 1 month [facility] RESOURCE [facility] Hematology Oncology FT LAB.PIV.CL  iron ferritin cbc    In 1 month Bryon Burger MD [facility] Hematology Oncology FOLLOW UP VISIT.CL  3m    In 2 months [facility]    In 3 months Roque Govea MD [facility] michele ee

## 2024-09-11 ENCOUNTER — OFFICE VISIT (OUTPATIENT)
Facility: CLINIC | Age: 84
End: 2024-09-11

## 2024-09-11 VITALS
HEART RATE: 61 BPM | BODY MASS INDEX: 26.54 KG/M2 | HEIGHT: 56 IN | DIASTOLIC BLOOD PRESSURE: 76 MMHG | WEIGHT: 118 LBS | SYSTOLIC BLOOD PRESSURE: 186 MMHG

## 2024-09-11 DIAGNOSIS — R63.4 WEIGHT LOSS: Primary | ICD-10-CM

## 2024-09-11 DIAGNOSIS — R10.13 EPIGASTRIC PAIN: ICD-10-CM

## 2024-09-11 PROCEDURE — 99214 OFFICE O/P EST MOD 30 MIN: CPT | Performed by: INTERNAL MEDICINE

## 2024-09-11 RX ORDER — PANCRELIPASE 24000; 76000; 120000 [USP'U]/1; [USP'U]/1; [USP'U]/1
2 CAPSULE, DELAYED RELEASE PELLETS ORAL
Qty: 180 CAPSULE | Refills: 1 | Status: SHIPPED | OUTPATIENT
Start: 2024-09-11 | End: 2024-11-10

## 2024-09-11 NOTE — PATIENT INSTRUCTIONS
Anemia  -EGD with MAC at the \[facility]\"" for GAVE and anemia with APC treatment  -Follow up with Dr. Burger for monitoring and iron treatments     Diarrhea  -possible malabsorption - start on digestive enzymes  -call to set up ultrasound      Dysphagia/painful swallowing - resolved after treatment with acyclovir  -EGD as per above  -continue pantoprazole

## 2024-09-11 NOTE — PROGRESS NOTES
Mercy Nieves is a 84 year old female.    HPI:     Chief Complaint   Patient presents with    Swallowing Problem     The patient is an 84 female who has history of anemia, chronic kidney disease, GAVE status post APC treatments, diabetes, high blood pressure follows up in the office today.    She presented for her last office visit with dysphagia.  She has a history of HSV esophagitis-she was retreated with acyclovir and her dysphagia has completely resolved.  She has lost more weight but she reports that was primarily because she was scared to eat when she had problems with the swallowing.    She continues with loose watery stools about 6/day.  No blood per rectum.  She does have occasional epigastric abdominal discomfort and can have some lower cramping particularly after a bowel movement.  She feels that stools are typically after eating anything and reports even water can sometimes trigger them.  Stool testing was negative for infection, no signs of C. difficile.  Her fecal elastase was low.    Turkish language line used to communicate with the patient until her daughter arrived and then she acted as .      HISTORY:  Past Medical History:    Anemia    Arthritis    Cholelithiasis    CKD (chronic kidney disease) stage 3, GFR 30-59 ml/min ([facility])    Coronary atherosclerosis    Depression    Diabetes ([facility])    Management:  Medication    Gastritis    GERD (gastroesophageal reflux disease)    High blood pressure    High cholesterol    Non-ST elevation MI (NSTEMI) ([facility])    stenting of the proximal LAD and ptca of mid LAD    Pregnancy ([facility])        Renal disorder    CKD 3    Special screening for osteoporosis    Dexa Scan    Stress incontinence    Type II or unspecified type diabetes mellitus without mention of complication, not stated as uncontrolled    Pills only      Past Surgical History:   Procedure Laterality Date    Anesth,angioplasty            4X    Cath drug eluting stent       Cholecystectomy      Colonoscopy  2016    Colonoscopy N/A 03/11/2019    Procedure: COLONOSCOPY;  Surgeon: Thomas Clement MD;  Location: [facility] ENDOSCOPY    Egd  2018    Electrocardiogram, complete  09/24/2013    Scanned to Media Tab - Date of Service 09-      Family History   Problem Relation Age of Onset    Diabetes Father     Other (Other) Father 62    Diabetes Mother 64        (cause of death)    Diabetes Brother     Other (Other) Brother 54    Other (Other) Brother 64    Glaucoma Neg     Macular degeneration Neg       Social History:   Social History     Socioeconomic History    Marital status:    Tobacco Use    Smoking status: Never     Passive exposure: Never    Smokeless tobacco: Never   Vaping Use    Vaping status: Never Used   Substance and Sexual Activity    Alcohol use: No     Alcohol/week: 0.0 standard drinks of alcohol    Drug use: No   Other Topics Concern    Caffeine Concern Yes     Comment: Coffee, 1 cup daily        Medications (Active prior to today's visit):  Current Outpatient Medications   Medication Sig Dispense Refill    Pancrelipase, Lip-Prot-Amyl, (CREON) 44767-02484 units Oral Cap DR Particles Take 2 capsules by mouth 3 (three) times daily with meals. 180 capsule 1    pantoprazole 40 MG Oral Tab EC Take 1 tablet (40 mg total) by mouth before breakfast. 90 tablet 3    clopidogrel 75 MG Oral Tab Take 1 tablet (75 mg total) by mouth daily. 90 tablet 3    oxybutynin ER 10 MG Oral Tablet 24 Hr Take 1 tablet (10 mg total) by mouth daily. 90 tablet 3    atorvastatin 20 MG Oral Tab Take 1 tablet (20 mg total) by mouth daily. 90 tablet 3    SITagliptin Phosphate (JANUVIA) 100 MG Oral Tab Take 1 tablet (100 mg total) by mouth daily. 90 tablet 3    acyclovir 400 MG Oral Tab Take 1 tablet (400 mg total) by mouth 3 (three) times daily. 30 tablet 0    metoprolol succinate ER 25 MG Oral Tablet 24 Hr Take 1 tablet (25 mg total) by mouth in the morning and 1 tablet (25 mg total) before  bedtime. 180 tablet 3    telmisartan 80 MG Oral Tab Take 1 tablet (80 mg total) by mouth daily. 90 tablet 3    folic acid 1 MG Oral Tab Take 1 tablet (1 mg total) by mouth daily. 90 tablet 3    ergocalciferol 1.25 MG (16959 UT) Oral Cap Take 1 capsule (50,000 Units total) by mouth once a week. 12 capsule 1    Blood Pressure Monitor Does not apply Device Use daily. 1 each 0    Blood Pressure Monitor Does not apply Device Use daily. 1 each 0    Glucose Blood (TRUE METRIX BLOOD GLUCOSE TEST) In Vitro Strip CHECK BLOOD GLUCOSE TWICE DAILY 200 strip 3    B Complex Vitamins (VITAMIN B COMPLEX) Oral Tab Take 1 tablet by mouth once daily. 90 tablet 0    Blood Pressure Monitoring Does not apply Misc Use as needed 1 Device 0    TRUEPLUS LANCETS 28G Does not apply Misc CHECK BLOOD GLUCOSE TWICE DAILY 200 each 4    aspirin 81 MG Oral Tab Take 1 tablet (81 mg total) by mouth daily.      Blood Glucose Monitoring Suppl (TRUE METRIX METER) W/DEVICE Does not apply Kit 1 kit by Does not apply route 2 (two) times daily. 1 kit 0    Milk Thistle Extract 175 MG Oral Tab Take 1 tablet by mouth once daily.      ferrous sulfate 325 (65 FE) MG Oral Tab EC Take 1 tablet (325 mg total) by mouth daily. (Patient not taking: Reported on 6/20/2024)         Allergies:  Allergies   Allergen Reactions    Zocor [Simvastatin] SWELLING    Sulfamethoxazole W/Trimethoprim NAUSEA AND VOMITING         ROS:   The patient denies any chest pain or shortness of breath,  No neurologic or dermatologic symptoms.     PHYSICAL EXAM:   Blood pressure (!) 186/76, pulse 61, height 4' 8\" (1.422 m), weight 118 lb (53.5 kg), not currently breastfeeding.    The patient appears their stated age and is in no acute distress  HEENT- anicteric sclera, neck no lymphadnopathy, OP- clear with no masses or lesions  Chest- Clear bilaterally, no wheezing,  Heart- regular rate, no murmur or gallop  Abdomen- Soft but with epigastric discomfort on palpation, otherwise no rebound or  guarding noted.  Ext- no clubbing or cyanosis  Skin- no rashes or lesions  Neuro- appropriate response, alert, no confusion  .  ASSESSMENT/PLAN:   Assessment   Encounter Diagnoses   Name Primary?    Weight loss Yes    Epigastric pain        The patient has a history of multiple medical issues including chronic anemia likely related to medical comorbid conditions as well as gave/slow GI blood loss-she will continue on Carafate, PPI therapy and iron replacement as per her hematologist.    Her dysphagia has resolved completely after retreatment with acyclovir.  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  Will continue to monitor.    She does have ongoing chronic diarrhea.  Stool testing was negative for infectious etiologies.  Plan a trial of digestive enzymes taken before meals.  This was discussed with the patient and her daughter who agreed.  If this is unhelpful in resolving her symptoms that we would need to set her up for colonoscopy to check for colitis/microscopic colitis.  Given that she does have some epigastric discomfort advised ultrasound of the abdomen to check the pancreas and bile ducts.  Consideration for MRCP ( without contrast given kidney issues ) if negative ultrasound ongoing symptoms.    All questions were answered to patient satisfaction.  She has an EGD set up for October.       Orders This Visit:  No orders of the defined types were placed in this encounter.      Meds This Visit:  Requested Prescriptions     Signed Prescriptions Disp Refills    Pancrelipase, Lip-Prot-Amyl, (CREON) 08701-11785 units Oral Cap DR Particles 180 capsule 1     Sig: Take 2 capsules by mouth 3 (three) times daily with meals.       Imaging & Referrals:  US ABDOMEN COMPLETE (CPT=[id number])       Thomas Clement MD  [facility] Gastroenterology

## 2024-09-25 ENCOUNTER — OFFICE VISIT (OUTPATIENT)
Dept: HEMATOLOGY/ONCOLOGY | Facility: HOSPITAL | Age: 84
End: 2024-09-25
Attending: INTERNAL MEDICINE
Payer: MEDICARE

## 2024-09-25 VITALS
SYSTOLIC BLOOD PRESSURE: 170 MMHG | HEIGHT: 56 IN | OXYGEN SATURATION: 100 % | TEMPERATURE: 98 F | WEIGHT: 116.19 LBS | RESPIRATION RATE: 16 BRPM | BODY MASS INDEX: 26.14 KG/M2 | DIASTOLIC BLOOD PRESSURE: 53 MMHG | HEART RATE: 56 BPM

## 2024-09-25 DIAGNOSIS — D63.1 ANEMIA IN STAGE 2 CHRONIC KIDNEY DISEASE: ICD-10-CM

## 2024-09-25 DIAGNOSIS — D50.9 IRON DEFICIENCY ANEMIA, UNSPECIFIED IRON DEFICIENCY ANEMIA TYPE: Primary | ICD-10-CM

## 2024-09-25 DIAGNOSIS — N18.2 ANEMIA IN STAGE 2 CHRONIC KIDNEY DISEASE: ICD-10-CM

## 2024-09-25 DIAGNOSIS — K90.9: ICD-10-CM

## 2024-09-25 DIAGNOSIS — D50.9 IRON DEFICIENCY ANEMIA, UNSPECIFIED IRON DEFICIENCY ANEMIA TYPE: ICD-10-CM

## 2024-09-25 LAB
BASOPHILS # BLD AUTO: 0.01 X10(3) UL (ref 0–0.2)
BASOPHILS NFR BLD AUTO: 0.2 %
DEPRECATED HBV CORE AB SER IA-ACNC: 134 NG/ML
EOSINOPHIL # BLD AUTO: 0.12 X10(3) UL (ref 0–0.7)
EOSINOPHIL NFR BLD AUTO: 1.9 %
HCT VFR BLD AUTO: 34.1 %
HGB BLD-MCNC: 11.5 G/DL
IMM GRANULOCYTES # BLD AUTO: 0.02 X10(3) UL (ref 0–1)
IMM GRANULOCYTES NFR BLD: 0.3 %
IRON SATN MFR SERPL: 27 %
IRON SERPL-MCNC: 85 UG/DL
LYMPHOCYTES # BLD AUTO: 1.38 X10(3) UL (ref 1–4)
LYMPHOCYTES NFR BLD AUTO: 21.7 %
MCH RBC QN AUTO: 29.6 PG (ref 26–34)
MCHC RBC AUTO-ENTMCNC: 33.7 G/DL (ref 31–37)
MCV RBC AUTO: 87.9 FL
MONOCYTES # BLD AUTO: 0.44 X10(3) UL (ref 0.1–1)
MONOCYTES NFR BLD AUTO: 6.9 %
NEUTROPHILS # BLD AUTO: 4.39 X10 (3) UL (ref 1.5–7.7)
NEUTROPHILS # BLD AUTO: 4.39 X10(3) UL (ref 1.5–7.7)
NEUTROPHILS NFR BLD AUTO: 69 %
PLATELET # BLD AUTO: 257 10(3)UL (ref 150–450)
PLATELET MORPHOLOGY: NORMAL
RBC # BLD AUTO: 3.88 X10(6)UL
TIBC SERPL-MCNC: 311 UG/DL (ref 250–425)
TRANSFERRIN SERPL-MCNC: 209 MG/DL (ref 250–380)
WBC # BLD AUTO: 6.4 X10(3) UL (ref 4–11)

## 2024-09-25 PROCEDURE — 82728 ASSAY OF FERRITIN: CPT

## 2024-09-25 PROCEDURE — 36415 COLL VENOUS BLD VENIPUNCTURE: CPT

## 2024-09-25 PROCEDURE — 84466 ASSAY OF TRANSFERRIN: CPT

## 2024-09-25 PROCEDURE — 99213 OFFICE O/P EST LOW 20 MIN: CPT | Performed by: INTERNAL MEDICINE

## 2024-09-25 PROCEDURE — 83540 ASSAY OF IRON: CPT

## 2024-09-25 PROCEDURE — 85025 COMPLETE CBC W/AUTO DIFF WBC: CPT

## 2024-09-25 NOTE — PROGRESS NOTES
HPI     Mercy Nieves is a 84 year old female who is here today for f/u of   Encounter Diagnoses   Name Primary?    Iron deficiency anemia, unspecified iron deficiency anemia type Yes    Impaired intestinal absorption (HCC)     Anemia in stage 2 chronic kidney disease          She is here today for follow-up.    Last course of venofer in June 2023, she had a total of 900 mg in 3 divided doses.    Has h/o GAVE.  Patient's last EGD was on 10/12/2023.  Mucosa showed GAVE in the antrum of the stomach extending out in multiple A's which was treated with APC on gastric settings with multiple applications with good overall results and no bleeding completion.  She also has small gastric fundic polyp in the body of stomach which was not biopsied.  She was recommended for follow-up with the blood counts, and retreatment of GAVE if ongoing issues.    Patient completed 2 doses of Venofer 400 mg on 11/2/2023, and 12/6/2023    Patient had EGD on 1/12/2024, she was found to have esophagitis with erosion/ulceration about 1 cm above the GE junction, gastric polyps which were biopsied, and in the stomach mucosa streaks of GAVE in the antrum.  No APC anticoagulation was performed.  She was prescribed PPI twice daily for a month as well as Carafate before meals.  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  She was treated with acyclovir.      Still epigastric symptoms but not as bad, states was taking half the dose of sucralfate.  Still c/o liquid stools, off iron and stool not dark.  Patient scheduled for EGD on 10/21/2024. Told maybe has ulcers.     2 doses of Feraheme in July 2024.  Still taking B12 and folic acid.     States still tired and fatigued.  Falls asleep at times.      No pica. No restless legs. Hair loss has resolved.     Review of Systems:   Review of Systems   Constitutional:  Positive for fatigue. Negative for appetite change and  unexpected weight change.   HENT:   Positive for hearing loss. Negative for trouble swallowing.    Respiratory:  Negative for shortness of breath.    Cardiovascular:  Negative for chest pain.   Gastrointestinal:  Positive for diarrhea (after eating.). Negative for abdominal pain, blood in stool and constipation.   Genitourinary:  Positive for nocturia. Negative for hematuria.    Neurological:  Positive for dizziness (at times). Negative for headaches.   Hematological:  Bruises/bleeds easily.   Psychiatric/Behavioral:  Positive for sleep disturbance.            Current Outpatient Medications   Medication Sig Dispense Refill    Pancrelipase, Lip-Prot-Amyl, (CREON) 08118-23208 units Oral Cap DR Particles Take 2 capsules by mouth 3 (three) times daily with meals. 180 capsule 1    pantoprazole 40 MG Oral Tab EC Take 1 tablet (40 mg total) by mouth before breakfast. 90 tablet 3    clopidogrel 75 MG Oral Tab Take 1 tablet (75 mg total) by mouth daily. 90 tablet 3    oxybutynin ER 10 MG Oral Tablet 24 Hr Take 1 tablet (10 mg total) by mouth daily. 90 tablet 3    atorvastatin 20 MG Oral Tab Take 1 tablet (20 mg total) by mouth daily. 90 tablet 3    SITagliptin Phosphate (JANUVIA) 100 MG Oral Tab Take 1 tablet (100 mg total) by mouth daily. 90 tablet 3    metoprolol succinate ER 25 MG Oral Tablet 24 Hr Take 1 tablet (25 mg total) by mouth in the morning and 1 tablet (25 mg total) before bedtime. 180 tablet 3    telmisartan 80 MG Oral Tab Take 1 tablet (80 mg total) by mouth daily. 90 tablet 3    folic acid 1 MG Oral Tab Take 1 tablet (1 mg total) by mouth daily. 90 tablet 3    ergocalciferol 1.25 MG (67505 UT) Oral Cap Take 1 capsule (50,000 Units total) by mouth once a week. 12 capsule 1    Blood Pressure Monitor Does not apply Device Use daily. 1 each 0    Blood Pressure Monitor Does not apply Device Use daily. 1 each 0    Glucose Blood (TRUE METRIX BLOOD GLUCOSE TEST) In Vitro Strip CHECK BLOOD GLUCOSE TWICE DAILY 200  strip 3    B Complex Vitamins (VITAMIN B COMPLEX) Oral Tab Take 1 tablet by mouth once daily. 90 tablet 0    Blood Pressure Monitoring Does not apply Misc Use as needed 1 Device 0    TRUEPLUS LANCETS 28G Does not apply Misc CHECK BLOOD GLUCOSE TWICE DAILY 200 each 4    ferrous sulfate 325 (65 FE) MG Oral Tab EC Take 1 tablet (325 mg total) by mouth daily.      aspirin 81 MG Oral Tab Take 1 tablet (81 mg total) by mouth daily.      Blood Glucose Monitoring Suppl (TRUE METRIX METER) W/DEVICE Does not apply Kit 1 kit by Does not apply route 2 (two) times daily. 1 kit 0    Milk Thistle Extract 175 MG Oral Tab Take 1 tablet by mouth once daily.      acyclovir 400 MG Oral Tab Take 1 tablet (400 mg total) by mouth 3 (three) times daily. (Patient not taking: Reported on 2024) 30 tablet 0     Allergies:   Allergies   Allergen Reactions    Zocor [Simvastatin] SWELLING    Sulfamethoxazole W/Trimethoprim NAUSEA AND VOMITING       Past Medical History:    Anemia    Arthritis    Cholelithiasis    CKD (chronic kidney disease) stage 3, GFR 30-59 ml/min ([facility])    Coronary atherosclerosis    Depression    Diabetes ([facility])    Management:  Medication    Gastritis    GERD (gastroesophageal reflux disease)    High blood pressure    High cholesterol    Non-ST elevation MI (NSTEMI) ([facility])    stenting of the proximal LAD and ptca of mid LAD    Pregnancy ([facility])        Renal disorder    CKD 3    Special screening for osteoporosis    Dexa Scan    Stress incontinence    Type II or unspecified type diabetes mellitus without mention of complication, not stated as uncontrolled    Pills only     Past Surgical History:   Procedure Laterality Date    Anesth,angioplasty            4X    Cath drug eluting stent      Cholecystectomy      Colonoscopy  2016    Colonoscopy N/A 2019    Procedure: COLONOSCOPY;  Surgeon: Thomas Clement MD;  Location: [facility] ENDOSCOPY    Egd  2018    Electrocardiogram, complete  2013    Scanned to  Media Tab - Date of Service 09-     Social History     Socioeconomic History    Marital status:    Tobacco Use    Smoking status: Never     Passive exposure: Never    Smokeless tobacco: Never   Vaping Use    Vaping status: Never Used   Substance and Sexual Activity    Alcohol use: No     Alcohol/week: 0.0 standard drinks of alcohol    Drug use: No   Other Topics Concern    Caffeine Concern Yes     Comment: Coffee, 1 cup daily       Family History   Problem Relation Age of Onset    Diabetes Father     Other (Other) Father 62    Diabetes Mother 64        (cause of death)    Diabetes Brother     Other (Other) Brother 54    Other (Other) Brother 64    Glaucoma Neg     Macular degeneration Neg          PHYSICAL EXAM:    BP (!) 170/53 (BP Location: Left arm, Patient Position: Sitting, Cuff Size: adult)   Pulse 56   Temp 98 °F (36.7 °C) (Oral)   Resp 16   Ht 1.422 m (4' 8\")   Wt 52.7 kg (116 lb 3.2 oz)   SpO2 100%   BMI 26.05 kg/m²   Wt Readings from Last 6 Encounters:   09/25/24 52.7 kg (116 lb 3.2 oz)   09/11/24 53.5 kg (118 lb)   08/14/24 53.5 kg (118 lb)   07/29/24 53.9 kg (118 lb 12.8 oz)   07/22/24 54.4 kg (120 lb)   06/20/24 54.4 kg (120 lb)     Physical Exam  General: Patient is alert and oriented x 3, not in acute distress.  HEENT: EOMs intact. PERRL.  Neck: No JVD. No palpable lymphadenopathy. Neck is supple.  Chest: Clear to auscultation.  Heart: Regular rate and rhythm.   Abdomen: Soft, + epigastric tenderness, good bowel sounds.  Extremities: Pedal pulses are present. No edema.  Neurological: Grossly intact.   Lymphatics: There is no palpable lymphadenopathy throughout in the cervical, supraclavicular, or axillary regions.  Psych/Depression: normal        ASSESSMENT/PLAN:     Encounter Diagnoses   Name Primary?    Iron deficiency anemia, unspecified iron deficiency anemia type Yes    Impaired intestinal absorption (HCC)     Anemia in stage 2 chronic kidney disease         Iron deficiency  due to UGI blood loss from gastritis that was chronic, since prior to July 2018.  Most recent EGD showed telangiactasias (GAVE) which underwent APC on 10/12/2023..    Continue to follow with GI as recommended. Needs f/u with Dr. Clement for EGD recommended again for APC.    She had venofer most recent 900 mg over 3 days ending June 2023.  Given her worsening iron deficiency and anemia, repeated course of Venofer 400 mg x 2 days, from end of November to beginning of December 2023.  Patient is taking folic acid daily.    Patient's iron stores are improved.  Her anemia has improved.  Patient can continue off iron supplement and continue with B12 and folic acid daily.  If she has worsening anemia, or worsening iron stores in 3 months, may repeat course of intravenous Venofer.    EGD January 2024 with streaks of GAVE.  She also had esophageal ulcer above the GE junction, consistent with HSV esophagitis.  She was treated with PPI, Carafate, and acyclovir.    Today's labs with a decrease in her hemoglobin from baseline.  She has worsening of her iron stores.  Discussed with patient that she will again need intravenous iron replacement.  Had additional Feraheme 510 mg x 2 doses July 2024.  She tolerated well and had excellent response.  She is no longer iron deficient.    D/w patient that the anemia is also related to chronic disease and CKD.  Her CKD is now stage II per most recent BMP in January.  If patient ever has decreased of hemoglobin to 10 or less and her CKD is stage III or above, may benefit from TAQUERIA if anemia with replace iron stores.    F/u in 6 months with labs, if symptomatic or anemia/iron worsening follow sooner.      No orders of the defined types were placed in this encounter.   MDM high risk medication      No follow-ups on file.     Recent Results (from the past 24 hour(s))   CBC With Differential With Platelet    Collection Time: 09/25/24 10:49 AM   Result Value Ref Range    WBC 6.4 4.0 - 11.0 x10(3) uL     RBC 3.88 3.80 - 5.30 x10(6)uL    HGB 11.5 (L) 12.0 - 16.0 g/dL    HCT 34.1 (L) 35.0 - 48.0 %    MCV 87.9 80.0 - 100.0 fL    MCH 29.6 26.0 - 34.0 pg    MCHC 33.7 31.0 - 37.0 g/dL    RDW-SD      RDW      .0 150.0 - 450.0 10(3)uL    Neutrophil Absolute Prelim 4.39 1.50 - 7.70 x10 (3) uL    Neutrophil Absolute 4.39 1.50 - 7.70 x10(3) uL    Lymphocyte Absolute 1.38 1.00 - 4.00 x10(3) uL    Monocyte Absolute 0.44 0.10 - 1.00 x10(3) uL    Eosinophil Absolute 0.12 0.00 - 0.70 x10(3) uL    Basophil Absolute 0.01 0.00 - 0.20 x10(3) uL    Immature Granulocyte Absolute 0.02 0.00 - 1.00 x10(3) uL    Neutrophil % 69.0 %    Lymphocyte % 21.7 %    Monocyte % 6.9 %    Eosinophil % 1.9 %    Basophil % 0.2 %    Immature Granulocyte % 0.3 %   Ferritin    Collection Time: 09/25/24 10:49 AM   Result Value Ref Range    Ferritin 134 50 - 306 ng/mL   Iron And Tibc    Collection Time: 09/25/24 10:49 AM   Result Value Ref Range    Iron 85 50 - 170 ug/dL    Transferrin 209 (L) 250 - 380 mg/dL    Total Iron Binding Capacity 311 250 - 425 ug/dL    % Saturation 27 15 - 50 %   RBC Morphology Scan    Collection Time: 09/25/24 10:49 AM   Result Value Ref Range    RBC Morphology See morphology below (A) Normal, Slide reviewed, see previous RBC morphology.    Platelet Morphology Normal Normal    Macrocytosis 1+      Microcytosis 1+       Component      Latest Ref Rng 6/20/2024 9/25/2024   WBC      4.0 - 11.0 x10(3) uL 5.8  6.4    RBC      3.80 - 5.30 x10(6)uL 3.83  3.88    Hemoglobin      12.0 - 16.0 g/dL 9.1 (L)  11.5 (L)    Hematocrit      35.0 - 48.0 % 28.2 (L)  34.1 (L)    MCV      80.0 - 100.0 fL 73.6 (L)  87.9    MCH      26.0 - 34.0 pg 23.8 (L)  29.6    MCHC      31.0 - 37.0 g/dL 32.3  33.7    RDW-SD 47.0 (H)  --    RDW 17.4 (H)  --    Platelet Count      150.0 - 450.0 10(3)uL 236.0  257.0    Prelim Neutrophil Abs      1.50 - 7.70 x10 (3) uL 3.87  4.39    Neutrophils Absolute      1.50 - 7.70 x10(3) uL 3.87  4.39    Lymphocytes  Absolute      1.00 - 4.00 x10(3) uL 1.47  1.38    Monocytes Absolute      0.10 - 1.00 x10(3) uL 0.29  0.44    Eosinophils Absolute      0.00 - 0.70 x10(3) uL 0.14  0.12    Basophils Absolute      0.00 - 0.20 x10(3) uL 0.01  0.01    Immature Granulocyte Absolute      0.00 - 1.00 x10(3) uL 0.02  0.02    Neutrophils %      % 66.8  69.0    Lymphocytes %      % 25.3  21.7    Monocytes %      % 5.0  6.9    Eosinophils %      % 2.4  1.9    Basophils %      % 0.2  0.2    Immature Granulocyte %      % 0.3  0.3    Iron, Serum      50 - 170 ug/dL 20 (L)  85    Transferrin      250 - 380 mg/dL 323  209 (L)    Iron Bind.Cap.(TIBC)      250 - 425 ug/dL 481 (H)  311    Iron Saturation      15 - 50 % 4 (L)  27    FERRITIN      50 - 306 ng/mL 3.8 (L)  134

## 2024-10-15 ENCOUNTER — TELEPHONE (OUTPATIENT)
Dept: FAMILY MEDICINE CLINIC | Facility: CLINIC | Age: 84
End: 2024-10-15

## 2024-10-16 ENCOUNTER — TELEPHONE (OUTPATIENT)
Facility: CLINIC | Age: 84
End: 2024-10-16

## 2024-10-16 ENCOUNTER — HOSPITAL ENCOUNTER (OUTPATIENT)
Dept: ULTRASOUND IMAGING | Age: 84
Discharge: HOME | End: 2024-10-16
Attending: INTERNAL MEDICINE
Payer: MEDICARE

## 2024-10-16 DIAGNOSIS — R10.13 EPIGASTRIC PAIN: ICD-10-CM

## 2024-10-16 DIAGNOSIS — R63.4 WEIGHT LOSS: ICD-10-CM

## 2024-10-16 PROCEDURE — 76700 US EXAM ABDOM COMPLETE: CPT | Performed by: INTERNAL MEDICINE

## 2024-10-16 NOTE — TELEPHONE ENCOUNTER
Patient's daughter normal contacted, results of ultrasound reviewed, fatty buildup in the liver but no mass lesion or any other significant pathology.  She has upper endoscopy scheduled for about a week from now.

## 2024-10-16 NOTE — PAT NURSING NOTE
Spoke with patient's daughter, Courtney. Name and  confirmed. Patient has instructions to hold Plavix for 7 days. Instructed daughter to follow up with office regarding Januvia.

## 2024-10-21 ENCOUNTER — HOSPITAL ENCOUNTER (OUTPATIENT)
Facility: HOSPITAL | Age: 84
Setting detail: HOSPITAL OUTPATIENT SURGERY
Discharge: HOME | End: 2024-10-21
Attending: INTERNAL MEDICINE | Admitting: INTERNAL MEDICINE
Payer: MEDICARE

## 2024-10-21 ENCOUNTER — ANESTHESIA EVENT (OUTPATIENT)
Dept: ENDOSCOPY | Facility: HOSPITAL | Age: 84
End: 2024-10-21
Payer: MEDICARE

## 2024-10-21 ENCOUNTER — ANESTHESIA (OUTPATIENT)
Dept: ENDOSCOPY | Facility: HOSPITAL | Age: 84
End: 2024-10-21
Payer: MEDICARE

## 2024-10-21 VITALS
DIASTOLIC BLOOD PRESSURE: 67 MMHG | OXYGEN SATURATION: 98 % | BODY MASS INDEX: 24.35 KG/M2 | RESPIRATION RATE: 16 BRPM | HEIGHT: 58 IN | SYSTOLIC BLOOD PRESSURE: 187 MMHG | WEIGHT: 116 LBS | TEMPERATURE: 99 F | HEART RATE: 51 BPM

## 2024-10-21 LAB — GLUCOSE BLDC GLUCOMTR-MCNC: 146 MG/DL (ref 70–99)

## 2024-10-21 PROCEDURE — 0DJ08ZZ INSPECTION OF UPPER INTESTINAL TRACT, VIA NATURAL OR ARTIFICIAL OPENING ENDOSCOPIC: ICD-10-PCS | Performed by: INTERNAL MEDICINE

## 2024-10-21 PROCEDURE — 43270 EGD LESION ABLATION: CPT | Performed by: INTERNAL MEDICINE

## 2024-10-21 RX ORDER — SUCRALFATE 1 G/1
1 TABLET ORAL
Qty: 90 TABLET | Refills: 0 | Status: SHIPPED | OUTPATIENT
Start: 2024-10-21 | End: 2024-11-20

## 2024-10-21 RX ORDER — SODIUM CHLORIDE, SODIUM LACTATE, POTASSIUM CHLORIDE, CALCIUM CHLORIDE 600; 310; 30; 20 MG/100ML; MG/100ML; MG/100ML; MG/100ML
INJECTION, SOLUTION INTRAVENOUS CONTINUOUS
Status: DISCONTINUED | OUTPATIENT
Start: 2024-10-21 | End: 2024-10-21

## 2024-10-21 RX ORDER — LIDOCAINE HYDROCHLORIDE 10 MG/ML
INJECTION, SOLUTION EPIDURAL; INFILTRATION; INTRACAUDAL; PERINEURAL AS NEEDED
Status: DISCONTINUED | OUTPATIENT
Start: 2024-10-21 | End: 2024-10-21 | Stop reason: SURG

## 2024-10-21 RX ADMIN — SODIUM CHLORIDE, SODIUM LACTATE, POTASSIUM CHLORIDE, CALCIUM CHLORIDE: 600; 310; 30; 20 INJECTION, SOLUTION INTRAVENOUS at 11:17:00

## 2024-10-21 RX ADMIN — LIDOCAINE HYDROCHLORIDE 50 MG: 10 INJECTION, SOLUTION EPIDURAL; INFILTRATION; INTRACAUDAL; PERINEURAL at 13:35:00

## 2024-10-21 RX ADMIN — SODIUM CHLORIDE, SODIUM LACTATE, POTASSIUM CHLORIDE, CALCIUM CHLORIDE: 600; 310; 30; 20 INJECTION, SOLUTION INTRAVENOUS at 13:50:00

## 2024-10-21 NOTE — ANESTHESIA POSTPROCEDURE EVALUATION
Patient: Mercy Nieves    Procedure Summary       Date: 10/21/24 Room / Location: [facility] ENDOSCOPY 03 / EM ENDOSCOPY    Anesthesia Start: 1330 Anesthesia Stop: 1350    Procedure: ESOPHAGOGASTRODUODENOSCOPY with Argon Plasma Coagulation Diagnosis:       Iron deficiency anemia, unspecified iron deficiency anemia type      (GAVE s/p APC)    Surgeons: Thomas Clement MD Anesthesiologist: Faina Jenkins MD    Anesthesia Type: MAC ASA Status: 3            Anesthesia Type: MAC    Vitals Value Taken Time   //65 10/21/24 1350   Temp 98.5 °F (36.9 °C) 10/21/24 1350   Pulse 68 10/21/24 1351   Resp 15 10/21/24 1350   SpO2 99 % 10/21/24 1350       [facility] AN Post Evaluation:   Patient Evaluated in PACU  Patient Participation: complete - patient participated  Level of Consciousness: awake  Pain Score: 0  Pain Management: adequate  Airway Patency:patent  Yes    Nausea/Vomiting: none  Cardiovascular Status: acceptable  Respiratory Status: acceptable  Postoperative Hydration acceptable      Faina Jenkins MD  10/21/2024 1:51 PM

## 2024-10-21 NOTE — DISCHARGE INSTRUCTIONS
Home Care Instructions for Gastroscopy with Sedation    Diet:  - Resume your regular diet as tolerated unless otherwise instructed.  - Start with light meals to minimize bloating.  - Do not drink alcohol today.    Medication:  - If you have questions about resuming your normal medications, please contact your Primary Care Physician.    Activities:  - Take it easy today. Do not return to work today.  - Do not drive today.  - Do not operate any machinery today (including kitchen equipment).    Gastroscopy:  - You may have a sore throat for 2-3 days following the exam. This is normal. Gargling with warm salt water (1/2 tsp salt to 1 glass warm water) or using throat lozenges will help.  - If you experience any sharp pain in your neck, abdomen or chest, vomiting of blood, oral temperature over 100 degrees Fahrenheit, light-headedness or dizziness, or any other problems, contact your doctor.    **If unable to reach your doctor, please go to the [facility] Emergency Room**    - Your referring physician will receive a full report of your examination.  - If you do not hear from your doctor's office within two weeks of your biopsy, please call them for your results.    You may be able to see your laboratory results in [employer] between 4 and 7 business days.  In some cases, your physician may not have viewed the results before they are released to [employer].  If you have questions regarding your results contact the physician who ordered the test/exam by phone or via [employer] by choosing \"Ask a Medical Question.\"

## 2024-10-21 NOTE — ANESTHESIA PREPROCEDURE EVALUATION
Anesthesia PreOp Note    HPI:     Mercy Nieves is a 84 year old female who presents for preoperative consultation requested by: Thomas Clement MD    Date of Surgery: 10/21/2024    Procedure(s):  ESOPHAGOGASTRODUODENOSCOPY  Indication: Iron deficiency anemia, unspecified iron deficiency anemia type    Relevant Problems   No relevant active problems       NPO:                         History Review:  Patient Active Problem List    Diagnosis Date Noted    Chronic pain of right ankle 03/15/2023    Mild episode of recurrent major depressive disorder ([facility]) 03/15/2023    Pain in right lower leg 10/07/2022    Type 2 diabetes mellitus with diabetic chronic kidney disease ([facility]) 03/10/2022    SIADH (syndrome of inappropriate ADH production) ([facility]) 2021    GAVE (gastric antral vascular ectasia) 2020    Sensorineural hearing loss (SNHL) of both ears 2020    Vitamin D deficiency 2019    Osteopenia of multiple sites 2019    Meibomian gland dysfunction (MGD) of both eyes 2018    Bilateral carotid bruits 2018    Impaired intestinal absorption ([facility]) 2018    Coronary artery disease of native heart with stable angina pectoris ([facility]) 2018    Atherosclerosis of aorta ([facility]) 2017    Peripheral vascular disease ([facility]) 2017    Essential hypertension 2016    Murmur 2016    Anemia, iron deficiency 10/05/2015    Age-related nuclear cataract of both eyes 2015    Diabetes mellitus type 2 without retinopathy ([facility]) 2015       Past Medical History:    Anemia    Arthritis    Cholelithiasis    CKD (chronic kidney disease) stage 3, GFR 30-59 ml/min ([facility])    Coronary atherosclerosis    Depression    Diabetes ([facility])    Management:  Medication    Gastritis    GERD (gastroesophageal reflux disease)    High blood pressure    High cholesterol    Non-ST elevation MI (NSTEMI) ([facility])    stenting of the proximal LAD and ptca of mid LAD    Pregnancy ([facility])         Renal disorder    CKD 3    Special screening for osteoporosis    Dexa Scan    Stress incontinence    Type II or unspecified type diabetes mellitus without mention of complication, not stated as uncontrolled    Pills only       Past Surgical History:   Procedure Laterality Date    Anesth,angioplasty            4X    Cath drug eluting stent      Cholecystectomy      Colonoscopy  2016    Colonoscopy N/A 2019    Procedure: COLONOSCOPY;  Surgeon: Thomas Clement MD;  Location: [facility] ENDOSCOPY    Egd      Electrocardiogram, complete  2013    Scanned to Media Tab - Date of Service 2013       Prescriptions Prior to Admission[1]  Current Medications and Prescriptions Ordered in Epic[2]    Allergies[3]    Family History   Problem Relation Age of Onset    Diabetes Father     Other (Other) Father 62    Diabetes Mother 64        (cause of death)    Diabetes Brother     Other (Other) Brother 54    Other (Other) Brother 64    Glaucoma Neg     Macular degeneration Neg      Social History     Socioeconomic History    Marital status:    Tobacco Use    Smoking status: Never     Passive exposure: Never    Smokeless tobacco: Never   Vaping Use    Vaping status: Never Used   Substance and Sexual Activity    Alcohol use: No     Alcohol/week: 0.0 standard drinks of alcohol    Drug use: No   Other Topics Concern    Caffeine Concern Yes     Comment: Coffee, 1 cup daily       Available pre-op labs reviewed.  Lab Results   Component Value Date    WBC 6.4 2024    RBC 3.88 2024    HGB 11.5 (L) 2024    HCT 34.1 (L) 2024    MCV 87.9 2024    MCH 29.6 2024    MCHC 33.7 2024    .0 2024             Vital Signs:  Body mass index is 24.24 kg/m².   height is 1.473 m (4' 10\") and weight is 52.6 kg (116 lb).   Vitals:    10/16/24 1449   Weight: 52.6 kg (116 lb)   Height: 1.473 m (4' 10\")        Anesthesia Evaluation     Patient summary reviewed and Nursing notes  reviewed    Airway   Mallampati: I  TM distance: >3 FB  Neck ROM: full  Dental      Pulmonary - negative ROS   (+) rhonchi  Cardiovascular   (+) hypertension, CAD    NYHA Classification: II  Rhythm: regular  Rate: normal    Neuro/Psych - negative ROS     GI/Hepatic/Renal    (+) GERD    Endo/Other    (+) diabetes mellitus type 2  Abdominal                  Anesthesia Plan:   ASA:  3  Plan:   MAC  Informed Consent Plan and Risks Discussed With:  Patient  Discussed plan with:  Attending      I have informed Mercy Nieves and/or legal guardian or family member of the nature of the anesthetic plan, benefits, risks including possible dental damage if relevant, major complications, and any alternative forms of anesthetic management.   All of the patient's questions were answered to the best of my ability. The patient desires the anesthetic management as planned.  Faina Jenkins MD  10/21/2024 10:54 AM  Present on Admission:  **None**           [1]   Medications Prior to Admission   Medication Sig Dispense Refill Last Dose/Taking    pantoprazole 40 MG Oral Tab EC Take 1 tablet (40 mg total) by mouth before breakfast. 90 tablet 3 Taking    clopidogrel 75 MG Oral Tab Take 1 tablet (75 mg total) by mouth daily. 90 tablet 3 Taking    oxybutynin ER 10 MG Oral Tablet 24 Hr Take 1 tablet (10 mg total) by mouth daily. 90 tablet 3 Taking    atorvastatin 20 MG Oral Tab Take 1 tablet (20 mg total) by mouth daily. 90 tablet 3 Taking    SITagliptin Phosphate (JANUVIA) 100 MG Oral Tab Take 1 tablet (100 mg total) by mouth daily. 90 tablet 3 Taking    metoprolol succinate ER 25 MG Oral Tablet 24 Hr Take 1 tablet (25 mg total) by mouth in the morning and 1 tablet (25 mg total) before bedtime. 180 tablet 3 Taking    telmisartan 80 MG Oral Tab Take 1 tablet (80 mg total) by mouth daily. 90 tablet 3 Taking    folic acid 1 MG Oral Tab Take 1 tablet (1 mg total) by mouth daily. 90 tablet 3 Taking    ergocalciferol 1.25 MG (75631 UT) Oral  Cap Take 1 capsule (50,000 Units total) by mouth once a week. 12 capsule 1 Taking    B Complex Vitamins (VITAMIN B COMPLEX) Oral Tab Take 1 tablet by mouth once daily. 90 tablet 0 Taking    ferrous sulfate 325 (65 FE) MG Oral Tab EC Take 1 tablet (325 mg total) by mouth daily.   Taking    aspirin 81 MG Oral Tab Take 1 tablet (81 mg total) by mouth daily.   Taking    Milk Thistle Extract 175 MG Oral Tab Take 1 tablet by mouth once daily.   Taking    Pancrelipase, Lip-Prot-Amyl, (CREON) 13149-60716 units Oral Cap DR Particles Take 2 capsules by mouth 3 (three) times daily with meals. 180 capsule 1     acyclovir 400 MG Oral Tab Take 1 tablet (400 mg total) by mouth 3 (three) times daily. (Patient not taking: Reported on 9/25/2024) 30 tablet 0     Blood Pressure Monitor Does not apply Device Use daily. 1 each 0     Blood Pressure Monitor Does not apply Device Use daily. 1 each 0     Glucose Blood (TRUE METRIX BLOOD GLUCOSE TEST) In Vitro Strip CHECK BLOOD GLUCOSE TWICE DAILY 200 strip 3     Blood Pressure Monitoring Does not apply Misc Use as needed 1 Device 0     TRUEPLUS LANCETS 28G Does not apply Misc CHECK BLOOD GLUCOSE TWICE DAILY 200 each 4     Blood Glucose Monitoring Suppl (TRUE METRIX METER) W/DEVICE Does not apply Kit 1 kit by Does not apply route 2 (two) times daily. 1 kit 0    [2]   No current Epic-ordered facility-administered medications on file.     No current [facility]-ordered outpatient medications on file.   [3]   Allergies  Allergen Reactions    Zocor [Simvastatin] SWELLING    Sulfamethoxazole W/Trimethoprim NAUSEA AND VOMITING

## 2024-10-21 NOTE — H&P
History & Physical Examination    Patient Name: Mercy Nieves  MRN: [id number]  CSN: [id number]  YOB: 1940    Diagnosis:   Anemia   History of GAVE  History of HSV esophagitis    Prescriptions Prior to Admission[1]  Current Facility-Administered Medications   Medication Dose Route Frequency    lactated ringers infusion   Intravenous Continuous       Allergies: Allergies[2]    Past Medical History:    Anemia    Arthritis    Cholelithiasis    CKD (chronic kidney disease) stage 3, GFR 30-59 ml/min ([facility])    Coronary atherosclerosis    Depression    Diabetes ([facility])    Management:  Medication    Gastritis    GERD (gastroesophageal reflux disease)    High blood pressure    High cholesterol    Non-ST elevation MI (NSTEMI) ([facility])    stenting of the proximal LAD and ptca of mid LAD    Pregnancy ([facility])        Renal disorder    CKD 3    Special screening for osteoporosis    Dexa Scan    Stress incontinence    Type II or unspecified type diabetes mellitus without mention of complication, not stated as uncontrolled    Pills only     Past Surgical History:   Procedure Laterality Date    Anesth,angioplasty            4X    Cath drug eluting stent      Cholecystectomy      Colonoscopy      Colonoscopy N/A 2019    Procedure: COLONOSCOPY;  Surgeon: Thomas Clement MD;  Location: [facility] ENDOSCOPY    Egd  2018    Electrocardiogram, complete  2013    Scanned to Media Tab - Date of Service 2013     Family History   Problem Relation Age of Onset    Diabetes Father     Other (Other) Father 62    Diabetes Mother 64        (cause of death)    Diabetes Brother     Other (Other) Brother 54    Other (Other) Brother 64    Glaucoma Neg     Macular degeneration Neg      Social History     Tobacco Use    Smoking status: Never     Passive exposure: Never    Smokeless tobacco: Never   Substance Use Topics    Alcohol use: No     Alcohol/week: 0.0 standard drinks of alcohol       SYSTEM Check if Review  is Normal Check if Physical Exam is Normal If not normal, please explain:   HEENT [x ] [ x]    NECK & BACK [x ] [x ]    HEART [x ] [ x]    LUNGS [x ] [ x]    ABDOMEN [x ] [x ]    UROGENITAL [ ] [ ]    EXTREMITIES [x ] [x ]    OTHER        [ x ] I have discussed the risks and benefits and alternatives with the patient/family.  They understand and agree to proceed with plan of care.  [ x ] I have reviewed the History and Physical done within the last 30 days.  Any changes noted above.    Thomas Clement MD  10/21/2024  11:16 AM         [1]   Medications Prior to Admission   Medication Sig Dispense Refill Last Dose/Taking    pantoprazole 40 MG Oral Tab EC Take 1 tablet (40 mg total) by mouth before breakfast. 90 tablet 3 Taking    clopidogrel 75 MG Oral Tab Take 1 tablet (75 mg total) by mouth daily. 90 tablet 3 10/14/2024    oxybutynin ER 10 MG Oral Tablet 24 Hr Take 1 tablet (10 mg total) by mouth daily. 90 tablet 3 Taking    atorvastatin 20 MG Oral Tab Take 1 tablet (20 mg total) by mouth daily. 90 tablet 3 Taking    SITagliptin Phosphate (JANUVIA) 100 MG Oral Tab Take 1 tablet (100 mg total) by mouth daily. 90 tablet 3 10/20/2024    metoprolol succinate ER 25 MG Oral Tablet 24 Hr Take 1 tablet (25 mg total) by mouth in the morning and 1 tablet (25 mg total) before bedtime. 180 tablet 3 Taking    telmisartan 80 MG Oral Tab Take 1 tablet (80 mg total) by mouth daily. 90 tablet 3 Taking    folic acid 1 MG Oral Tab Take 1 tablet (1 mg total) by mouth daily. 90 tablet 3 Taking    ergocalciferol 1.25 MG (27387 UT) Oral Cap Take 1 capsule (50,000 Units total) by mouth once a week. 12 capsule 1 Taking    B Complex Vitamins (VITAMIN B COMPLEX) Oral Tab Take 1 tablet by mouth once daily. 90 tablet 0 Taking    ferrous sulfate 325 (65 FE) MG Oral Tab EC Take 1 tablet (325 mg total) by mouth daily.   Taking    aspirin 81 MG Oral Tab Take 1 tablet (81 mg total) by mouth daily.   Taking    Milk Thistle Extract 175 MG Oral Tab  Take 1 tablet by mouth once daily.   Taking    Pancrelipase, Lip-Prot-Amyl, (CREON) 56698-75680 units Oral Cap DR Particles Take 2 capsules by mouth 3 (three) times daily with meals. 180 capsule 1     acyclovir 400 MG Oral Tab Take 1 tablet (400 mg total) by mouth 3 (three) times daily. (Patient not taking: Reported on 9/25/2024) 30 tablet 0     Blood Pressure Monitor Does not apply Device Use daily. 1 each 0     Blood Pressure Monitor Does not apply Device Use daily. 1 each 0     Glucose Blood (TRUE METRIX BLOOD GLUCOSE TEST) In Vitro Strip CHECK BLOOD GLUCOSE TWICE DAILY 200 strip 3     Blood Pressure Monitoring Does not apply Misc Use as needed 1 Device 0     TRUEPLUS LANCETS 28G Does not apply Misc CHECK BLOOD GLUCOSE TWICE DAILY 200 each 4     Blood Glucose Monitoring Suppl (TRUE METRIX METER) W/DEVICE Does not apply Kit 1 kit by Does not apply route 2 (two) times daily. 1 kit 0    [2]   Allergies  Allergen Reactions    Zocor [Simvastatin] SWELLING    Sulfamethoxazole W/Trimethoprim NAUSEA AND VOMITING

## 2024-10-21 NOTE — OPERATIVE REPORT
[facility] Endoscopy Report  Date of procedure-October 21, 2024    Preoperative Diagnosis:  -Anemia/history of GAVE requiring APC  -Intermittent dysphagia      Postoperative Diagnosis:  -Gave status post APC gastric antrum  -Gastric polyps  -Small esophageal linear mucosal disruptions x 2      Procedure:    Esophagogastroduodenoscopy       Surgeon:  Thomas Clement M.D.    Anesthesia:  MAC     Technique:  After informed consent, the patient was placed in the left lateral recumbent position.  An Olympus adult HD gastroscope was inserted into the hypopharynx and advanced under direct vision into the esophagus, stomach and duodenum.  The endoscope was withdrawn to the stomach where retroflexion of the annulus, body, cardia and fundus was performed.  The instrument was straightened, insufflated air and fluid were suctioned and the endoscope was withdrawn.  The procedure was well tolerated without immediate complication.      Findings:  The esophagus showed 2 tiny linear mucosal disruptions distal esophagus about 1 to 2 cm above the GE junction, there was no bleeding noted from coming of either these.  Possibly at the site of the prior HSV ulcers but no signs of deep ulcer versus possibly induced during the procedure-these were superficial and no evidence of perforation  The GE junction and diaphragmatic impression were at 39 cm.  The stomach distended appropriately with insufflated air.  The mucosa of the stomach streaks of vascular AVMs/GAVE-treated with multiple applications of APC.  Pretreatment one of these areas was noted to be oozing very slightly.  The duodenal bulb and post bulbar regions were normal.    Estimated blood loss-insignificant  Specimens-see above    Impression:  -Gave status post APC gastric antrum  -Gastric polyps  -Small esophageal linear mucosal disruptions x 2    Recommendations:  - Post procedure instructions given/liquids today   - Start on sulcralfate   - Follow blood  counts            Thomas Clement MD  10/21/2024  1:47 PM

## 2024-11-04 NOTE — TELEPHONE ENCOUNTER
Requested Prescriptions     Pending Prescriptions Disp Refills    Pancrelipase, Lip-Prot-Amyl, (CREON) 01499-72319 units Oral Cap DR Particles 180 capsule 1     Sig: Take 2 capsules by mouth 3 (three) times daily with meals.       LOV  9/11/2024  LR  9/11/2024    em

## 2024-11-05 RX ORDER — PANCRELIPASE 24000; 76000; 120000 [USP'U]/1; [USP'U]/1; [USP'U]/1
2 CAPSULE, DELAYED RELEASE PELLETS ORAL
Qty: 180 CAPSULE | Refills: 1 | Status: SHIPPED | OUTPATIENT
Start: 2024-11-05 | End: 2025-01-04

## 2024-11-08 RX ORDER — FOLIC ACID 1 MG/1
1 TABLET ORAL DAILY
Qty: 90 TABLET | Refills: 1 | Status: SHIPPED | OUTPATIENT
Start: 2024-11-08

## 2025-01-15 ENCOUNTER — TELEPHONE (OUTPATIENT)
Facility: CLINIC | Age: 85
End: 2025-01-15

## 2025-01-15 DIAGNOSIS — R13.19 ESOPHAGEAL DYSPHAGIA: Primary | ICD-10-CM

## 2025-01-15 NOTE — TELEPHONE ENCOUNTER
Patient's daughter contacted, normal.  The patient has been experiencing issues with swallowing, occasionally food is sticking and she can have some coughing suggesting oropharyngeal.  Previously esophageal ulcers due to HSV noted in the distal esophagus and some potential narrowing noted as well.  She denies any pain on swallowing.  She can typically get food down by using Coke/carbonated beverage    Plan-possible stricture and/or motility issue.  Discussed the above with the patient's daughter.  Plan to continue the patient to food carefully, okay to use Ensure or boost to maintain weight and crease calories.  -Video swallow  -Esophagram  -Consideration for EGD with dilation if focal stricture found

## 2025-01-15 NOTE — TELEPHONE ENCOUNTER
Dr. Clement    I spoke to Courtney  She states her mother cuts food \"like infant\" meaning tiny pieces and drinks a coke with every meal since water does not help her swallow her food.  They noticed it more around the holidays.  This has been going on since whole month of December, but trouble with swallowing has gotten progressively worse.    States [place] swallowed tylenol which got stuck, but with maneuvers from family did get it out and no longer stuck.  Patient is going to avoid tylenol until this is resolved.    They have to cut her pills.  She is diabetic and having a coke in order to wash food down since she told them water does not help.  They are worried this will worsen her diabetes.      She can swallow soups, rice, meat cut up really small but not normal food/issues.    I advised she stick to soft foods, chew well, and alternate each bit of solid with a sip of water.    Patient has appointment with you in March.    Please advise    Thank you   115

## 2025-01-15 NOTE — TELEPHONE ENCOUNTER
Patient daughter is calling, patient has complaints of having trouble swallowing.  Everything she eats get stuck in her throat.  Patient denies pain.  Please call

## 2025-01-17 NOTE — TELEPHONE ENCOUNTER
I called Courtney Clement placed orders for XR esophagus and video swallow  Given number to central scheduling to set up these tests, she wrote it down and will call to schedule.

## 2025-01-21 ENCOUNTER — HOSPITAL ENCOUNTER (OUTPATIENT)
Dept: GENERAL RADIOLOGY | Facility: HOSPITAL | Age: 85
Discharge: HOME | End: 2025-01-21
Attending: INTERNAL MEDICINE
Payer: MEDICARE

## 2025-01-21 DIAGNOSIS — R13.19 ESOPHAGEAL DYSPHAGIA: ICD-10-CM

## 2025-01-21 PROCEDURE — 74220 X-RAY XM ESOPHAGUS 1CNTRST: CPT | Performed by: INTERNAL MEDICINE

## 2025-01-22 DIAGNOSIS — K22.2 STRICTURE ESOPHAGUS: Primary | ICD-10-CM

## 2025-01-22 NOTE — TELEPHONE ENCOUNTER
----- Message from Thomas Clement sent at 1/22/2025  9:57 AM CST -----  Xray shows narrowing at the lower esophagus.  Likely from prior ulcers and healing then causing narrowing ( stenosis).      Will need EGD with dilation of lower esophagus with MAC at hospital GI lab.

## 2025-01-22 NOTE — TELEPHONE ENCOUNTER
Dr. Clement    I called patient's number which was Courtney's cell  I reviewed your message with Courtney.  She is asking if she still needs to do the video swallow test given these findings.  She is scheduled for that in February.    Thank you    Schedulers:  Please assist with scheduling EGD with below orders from Dr. Clement-she has a stricture.  Thank you!

## 2025-01-22 NOTE — TELEPHONE ENCOUNTER
Please have them go ahead with the video swallow.     Will need EGD with dilation at hospital.   Hold plavix 5 days   Hold januvia 4 days   Hold iron 5 days before

## 2025-01-23 NOTE — TELEPHONE ENCOUNTER
Schedulers:  Please assist with scheduling EGD-orders from Dr. Clement below.  I reviewed below message with daughter Courtney (the patient's primary phone number is Courtney's cell) aware to be expecting a call.    Dr. Racquel Valdez asking if she can get refill for the sucralfate for her mom.  They are out.    Thank you

## 2025-01-24 RX ORDER — SUCRALFATE 1 G/1
1 TABLET ORAL
Qty: 90 TABLET | Refills: 1 | Status: SHIPPED | OUTPATIENT
Start: 2025-01-24

## 2025-01-24 RX ORDER — PANCRELIPASE 24000; 76000; 120000 [USP'U]/1; [USP'U]/1; [USP'U]/1
2 CAPSULE, DELAYED RELEASE PELLETS ORAL
COMMUNITY
Start: 2025-01-12

## 2025-01-24 NOTE — TELEPHONE ENCOUNTER
Scheduled for: EGD 79820 with possible dilation   Provider Name: Dr. 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 notified?:  I sent an electronic request to Endo Scheduling and received a confirmation today.    Medication Orders: Patient is aware to NOT take iron pills, herbal meds and diet supplements for 7 days before exam. Also to NOT take any form of alcohol, recreational drugs and any forms of ED meds 24-72 hours before exam.   -Hold plavix 5 days   -Hold januvia 4 days   -Hold iron 5 days before     Misc Orders:  [place] instructions sent     Further instructions given by staff:  Instructions given in office and pt verbalized understanding

## 2025-02-04 ENCOUNTER — EKG ENCOUNTER (OUTPATIENT)
Dept: LAB | Age: 85
End: 2025-02-04
Attending: FAMILY MEDICINE
Payer: MEDICARE

## 2025-02-04 ENCOUNTER — OFFICE VISIT (OUTPATIENT)
Dept: FAMILY MEDICINE CLINIC | Facility: CLINIC | Age: 85
End: 2025-02-04

## 2025-02-04 ENCOUNTER — LAB ENCOUNTER (OUTPATIENT)
Dept: LAB | Age: 85
End: 2025-02-04
Attending: FAMILY MEDICINE
Payer: MEDICARE

## 2025-02-04 VITALS
SYSTOLIC BLOOD PRESSURE: 200 MMHG | DIASTOLIC BLOOD PRESSURE: 65 MMHG | HEART RATE: 55 BPM | WEIGHT: 117 LBS | BODY MASS INDEX: 24 KG/M2

## 2025-02-04 DIAGNOSIS — I10 ESSENTIAL HYPERTENSION: ICD-10-CM

## 2025-02-04 DIAGNOSIS — Z00.00 ENCOUNTER FOR ANNUAL HEALTH EXAMINATION: ICD-10-CM

## 2025-02-04 DIAGNOSIS — E55.9 VITAMIN D DEFICIENCY: ICD-10-CM

## 2025-02-04 DIAGNOSIS — L98.9 SKIN LESION: ICD-10-CM

## 2025-02-04 DIAGNOSIS — D50.9 IRON DEFICIENCY ANEMIA, UNSPECIFIED IRON DEFICIENCY ANEMIA TYPE: ICD-10-CM

## 2025-02-04 DIAGNOSIS — R13.10 DYSPHAGIA, UNSPECIFIED TYPE: ICD-10-CM

## 2025-02-04 DIAGNOSIS — E11.9 DIABETES MELLITUS TYPE 2 WITHOUT RETINOPATHY (HCC): ICD-10-CM

## 2025-02-04 DIAGNOSIS — E78.5 HYPERLIPIDEMIA, UNSPECIFIED HYPERLIPIDEMIA TYPE: Primary | ICD-10-CM

## 2025-02-04 PROBLEM — H04.123 DRY EYE SYNDROME OF BOTH EYES: Status: ACTIVE | Noted: 2024-06-11

## 2025-02-04 PROBLEM — H35.3132 INTERMEDIATE STAGE NONEXUDATIVE AGE-RELATED MACULAR DEGENERATION OF BOTH EYES: Status: ACTIVE | Noted: 2024-06-11

## 2025-02-04 PROBLEM — H52.4 PRESBYOPIA OF BOTH EYES: Status: ACTIVE | Noted: 2024-06-11

## 2025-02-04 PROBLEM — H35.363 DEGENERATIVE DRUSEN OF BOTH EYES: Status: ACTIVE | Noted: 2024-06-11

## 2025-02-04 LAB
ALBUMIN SERPL-MCNC: 4.6 G/DL (ref 3.2–4.8)
ALBUMIN/GLOB SERPL: 2.2 {RATIO} (ref 1–2)
ALP LIVER SERPL-CCNC: 77 U/L
ALT SERPL-CCNC: 13 U/L
ANION GAP SERPL CALC-SCNC: 8 MMOL/L (ref 0–18)
AST SERPL-CCNC: 19 U/L (ref ?–34)
ATRIAL RATE: 51 BPM
BASOPHILS # BLD AUTO: 0.02 X10(3) UL (ref 0–0.2)
BASOPHILS NFR BLD AUTO: 0.5 %
BILIRUB SERPL-MCNC: 0.4 MG/DL (ref 0.2–1.1)
BUN BLD-MCNC: 8 MG/DL (ref 9–23)
BUN/CREAT SERPL: 8.2 (ref 10–20)
CALCIUM BLD-MCNC: 9 MG/DL (ref 8.7–10.4)
CHLORIDE SERPL-SCNC: 97 MMOL/L (ref 98–112)
CHOLEST SERPL-MCNC: 168 MG/DL (ref ?–200)
CO2 SERPL-SCNC: 25 MMOL/L (ref 21–32)
CREAT BLD-MCNC: 0.97 MG/DL
CREAT UR-SCNC: 34 MG/DL
DEPRECATED HBV CORE AB SER IA-ACNC: 24 NG/ML
DEPRECATED RDW RBC AUTO: 49.1 FL (ref 35.1–46.3)
EGFRCR SERPLBLD CKD-EPI 2021: 58 ML/MIN/1.73M2 (ref 60–?)
EOSINOPHIL # BLD AUTO: 0.11 X10(3) UL (ref 0–0.7)
EOSINOPHIL NFR BLD AUTO: 2.6 %
ERYTHROCYTE [DISTWIDTH] IN BLOOD BY AUTOMATED COUNT: 14.7 % (ref 11–15)
EST. AVERAGE GLUCOSE BLD GHB EST-MCNC: 108 MG/DL (ref 68–126)
FASTING PATIENT LIPID ANSWER: YES
FASTING STATUS PATIENT QL REPORTED: YES
GLOBULIN PLAS-MCNC: 2.1 G/DL (ref 2–3.5)
GLUCOSE BLD-MCNC: 136 MG/DL (ref 70–99)
HBA1C MFR BLD: 5.4 % (ref ?–5.7)
HCT VFR BLD AUTO: 30 %
HDLC SERPL-MCNC: 44 MG/DL (ref 40–59)
HGB BLD-MCNC: 9.8 G/DL
IMM GRANULOCYTES # BLD AUTO: 0.01 X10(3) UL (ref 0–1)
IMM GRANULOCYTES NFR BLD: 0.2 %
IRON SATN MFR SERPL: 8 %
IRON SERPL-MCNC: 31 UG/DL
LDLC SERPL CALC-MCNC: 90 MG/DL (ref ?–100)
LYMPHOCYTES # BLD AUTO: 1.41 X10(3) UL (ref 1–4)
LYMPHOCYTES NFR BLD AUTO: 33 %
MCH RBC QN AUTO: 29.7 PG (ref 26–34)
MCHC RBC AUTO-ENTMCNC: 32.7 G/DL (ref 31–37)
MCV RBC AUTO: 90.9 FL
MICROALBUMIN UR-MCNC: 7.2 MG/DL
MICROALBUMIN/CREAT 24H UR-RTO: 211.8 UG/MG (ref ?–30)
MONOCYTES # BLD AUTO: 0.35 X10(3) UL (ref 0.1–1)
MONOCYTES NFR BLD AUTO: 8.2 %
NEUTROPHILS # BLD AUTO: 2.37 X10 (3) UL (ref 1.5–7.7)
NEUTROPHILS # BLD AUTO: 2.37 X10(3) UL (ref 1.5–7.7)
NEUTROPHILS NFR BLD AUTO: 55.5 %
NONHDLC SERPL-MCNC: 124 MG/DL (ref ?–130)
OSMOLALITY SERPL CALC.SUM OF ELEC: 270 MOSM/KG (ref 275–295)
P AXIS: 26 DEGREES
P-R INTERVAL: 212 MS
PLATELET # BLD AUTO: 236 10(3)UL (ref 150–450)
POTASSIUM SERPL-SCNC: 4.5 MMOL/L (ref 3.5–5.1)
PROT SERPL-MCNC: 6.7 G/DL (ref 5.7–8.2)
Q-T INTERVAL: 456 MS
QRS DURATION: 86 MS
QTC CALCULATION (BEZET): 420 MS
R AXIS: -21 DEGREES
RBC # BLD AUTO: 3.3 X10(6)UL
SODIUM SERPL-SCNC: 130 MMOL/L (ref 136–145)
T AXIS: 127 DEGREES
TOTAL IRON BINDING CAPACITY: 380 UG/DL (ref 250–425)
TRANSFERRIN SERPL-MCNC: 293 MG/DL (ref 250–380)
TRIGL SERPL-MCNC: 199 MG/DL (ref 30–149)
TSI SER-ACNC: 1.65 UIU/ML (ref 0.55–4.78)
VENTRICULAR RATE: 51 BPM
VIT D+METAB SERPL-MCNC: 44.4 NG/ML (ref 30–100)
VLDLC SERPL CALC-MCNC: 32 MG/DL (ref 0–30)
WBC # BLD AUTO: 4.3 X10(3) UL (ref 4–11)

## 2025-02-04 PROCEDURE — 84443 ASSAY THYROID STIM HORMONE: CPT

## 2025-02-04 PROCEDURE — 82570 ASSAY OF URINE CREATININE: CPT

## 2025-02-04 PROCEDURE — 83540 ASSAY OF IRON: CPT

## 2025-02-04 PROCEDURE — 84466 ASSAY OF TRANSFERRIN: CPT

## 2025-02-04 PROCEDURE — 1159F MED LIST DOCD IN RCRD: CPT | Performed by: FAMILY MEDICINE

## 2025-02-04 PROCEDURE — 36415 COLL VENOUS BLD VENIPUNCTURE: CPT

## 2025-02-04 PROCEDURE — 1160F RVW MEDS BY RX/DR IN RCRD: CPT | Performed by: FAMILY MEDICINE

## 2025-02-04 PROCEDURE — 80061 LIPID PANEL: CPT

## 2025-02-04 PROCEDURE — 82043 UR ALBUMIN QUANTITATIVE: CPT

## 2025-02-04 PROCEDURE — 85025 COMPLETE CBC W/AUTO DIFF WBC: CPT

## 2025-02-04 PROCEDURE — G2211 COMPLEX E/M VISIT ADD ON: HCPCS | Performed by: FAMILY MEDICINE

## 2025-02-04 PROCEDURE — 82728 ASSAY OF FERRITIN: CPT

## 2025-02-04 PROCEDURE — 83036 HEMOGLOBIN GLYCOSYLATED A1C: CPT

## 2025-02-04 PROCEDURE — 80053 COMPREHEN METABOLIC PANEL: CPT

## 2025-02-04 PROCEDURE — 93005 ELECTROCARDIOGRAM TRACING: CPT

## 2025-02-04 PROCEDURE — 82306 VITAMIN D 25 HYDROXY: CPT

## 2025-02-04 PROCEDURE — 93010 ELECTROCARDIOGRAM REPORT: CPT | Performed by: STUDENT IN AN ORGANIZED HEALTH CARE EDUCATION/TRAINING PROGRAM

## 2025-02-04 PROCEDURE — 99214 OFFICE O/P EST MOD 30 MIN: CPT | Performed by: FAMILY MEDICINE

## 2025-02-04 RX ORDER — ALCOHOL ANTISEPTIC PADS
PADS, MEDICATED (EA) TOPICAL
Qty: 100 EACH | Refills: 1 | Status: SHIPPED | OUTPATIENT
Start: 2025-02-04

## 2025-02-04 NOTE — PATIENT INSTRUCTIONS
Please complete the lab work that I ordered in the lab work that Dr. Burger ordered last year.  Please complete this today.    Please start taking metoprolol twice daily along with the telmisartan and monitor blood pressure routinely.  Please schedule follow-up for annual physical exam this spring.  Over the all look through my stuff

## 2025-02-04 NOTE — PROGRESS NOTES
Chief Complaint   Patient presents with    Follow - Up     Scheduled for swallow study, discuss stopping blood thinners    Derm Problem     Concerned w/ cyst on right thumb    Medication Request     Test strips     HPI:   Mercy Nieves is a 84 year old female who presents to clinic for follow up, acc by dtr.  History of GAVE  History of HSV esophagitis  + difficulty swallowing so drinking carbonated beverages bc she feels like it helps ( soda), eating less ( food is being cut into small pieces).  + watery stools - neg for c diff.    + BP very elevated in office today. No reported cp sob or dizziness.     Need refill of strips/lancets. Not checking BG reg.     Saw oph and was told to get blood work done.     + lesion on her hand    REVIEW OF SYSTEMS:   Negative, except per HPI.     EXAM:   BP (!) 210/65 (BP Location: Right arm, Patient Position: Sitting, Cuff Size: adult)   Pulse 55   Wt 117 lb (53.1 kg)   BMI 24.45 kg/m²   Body mass index is 24.45 kg/m².  GENERAL: well developed, well nourished, in no apparent distress  SKIN: no rashes, no suspicious lesions  HEENT: atraumatic, normocephalic  EYES: PERRLA, EOMI,conjunctiva are clear  NECK: supple, no adenopathy    ASSESSMENT AND PLAN:     1. Hyperlipidemia, unspecified hyperlipidemia type  - Stable condition, continue present management.      2. Diabetes mellitus type 2 without retinopathy (HCC)  Labs    - Lancets 30G Does not apply Misc; Use once daily  Dispense: 100 each; Refill: 1  - Glucose Blood In Vitro Strip; Use to check fasting blood sugar once daily  Dispense: 100 strip; Refill: 1    3. Essential hypertension  - bp elevated in the office today. Telmi + metoprolol in am and 2nd tab of metoprolol in pm.   - EKG 12 Lead to be performed at [facility]; Future    4. Dysphagia, unspecified type  - seeing GI for swallow study and egd w dilation    5. Skin lesion  Chronic issue, recurrent.  - Hand & Microvascular Surgery [facility]erral - [facility] (Dr. Mcdaniel)     RTC if no improvement in symptoms. Red flags discussed to go to MIRNA Chung MD  2/4/2025  10:40 AM

## 2025-02-05 ENCOUNTER — TELEPHONE (OUTPATIENT)
Facility: CLINIC | Age: 85
End: 2025-02-05

## 2025-02-05 NOTE — TELEPHONE ENCOUNTER
Dr. Chung    Please advise if patient cleared from your standpoint for EGD with MAC sedation given EKG with first degree AV block.    Thank you  Em Gi Clinical Staff

## 2025-02-05 NOTE — TELEPHONE ENCOUNTER
----- Message from Thomas Clement sent at 2/4/2025  4:35 PM CST -----  EKG done by Dr. Chung, RN to see if first degree AV block will impact upcoming procedure ( check with anesthesia)

## 2025-02-05 NOTE — TELEPHONE ENCOUNTER
I spoke to endoscopy charge Bouchra since anesthesia charge phone seems to depend on who is there that day which they don't know day of  I was told to get a clearance from her Primary Care since does not see cardiology.

## 2025-02-06 NOTE — TELEPHONE ENCOUNTER
Thank you Dr. Chung.    RONDA Clement - we have the clearance for her procedure from primary care as advised since she does not go to a cardiologist.    Thank you

## 2025-02-07 ENCOUNTER — HOSPITAL ENCOUNTER (OUTPATIENT)
Dept: GENERAL RADIOLOGY | Facility: HOSPITAL | Age: 85
Discharge: HOME | End: 2025-02-07
Attending: INTERNAL MEDICINE
Payer: MEDICARE

## 2025-02-07 DIAGNOSIS — R13.19 ESOPHAGEAL DYSPHAGIA: ICD-10-CM

## 2025-02-07 PROCEDURE — 74230 X-RAY XM SWLNG FUNCJ C+: CPT | Performed by: INTERNAL MEDICINE

## 2025-02-07 PROCEDURE — 92611 MOTION FLUOROSCOPY/SWALLOW: CPT

## 2025-02-07 NOTE — PROGRESS NOTES
ADULT VIDEOFLUOROSCOPIC SWALLOWING STUDY       ADULT VIDEOFLUOROSCOPIC SWALLOWING STUDY:   Referring Physician: Racquel      Radiologist: Dr. Kunz  Diagnosis: dysphagia    Date of Service: 2025     PATIENT SUMMARY   Chief Complaint: Pt c/o food sticking in her throat, odynophagia rated 3/10, vomiting about once a week and diarrhea.  The symptoms have improved since starting on Sucralfate.  She has lost 10 pounds.  Recent esophagram revealed narrowing in lower esophagus.  Pt is scheduled for EGD on Monday.    Current Diet: regular foods and liquids       Problem List  Active Problems:  Active Problems:    * No active hospital problems. *      Past Medical History  Past Medical History:    Anemia    Arthritis    Cholelithiasis    CKD (chronic kidney disease) stage 3, GFR 30-59 ml/min ([facility])    Coronary atherosclerosis    Depression    Diabetes ([facility])    Management:  Medication    Gastritis    GERD (gastroesophageal reflux disease)    Hearing impaired person, bilateral    danial hearing aids    Heart attack ([facility])    High blood pressure    High cholesterol    History of blood transfusion    Incontinence    Muscle weakness    walker    Non-ST elevation MI (NSTEMI) ([facility])    stenting of the proximal LAD and ptca of mid LAD    Pregnancy ([facility])        Problems with swallowing    Renal disorder    CKD 3    Special screening for osteoporosis    Dexa Scan    Stress incontinence    Stroke ([facility])    tia    Type II or unspecified type diabetes mellitus without mention of complication, not stated as uncontrolled    Pills only    Visual impairment        Imaging results: 25 esophagram:  1. Mild distention involving the mid esophagus measuring 4.5 cm transverse.  Distal esophageal luminal narrowing measuring 0.7 cm transverse suggesting distal esophageal stricture at the GE junction.  Findings may reflect chronic longstanding sequela   from gastroesophageal reflux.    2. Recommend upper endoscopy for further  evaluation.  No reflux visualized at this time   3. No gastric outlet obstruction.  Duodenum unremarkable.   4. Swallowing evaluation unremarkable without laryngeal penetration or aspiration     ASSESSMENT   DYSPHAGIA ASSESSMENT  Test completed in conjunction with Radiologist.   Food/Liquid Types Presented: puree, solid, and thin liquids.    Study Position and View:  Patient was seated upright and viewed laterally and A-P.    Pain Assessment: The patient reports pain at a level of 0/10.    Oral phase:  Oral phase was within normal limits with adequate bilabial seal and bolus containment, timely mastication and transit and minimal to no oral retention.  Occasional cue required for second swallow to clear residue.    Pharyngeal phase:  The pharyngeal response triggered at the tongue base for liquids and solids and at the valleculae for puree.  Slightly delayed epiglottic inversion x1 resulted in one episode of shallow, transient laryngeal penetration with thin liquids by cup.  No material remained in the laryngeal vestibule and no aspiration was observed.  Base of tongue retraction and hyolaryngeal excursion were adequate.  No pharyngeal retention remained.      Esophageal phase:   Slightly prominent cricopharyngeus was evident by narrowing at that juncture.  Distended esophagus with moderate retention of food which was not cleared with liquid wash.  See radiologist's report for further details.    Penetration Aspiration Scale: 2/8.  Material entered the airway, remained above the vocal cords and was ejected from the airway.    Overall Impression: Normal oropharyngeal swallowing.  Esophageal components included slightly prominent cricopharyngeus, distended esophagus and moderate retention throughout.  See radiologist's report for further details.  Recommend general diet with thin liquids.  Dysphagia therapy is not warranted.    FCM category and level: Swallowing, 7  PLAN   Potential: Good    Diet  Recommendations:  Solids: Regular  Liquids: Thin    Recommended compensatory strategies:   Sit upright  Eat slowly  Alternate liquids and solids  Remained upright for 90 minutes after eating    Medication Administration:  Take pills one at a time with plenty of water    Further Follow-up:  Follow up with GI      EDUCATION/INSTRUCTION  Reviewed results and recommendations with patient and family.  Written instructions were provided.  Agreement/Understanding verbalized and all questions answered to their apparent satisfaction.      INTERDISCIPLINARY COMMUNICATION  Reviewed results with Radiologist; agreement verbalized.      Thank you for your referral. If you have any questions, please contact me at [phone].    Ana Swenson MA/JEANNIE-SLP  Speech Language Pathologist  [facility]  [phone]    Electronically signed by therapist: Ana Swenson, SLP  Physician's certification required: No

## 2025-02-07 NOTE — PATIENT INSTRUCTIONS
VIDEO SWALLOW STUDY    Diet Recommendations:  Solids: Regular  Liquids: Thin    Recommended compensatory strategies:   Sit upright  Eat slowly  Alternate liquids and solids  Remained upright for 90 minutes after eating    Medication Administration:  Take pills one at a time with plenty of water    Further Follow-up:  Follow up with JERMAN Swenson MA/CCC-SLP  Speech Language Pathologist  [facility]  [phone]

## 2025-02-10 ENCOUNTER — HOSPITAL ENCOUNTER (OUTPATIENT)
Facility: HOSPITAL | Age: 85
Setting detail: HOSPITAL OUTPATIENT SURGERY
Discharge: HOME | End: 2025-02-10
Attending: INTERNAL MEDICINE | Admitting: INTERNAL MEDICINE
Payer: MEDICARE

## 2025-02-10 ENCOUNTER — TELEPHONE (OUTPATIENT)
Facility: CLINIC | Age: 85
End: 2025-02-10

## 2025-02-10 ENCOUNTER — ANESTHESIA EVENT (OUTPATIENT)
Dept: ENDOSCOPY | Facility: HOSPITAL | Age: 85
End: 2025-02-10
Payer: MEDICARE

## 2025-02-10 ENCOUNTER — ANESTHESIA (OUTPATIENT)
Dept: ENDOSCOPY | Facility: HOSPITAL | Age: 85
End: 2025-02-10
Payer: MEDICARE

## 2025-02-10 PROBLEM — R13.10 DYSPHAGIA: Status: ACTIVE | Noted: 2025-02-10

## 2025-02-10 LAB — GLUCOSE BLDC GLUCOMTR-MCNC: 110 MG/DL (ref 70–99)

## 2025-02-10 PROCEDURE — 43249 ESOPH EGD DILATION <30 MM: CPT | Performed by: INTERNAL MEDICINE

## 2025-02-10 PROCEDURE — 0D738ZZ DILATION OF LOWER ESOPHAGUS, VIA NATURAL OR ARTIFICIAL OPENING ENDOSCOPIC: ICD-10-PCS | Performed by: INTERNAL MEDICINE

## 2025-02-10 RX ORDER — LIDOCAINE HYDROCHLORIDE 20 MG/ML
INJECTION, SOLUTION EPIDURAL; INFILTRATION; INTRACAUDAL; PERINEURAL AS NEEDED
Status: DISCONTINUED | OUTPATIENT
Start: 2025-02-10 | End: 2025-02-10 | Stop reason: SURG

## 2025-02-10 RX ORDER — SODIUM CHLORIDE, SODIUM LACTATE, POTASSIUM CHLORIDE, CALCIUM CHLORIDE 600; 310; 30; 20 MG/100ML; MG/100ML; MG/100ML; MG/100ML
INJECTION, SOLUTION INTRAVENOUS CONTINUOUS
Status: DISCONTINUED | OUTPATIENT
Start: 2025-02-10 | End: 2025-02-10

## 2025-02-10 RX ADMIN — LIDOCAINE HYDROCHLORIDE 5 ML: 20 INJECTION, SOLUTION EPIDURAL; INFILTRATION; INTRACAUDAL; PERINEURAL at 14:51:00

## 2025-02-10 RX ADMIN — SODIUM CHLORIDE, SODIUM LACTATE, POTASSIUM CHLORIDE, CALCIUM CHLORIDE: 600; 310; 30; 20 INJECTION, SOLUTION INTRAVENOUS at 14:48:00

## 2025-02-10 NOTE — TELEPHONE ENCOUNTER
Results discussed with patient's daughter Courtney, results of EGD reviewed.  We dilated the esophagus with and I was able to get the scope down.  Recurrent GAVE was also noted with heme staining in the antrum of the stomach.    Plan-continue Protonix  -Soft foods, post dilation instructions given today and she will do liquids for the next 24 hours  -Repeat upper endoscopy in a month for repeat dilation and treatment of GAVE.

## 2025-02-10 NOTE — OPERATIVE REPORT
[facility] Endoscopy Report  Date of procedure-February 10, 2025    Preoperative Diagnosis:  -Dysphagia with abnormal esophagram/esophageal stricture  -Hx of GAVE      Postoperative Diagnosis:  -Esophageal stricture at 36 cm status post TTS balloon dilation  -GAVE with heme staining      Procedure:    Esophagogastroduodenoscopy       Surgeon:  Thomas Clement M.D.    Anesthesia:  MAC     Technique:  After informed consent, the patient was placed in the left lateral recumbent position.  An Olympus adult HD gastroscope was inserted into the hypopharynx and advanced under direct vision into the esophagus-stricture distal esophagus at the site of the prior ulcers-the scope could not be passed beyond this.  A TTS balloon dilation catheter was passed through the stricture as I could see at the GE junction and into the stomach from the stricture which was about 2 cm above the GE junction.  This dilation catheter was not inflated to 8 mm and held for 30 seconds then deflated.  Slight amount of heme staining was noted when I pulled back the catheter.  I noticed I could now easily maneuver the scope into the stomach.  The scope was advanced the second portion duodenum then gradually withdrawn.  Careful examination performed with insertion removal.  Retroflexion was poor in the body stomach and proximal examined.  The procedure was well tolerated without immediate complication.      Findings:  The esophagus showed a stricture in the distal esophagus benign-appearing smooth features at the site of the prior HSV ulcers in the distal esophagus ( at about 36- 37 cm).  Dilated with TTS balloon dilation catheter as outlined above.  The GE junction and diaphragmatic impression were at 38 cm.  The stomach distended appropriately with insufflated air.  The mucosa of the stomach showed multiple small AVMs throughout the antrum, with some heme staining in the antrum of the stomach.  The duodenal bulb and post bulbar  regions were normal.    Estimated blood loss-insignificant  Specimens-see above    Impression:  -Esophageal stricture at 36 cm status post TTS balloon dilation  -GAVE with heme staining    Recommendations:  - Post procedure/dilation instructions given  - Liquids today and then soft foods  - Repeat EGD in 4- 8 weeks to upsize the stricture and retreat GAVE          Thomas Clement MD  2/10/2025  3:27 PM

## 2025-02-10 NOTE — DISCHARGE INSTRUCTIONS
Home Care Instructions for Gastroscopy with Sedation    LIQUIDS TODAY  RESUME PLAVIX TOMORROW      Diet:  - Resume your regular diet as tolerated unless otherwise instructed.  - Start with light meals to minimize bloating.  - Do not drink alcohol today.    Medication:  - If you have questions about resuming your normal medications, please contact your Primary Care Physician.    Activities:  - Take it easy today. Do not return to work today.  - Do not drive today.  - Do not operate any machinery today (including kitchen equipment).    Gastroscopy:  - You may have a sore throat for 2-3 days following the exam. This is normal. Gargling with warm salt water (1/2 tsp salt to 1 glass warm water) or using throat lozenges will help.  - If you experience any sharp pain in your neck, abdomen or chest, vomiting of blood, oral temperature over 100 degrees Fahrenheit, light-headedness or dizziness, or any other problems, contact your doctor.    **If unable to reach your doctor, please go to the [facility] Emergency Room**    - Your referring physician will receive a full report of your examination.  - If you do not hear from your doctor's office within two weeks of your biopsy, please call them for your results.    You may be able to see your laboratory results in [employer] between 4 and 7 business days.  In some cases, your physician may not have viewed the results before they are released to [employer].  If you have questions regarding your results contact the physician who ordered the test/exam by phone or via [employer] by choosing \"Ask a Medical Question.\"

## 2025-02-10 NOTE — ANESTHESIA PREPROCEDURE EVALUATION
Anesthesia PreOp Note    HPI:     Mercy Nieves is a 84 year old female who presents for preoperative consultation requested by: Thomas Clement MD    Date of Surgery: 2/10/2025    Procedure(s):  ESOPHAGOGASTRODUODENOSCOPY with Possible Dilation  Indication: Stricture esophagus    Relevant Problems   No relevant active problems       NPO:  Last Liquid Consumption Date: 02/10/25  Last Liquid Consumption Time: 1100  Last Solid Consumption Date: 02/09/25  Last Solid Consumption Time: 2100  Last Liquid Consumption Date: 02/10/25          History Review:  Patient Active Problem List    Diagnosis Date Noted    Dysphagia 02/10/2025    Degenerative drusen of both eyes 06/11/2024    Dry eye syndrome of both eyes 06/11/2024    Intermediate stage nonexudative age-related macular degeneration of both eyes 06/11/2024    Presbyopia of both eyes 06/11/2024    Chronic pain of right ankle 03/15/2023    Mild episode of recurrent major depressive disorder 03/15/2023    Pain in right lower leg 10/07/2022    Type 2 diabetes mellitus with diabetic chronic kidney disease ([facility]) 03/10/2022    SIADH (syndrome of inappropriate ADH production) ([facility]) 04/16/2021    GAVE (gastric antral vascular ectasia) 12/31/2020    Sensorineural hearing loss (SNHL) of both ears 08/31/2020    Vitamin D deficiency 03/08/2019    Osteopenia of multiple sites 03/06/2019    Meibomian gland dysfunction (MGD) of both eyes 12/06/2018    Bilateral carotid bruits 08/09/2018    Impaired intestinal absorption ([facility]) 04/03/2018    Coronary artery disease of native heart with stable angina pectoris 03/06/2018    Atherosclerosis of aorta 07/14/2017    Peripheral vascular disease 07/14/2017    Essential hypertension 06/28/2016    Murmur 06/28/2016    Anemia, iron deficiency 10/05/2015    Age-related nuclear cataract of both eyes 03/19/2015    Diabetes mellitus type 2 without retinopathy (HCC) 03/19/2015       Past Medical History:    Anemia    Arthritis    Cholelithiasis     CKD (chronic kidney disease) stage 3, GFR 30-59 ml/min ([facility])    Coronary atherosclerosis    Depression    Diabetes ([facility])    Management:  Medication    Gastritis    GERD (gastroesophageal reflux disease)    Hearing impaired person, bilateral    danial hearing aids    Heart attack ([facility])    High blood pressure    High cholesterol    History of blood transfusion    Incontinence    Muscle weakness    walker    Non-ST elevation MI (NSTEMI) ([facility])    stenting of the proximal LAD and ptca of mid LAD    Pregnancy ([facility])        Problems with swallowing    Renal disorder    CKD 3    Special screening for osteoporosis    Dexa Scan    Stress incontinence    Type II or unspecified type diabetes mellitus without mention of complication, not stated as uncontrolled    Pills only    Visual impairment       Past Surgical History:   Procedure Laterality Date    Anesth,angioplasty            4X    Cath drug eluting stent      Cholecystectomy      Colonoscopy      Colonoscopy N/A 2019    Procedure: COLONOSCOPY;  Surgeon: Thomas Clement MD;  Location: [facility] ENDOSCOPY    Egd  2018    Egd N/A 02/10/2025    ;    Electrocardiogram, complete  2013    Scanned to Media Tab - Date of Service 2013    Upper gi endoscopy,exam         Prescriptions Prior to Admission[1]  Current Medications and Prescriptions Ordered in Epic[2]    Allergies[3]    Family History   Problem Relation Age of Onset    Diabetes Father     Other (Other) Father 62    Diabetes Mother 64        (cause of death)    Diabetes Brother     Other (Other) Brother 54    Other (Other) Brother 64    Glaucoma Neg     Macular degeneration Neg      Social History     Socioeconomic History    Marital status:    Tobacco Use    Smoking status: Never     Passive exposure: Never    Smokeless tobacco: Never   Vaping Use    Vaping status: Never Used   Substance and Sexual Activity    Alcohol use: No     Alcohol/week: 0.0 standard drinks of alcohol     Drug use: No   Other Topics Concern    Caffeine Concern Yes     Comment: Coffee, 1 cup daily       Available pre-op labs reviewed.  Lab Results   Component Value Date    WBC 4.3 02/04/2025    RBC 3.30 (L) 02/04/2025    HGB 9.8 (L) 02/04/2025    HCT 30.0 (L) 02/04/2025    MCV 90.9 02/04/2025    MCH 29.7 02/04/2025    MCHC 32.7 02/04/2025    RDW 14.7 02/04/2025    .0 02/04/2025     Lab Results   Component Value Date     (L) 02/04/2025    K 4.5 02/04/2025    CL 97 (L) 02/04/2025    CO2 25.0 02/04/2025    BUN 8 (L) 02/04/2025    CREATSERUM 0.97 02/04/2025     (H) 02/04/2025    PGLU 110 (H) 02/10/2025    CA 9.0 02/04/2025          Vital Signs:  Body mass index is 22.78 kg/m².   height is 1.473 m (4' 10\") and weight is 49.4 kg (109 lb). Her blood pressure is 232/62 (abnormal) and her pulse is 66. Her respiration is 16 and oxygen saturation is 99%.   Vitals:    02/03/25 1203 02/10/25 1420 02/10/25 1428   BP:  (!) 228/63 (!) 232/62   Pulse:  73 66   Resp:  12 16   SpO2:  100% 99%   Weight: 49.4 kg (109 lb)     Height: 1.473 m (4' 10\")          Anesthesia Evaluation     Patient summary reviewed    No history of anesthetic complications   Airway   Mallampati: II  TM distance: >3 FB  Neck ROM: full  Dental    (+) upper dentures and lower dentures    Pulmonary - negative ROS and normal exam   Cardiovascular - normal exam  (+) hypertension, CAD    ECG reviewed    Neuro/Psych    (+)   depression      GI/Hepatic/Renal    (+) GERD    Endo/Other    (+) diabetes mellitus type 2 well controlled  Abdominal  - normal exam                 Anesthesia Plan:   ASA:  3  Plan:   MAC  Informed Consent Plan and Risks Discussed With:  Patient and spouse  Discussed plan with:  CRNA      I have informed Mercy Nieves and/or legal guardian or family member of the nature of the anesthetic plan, benefits, risks including possible dental damage if relevant, major complications, and any alternative forms of anesthetic  management.   All of the patient's questions were answered to the best of my ability. The patient desires the anesthetic management as planned.  Lane PastranaCHRISTIANO  2/10/2025 2:47 PM  Present on Admission:  **None**           [1]   Medications Prior to Admission   Medication Sig Dispense Refill Last Dose/Taking    sucralfate 1 g Oral Tab Take 1 tablet (1 g total) by mouth 3 (three) times daily before meals. 90 tablet 1 2025    folic acid 1 MG Oral Tab Take 1 tablet (1 mg total) by mouth daily. 90 tablet 1 2025    [] Pancrelipase, Lip-Prot-Amyl, (CREON) 37731-05388 units Oral Cap DR Particles Take 2 capsules by mouth 3 (three) times daily with meals. 180 capsule 1 Taking    pantoprazole 40 MG Oral Tab EC Take 1 tablet (40 mg total) by mouth before breakfast. 90 tablet 3 2025    clopidogrel 75 MG Oral Tab Take 1 tablet (75 mg total) by mouth daily. 90 tablet 3 2025    oxybutynin ER 10 MG Oral Tablet 24 Hr Take 1 tablet (10 mg total) by mouth daily. 90 tablet 3 2025    atorvastatin 20 MG Oral Tab Take 1 tablet (20 mg total) by mouth daily. 90 tablet 3 2025    SITagliptin Phosphate (JANUVIA) 100 MG Oral Tab Take 1 tablet (100 mg total) by mouth daily. 90 tablet 3 2025    metoprolol succinate ER 25 MG Oral Tablet 24 Hr Take 1 tablet (25 mg total) by mouth in the morning and 1 tablet (25 mg total) before bedtime. (Patient taking differently: Take 1 tablet (25 mg total) by mouth daily.) 180 tablet 3 2/10/2025    telmisartan 80 MG Oral Tab Take 1 tablet (80 mg total) by mouth daily. 90 tablet 3 2/10/2025    Glucose Blood (TRUE METRIX BLOOD GLUCOSE TEST) In Vitro Strip CHECK BLOOD GLUCOSE TWICE DAILY 200 strip 3 Taking    B Complex Vitamins (VITAMIN B COMPLEX) Oral Tab Take 1 tablet by mouth once daily. 90 tablet 0 2025    ferrous sulfate 325 (65 FE) MG Oral Tab EC Take 1 tablet (325 mg total) by mouth daily.   2025    aspirin 81 MG Oral Tab Take 1 tablet (81 mg total) by mouth daily.    2025    Lancets 30G Does not apply Misc Use once daily 100 each 1     Glucose Blood In Vitro Strip Use to check fasting blood sugar once daily 100 strip 1     [] sucralfate 1 g Oral Tab Take 1 tablet (1 g total) by mouth 3 (three) times daily before meals. 90 tablet 0     Blood Pressure Monitor Does not apply Device Use daily. 1 each 0     Blood Pressure Monitoring Does not apply Misc Use as needed 1 Device 0     TRUEPLUS LANCETS 28G Does not apply Misc CHECK BLOOD GLUCOSE TWICE DAILY 200 each 4     Blood Glucose Monitoring Suppl (TRUE METRIX METER) W/DEVICE Does not apply Kit 1 kit by Does not apply route 2 (two) times daily. 1 kit 0     Milk Thistle Extract 175 MG Oral Tab Take 1 tablet by mouth once daily.   2025   [2]   Current Facility-Administered Medications Ordered in Epic   Medication Dose Route Frequency Provider Last Rate Last Admin    lactated ringers infusion   Intravenous Continuous Thomas Clement MD         No current [facility]-ordered outpatient medications on file.   [3]   Allergies  Allergen Reactions    Zocor [Simvastatin] SWELLING    Sulfamethoxazole W/Trimethoprim NAUSEA AND VOMITING

## 2025-02-10 NOTE — H&P
History & Physical Examination    Patient Name: Mercy Nieves  MRN: [id number]  [facility]: [id number]  YOB: 1940    Diagnosis:   Dysphagia    Prescriptions Prior to Admission[1]  Current Facility-Administered Medications   Medication Dose Route Frequency    lactated ringers infusion   Intravenous Continuous       Allergies: Allergies[2]    Past Medical History:    Anemia    Arthritis    Cholelithiasis    CKD (chronic kidney disease) stage 3, GFR 30-59 ml/min ([facility])    Coronary atherosclerosis    Depression    Diabetes ([facility])    Management:  Medication    Gastritis    GERD (gastroesophageal reflux disease)    Hearing impaired person, bilateral    danial hearing aids    Heart attack ([facility])    High blood pressure    High cholesterol    History of blood transfusion    Incontinence    Muscle weakness    walker    Non-ST elevation MI (NSTEMI) ([facility])    stenting of the proximal LAD and ptca of mid LAD    Pregnancy ([facility])        Problems with swallowing    Renal disorder    CKD 3    Special screening for osteoporosis    Dexa Scan    Stress incontinence    Type II or unspecified type diabetes mellitus without mention of complication, not stated as uncontrolled    Pills only    Visual impairment     Past Surgical History:   Procedure Laterality Date    Anesth,angioplasty            4X    Cath drug eluting stent      Cholecystectomy      Colonoscopy      Colonoscopy N/A 2019    Procedure: COLONOSCOPY;  Surgeon: Thomas Clement MD;  Location: [facility] ENDOSCOPY    Egd  2018    Egd N/A 02/10/2025    ;    Electrocardiogram, complete  2013    Scanned to Media Tab - Date of Service 2013    Upper gi endoscopy,exam       Family History   Problem Relation Age of Onset    Diabetes Father     Other (Other) Father 62    Diabetes Mother 64        (cause of death)    Diabetes Brother     Other (Other) Brother 54    Other (Other) Brother 64    Glaucoma Neg     Macular degeneration Neg       Social History     Tobacco Use    Smoking status: Never     Passive exposure: Never    Smokeless tobacco: Never   Substance Use Topics    Alcohol use: No     Alcohol/week: 0.0 standard drinks of alcohol       SYSTEM Check if Review is Normal Check if Physical Exam is Normal If not normal, please explain:   HEENT [x ] [ x]    NECK & BACK [x ] [x ]    HEART [x ] [ x]    LUNGS [x ] [ x]    ABDOMEN [x ] [x ]    UROGENITAL [ ] [ ]    EXTREMITIES [x ] [x ]    OTHER        [ x ] I have discussed the risks and benefits and alternatives with the patient/family.  They understand and agree to proceed with plan of care.  [ x ] I have reviewed the History and Physical done within the last 30 days.  Any changes noted above.    Thomas Clement MD  2/10/2025  2:24 PM         [1]   Medications Prior to Admission   Medication Sig Dispense Refill Last Dose/Taking    sucralfate 1 g Oral Tab Take 1 tablet (1 g total) by mouth 3 (three) times daily before meals. 90 tablet 1 2025    folic acid 1 MG Oral Tab Take 1 tablet (1 mg total) by mouth daily. 90 tablet 1 2025    [] Pancrelipase, Lip-Prot-Amyl, (CREON) 56420-94506 units Oral Cap DR Particles Take 2 capsules by mouth 3 (three) times daily with meals. 180 capsule 1 Taking    pantoprazole 40 MG Oral Tab EC Take 1 tablet (40 mg total) by mouth before breakfast. 90 tablet 3 2025    clopidogrel 75 MG Oral Tab Take 1 tablet (75 mg total) by mouth daily. 90 tablet 3 2025    oxybutynin ER 10 MG Oral Tablet 24 Hr Take 1 tablet (10 mg total) by mouth daily. 90 tablet 3 2025    atorvastatin 20 MG Oral Tab Take 1 tablet (20 mg total) by mouth daily. 90 tablet 3 2025    SITagliptin Phosphate (JANUVIA) 100 MG Oral Tab Take 1 tablet (100 mg total) by mouth daily. 90 tablet 3 2025    metoprolol succinate ER 25 MG Oral Tablet 24 Hr Take 1 tablet (25 mg total) by mouth in the morning and 1 tablet (25 mg total) before bedtime. (Patient taking differently: Take 1  tablet (25 mg total) by mouth daily.) 180 tablet 3 2/10/2025    telmisartan 80 MG Oral Tab Take 1 tablet (80 mg total) by mouth daily. 90 tablet 3 2/10/2025    Glucose Blood (TRUE METRIX BLOOD GLUCOSE TEST) In Vitro Strip CHECK BLOOD GLUCOSE TWICE DAILY 200 strip 3 Taking    B Complex Vitamins (VITAMIN B COMPLEX) Oral Tab Take 1 tablet by mouth once daily. 90 tablet 0 2025    ferrous sulfate 325 (65 FE) MG Oral Tab EC Take 1 tablet (325 mg total) by mouth daily.   2025    aspirin 81 MG Oral Tab Take 1 tablet (81 mg total) by mouth daily.   2025    Lancets 30G Does not apply Misc Use once daily 100 each 1     Glucose Blood In Vitro Strip Use to check fasting blood sugar once daily 100 strip 1     [] sucralfate 1 g Oral Tab Take 1 tablet (1 g total) by mouth 3 (three) times daily before meals. 90 tablet 0     Blood Pressure Monitor Does not apply Device Use daily. 1 each 0     Blood Pressure Monitoring Does not apply Misc Use as needed 1 Device 0     TRUEPLUS LANCETS 28G Does not apply Misc CHECK BLOOD GLUCOSE TWICE DAILY 200 each 4     Blood Glucose Monitoring Suppl (TRUE METRIX METER) W/DEVICE Does not apply Kit 1 kit by Does not apply route 2 (two) times daily. 1 kit 0     Milk Thistle Extract 175 MG Oral Tab Take 1 tablet by mouth once daily.   2025   [2]   Allergies  Allergen Reactions    Zocor [Simvastatin] SWELLING    Sulfamethoxazole W/Trimethoprim NAUSEA AND VOMITING

## 2025-02-10 NOTE — ANESTHESIA POSTPROCEDURE EVALUATION
Patient: Mercy Nieves    Procedure Summary       Date: 02/10/25 Room / Location: [facility] ENDOSCOPY 03 / [facility] ENDOSCOPY    Anesthesia Start: 1448 Anesthesia Stop: 1505    Procedure: ESOPHAGOGASTRODUODENOSCOPY with Possible Dilation Diagnosis:       Stricture esophagus      (esophageal stricture,gastric AVMs)    Surgeons: Thomas Clement MD Anesthesiologist: Lane Pastrana CRNA    Anesthesia Type: MAC ASA Status: 3            Anesthesia Type: MAC    Vitals Value Taken Time   /41 02/10/25 1505   Temp 98.1 02/10/25 1505   Pulse 53 02/10/25 1505   Resp 17 02/10/25 1505   SpO2 99% 02/10/25 1505       [facility] AN Post Evaluation:   Patient Participation: complete - patient participated  Level of Consciousness: awake and alert and awake  Pain Score: 0  Pain Management: adequate  Airway Patency:patent  Dental exam unchanged from preop  Yes    Nausea/Vomiting: none  Cardiovascular Status: acceptable and blood pressure returned to baseline  Respiratory Status: room air  Postoperative Hydration acceptable      Lane Pastrana CRNA  2/10/2025 3:05 PM

## 2025-02-11 VITALS
RESPIRATION RATE: 22 BRPM | WEIGHT: 109 LBS | DIASTOLIC BLOOD PRESSURE: 52 MMHG | BODY MASS INDEX: 22.88 KG/M2 | OXYGEN SATURATION: 99 % | SYSTOLIC BLOOD PRESSURE: 167 MMHG | HEART RATE: 58 BPM | HEIGHT: 58 IN

## 2025-02-16 DIAGNOSIS — I10 ESSENTIAL HYPERTENSION: ICD-10-CM

## 2025-02-20 NOTE — TELEPHONE ENCOUNTER
Please review. Protocol Failed; No Protocol      BP Readings from Last 3 Encounters:   02/10/25 (!) 167/52   02/04/25 (!) 200/65   10/21/24 (!) 187/67

## 2025-02-21 RX ORDER — TELMISARTAN 80 MG/1
80 TABLET ORAL DAILY
Qty: 90 TABLET | Refills: 3 | Status: SHIPPED | OUTPATIENT
Start: 2025-02-21

## 2025-02-24 RX ORDER — SUCRALFATE 1 G/1
1 TABLET ORAL
Qty: 90 TABLET | Refills: 1 | Status: SHIPPED | OUTPATIENT
Start: 2025-02-24

## 2025-02-24 NOTE — TELEPHONE ENCOUNTER
Dr. Clement    Plan after EGD says continue Protonix but carafate is not mentioned.  Is she to continue the Carafate?  If so, she needs a refill.  She has an office visit set up to see you on 3/12/25.    Thank you      H2 Blockers/Antacids: Pepcid (Famotidine), Tagamet (Cimetidine), Carafate (Sucralfate)      Protocol Criteria    Review last office visit note(s), plan of care, for any change    Appointment scheduled within the past 12 months or in the next 3 months or had a procedure (if applicable) and is up to date with their recall   Date of Last Office Visit: 2/10/2025 for EGD    Date of next scheduled appointment: 3/12/2025    Date of last procedure (if applicable): n/a    Date of recall (if applicable):

## 2025-02-24 NOTE — TELEPHONE ENCOUNTER
Current Outpatient Medications   Medication Sig Dispense Refill    sucralfate 1 g Oral Tab Take 1 tablet (1 g total) by mouth 3 (three) times daily before meals. 90 tablet 1

## 2025-03-12 ENCOUNTER — LAB ENCOUNTER (OUTPATIENT)
Dept: LAB | Facility: HOSPITAL | Age: 85
End: 2025-03-12
Attending: INTERNAL MEDICINE
Payer: MEDICARE

## 2025-03-12 ENCOUNTER — TELEPHONE (OUTPATIENT)
Facility: CLINIC | Age: 85
End: 2025-03-12

## 2025-03-12 ENCOUNTER — OFFICE VISIT (OUTPATIENT)
Facility: CLINIC | Age: 85
End: 2025-03-12

## 2025-03-12 VITALS
HEIGHT: 58 IN | HEART RATE: 64 BPM | WEIGHT: 116 LBS | SYSTOLIC BLOOD PRESSURE: 180 MMHG | DIASTOLIC BLOOD PRESSURE: 77 MMHG | BODY MASS INDEX: 24.35 KG/M2

## 2025-03-12 DIAGNOSIS — K22.2 STRICTURE OF ESOPHAGUS: Primary | ICD-10-CM

## 2025-03-12 DIAGNOSIS — K22.2 STRICTURE ESOPHAGUS: ICD-10-CM

## 2025-03-12 DIAGNOSIS — R13.10 DYSPHAGIA, UNSPECIFIED TYPE: ICD-10-CM

## 2025-03-12 DIAGNOSIS — D50.9 IRON DEFICIENCY ANEMIA, UNSPECIFIED IRON DEFICIENCY ANEMIA TYPE: Primary | ICD-10-CM

## 2025-03-12 DIAGNOSIS — D64.9 ANEMIA, UNSPECIFIED TYPE: ICD-10-CM

## 2025-03-12 DIAGNOSIS — D50.9 IRON DEFICIENCY ANEMIA, UNSPECIFIED IRON DEFICIENCY ANEMIA TYPE: ICD-10-CM

## 2025-03-12 DIAGNOSIS — R13.19 ESOPHAGEAL DYSPHAGIA: ICD-10-CM

## 2025-03-12 DIAGNOSIS — K31.819 GAVE (GASTRIC ANTRAL VASCULAR ECTASIA): ICD-10-CM

## 2025-03-12 DIAGNOSIS — R63.4 WEIGHT LOSS: ICD-10-CM

## 2025-03-12 LAB
BASOPHILS # BLD AUTO: 0.01 X10(3) UL (ref 0–0.2)
BASOPHILS NFR BLD AUTO: 0.2 %
DEPRECATED RDW RBC AUTO: 50.1 FL (ref 35.1–46.3)
EOSINOPHIL # BLD AUTO: 0.1 X10(3) UL (ref 0–0.7)
EOSINOPHIL NFR BLD AUTO: 2 %
ERYTHROCYTE [DISTWIDTH] IN BLOOD BY AUTOMATED COUNT: 16 % (ref 11–15)
HCT VFR BLD AUTO: 29.6 %
HGB BLD-MCNC: 9.7 G/DL
IMM GRANULOCYTES # BLD AUTO: 0.02 X10(3) UL (ref 0–1)
IMM GRANULOCYTES NFR BLD: 0.4 %
LYMPHOCYTES # BLD AUTO: 1.11 X10(3) UL (ref 1–4)
LYMPHOCYTES NFR BLD AUTO: 22.3 %
MCH RBC QN AUTO: 28 PG (ref 26–34)
MCHC RBC AUTO-ENTMCNC: 32.8 G/DL (ref 31–37)
MCV RBC AUTO: 85.3 FL
MONOCYTES # BLD AUTO: 0.42 X10(3) UL (ref 0.1–1)
MONOCYTES NFR BLD AUTO: 8.4 %
NEUTROPHILS # BLD AUTO: 3.32 X10 (3) UL (ref 1.5–7.7)
NEUTROPHILS # BLD AUTO: 3.32 X10(3) UL (ref 1.5–7.7)
NEUTROPHILS NFR BLD AUTO: 66.7 %
PLATELET # BLD AUTO: 233 10(3)UL (ref 150–450)
RBC # BLD AUTO: 3.47 X10(6)UL
WBC # BLD AUTO: 5 X10(3) UL (ref 4–11)

## 2025-03-12 PROCEDURE — 99214 OFFICE O/P EST MOD 30 MIN: CPT | Performed by: INTERNAL MEDICINE

## 2025-03-12 PROCEDURE — 1160F RVW MEDS BY RX/DR IN RCRD: CPT | Performed by: INTERNAL MEDICINE

## 2025-03-12 PROCEDURE — 36415 COLL VENOUS BLD VENIPUNCTURE: CPT

## 2025-03-12 PROCEDURE — 1159F MED LIST DOCD IN RCRD: CPT | Performed by: INTERNAL MEDICINE

## 2025-03-12 PROCEDURE — 85025 COMPLETE CBC W/AUTO DIFF WBC: CPT

## 2025-03-12 NOTE — PROGRESS NOTES
Mercy Nieves is a 84 year old female.    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  She is here today with her  and daughter.  They assist in translation as the patient is primary [place]-speaking.  They were comfortable with this approach versus .    The patient continues to have issues with dysphagia, she ate raw apple and felt the food sticking, she was able to get this loosened up by drinking water last evening.  She denies any food impaction at this time.  She is able to swallow secretions and fluids.    She does have a history of abdominal pain, irregular bowel habits.  Prior ultrasound Oct 2024 has shown fatty liver, postcholecystectomy state without significant hepatobiliary dilation.    Recent EGD from February 2025 did show stricturing at the GE junction site of the prior HSV infection, this was dilated to 8 mm.  We were able to get the upper endoscope through the area of stricturing after dilation.  GAVE was noted with heme staining in the stomach.  Given the tight stricture and recent dilation the GAVE was not treated during this exam.    Her weight has diminished due to difficulty with eating/swallowing/dysphagia.  She has been able to get Ensure boost supplements down.  She has been eating softer and cooked foods.  Unfortunately she occasionally does have raw foods such as the apple yesterday.    HISTORY:  Past Medical History:    Anemia    Arthritis    Cholelithiasis    CKD (chronic kidney disease) stage 3, GFR 30-59 ml/min (HCC)    Coronary atherosclerosis    Depression    Diabetes (HCC)    Management:  Medication    Gastritis    GERD (gastroesophageal  reflux disease)    Hearing impaired person, bilateral    danial hearing aids    Heart attack (HCC)    High blood pressure    High cholesterol    History of blood transfusion    Incontinence    Muscle weakness    walker    Non-ST elevation MI (NSTEMI) (HCC)    stenting of the proximal LAD and ptca of mid LAD    Pregnancy (HCC)        Problems with swallowing    Renal disorder    CKD 3    Special screening for osteoporosis    Dexa Scan    Stress incontinence    Type II or unspecified type diabetes mellitus without mention of complication, not stated as uncontrolled    Pills only    Visual impairment      Past Surgical History:   Procedure Laterality Date    Anesth,angioplasty            4X    Cath drug eluting stent      Cholecystectomy      Colonoscopy      Colonoscopy N/A 2019    Procedure: COLONOSCOPY;  Surgeon: Thomas Clement MD;  Location: [facility] ENDOSCOPY    Egd      Egd N/A 02/10/2025    ; esophageal stricture,gastric AVMs; balloon dilation up to 8mm    Electrocardiogram, complete  2013    Scanned to Media Tab - Date of Service 2013    Upper gi endoscopy,exam        Family History   Problem Relation Age of Onset    Diabetes Father     Other (Other) Father 62    Diabetes Mother 64        (cause of death)    Diabetes Brother     Other (Other) Brother 54    Other (Other) Brother 64    Glaucoma Neg     Macular degeneration Neg       Social History:   Social History     Socioeconomic History    Marital status:    Tobacco Use    Smoking status: Never     Passive exposure: Never    Smokeless tobacco: Never   Vaping Use    Vaping status: Never Used   Substance and Sexual Activity    Alcohol use: No     Alcohol/week: 0.0 standard drinks of alcohol    Drug use: No   Other Topics Concern    Caffeine Concern Yes     Comment: Coffee, 1 cup daily        Medications (Active prior to today's visit):  Current Outpatient Medications   Medication Sig Dispense Refill     sucralfate 1 g Oral Tab Take 1 tablet (1 g total) by mouth 3 (three) times daily before meals. 90 tablet 1    telmisartan 80 MG Oral Tab Take 1 tablet (80 mg total) by mouth daily. 90 tablet 3    Lancets 30G Does not apply Misc Use once daily 100 each 1    Glucose Blood In Vitro Strip Use to check fasting blood sugar once daily 100 strip 1    folic acid 1 MG Oral Tab Take 1 tablet (1 mg total) by mouth daily. 90 tablet 1    pantoprazole 40 MG Oral Tab EC Take 1 tablet (40 mg total) by mouth before breakfast. 90 tablet 3    clopidogrel 75 MG Oral Tab Take 1 tablet (75 mg total) by mouth daily. 90 tablet 3    oxybutynin ER 10 MG Oral Tablet 24 Hr Take 1 tablet (10 mg total) by mouth daily. 90 tablet 3    atorvastatin 20 MG Oral Tab Take 1 tablet (20 mg total) by mouth daily. 90 tablet 3    SITagliptin Phosphate (JANUVIA) 100 MG Oral Tab Take 1 tablet (100 mg total) by mouth daily. 90 tablet 3    metoprolol succinate ER 25 MG Oral Tablet 24 Hr Take 1 tablet (25 mg total) by mouth in the morning and 1 tablet (25 mg total) before bedtime. (Patient taking differently: Take 1 tablet (25 mg total) by mouth daily.) 180 tablet 3    Blood Pressure Monitor Does not apply Device Use daily. 1 each 0    Glucose Blood (TRUE METRIX BLOOD GLUCOSE TEST) In Vitro Strip CHECK BLOOD GLUCOSE TWICE DAILY 200 strip 3    B Complex Vitamins (VITAMIN B COMPLEX) Oral Tab Take 1 tablet by mouth once daily. 90 tablet 0    Blood Pressure Monitoring Does not apply Misc Use as needed 1 Device 0    TRUEPLUS LANCETS 28G Does not apply Misc CHECK BLOOD GLUCOSE TWICE DAILY 200 each 4    ferrous sulfate 325 (65 FE) MG Oral Tab EC Take 1 tablet (325 mg total) by mouth daily.      aspirin 81 MG Oral Tab Take 1 tablet (81 mg total) by mouth daily.      Blood Glucose Monitoring Suppl (TRUE METRIX METER) W/DEVICE Does not apply Kit 1 kit by Does not apply route 2 (two) times daily. 1 kit 0    Milk Thistle Extract 175 MG Oral Tab Take 1 tablet by mouth once  daily.         Allergies:  Allergies[1]      ROS:   The patient denies any chest pain or shortness of breath,  No neurologic or dermatologic symptoms.     PHYSICAL EXAM:   Blood pressure (!) 180/77, pulse 64, height 4' 10\" (1.473 m), weight 116 lb (52.6 kg), not currently breastfeeding.    The patient appears their stated age and is in no acute distress  HEENT- anicteric sclera, neck no lymphadnopathy, OP- clear with no masses or lesions  Chest- Clear bilaterally, no wheezing,  Heart- regular rate, no murmur or gallop  Abdomen- Soft and nontender, nondistended  Ext- no clubbing or cyanosis  Skin- no rashes or lesions  Neuro- appropriate response, alert, no confusion  .  ASSESSMENT/PLAN:   Assessment   Encounter Diagnoses   Name Primary?    Iron deficiency anemia, unspecified iron deficiency anemia type Yes    GAVE (gastric antral vascular ectasia)     Stricture esophagus     Esophageal dysphagia     Weight loss      Dysphagia  Esophageal stricture/history of esophageal HSV infection  GAVE with chronic iron deficiency anemia  Chronic abdominal pain and irregular bowel habits    Overall the patient reports her swallowing is improved somewhat, she still does have some dysphagia and this is typically with dry or solid foods.  Prior dilation to 8 mm with ability to pass the scope through this region in the distal esophagus, site of prior HSV infection.  No signs of active infection during prior examination.  Patient does have a history of GAVE and chronic iron deficiency anemia.  Would plan to continue to monitor blood counts and set up redilation with possible APC treatment as well during upcoming EGD.  Risks and benefits were discussed with the patient, her  and daughter in the office today.  All questions were answered.    Plan  Dysphagia- stricture from prior HSV esophagitis  - EGD with dilation under MAC at hospital lab  - soft foods only and ok to take Ensure or Boost supplements   - Anemia- GAVE, will treat  with APC during EGD and check blood count today, monitor blood counts through Dr. Chung office. Iron supplementation and PPI.          Orders This Visit:  Orders Placed This Encounter   Procedures    CBC W Differential W Platelet       Meds This Visit:  Requested Prescriptions      No prescriptions requested or ordered in this encounter       Imaging & Referrals:  None       Thomas Clement MD  [facility]              [1]   Allergies  Allergen Reactions    Zocor [Simvastatin] SWELLING    Sulfamethoxazole W/Trimethoprim NAUSEA AND VOMITING

## 2025-03-12 NOTE — TELEPHONE ENCOUNTER
Scheduled for:  EGD with dilation 61751  Provider Name:  Dr. Clement  Date: 04/03/2025- Ok'd by Dr Clement and Lisa( Endo)  Location:  [facility]  Sedation:  MAC  Time:  Patient is aware that endo/eosc will call with arrival time   Prep:  NPO after Midnight Prep Instructions Given At The Office Visit and Mychart    Meds/Allergies Reconciled?:  Physician Reviewed   Diagnosis with codes:  Dysphagia R13.10/ Anemia D64.9/ Stricture esophagus K22.2   Was patient informed to call insurance with codes (Y/N):  Yes  Referral sent?:  Referral was sent at the time of electronic surgical scheduling.  EM or [facility] notified?:  I sent an electronic request to Endo Scheduling and received a confirmation today.  Medication Orders: Patient is aware to  -Hold plavix 5 days   -Hold januvia 4 days   -Hold iron 5 days beforePt is aware to NOT take iron pills, herbal meds and diet supplements for 7 days before exam. Also to NOT take any form of alcohol, recreational drugs and any forms of ED meds 24 hours before exam.   Misc Orders:  Patient was informed that they will need a COVID 19 test prior to their procedure. Patient verbally understood & will await a phone call from [facility] to schedule.       Further instructions given by staff:  I provide prep instructions to patient at the time of the appointment and Mychart and reviewed date, time and location, she verbalized that she understood and is aware to call if she has any questions.

## 2025-03-12 NOTE — PATIENT INSTRUCTIONS
Dysphagia- stricture from prior HSV esophagitis  - EGD with dilation under MAC at hospital lab  - soft foods only and ok to take Ensure or Boost supplements   - Anemia- GAVE, will treat with APC during EGD and check blood count today  -Hold plavix 5 days   -Hold januvia 4 days   -Hold iron 5 days before

## 2025-03-12 NOTE — TELEPHONE ENCOUNTER
----- Message from Thomas Clement sent at 3/12/2025 10:22 AM CDT -----  Blood count is stable, hgb level is 9.7 and in the same range as last month.  This is low but not at severe level.

## 2025-03-17 NOTE — TELEPHONE ENCOUNTER
Dr. Clement    Request received to refill the sucralfate.  Is she supposed to continue taking this?  She has a procedure set up with you on 4/3/25.    Thank you

## 2025-03-18 RX ORDER — SUCRALFATE 1 G/1
1 TABLET ORAL
Qty: 90 TABLET | Refills: 2 | Status: SHIPPED | OUTPATIENT
Start: 2025-03-18

## 2025-03-25 ENCOUNTER — OFFICE VISIT (OUTPATIENT)
Age: 85
End: 2025-03-25
Attending: INTERNAL MEDICINE
Payer: MEDICARE

## 2025-03-25 ENCOUNTER — APPOINTMENT (OUTPATIENT)
Age: 85
End: 2025-03-25
Attending: INTERNAL MEDICINE
Payer: MEDICARE

## 2025-03-25 VITALS
RESPIRATION RATE: 16 BRPM | HEIGHT: 56 IN | HEART RATE: 62 BPM | DIASTOLIC BLOOD PRESSURE: 76 MMHG | OXYGEN SATURATION: 98 % | BODY MASS INDEX: 26.27 KG/M2 | TEMPERATURE: 98 F | SYSTOLIC BLOOD PRESSURE: 189 MMHG | WEIGHT: 116.81 LBS

## 2025-03-25 DIAGNOSIS — D50.9 IRON DEFICIENCY ANEMIA, UNSPECIFIED IRON DEFICIENCY ANEMIA TYPE: Primary | ICD-10-CM

## 2025-03-25 DIAGNOSIS — K90.9: ICD-10-CM

## 2025-03-25 DIAGNOSIS — D63.1 ANEMIA IN STAGE 2 CHRONIC KIDNEY DISEASE: ICD-10-CM

## 2025-03-25 DIAGNOSIS — Z51.81 MEDICATION MONITORING ENCOUNTER: ICD-10-CM

## 2025-03-25 DIAGNOSIS — N18.2 ANEMIA IN STAGE 2 CHRONIC KIDNEY DISEASE: ICD-10-CM

## 2025-03-25 DIAGNOSIS — D50.8 OTHER IRON DEFICIENCY ANEMIA: ICD-10-CM

## 2025-03-25 RX ORDER — FOLIC ACID 1 MG/1
1 TABLET ORAL DAILY
Qty: 30 TABLET | Refills: 3 | Status: CANCELLED | OUTPATIENT
Start: 2025-03-25

## 2025-03-25 NOTE — PROGRESS NOTES
HPI     Mercy Nieves is a 84 year old female who is here today for f/u of   Encounter Diagnoses   Name Primary?    Iron deficiency anemia, unspecified iron deficiency anemia type Yes    Impaired intestinal absorption (HCC)     Anemia in stage 2 chronic kidney disease     Other iron deficiency anemia     Medication monitoring encounter          She is here today for follow-up.    Last course of venofer in June 2023, she had a total of 900 mg in 3 divided doses.    Has h/o GAVE.  Patient's last EGD was on 10/12/2023.  Mucosa showed GAVE in the antrum of the stomach extending out in multiple A's which was treated with APC on gastric settings with multiple applications with good overall results and no bleeding completion.  She also has small gastric fundic polyp in the body of stomach which was not biopsied.  She was recommended for follow-up with the blood counts, and retreatment of GAVE if ongoing issues.    Patient completed 2 doses of Venofer 400 mg on 11/2/2023, and 12/6/2023    Patient had EGD on 1/12/2024, she was found to have esophagitis with erosion/ulceration about 1 cm above the GE junction, gastric polyps which were biopsied, and in the stomach mucosa streaks of GAVE in the antrum.  No APC anticoagulation was performed.  She was prescribed PPI twice daily for a month as well as Carafate before meals.  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  She was treated with acyclovir.      Patient had EGD on February 2025 showing stricturing at the GE junction at the prior site of HSV infection.  This was dilated to 8 mm.  She was able to have the upper scope after the dilation.  GAVE was noted with heme staining in the stomach.  Given the tight stricture and recent dilation, the GAVE was not treated during that time.  She is scheduled for for 4/3/2025 to have treatment for GAVE.  And stricture dilation.    2 doses of Feraheme  in July 2024.  Still taking B12 and folic acid.     States still tired and fatigued.  Falls asleep at times after eating.      States food felt stuck and since taking  new medication from Dr. Clement, states that no further pain and able to eat better.      No pica. No restless legs. Hair loss again.    Review of Systems:   Review of Systems   Constitutional:  Positive for fatigue. Negative for appetite change and unexpected weight change.   HENT:   Positive for hearing loss and trouble swallowing.    Respiratory:  Negative for shortness of breath.    Cardiovascular:  Positive for leg swelling. Negative for chest pain and palpitations.   Gastrointestinal:  Positive for diarrhea (improved with new medication Dr. Clement prescribed.). Negative for abdominal pain, blood in stool and constipation.   Genitourinary:  Negative for hematuria.    Neurological:  Positive for dizziness (at times). Negative for headaches.   Hematological:  Bruises/bleeds easily.   Psychiatric/Behavioral:  Negative for sleep disturbance.            Current Outpatient Medications   Medication Sig Dispense Refill    sucralfate 1 g Oral Tab Take 1 tablet (1 g total) by mouth 3 (three) times daily before meals. 90 tablet 2    telmisartan 80 MG Oral Tab Take 1 tablet (80 mg total) by mouth daily. 90 tablet 3    Lancets 30G Does not apply Misc Use once daily 100 each 1    Glucose Blood In Vitro Strip Use to check fasting blood sugar once daily 100 strip 1    folic acid 1 MG Oral Tab Take 1 tablet (1 mg total) by mouth daily. 90 tablet 1    pantoprazole 40 MG Oral Tab EC Take 1 tablet (40 mg total) by mouth before breakfast. 90 tablet 3    clopidogrel 75 MG Oral Tab Take 1 tablet (75 mg total) by mouth daily. 90 tablet 3    oxybutynin ER 10 MG Oral Tablet 24 Hr Take 1 tablet (10 mg total) by mouth daily. 90 tablet 3    atorvastatin 20 MG Oral Tab Take 1 tablet (20 mg total) by mouth daily. 90 tablet 3    SITagliptin Phosphate (JANUVIA) 100 MG Oral Tab Take  1 tablet (100 mg total) by mouth daily. 90 tablet 3    metoprolol succinate ER 25 MG Oral Tablet 24 Hr Take 1 tablet (25 mg total) by mouth in the morning and 1 tablet (25 mg total) before bedtime. (Patient taking differently: Take 1 tablet (25 mg total) by mouth daily.) 180 tablet 3    Blood Pressure Monitor Does not apply Device Use daily. 1 each 0    Glucose Blood (TRUE METRIX BLOOD GLUCOSE TEST) In Vitro Strip CHECK BLOOD GLUCOSE TWICE DAILY 200 strip 3    B Complex Vitamins (VITAMIN B COMPLEX) Oral Tab Take 1 tablet by mouth once daily. 90 tablet 0    Blood Pressure Monitoring Does not apply Misc Use as needed 1 Device 0    TRUEPLUS LANCETS 28G Does not apply Misc CHECK BLOOD GLUCOSE TWICE DAILY 200 each 4    ferrous sulfate 325 (65 FE) MG Oral Tab EC Take 1 tablet (325 mg total) by mouth daily.      aspirin 81 MG Oral Tab Take 1 tablet (81 mg total) by mouth daily.      Blood Glucose Monitoring Suppl (TRUE METRIX METER) W/DEVICE Does not apply Kit 1 kit by Does not apply route 2 (two) times daily. 1 kit 0    Milk Thistle Extract 175 MG Oral Tab Take 1 tablet by mouth once daily.       Allergies:   Allergies   Allergen Reactions    Zocor [Simvastatin] SWELLING    Sulfamethoxazole W/Trimethoprim NAUSEA AND VOMITING       Past Medical History:    Anemia    Arthritis    Cholelithiasis    CKD (chronic kidney disease) stage 3, GFR 30-59 ml/min ([facility])    Coronary atherosclerosis    Depression    Diabetes ([facility])    Management:  Medication    Gastritis    GERD (gastroesophageal reflux disease)    Hearing impaired person, bilateral    danial hearing aids    Heart attack ([facility])    High blood pressure    High cholesterol    History of blood transfusion    Incontinence    Muscle weakness    walker    Non-ST elevation MI (NSTEMI) ([facility])    stenting of the proximal LAD and ptca of mid LAD    Pregnancy ([facility])        Problems with swallowing    Renal disorder    CKD 3    Special screening for osteoporosis    Dexa Scan     Stress incontinence    Type II or unspecified type diabetes mellitus without mention of complication, not stated as uncontrolled    Pills only    Visual impairment     Past Surgical History:   Procedure Laterality Date    Anesth,angioplasty            4X    Cath drug eluting stent      Cholecystectomy      Colonoscopy      Colonoscopy N/A 2019    Procedure: COLONOSCOPY;  Surgeon: Thomas Clement MD;  Location: [facility] ENDOSCOPY    Egd  2018    Egd N/A 02/10/2025    ; esophageal stricture,gastric AVMs; balloon dilation up to 8mm    Electrocardiogram, complete  2013    Scanned to Media Tab - Date of Service 2013    Upper gi endoscopy,exam       Social History     Socioeconomic History    Marital status:    Tobacco Use    Smoking status: Never     Passive exposure: Never    Smokeless tobacco: Never   Vaping Use    Vaping status: Never Used   Substance and Sexual Activity    Alcohol use: No     Alcohol/week: 0.0 standard drinks of alcohol    Drug use: No   Other Topics Concern    Caffeine Concern Yes     Comment: Coffee, 1 cup daily       Family History   Problem Relation Age of Onset    Diabetes Father     Other (Other) Father 62    Diabetes Mother 64        (cause of death)    Diabetes Brother     Other (Other) Brother 54    Other (Other) Brother 64    Glaucoma Neg     Macular degeneration Neg          PHYSICAL EXAM:    BP (!) 189/76 (BP Location: Left arm, Patient Position: Sitting, Cuff Size: large)   Pulse 62   Temp 97.6 °F (36.4 °C)   Resp 16   Ht 1.422 m (4' 8\")   Wt 53 kg (116 lb 12.8 oz)   SpO2 98%   BMI 26.19 kg/m²   Wt Readings from Last 6 Encounters:   25 53 kg (116 lb 12.8 oz)   25 52.6 kg (116 lb)   25 49.4 kg (109 lb)   25 53.1 kg (117 lb)   10/16/24 52.6 kg (116 lb)   24 52.7 kg (116 lb 3.2 oz)     Physical Exam  General: Patient is alert and oriented x 3, not in acute distress.  HEENT: EOMs intact. PERRL.  Neck: No JVD. No  palpable lymphadenopathy. Neck is supple.  Chest: Clear to auscultation.  Heart: Regular rate and rhythm.   Abdomen: Soft, + epigastric tenderness, good bowel sounds.  Extremities: Pedal pulses are present. No edema.  Neurological: Grossly intact.   Lymphatics: There is no palpable lymphadenopathy throughout in the cervical, supraclavicular, or axillary regions.  Psych/Depression: normal        ASSESSMENT/PLAN:     Encounter Diagnoses   Name Primary?    Iron deficiency anemia, unspecified iron deficiency anemia type Yes    Impaired intestinal absorption (HCC)     Anemia in stage 2 chronic kidney disease     Other iron deficiency anemia     Medication monitoring encounter         Iron deficiency due to UGI blood loss from gastritis that was chronic, since prior to July 2018.  Most recent EGD showed telangiactasias (GAVE) which underwent APC on 10/12/2023..    Continue to follow with GI as recommended. Needs f/u with Dr. Clement for EGD recommended again for APC.    She had venofer most recent 900 mg over 3 days ending June 2023.  Given her worsening iron deficiency and anemia, repeated course of Venofer 400 mg x 2 days, from end of November to beginning of December 2023.  Patient is taking folic acid daily.    Patient's iron stores are improved.  Her anemia has improved.  Patient can continue off iron supplement and continue with B12 and folic acid daily.  If she has worsening anemia, or worsening iron stores in 3 months, may repeat course of intravenous Venofer.    EGD January 2024 with streaks of GAVE.  She also had esophageal ulcer above the GE junction, consistent with HSV esophagitis.  She was treated with PPI, Carafate, and acyclovir.    Today's labs with a decrease in her hemoglobin from baseline.  She has worsening of her iron stores.  Discussed with patient that she will again need intravenous iron replacement.  Had additional Feraheme 510 mg x 2 doses July 2024.  She tolerated well and had excellent response.       She is again iron deficient, did have signs of GI bleeding on most recent EGD in February 2025.  Will proceed with 2 additional doses of Feraheme.    Patient following with GI for ongoing management of GAVE.  Scheduled for EGD on 4/3/2025.    D/w patient that the anemia is also related to chronic disease and CKD.  Her CKD is now stage II per most recent BMP in January.  If patient ever has decreased of hemoglobin to 10 or less and her CKD is stage III or above, may benefit from TAQUERIA if anemia with replace iron stores.    F/u in 3 months with labs, if symptomatic or anemia/iron worsening follow sooner.      No orders of the defined types were placed in this encounter.   MDM high risk medication      No follow-ups on file.     No results found for this or any previous visit (from the past 24 hours).  Component      Latest Ref Rng 2/4/2025 3/12/2025   WBC      4.0 - 11.0 x10(3) uL 4.3  5.0    RBC      3.80 - 5.30 x10(6)uL 3.30 (L)  3.47 (L)    Hemoglobin      12.0 - 16.0 g/dL 9.8 (L)  9.7 (L)    Hematocrit      35.0 - 48.0 % 30.0 (L)  29.6 (L)    MCV      80.0 - 100.0 fL 90.9  85.3    MCH      26.0 - 34.0 pg 29.7  28.0    MCHC      31.0 - 37.0 g/dL 32.7  32.8    RDW-SD      35.1 - 46.3 fL 49.1 (H)  50.1 (H)    RDW      11.0 - 15.0 % 14.7  16.0 (H)    Platelet Count      150.0 - 450.0 10(3)uL 236.0  233.0    Prelim Neutrophil Abs      1.50 - 7.70 x10 (3) uL 2.37  3.32    Neutrophils Absolute      1.50 - 7.70 x10(3) uL 2.37  3.32    Lymphocytes Absolute      1.00 - 4.00 x10(3) uL 1.41  1.11    Monocytes Absolute      0.10 - 1.00 x10(3) uL 0.35  0.42    Eosinophils Absolute      0.00 - 0.70 x10(3) uL 0.11  0.10    Basophils Absolute      0.00 - 0.20 x10(3) uL 0.02  0.01    Immature Granulocyte Absolute      0.00 - 1.00 x10(3) uL 0.01  0.02    Neutrophils %      % 55.5  66.7    Lymphocytes %      % 33.0  22.3    Monocytes %      % 8.2  8.4    Eosinophils %      % 2.6  2.0    Basophils %      % 0.5  0.2    Immature  Granulocyte %      % 0.2  0.4    Iron, Serum      50 - 170 ug/dL 31 (L)     Transferrin      250 - 380 mg/dL 293     Iron Bind.Cap.(TIBC)      250 - 425 ug/dL 380     Iron Saturation      15 - 50 % 8 (L)     FERRITIN      50 - 306 ng/mL 24 (L)        Component      Latest Ref Rng 6/20/2024 9/25/2024 2/4/2025   FERRITIN      50 - 306 ng/mL 3.8 (L)  134  24 (L)       Legend:  (L) Low

## 2025-04-03 ENCOUNTER — HOSPITAL ENCOUNTER (EMERGENCY)
Facility: HOSPITAL | Age: 85
Discharge: HOME | End: 2025-04-03
Attending: EMERGENCY MEDICINE
Payer: MEDICARE

## 2025-04-03 ENCOUNTER — HOSPITAL ENCOUNTER (OUTPATIENT)
Facility: HOSPITAL | Age: 85
Setting detail: HOSPITAL OUTPATIENT SURGERY
Discharge: ANOTHER HOSPITAL | End: 2025-04-03
Attending: INTERNAL MEDICINE | Admitting: INTERNAL MEDICINE
Payer: MEDICARE

## 2025-04-03 ENCOUNTER — TELEPHONE (OUTPATIENT)
Facility: CLINIC | Age: 85
End: 2025-04-03

## 2025-04-03 VITALS
BODY MASS INDEX: 25.01 KG/M2 | HEART RATE: 56 BPM | OXYGEN SATURATION: 97 % | TEMPERATURE: 98 F | WEIGHT: 115.94 LBS | HEIGHT: 57 IN | SYSTOLIC BLOOD PRESSURE: 199 MMHG | RESPIRATION RATE: 14 BRPM | DIASTOLIC BLOOD PRESSURE: 56 MMHG

## 2025-04-03 VITALS
SYSTOLIC BLOOD PRESSURE: 215 MMHG | BODY MASS INDEX: 24.35 KG/M2 | HEART RATE: 61 BPM | HEIGHT: 58 IN | RESPIRATION RATE: 17 BRPM | OXYGEN SATURATION: 97 % | DIASTOLIC BLOOD PRESSURE: 68 MMHG | WEIGHT: 116 LBS

## 2025-04-03 DIAGNOSIS — I10 HYPERTENSION, UNCONTROLLED: Primary | ICD-10-CM

## 2025-04-03 LAB
ATRIAL RATE: 67 BPM
GLUCOSE BLDC GLUCOMTR-MCNC: 133 MG/DL (ref 70–99)
P AXIS: 41 DEGREES
P-R INTERVAL: 202 MS
Q-T INTERVAL: 428 MS
QRS DURATION: 78 MS
QTC CALCULATION (BEZET): 452 MS
R AXIS: -30 DEGREES
T AXIS: 118 DEGREES
VENTRICULAR RATE: 67 BPM

## 2025-04-03 PROCEDURE — 99284 EMERGENCY DEPT VISIT MOD MDM: CPT

## 2025-04-03 PROCEDURE — 93005 ELECTROCARDIOGRAM TRACING: CPT

## 2025-04-03 PROCEDURE — 82962 GLUCOSE BLOOD TEST: CPT

## 2025-04-03 PROCEDURE — 93010 ELECTROCARDIOGRAM REPORT: CPT

## 2025-04-03 RX ORDER — AMLODIPINE BESYLATE 5 MG/1
5 TABLET ORAL ONCE
Status: COMPLETED | OUTPATIENT
Start: 2025-04-03 | End: 2025-04-03

## 2025-04-03 RX ORDER — SODIUM CHLORIDE, SODIUM LACTATE, POTASSIUM CHLORIDE, CALCIUM CHLORIDE 600; 310; 30; 20 MG/100ML; MG/100ML; MG/100ML; MG/100ML
INJECTION, SOLUTION INTRAVENOUS CONTINUOUS
Status: DISCONTINUED | OUTPATIENT
Start: 2025-04-03 | End: 2025-04-03

## 2025-04-03 NOTE — TELEPHONE ENCOUNTER
Await workup for blood pressure    H&P signed by Thomas Clement MD at 4/3/2025  3:41 PM    Author: Thomas Clement MD Service: Gastroenterology Author Type: Physician   Filed: 4/3/2025  3:41 PM Date of Service: 4/3/2025  3:40 PM Note Type: H&P   Status: Signed : Thomas Clement MD (Physician)   EGD canceled, discussed with anesthesia today as blood pressure was elevated with systolics greater than 200.  She is asymptomatic.  Multiple blood pressure checks at various sites over the course of an hour plus.  She has no symptoms, no chest pain, headaches or confusion.  Will send to ER for further evaluation for severe hypertension.     Nursing staff to reschedule patient once blood pressure issues have been addressed, EGD with MAC for esophageal dilation and treatment of GAVE with APC.

## 2025-04-03 NOTE — TELEPHONE ENCOUNTER
Dr. Clement    I spoke to Courtney  Patient's blood pressure today was 200/90  She had not taken her blood pressure medication that she normally takes.  I let her know it is ok to take all of the blood pressure pills prior to procedure, she told me she had just taken them all about 25 minutes before my call.  I told her to check her blood pressure again in about 20 minutes and if still that high she should give us a call and present to the ER  If goes down below 160/80 then should come in to Gi lab.    Thank you

## 2025-04-03 NOTE — TELEPHONE ENCOUNTER
Courtney daughter states patient blood pressure is high and wondering if she can take anything before the procedure today please call

## 2025-04-03 NOTE — DISCHARGE SUMMARY
Upon arriving to pre-op the patient, told by pre-op RNs that patient's BP consistently in the 200s SBP, or unable to be read by the monitor.  Various size BP cuffs attempted on various extremities. Manual BP was 220/80. Patient admitted to not taking BP meds until 11am and had not seen her cardiologist in the past two years. Discussed with Dr. Clement and patient's daughter, and agreed to send patient to ER.

## 2025-04-03 NOTE — ED INITIAL ASSESSMENT (HPI)
Pt sent here from [facility]   for BP elevated at 220/89.  Per daughter pt was supposed to have EGD for narrowing esophagus but since BP is elevated it was cancelled.  Pt is A/Ox 3, breathing unlabored.  Pt is [place] speaking,daughter Courtney is translating.  Denies chest pain, Shortness of breath.

## 2025-04-03 NOTE — ED QUICK NOTES
Patient remains asymptomatic. MD Chester states ok to discharge. Patient understanding she will go home and take her normal nightly BP meds and follow up with her PCP as an outpatient.

## 2025-04-03 NOTE — H&P
EGD canceled, discussed with anesthesia today as blood pressure was elevated with systolics greater than 200.  She is asymptomatic.  Multiple blood pressure checks at various sites over the course of an hour plus.  She has no symptoms, no chest pain, headaches or confusion.  Will send to ER for further evaluation for severe hypertension.    Nursing staff to reschedule patient once blood pressure issues have been addressed, EGD with MAC for esophageal dilation and treatment of GAVE with APC.

## 2025-04-03 NOTE — ED QUICK NOTES
Patient discharged home in no acute distress. A&Ox4, skin p/w/d, denies cp/sob. No headache or other neuro complaints. BEFAST neg. Ambulating with steady gait and verbalized understanding of d/c instructions.    Patient aware she is to take her normal evening BP meds this evening after returning home.

## 2025-04-04 NOTE — TELEPHONE ENCOUNTER
Patient is seeing Dr. Chung about her blood pressure on 6/3/2025  Encounter flagged to follow up after that appointment (remind me encounter made)  Your Appointments      Tuesday June 03, 2025 3:15 PM  Follow Up Visit with Luann Chung MD  [facility] [address] [place] [id number]  [phone]

## 2025-04-04 NOTE — ED PROVIDER NOTES
Patient Seen in: [facility] Emergency Department    History     Chief Complaint   Patient presents with    Hypertension       HPI    Patient presents to the ED after her blood pressure was noted to be high prior to an endoscopy today.  She states no complaints.  Took her blood pressure medication but did take her morning dose late.  Denies other complaints.    History reviewed.   Past Medical History:    Anemia    Arthritis    Cholelithiasis    CKD (chronic kidney disease) stage 3, GFR 30-59 ml/min ([facility])    Coronary atherosclerosis    Depression    Diabetes ([facility])    Management:  Medication    Gastritis    GERD (gastroesophageal reflux disease)    Hearing impaired person, bilateral    danial hearing aids    Heart attack ([facility])    High blood pressure    High cholesterol    History of blood transfusion    Incontinence    Muscle weakness    walker    Non-ST elevation MI (NSTEMI) ([facility])    stenting of the proximal LAD and ptca of mid LAD    Pregnancy ([facility])        Problems with swallowing    Renal disorder    CKD 3    Special screening for osteoporosis    Dexa Scan    Stress incontinence    Type II or unspecified type diabetes mellitus without mention of complication, not stated as uncontrolled    Pills only    Visual impairment       History reviewed.   Past Surgical History:   Procedure Laterality Date    Anesth,angioplasty            4X    Cath drug eluting stent      Cholecystectomy      Colonoscopy      Colonoscopy N/A 2019    Procedure: COLONOSCOPY;  Surgeon: Thomas Clement MD;  Location: [facility] ENDOSCOPY    Egd  2018    Egd N/A 02/10/2025    ; esophageal stricture,gastric AVMs; balloon dilation up to 8mm    Electrocardiogram, complete  2013    Scanned to Media Tab - Date of Service 2013    Upper gi endoscopy,exam           Medications :  Prescriptions Prior to Admission[1]     Family History   Problem Relation Age of Onset    Diabetes Father     Other (Other)  Father 62    Diabetes Mother 64        (cause of death)    Diabetes Brother     Other (Other) Brother 54    Other (Other) Brother 64    Glaucoma Neg     Macular degeneration Neg        Smoking Status:   Social History     Socioeconomic History    Marital status:    Tobacco Use    Smoking status: Never     Passive exposure: Never    Smokeless tobacco: Never   Vaping Use    Vaping status: Never Used   Substance and Sexual Activity    Alcohol use: No     Alcohol/week: 0.0 standard drinks of alcohol    Drug use: No   Other Topics Concern    Caffeine Concern Yes     Comment: Coffee, 1 cup daily       Constitutional and vital signs reviewed.      Social History and Family History elements reviewed from today, pertinent positives to the presenting problem noted.    Physical Exam     ED Triage Vitals [04/03/25 1553]   BP (!) 196/76   Pulse 67   Resp 20   Temp 98.1 °F (36.7 °C)   Temp src Oral   SpO2 98 %   O2 Device None (Room air)       All measures to prevent infection transmission during my interaction with the patient were taken. Handwashing was performed prior to and after the exam.  Stethoscope and any equipment used during my examination was cleaned with super sani-cloth germicidal wipes following the exam.     Physical Exam  Vitals and nursing note reviewed.   Constitutional:       General: She is not in acute distress.     Appearance: She is well-developed. She is not ill-appearing or toxic-appearing.   HENT:      Head: Normocephalic and atraumatic.   Eyes:      General:         Right eye: No discharge.         Left eye: No discharge.      Conjunctiva/sclera: Conjunctivae normal.   Neck:      Trachea: No tracheal deviation.   Cardiovascular:      Rate and Rhythm: Normal rate and regular rhythm.   Pulmonary:      Effort: Pulmonary effort is normal. No respiratory distress.      Breath sounds: No stridor.   Skin:     General: Skin is warm and dry.   Neurological:      Mental Status: She is alert and oriented  to person, place, and time.   Psychiatric:         Mood and Affect: Mood normal.         Behavior: Behavior normal.         ED Course      Labs Reviewed - No data to display  EKG    Rate, intervals and axes as noted on EKG Report.  Rate: Normal, 67 bpm  Rhythm: Sinus Rhythm  Reading: Left axis deviation, ST and T wave abnormality, abnormal EKG           As Interpreted by me    Imaging Results Available and Reviewed while in ED: No results found.  ED Medications Administered:   Medications   amLODIPine (Norvasc) tab 5 mg (5 mg Oral Given 4/3/25 1759)         MDM     Vitals:    04/03/25 1745 04/03/25 1800 04/03/25 1815 04/03/25 1830   BP: (!) 223/68 (!) 206/67 (!) 206/58 (!) 199/56   Pulse: 55 56 52 56   Resp: 19 15 15 14   Temp:       TempSrc:       SpO2: 99% 99% 98% 97%   Weight:       Height:         *I personally reviewed and interpreted all ED vitals.    Pulse Ox: 97%, Room air, Normal     Monitor Interpretation:   sinus bradycardia as interpreted by me.  The cardiac monitor was ordered to monitor heart rate.    Differential Diagnosis/ Diagnostic Considerations: Uncontrolled hypertension, other    Complicating Factors: The patient already has does not have any pertinent problems on file. to contribute to the complexity of this ED evaluation.    Medical Decision Making  Patient presents to the ED after blood pressure is noted to be high prior to an endoscopy today.  The procedure was not done.  Patient arrives to the ED without any complaints.  Blood pressure elevated.  Patient states usually 1 50-1 60 systolic.  She was given a dose of amlodipine and blood pressure improved.  Stable for discharge home with outpatient follow-up.    Problems Addressed:  Hypertension, uncontrolled: chronic illness or injury with exacerbation, progression, or side effects of treatment        Condition upon leaving the department: Stable    Disposition and Plan     Clinical Impression:  1. Hypertension, uncontrolled         Disposition:  Discharge    Follow-up:  Luann Chung MD  [address] [place] [address]  [address]  [facility] [address]  [phone]    Schedule an appointment as soon as possible for a visit in 1 week(s)        Medications Prescribed:  Discharge Medication List as of 4/3/2025  6:35 PM                           [1] (Not in a hospital admission)

## 2025-04-08 ENCOUNTER — TELEPHONE (OUTPATIENT)
Dept: FAMILY MEDICINE CLINIC | Facility: CLINIC | Age: 85
End: 2025-04-08

## 2025-04-08 NOTE — TELEPHONE ENCOUNTER
Spoke with patient's daughter per VEE, Date of Birth verified  She stated patient was seen ER on 4/3/25 for HTN and the next available appt with PCP is on June 2025.  She stated patient EGD was cancelled due to elevated BP.  She is looking for follow up appt this week or early next week.   Can we add patient , ok to use res 24?         Future Appointments   Date Time Provider Department Center   6/3/2025  3:15 PM Luann Chung MD [facility]   6/25/2025  3:00 PM [facility]   6/25/2025  4:00 PM Bryon Burger MD [facility]

## 2025-04-08 NOTE — TELEPHONE ENCOUNTER
Future Appointments   Date Time Provider Department Center   4/15/2025  3:00 PM Luann Chung MD [facility]   6/25/2025  3:00 PM [facility] RESOURCE [facility]   6/25/2025  4:00 PM Bryon Burger MD [facility]

## 2025-04-15 ENCOUNTER — OFFICE VISIT (OUTPATIENT)
Dept: FAMILY MEDICINE CLINIC | Facility: CLINIC | Age: 85
End: 2025-04-15

## 2025-04-15 VITALS
HEART RATE: 56 BPM | DIASTOLIC BLOOD PRESSURE: 75 MMHG | BODY MASS INDEX: 26 KG/M2 | WEIGHT: 118 LBS | SYSTOLIC BLOOD PRESSURE: 141 MMHG

## 2025-04-15 DIAGNOSIS — I10 ESSENTIAL HYPERTENSION: Primary | ICD-10-CM

## 2025-04-15 PROCEDURE — 1159F MED LIST DOCD IN RCRD: CPT | Performed by: FAMILY MEDICINE

## 2025-04-15 PROCEDURE — G2211 COMPLEX E/M VISIT ADD ON: HCPCS | Performed by: FAMILY MEDICINE

## 2025-04-15 PROCEDURE — 1160F RVW MEDS BY RX/DR IN RCRD: CPT | Performed by: FAMILY MEDICINE

## 2025-04-15 PROCEDURE — 99214 OFFICE O/P EST MOD 30 MIN: CPT | Performed by: FAMILY MEDICINE

## 2025-04-15 RX ORDER — PANCRELIPASE 24000; 76000; 120000 [USP'U]/1; [USP'U]/1; [USP'U]/1
CAPSULE, DELAYED RELEASE PELLETS ORAL
COMMUNITY
Start: 2025-04-14

## 2025-04-15 RX ORDER — AMLODIPINE BESYLATE 10 MG/1
10 TABLET ORAL DAILY
Qty: 90 TABLET | Refills: 3 | Status: SHIPPED | OUTPATIENT
Start: 2025-04-15

## 2025-04-15 NOTE — PROGRESS NOTES
Chief Complaint   Patient presents with    ER F/U     HPI:   Mercy Nieves is a 85 year old female who presents to clinic with complaints of elevated BP.   EGD cancelled d/t BP not improving.   Bp elevated in office today 151/75 no cp, sob or dizziness. No headaches.   She takes metop and telmi daily.     REVIEW OF SYSTEMS:   Negative, except per HPI.     EXAM:   /75 (BP Location: Left arm, Patient Position: Sitting, Cuff Size: large)   Pulse 56   Wt 118 lb (53.5 kg)   BMI 25.53 kg/m²   Body mass index is 25.53 kg/m².  GENERAL: well developed, well nourished, in no apparent distress  SKIN: no rashes, no suspicious lesions  HEENT: atraumatic, normocephalic  EYES: PERRLA, EOMI,conjunctiva are clear  NECK: supple, no adenopathy  LUNGS: clear to auscultation  CARDIO: RRR without murmur    ASSESSMENT AND PLAN:     Essential hypertension  -Medical, surgical and social history, as well as medications and allergies were reviewed with patient.  Blood pressure elevated pre-op before EGD and was cancelled.   - amLODIPine 10 MG Oral Tab; Take 1 tablet (10 mg total) by mouth daily.  Dispense: 90 tablet; Refill: 3     RTC if no improvement in symptoms. Red flags discussed to go to ER.     Luann Chung MD  4/15/2025  2:51 PM

## 2025-04-17 ENCOUNTER — OFFICE VISIT (OUTPATIENT)
Age: 85
End: 2025-04-17
Attending: INTERNAL MEDICINE
Payer: MEDICARE

## 2025-04-17 VITALS
RESPIRATION RATE: 16 BRPM | DIASTOLIC BLOOD PRESSURE: 65 MMHG | OXYGEN SATURATION: 100 % | TEMPERATURE: 99 F | HEART RATE: 60 BPM | BODY MASS INDEX: 26 KG/M2 | SYSTOLIC BLOOD PRESSURE: 167 MMHG | WEIGHT: 119.38 LBS

## 2025-04-17 DIAGNOSIS — D50.9 IRON DEFICIENCY ANEMIA, UNSPECIFIED IRON DEFICIENCY ANEMIA TYPE: Primary | ICD-10-CM

## 2025-04-17 DIAGNOSIS — K90.9: ICD-10-CM

## 2025-04-17 NOTE — PROGRESS NOTES
Mercy to infusion today for Feraheme dose 1 of 2. Pt denies any issues or concerns. Tolerated Feraheme well in the past.      Ordering Provider: Tao Carolina Exp: 1 dose remaining in order     Pt tolerated infusion without difficulty or complaint. Reviewed next apt date/time: 4/25 @ 2p      Education Record  Learner:  Patient  Disease / Diagnosis: SVITLANA  Barriers / Limitations:  None  Method:  Reinforcement  General Topics:  Plan of care reviewed  Outcome:  Shows understanding

## 2025-04-25 ENCOUNTER — OFFICE VISIT (OUTPATIENT)
Age: 85
End: 2025-04-25
Attending: INTERNAL MEDICINE
Payer: MEDICARE

## 2025-04-25 VITALS
OXYGEN SATURATION: 100 % | HEART RATE: 72 BPM | BODY MASS INDEX: 26 KG/M2 | WEIGHT: 120 LBS | SYSTOLIC BLOOD PRESSURE: 167 MMHG | TEMPERATURE: 98 F | DIASTOLIC BLOOD PRESSURE: 71 MMHG

## 2025-04-25 DIAGNOSIS — D50.9 IRON DEFICIENCY ANEMIA, UNSPECIFIED IRON DEFICIENCY ANEMIA TYPE: Primary | ICD-10-CM

## 2025-04-25 DIAGNOSIS — K90.9: ICD-10-CM

## 2025-04-25 NOTE — PROGRESS NOTES
Mercy arrives to infusion clinic for Feraheme dose 2 of 2. Pt denies any issues or concerns.      Ordering Provider: Tao  Order Exp: 0 remain      Pt tolerated infusion without difficulty or complaint. No s/s of reaction. Has tolerated well in the past. Reviewed next apt date/time: f/u visit and labs scheduled 6/25        Education Record  Learner:  Patient  Disease / Diagnosis: SVITLANA  Barriers / Limitations:  None  Method:  Reinforcement  General Topics:  Plan of care reviewed  Outcome:  Shows understanding

## 2025-05-02 ENCOUNTER — TELEMEDICINE (OUTPATIENT)
Dept: FAMILY MEDICINE CLINIC | Facility: CLINIC | Age: 85
End: 2025-05-02

## 2025-05-02 DIAGNOSIS — I10 ESSENTIAL HYPERTENSION: Primary | ICD-10-CM

## 2025-05-02 DIAGNOSIS — E11.9 DIABETES MELLITUS TYPE 2 WITHOUT RETINOPATHY (HCC): ICD-10-CM

## 2025-05-02 DIAGNOSIS — E78.5 HYPERLIPIDEMIA, UNSPECIFIED HYPERLIPIDEMIA TYPE: ICD-10-CM

## 2025-05-02 PROCEDURE — 1159F MED LIST DOCD IN RCRD: CPT | Performed by: FAMILY MEDICINE

## 2025-05-02 PROCEDURE — 1160F RVW MEDS BY RX/DR IN RCRD: CPT | Performed by: FAMILY MEDICINE

## 2025-05-02 PROCEDURE — 99213 OFFICE O/P EST LOW 20 MIN: CPT | Performed by: FAMILY MEDICINE

## 2025-05-02 NOTE — PROGRESS NOTES
Virtual Telephone Check-In    Mercy Nieves verbally consents to a Virtual/Telephone Check-In service on 05/02/25.    Patient understands and accepts financial responsibility for any deductible, co-insurance and/or co-pays associated with this service.    Duration of the service: 20 minutes    Summary of topics discussed:     Blood pressure follow-up.  Patient was recently seen in the office and EGD had to be canceled due to elevated blood pressures.  She is not currently taking metoprolol, telmisartan and new amlodipine and her blood pressures are well-controlled at home with no elevations above 140/90.  Denies any chest pain shortness of breath dizziness or palpitations.  She is amenable to continuing with this regimen.  She understands she has to schedule an annual physical exam soon.    Physical Exam:  General: alert, speaking in full sentences, no acute distress  Lungs: respirations sound unlabored, no audible wheezing with speaking.  Neurologic: alert, oriented x3    Assessment and plan:    1. Essential hypertension  Blood pressure currently well-controlled with current regimen.  Okay to continue and can monitor blood pressure at home routinely.  Physical activity encouraged.    2. 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  Continue to monitor blood glucose routinely.  She states that she has made some diet changes to help reduce her blood sugar average as well.    3. Hyperlipidemia, unspecified hyperlipidemia type  Continue atorvastatin 20 nightly.  Will recheck lipid panel at upcoming annual physical exam      Advised to call back clinic if no improvement in symptoms. Red flags discussed to go to [facility].     Luann Chung MD

## 2025-05-06 ENCOUNTER — OFFICE VISIT (OUTPATIENT)
Dept: FAMILY MEDICINE CLINIC | Facility: CLINIC | Age: 85
End: 2025-05-06

## 2025-05-06 ENCOUNTER — NURSE TRIAGE (OUTPATIENT)
Dept: FAMILY MEDICINE CLINIC | Facility: CLINIC | Age: 85
End: 2025-05-06

## 2025-05-06 DIAGNOSIS — E22.2 SIADH (SYNDROME OF INAPPROPRIATE ADH PRODUCTION) (HCC): Primary | ICD-10-CM

## 2025-05-06 DIAGNOSIS — I10 ESSENTIAL HYPERTENSION: ICD-10-CM

## 2025-05-06 DIAGNOSIS — R60.0 BILATERAL LOWER EXTREMITY EDEMA: ICD-10-CM

## 2025-05-06 PROCEDURE — G2211 COMPLEX E/M VISIT ADD ON: HCPCS | Performed by: FAMILY MEDICINE

## 2025-05-06 PROCEDURE — 1160F RVW MEDS BY RX/DR IN RCRD: CPT | Performed by: FAMILY MEDICINE

## 2025-05-06 PROCEDURE — 1159F MED LIST DOCD IN RCRD: CPT | Performed by: FAMILY MEDICINE

## 2025-05-06 PROCEDURE — 99214 OFFICE O/P EST MOD 30 MIN: CPT | Performed by: FAMILY MEDICINE

## 2025-05-06 RX ORDER — HYDROCHLOROTHIAZIDE 12.5 MG/1
12.5 TABLET ORAL DAILY
Qty: 90 TABLET | Refills: 3 | Status: SHIPPED | OUTPATIENT
Start: 2025-05-06 | End: 2026-05-01

## 2025-05-06 NOTE — PROGRESS NOTES
HPI:   Mercy Nieevs is a 85 year old female who presents to clinic with complaints of lower extremity swelling below the knee.  Typically worse at the end of the day and coincides with new amlodipine use.    Recently started taking amlodipine to help normalize her blood pressure.  She was monitoring her blood pressure at home and it has been normal.    REVIEW OF SYSTEMS:   Negative, except per HPI.     EXAM:     GENERAL: well developed, well nourished, in no apparent distress  SKIN: no rashes, no suspicious lesions  HEENT: atraumatic, normocephalic  EYES: PERRLA, EOMI,conjunctiva are clear  NECK: supple, no adenopathy  MUSCULOSKELETAL: Bilateral pitting edema, no redness or swelling. No tenderness to palpation.  ASSESSMENT AND PLAN:     1. 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  Last sodium was 130.  Patient asymptomatic.  Fluid restrictions and liberal salt intake discussed.  Will recheck lab work at upcoming annual physical exam on July 22    2. Essential hypertension  Will switch her from amlodipine to hydrochlorothiazide given the lower extremity swelling that she has experienced since starting the amlodipine.  The amlodipine really did help her blood pressure normalized but due to side effect would discontinue  - hydroCHLOROthiazide 12.5 MG Oral Tab; Take 1 tablet (12.5 mg total) by mouth daily.  Dispense: 90 tablet; Refill: 3    3. Bilateral lower extremity edema  Medication side effect.  Discontinue amlodipine.  Hydrochlorothiazide should help improve edema.     RTC if no improvement in symptoms. Red flags discussed to go to ER.     Luann Chung MD  5/6/2025  12:29 PM

## 2025-05-06 NOTE — TELEPHONE ENCOUNTER
Yes, can she come at 12:30 p today instead? My afternoon is quite booked but ill be here through lunch to see her

## 2025-05-06 NOTE — TELEPHONE ENCOUNTER
DR Chung=can you fit her in today ? She is currently scheduled to see DR Garza at 2 pm today , but they prefer to see you instead . Thanks.       Action Requested: Summary for Provider     []  Critical Lab, Recommendations Needed  [x] Need Additional Advice  []   FYI    [x]   Need Orders  [] Need Medications Sent to Pharmacy  []  Other     SUMMARY: Per daughter (release of information ) .Bilateral below knee edema down to the feet leaves marks when pressed by the daughter and takes time to return to normal size. Weight gain (not sure how much), BP is normal. Struggling to get dressed and gets tired easily when walking; panties are getting tighter.  Patient  is not on any diuretics.     Future Appointments   Date Time Provider Department Center   5/6/2025  2:00 PM [facility]       Reason for call: Edema  Onset: 1 day     They are planning to go to [place] this coming Wednesday .       Reason for Disposition   MODERATE swelling of both ankles (e.g., swelling extends up to the knees) AND new-onset or worsening    Protocols used: Leg Swelling and Edema-A-OH

## 2025-05-19 ENCOUNTER — TELEPHONE (OUTPATIENT)
Facility: CLINIC | Age: 85
End: 2025-05-19

## 2025-05-19 NOTE — TELEPHONE ENCOUNTER
Patient states in the past week having difficulties swallowing any solid foods. Was hoping for a sooner appointment. Please call

## 2025-05-19 NOTE — TELEPHONE ENCOUNTER
Dayana,  I agree with your advice. Pt to maintain pureed diet until she can be scoped.   I also see that she was not rescheduled for Endoscopy, if you can please have pt schedules for endoscopy with dilation at the earliest with Dr. Clement.     EGD with possible dilation for dysphagia, stricture of esophagus, anemia under [facility] URGENT    Sophia TOBIN

## 2025-05-19 NOTE — TELEPHONE ENCOUNTER
Dr. Clement (also routed to [place] on call since out of office)    I spoke to Courtney  For the past 6 days Mercy has not been able to swallow well.  4 days ago she had some discomfort and had trouble swallowing even liquids but that has improved.  She has no pain or discomfort now and is able to swallow liquids and puree ok-she is having trouble with anything solid even if it is soft like macaroni and meats.    I confirmed that she is still on the sucralfate as ordered.  She was scheduled for EGD in early April which was canceled due to high blood pressure on arrival to endoscopy.  Her blood pressure is better controlled now per daughter.    I advised that she stick with liquid and puree foods for now since she tolerates those well.  I advised to go to ER if anything gets stuck and she can't get it down.  Aware Dr. Clement is out of office today and returns tomorrow morning.  I let her know that I would send to our on call to see if she should do anything more between now and then, but let her know that likely will be addressed by Dr. Clement as he wanted to do a scope with possible dilation so she could swallow better.    Thank you

## 2025-05-20 ENCOUNTER — TELEPHONE (OUTPATIENT)
Dept: GASTROENTEROLOGY | Facility: CLINIC | Age: 85
End: 2025-05-20

## 2025-05-20 DIAGNOSIS — K22.2 STRICTURE OF ESOPHAGUS: ICD-10-CM

## 2025-05-20 DIAGNOSIS — D64.9 ANEMIA, UNSPECIFIED TYPE: ICD-10-CM

## 2025-05-20 DIAGNOSIS — R13.10 DYSPHAGIA, UNSPECIFIED TYPE: Primary | ICD-10-CM

## 2025-05-20 NOTE — TELEPHONE ENCOUNTER
Scheduled for:  EGD with possible dilation 25906  Provider Name:  Dr. Clement  Date:  6/10/2025  Location:   [facility]  Sedation:  MAC  Time:  4:00 PM - Patient is aware [facility] will call the day before with arrival time.  Prep:  NPO after midnight  Meds/Allergies Reconciled?:  APN reviewed   Diagnosis with codes:  Dysphagia R13.10; Stricture of esophagus K22.2; Anemia D64.9  Was patient informed to call insurance with codes (Y/N):  Yes, I confirmed [employer] insurance with the patient.   Referral sent?:  Referral was sent at the time of electronic surgical scheduling.   EM or [facility] notified?:  I sent an electronic request to Endo Scheduling and received a confirmation today.   Medication Orders:  Hold Januvia 4 days prior to the procedure. Hold multivitamins/supplements for two weeks prior to procedure. Hold Hydrochlorothiazide the day of procedure.  Misc Orders:  GI RN will obtain Plavix orders from Dr. Sierra prior to the procedure.     Further instructions given by staff:  I discussed the prep instructions with the patient which she verbally understood and is aware that I will send the instructions today.

## 2025-06-10 ENCOUNTER — HOSPITAL ENCOUNTER (OUTPATIENT)
Facility: HOSPITAL | Age: 85
Setting detail: HOSPITAL OUTPATIENT SURGERY
Discharge: HOME | End: 2025-06-10
Attending: INTERNAL MEDICINE | Admitting: INTERNAL MEDICINE
Payer: MEDICARE

## 2025-06-10 ENCOUNTER — ANESTHESIA EVENT (OUTPATIENT)
Dept: ENDOSCOPY | Facility: HOSPITAL | Age: 85
End: 2025-06-10
Payer: MEDICARE

## 2025-06-10 ENCOUNTER — ANESTHESIA (OUTPATIENT)
Dept: ENDOSCOPY | Facility: HOSPITAL | Age: 85
End: 2025-06-10
Payer: MEDICARE

## 2025-06-10 VITALS
HEART RATE: 50 BPM | WEIGHT: 116 LBS | OXYGEN SATURATION: 99 % | RESPIRATION RATE: 15 BRPM | DIASTOLIC BLOOD PRESSURE: 56 MMHG | SYSTOLIC BLOOD PRESSURE: 189 MMHG | BODY MASS INDEX: 25.03 KG/M2 | HEIGHT: 57 IN

## 2025-06-10 DIAGNOSIS — D64.9 ANEMIA, UNSPECIFIED TYPE: ICD-10-CM

## 2025-06-10 DIAGNOSIS — R13.10 DYSPHAGIA, UNSPECIFIED TYPE: ICD-10-CM

## 2025-06-10 DIAGNOSIS — K22.2 STRICTURE OF ESOPHAGUS: ICD-10-CM

## 2025-06-10 LAB — GLUCOSE BLDC GLUCOMTR-MCNC: 141 MG/DL (ref 70–99)

## 2025-06-10 PROCEDURE — 43255 EGD CONTROL BLEEDING ANY: CPT | Performed by: INTERNAL MEDICINE

## 2025-06-10 PROCEDURE — 43249 ESOPH EGD DILATION <30 MM: CPT | Performed by: INTERNAL MEDICINE

## 2025-06-10 PROCEDURE — 43239 EGD BIOPSY SINGLE/MULTIPLE: CPT | Performed by: INTERNAL MEDICINE

## 2025-06-10 RX ORDER — LIDOCAINE HYDROCHLORIDE 10 MG/ML
INJECTION, SOLUTION EPIDURAL; INFILTRATION; INTRACAUDAL; PERINEURAL AS NEEDED
Status: DISCONTINUED | OUTPATIENT
Start: 2025-06-10 | End: 2025-06-10 | Stop reason: SURG

## 2025-06-10 RX ORDER — SODIUM CHLORIDE, SODIUM LACTATE, POTASSIUM CHLORIDE, CALCIUM CHLORIDE 600; 310; 30; 20 MG/100ML; MG/100ML; MG/100ML; MG/100ML
INJECTION, SOLUTION INTRAVENOUS CONTINUOUS
Status: DISCONTINUED | OUTPATIENT
Start: 2025-06-10 | End: 2025-06-10

## 2025-06-10 RX ADMIN — LIDOCAINE HYDROCHLORIDE 50 MG: 10 INJECTION, SOLUTION EPIDURAL; INFILTRATION; INTRACAUDAL; PERINEURAL at 09:47:00

## 2025-06-10 NOTE — ANESTHESIA POSTPROCEDURE EVALUATION
Patient: Mercy Nieves    Procedure Summary       Date: 06/10/25 Room / Location: [facility] ENDOSCOPY 02 / [facility] ENDOSCOPY    Anesthesia Start: 0944 Anesthesia Stop: 1010    Procedure: ESOPHAGOGASTRODUODENOSCOPY (EGD) with possible dilation Diagnosis:       Dysphagia, unspecified type      Stricture of esophagus      Anemia, unspecified type      (esophageal stricture, AVM's, GAVE,)    Surgeons: Thomas Clement MD Anesthesiologist: Essie Leiva CRNA    Anesthesia Type: MAC ASA Status: 3            Anesthesia Type: MAC    Vitals Value Taken Time   /49 06/10/25 10:10   Temp 97 06/10/25 10:10   Pulse 51 06/10/25 10:10   Resp 16 06/10/25 10:10   SpO2 100 % 06/10/25 10:10       [facility] AN Post Evaluation:   Patient Evaluated in PACU  Patient Participation: complete - patient participated  Level of Consciousness: awake and alert  Pain Score: 0  Pain Management: adequate  Airway Patency:patent  Yes    Nausea/Vomiting: none  Cardiovascular Status: acceptable  Respiratory Status: acceptable  Postoperative Hydration acceptable      Essie Leiva CRNA  6/10/2025 10:10 AM

## 2025-06-10 NOTE — OR PREOP
During pre op admission blood pressures were reading high see below  Vitals:    06/03/25 1037 06/10/25 0801 06/10/25 0803 06/10/25 0805   BP:  (!) 227/59 (!) 226/57 (!) 216/68   BP Location:  Right arm Left arm Right arm   Pulse:  61 60 59   Resp:  21 15 15   SpO2:  99% 99% 99%   Weight: 116 lb (52.6 kg)      Height: 4' 9\" (1.448 m)        Bp cuff was changed and moved to upper right arm, then lower right arm and then again to right upper right arm with b/p readings still elevated. Anesthesia and Dr Clement made aware of high bp. Awaiting further instructions.

## 2025-06-10 NOTE — OPERATIVE REPORT
[facility] Endoscopy Report  Date of procedure-Anali 10, 2025    Preoperative Diagnosis:  - Esophageal stricture/dysphagia  -GAVE/chronic anemia      Postoperative Diagnosis:  -Distal esophageal stricture status post dilation with TTS  -GAVE status post APC treatment    Procedure:    Esophagogastroduodenoscopy with TTS balloon dilation esophagus and APC of GAVE      Surgeon:  Thomas Clement M.D.    Anesthesia:  MAC     Technique:  After informed consent, the patient was placed in the left lateral recumbent position.  An Olympus adult HD gastroscope was inserted into the hypopharynx and advanced under direct vision into the esophagus-esophageal stricture noted at 36 cm, this could not be passed with the scope but again I could see through the stricture into the stomach through the GE junction which was just below this.  TTS balloon dilation catheter was passed through the scope and centered over the stricture and was inflated to 8 mm and held for 30 seconds then deflated subsequently inflated to 9 and 10 mm as outlined above.  After the 10 mm dilation a small linear laceration about 1 cm in length was noted, no bleeding was noted, no perforation.  The scope was then easily maneuvered through the stricture into the stomach and duodenum.  Scope was then pulled back into the stomach and GAVE noted in the antrum, 2 small areas of heme staining in 1 area of active bleeding noted.  These were treated with APC/multiple applications on gastric settings.  Excellent hemostasis noted and treatment effect.  Retroflexion was performed in the exam completed.  The procedure was well tolerated without immediate complication.      Findings:  The esophagus  show esophageal stricture at 36 cm, appeared to be scar in this area prior healing from her HSV esophagitis.  Redilated as outlined above with TTS balloon dilation catheter with good effect.  Biopsies taken from the region of the stricture.  The stomach distended  appropriately with insufflated air.  The mucosa of the stomach showed gave multiple vascular lesions noted in the antrum, 2 of these with heme staining and one of them actual oozing.  Endoscopic treatment with APC as outlined above with hemostasis.  The duodenal bulb and post bulbar regions were normal.    Estimated blood loss-insignificant  Specimens-see above    Impression:  -Distal esophageal stricture status post dilation with TTS  -GAVE status post APC treatment    Recommendations:  - Post procedure/esophageal dilation instructions given to the patient and family  - Follow up on biopsies  - Repeat EGD pending response to above  - Follow blood counts/PPI and iron therapy          Thomas Clement MD  6/10/2025  10:06 AM

## 2025-06-10 NOTE — ANESTHESIA PREPROCEDURE EVALUATION
Anesthesia PreOp Note    HPI:     Mercy Nieves is a 85 year old female who presents for preoperative consultation requested by: Thomas Clement MD    Date of Surgery: 6/10/2025    Procedure(s):  ESOPHAGOGASTRODUODENOSCOPY (EGD) with possible dilation  Indication: Dysphagia, unspecified type/ Stricture of esophagus / Anemia, unspecified type    Relevant Problems   No relevant active problems       NPO:  Last Liquid Consumption Date: 06/09/25  Last Liquid Consumption Time: 2200  Last Solid Consumption Date: 06/07/25  Last Solid Consumption Time: 0000  Last Liquid Consumption Date: 06/09/25          History Review:  Patient Active Problem List    Diagnosis Date Noted   • Dysphagia 02/10/2025   • Degenerative drusen of both eyes 06/11/2024   • Dry eye syndrome of both eyes 06/11/2024   • Intermediate stage nonexudative age-related macular degeneration of both eyes 06/11/2024   • Presbyopia of both eyes 06/11/2024   • Chronic pain of right ankle 03/15/2023   • Mild episode of recurrent major depressive disorder 03/15/2023   • Pain in right lower leg 10/07/2022   • Type 2 diabetes mellitus with diabetic chronic kidney disease (HCC) 03/10/2022   • SIADH (syndrome of inappropriate ADH production) ([facility]) 04/16/2021   • GAVE (gastric antral vascular ectasia) 12/31/2020   • Sensorineural hearing loss (SNHL) of both ears 08/31/2020   • Vitamin D deficiency 03/08/2019   • Osteopenia of multiple sites 03/06/2019   • Meibomian gland dysfunction (MGD) of both eyes 12/06/2018   • Bilateral carotid bruits 08/09/2018   • Impaired intestinal absorption ([facility]) 04/03/2018   • Coronary artery disease of native heart with stable angina pectoris 03/06/2018   • Atherosclerosis of aorta 07/14/2017   • Peripheral vascular disease 07/14/2017   • Essential hypertension 06/28/2016   • Murmur 06/28/2016   • Anemia, iron deficiency 10/05/2015   • Age-related nuclear cataract of both eyes 03/19/2015   • Diabetes mellitus type 2 without  retinopathy (HCC) 03/19/2015       Past Medical History[1]    Past Surgical History[2]    Prescriptions Prior to Admission[3]  Current Medications and Prescriptions Ordered in Epic[4]    Allergies[5]    Family History[6]  Social Hx on file[7]    Available pre-op labs reviewed.     Lab Results   Component Value Date    PGLU 141 (H) 06/10/2025          Vital Signs:  Body mass index is 25.1 kg/m².   height is 1.448 m (4' 9\") and weight is 52.6 kg (116 lb). Her blood pressure is 213/68 (abnormal) and her pulse is 56. Her respiration is 13 and oxygen saturation is 99%.   Vitals:    06/10/25 0835 06/10/25 0855 06/10/25 0900 06/10/25 0905   BP: (!) 209/56 (!) 194/64 (!) 211/53 (!) 213/68   Pulse: 56      Resp: 13      SpO2:       Weight:       Height:            Anesthesia Evaluation     Patient summary reviewed and Nursing notes reviewed    No history of anesthetic complications   Airway   Mallampati: II  TM distance: >3 FB  Neck ROM: full  Dental - Dentition appears grossly intact     Pulmonary - negative ROS and normal exam    breath sounds clear to auscultation  Cardiovascular   (+) hypertension poorly controlled, CAD    Rhythm: regular  Rate: normal    Neuro/Psych - negative ROS   (+)   depression      GI/Hepatic/Renal    (+) GERD well controlled    Endo/Other    (+) diabetes mellitus type 2 well controlled  Abdominal                Anesthesia Plan:   ASA:  3  Plan:   MAC  Post-op Pain Management: IV analgesics  Informed Consent Plan and Risks Discussed With:  Patient  Use of Blood Products Discussed With:  Patient  Discussed plan with:  CRNA and surgeon      I have informed Mercy Nieves and/or legal guardian or family member of the nature of the anesthetic plan, benefits, risks including possible dental damage if relevant, major complications, and any alternative forms of anesthetic management.   All of the patient's questions were answered to the best of my ability. The patient desires the anesthetic management  as planned.  Essie LeivaCHRISTIANO  6/10/2025 9:37 AM  Present on Admission:  **None**             [1]  Past Medical History:  • Anemia   • Arthritis   • Cholelithiasis   • CKD (chronic kidney disease) stage 3, GFR 30-59 ml/min ([facility])   • Coronary atherosclerosis   • Deep vein thrombosis ([facility])    right leg/calf   • Depression   • Diabetes ([facility])    Management:  Medication   • Gastritis   • GERD (gastroesophageal reflux disease)   • Hearing impaired person, bilateral    danial hearing aids   • Heart attack ([facility])   • High blood pressure   • High cholesterol   • History of blood transfusion   • Incontinence   • Muscle weakness    walker   • Non-ST elevation MI (NSTEMI) ([facility])    stenting of the proximal LAD and ptca of mid LAD   • Pregnancy ([facility])       • Problems with swallowing   • Renal disorder    CKD 3   • Special screening for osteoporosis    Dexa Scan   • Stress incontinence   • Type II or unspecified type diabetes mellitus without mention of complication, not stated as uncontrolled    Pills only   • Visual impairment   [2]  Past Surgical History:  Procedure Laterality Date   • Anesth,angioplasty     •       4X   • Cath drug eluting stent     • Cholecystectomy     • Colonoscopy     • Colonoscopy N/A 2019    Procedure: COLONOSCOPY;  Surgeon: Thomas Clement MD;  Location: [facility] ENDOSCOPY   • Egd     • Egd N/A 02/10/2025    ; esophageal stricture,gastric AVMs; balloon dilation up to 8mm   • Electrocardiogram, complete  2013    Scanned to Media Tab - Date of Service 2013   • Upper gi endoscopy,exam     [3]  Medications Prior to Admission   Medication Sig Dispense Refill Last Dose/Taking   • hydroCHLOROthiazide 12.5 MG Oral Tab Take 1 tablet (12.5 mg total) by mouth daily. 90 tablet 3 2025   • CREON 18011-14220 units Oral Cap DR Particles Take 1 capsule by mouth 3 (three) times daily with meals.   Taking   • sucralfate 1 g Oral Tab Take 1 tablet (1 g total) by mouth 3  (three) times daily before meals. 90 tablet 2 6/7/2025   • telmisartan 80 MG Oral Tab Take 1 tablet (80 mg total) by mouth daily. 90 tablet 3 6/9/2025   • folic acid 1 MG Oral Tab Take 1 tablet (1 mg total) by mouth daily. 90 tablet 1 6/1/2025   • pantoprazole 40 MG Oral Tab EC Take 1 tablet (40 mg total) by mouth before breakfast. 90 tablet 3 6/7/2025   • clopidogrel 75 MG Oral Tab Take 1 tablet (75 mg total) by mouth daily. 90 tablet 3 5/31/2025   • oxybutynin ER 10 MG Oral Tablet 24 Hr Take 1 tablet (10 mg total) by mouth daily. 90 tablet 3 5/27/2025   • atorvastatin 20 MG Oral Tab Take 1 tablet (20 mg total) by mouth daily. 90 tablet 3 6/3/2025   • SITagliptin Phosphate (JANUVIA) 100 MG Oral Tab Take 1 tablet (100 mg total) by mouth daily. 90 tablet 3 6/3/2025   • metoprolol succinate ER 25 MG Oral Tablet 24 Hr Take 1 tablet (25 mg total) by mouth in the morning and 1 tablet (25 mg total) before bedtime. (Patient taking differently: Take 1 tablet (25 mg total) by mouth in the morning.) 180 tablet 3 6/9/2025   • B Complex Vitamins (VITAMIN B COMPLEX) Oral Tab Take 1 tablet by mouth once daily. 90 tablet 0 6/3/2025   • ferrous sulfate 325 (65 FE) MG Oral Tab EC Take 1 tablet (325 mg total) by mouth in the morning.   6/1/2025   • aspirin 81 MG Oral Tab Take 1 tablet (81 mg total) by mouth in the morning.   5/31/2025   • Milk Thistle Extract 175 MG Oral Tab Take 1 tablet by mouth in the morning.   Taking   • Lancets 30G Does not apply Misc Use once daily 100 each 1    • Glucose Blood In Vitro Strip Use to check fasting blood sugar once daily 100 strip 1    • Blood Pressure Monitor Does not apply Device Use daily. 1 each 0    • Glucose Blood (TRUE METRIX BLOOD GLUCOSE TEST) In Vitro Strip CHECK BLOOD GLUCOSE TWICE DAILY 200 strip 3    • Blood Pressure Monitoring Does not apply Misc Use as needed 1 Device 0    • TRUEPLUS LANCETS 28G Does not apply Misc CHECK BLOOD GLUCOSE TWICE DAILY 200 each 4    • Blood Glucose  Monitoring Suppl (TRUE METRIX METER) W/DEVICE Does not apply Kit 1 kit by Does not apply route 2 (two) times daily. 1 kit 0    [4]  No current Epic-ordered facility-administered medications on file.     No current [facility]-ordered outpatient medications on file.   [5]  Allergies  Allergen Reactions   • Zocor [Simvastatin] SWELLING   • Sulfamethoxazole W/Trimethoprim NAUSEA AND VOMITING   [6]  Family History  Problem Relation Age of Onset   • Diabetes Father    • Other (Other) Father 62   • Diabetes Mother 64        (cause of death)   • Diabetes Brother    • Other (Other) Brother 54   • Other (Other) Brother 64   • Glaucoma Neg    • Macular degeneration Neg    [7]  Social History  Socioeconomic History   • Marital status:    Tobacco Use   • Smoking status: Never     Passive exposure: Never   • Smokeless tobacco: Never   Vaping Use   • Vaping status: Never Used   Substance and Sexual Activity   • Alcohol use: No     Alcohol/week: 0.0 standard drinks of alcohol   • Drug use: No   Other Topics Concern   • Caffeine Concern Yes     Comment: Coffee, 1 cup daily

## 2025-06-10 NOTE — H&P
History & Physical Examination    Patient Name: Mercy Nieves  MRN: [id number]  [facility]: [id number]  YOB: 1940    Diagnosis:   -Esophageal stricture/dysphagia  -GAVE/chronic anemia      Prescriptions Prior to Admission[1]  Current Hospital Medications[2]    Allergies: Allergies[3]    Past Medical History[4]  Past Surgical History[5]  Family History[6]  Social History     Tobacco Use    Smoking status: Never     Passive exposure: Never    Smokeless tobacco: Never   Substance Use Topics    Alcohol use: No     Alcohol/week: 0.0 standard drinks of alcohol       SYSTEM Check if Review is Normal Check if Physical Exam is Normal If not normal, please explain:   HEENT [x ] [ x]    NECK & BACK [x ] [x ]    HEART [x ] [ x]    LUNGS [x ] [ x]    ABDOMEN [x ] [x ]    UROGENITAL [ ] [ ]    EXTREMITIES [x ] [x ]    OTHER        [ x ] I have discussed the risks and benefits and alternatives with the patient/family.  They understand and agree to proceed with plan of care.  [ x ] I have reviewed the History and Physical done within the last 30 days.  Any changes noted above.    Thomas Clement MD  6/10/2025  10:35 AM         [1]   Medications Prior to Admission   Medication Sig Dispense Refill Last Dose/Taking    hydroCHLOROthiazide 12.5 MG Oral Tab Take 1 tablet (12.5 mg total) by mouth daily. 90 tablet 3 6/8/2025    CREON 93700-54898 units Oral Cap DR Particles Take 1 capsule by mouth 3 (three) times daily with meals.   Taking    sucralfate 1 g Oral Tab Take 1 tablet (1 g total) by mouth 3 (three) times daily before meals. 90 tablet 2 6/7/2025    telmisartan 80 MG Oral Tab Take 1 tablet (80 mg total) by mouth daily. 90 tablet 3 6/9/2025    folic acid 1 MG Oral Tab Take 1 tablet (1 mg total) by mouth daily. 90 tablet 1 6/1/2025    pantoprazole 40 MG Oral Tab EC Take 1 tablet (40 mg total) by mouth before breakfast. 90 tablet 3 6/7/2025    clopidogrel 75 MG Oral Tab Take 1 tablet (75 mg total) by mouth daily. 90 tablet  3 5/31/2025    oxybutynin ER 10 MG Oral Tablet 24 Hr Take 1 tablet (10 mg total) by mouth daily. 90 tablet 3 5/27/2025    atorvastatin 20 MG Oral Tab Take 1 tablet (20 mg total) by mouth daily. 90 tablet 3 6/3/2025    SITagliptin Phosphate (JANUVIA) 100 MG Oral Tab Take 1 tablet (100 mg total) by mouth daily. 90 tablet 3 6/3/2025    metoprolol succinate ER 25 MG Oral Tablet 24 Hr Take 1 tablet (25 mg total) by mouth in the morning and 1 tablet (25 mg total) before bedtime. (Patient taking differently: Take 1 tablet (25 mg total) by mouth in the morning.) 180 tablet 3 6/9/2025    B Complex Vitamins (VITAMIN B COMPLEX) Oral Tab Take 1 tablet by mouth once daily. 90 tablet 0 6/3/2025    ferrous sulfate 325 (65 FE) MG Oral Tab EC Take 1 tablet (325 mg total) by mouth in the morning.   6/1/2025    aspirin 81 MG Oral Tab Take 1 tablet (81 mg total) by mouth in the morning.   5/31/2025    Milk Thistle Extract 175 MG Oral Tab Take 1 tablet by mouth in the morning.   Taking    Lancets 30G Does not apply Misc Use once daily 100 each 1     Glucose Blood In Vitro Strip Use to check fasting blood sugar once daily 100 strip 1     Blood Pressure Monitor Does not apply Device Use daily. 1 each 0     Glucose Blood (TRUE METRIX BLOOD GLUCOSE TEST) In Vitro Strip CHECK BLOOD GLUCOSE TWICE DAILY 200 strip 3     Blood Pressure Monitoring Does not apply Misc Use as needed 1 Device 0     TRUEPLUS LANCETS 28G Does not apply Misc CHECK BLOOD GLUCOSE TWICE DAILY 200 each 4     Blood Glucose Monitoring Suppl (TRUE METRIX METER) W/DEVICE Does not apply Kit 1 kit by Does not apply route 2 (two) times daily. 1 kit 0    [2]   Current Facility-Administered Medications   Medication Dose Route Frequency    lactated ringers infusion   Intravenous Continuous   [3]   Allergies  Allergen Reactions    Zocor [Simvastatin] SWELLING    Sulfamethoxazole W/Trimethoprim NAUSEA AND VOMITING   [4]   Past Medical History:   Anemia    Arthritis    Cholelithiasis     CKD (chronic kidney disease) stage 3, GFR 30-59 ml/min ([facility])    Coronary atherosclerosis    Deep vein thrombosis ([facility])    right leg/calf    Depression    Diabetes ([facility])    Management:  Medication    Gastritis    GERD (gastroesophageal reflux disease)    Hearing impaired person, bilateral    danial hearing aids    Heart attack ([facility])    High blood pressure    High cholesterol    History of blood transfusion    Incontinence    Muscle weakness    walker    Non-ST elevation MI (NSTEMI) ([facility])    stenting of the proximal LAD and ptca of mid LAD    Pregnancy ([facility])        Problems with swallowing    Renal disorder    CKD 3    Special screening for osteoporosis    Dexa Scan    Stress incontinence    Type II or unspecified type diabetes mellitus without mention of complication, not stated as uncontrolled    Pills only    Visual impairment   [5]   Past Surgical History:  Procedure Laterality Date    Anesth,angioplasty            4X    Cath drug eluting stent      Cholecystectomy      Colonoscopy      Colonoscopy N/A 2019    Procedure: COLONOSCOPY;  Surgeon: Thomas Clement MD;  Location: [facility] ENDOSCOPY    Egd  2018    Egd N/A 02/10/2025    ; esophageal stricture,gastric AVMs; balloon dilation up to 8mm    Electrocardiogram, complete  2013    Scanned to Media Tab - Date of Service 2013    Upper gi endoscopy,exam     [6]   Family History  Problem Relation Age of Onset    Diabetes Father     Other (Other) Father 62    Diabetes Mother 64        (cause of death)    Diabetes Brother     Other (Other) Brother 54    Other (Other) Brother 64    Glaucoma Neg     Macular degeneration Neg

## 2025-06-10 NOTE — DISCHARGE INSTRUCTIONS
Home Care Instructions for Gastroscopy with Sedation    Diet:  - LIQUID DIET FOR 24 HOURS    Medication:  RESUME PLAVIX TOMORROW  - Do not drink alcohol today.    -   Activities:  - Take it easy today. Do not return to work today.  - Do not drive today.  - Do not operate any machinery today (including kitchen equipment).    Gastroscopy:  - You may have a sore throat for 2-3 days following the exam. This is normal. Gargling with warm salt water (1/2 tsp salt to 1 glass warm water) or using throat lozenges will help.  - If you experience any sharp pain in your neck, abdomen or chest, vomiting of blood, oral temperature over 100 degrees Fahrenheit, light-headedness or dizziness, or any other problems, contact your doctor.    **If unable to reach your doctor, please go to the [facility] Emergency Room**    - Your referring physician will receive a full report of your examination.  - If you do not hear from your doctor's office within two weeks of your biopsy, please call them for your results.    You may be able to see your laboratory results in [employer] between 4 and 7 business days.  In some cases, your physician may not have viewed the results before they are released to [employer].  If you have questions regarding your results contact the physician who ordered the test/exam by phone or via [employer] by choosing \"Ask a Medical Question.\"

## 2025-06-12 ENCOUNTER — TELEPHONE (OUTPATIENT)
Facility: CLINIC | Age: 85
End: 2025-06-12

## 2025-06-12 DIAGNOSIS — K22.2 ESOPHAGEAL STRICTURE: ICD-10-CM

## 2025-06-12 DIAGNOSIS — R13.10 DYSPHAGIA, UNSPECIFIED TYPE: Primary | ICD-10-CM

## 2025-06-12 NOTE — TELEPHONE ENCOUNTER
----- Message from Thomas Clement sent at 6/11/2025  9:01 AM CDT -----  Biopsies of the narrowing in the esophagus were negative ( no cancerous changes ). I would suggest that we repeat the EGD with dilation in 6 - 8 weeks.    ----- Message -----  From: Lab, Background User  Sent: 6/11/2025   6:11 AM CDT  To: Thomas Clement MD

## 2025-06-12 NOTE — TELEPHONE ENCOUNTER
Dr. Clement    I reviewed your result note below with Courtney  She said that Mercy is doing very well after the procedure.  Could you please provide the EGD orders so we can get her set up in 6-8 weeks for the EGD?    Thank you

## 2025-06-12 NOTE — TELEPHONE ENCOUNTER
Ok to set up EGD in the next 8 weeks,   Please use prior orders   Dx dysphagia and esophageal stricture

## 2025-06-13 NOTE — TELEPHONE ENCOUNTER
Scheduled for:  EGD with dilation 22015  Provider Name:  Dr. Clement  Date:  8/14/2025  Location:   [facility]  Sedation:  MAC  Time:  3:50 PM - Patient is aware [facility] will call the day before with arrival time.  Prep:  NPO after midnight  Meds/Allergies Reconciled?:  Physician reviewed   Diagnosis with codes:  Dysphagia R13.10; Esophageal stricture K22.2  Was patient informed to call insurance with codes (Y/N):  Yes, I confirmed [employer] insurance with the patient.   Referral sent?:  Referral was sent at the time of electronic surgical scheduling.   EM or [facility] notified?:  I sent an electronic request to Endo Scheduling and received a confirmation today.   Medication Orders:  Hold Januvia 4 days prior to the procedure. Hold multivitamins/supplements for two weeks prior to procedure. Hold Hydrochlorothiazide the day of procedure.  [facility]:  GI RN will obtain Plavix orders from Dr. Sierra prior to the procedure.     Further instructions given by staff:   I discussed the prep instructions with the patient which she verbally understood and is aware that I will send the instructions today.

## 2025-06-16 NOTE — TELEPHONE ENCOUNTER
Per chart review, Dr. Chung manages patient's Clopidogrel dosing. Clopidogrel orders faxed to Dr. Chung with successful fax transmission receipt.

## 2025-06-20 NOTE — TELEPHONE ENCOUNTER
Dr. Chung    Gi is requesting ordering provider to hold Plavix for 5 days prior to EGD with Dr. Clement on 8/14/2025.    Please advise if ok to do so.    Thank you  [facility] Clinical Staff

## 2025-06-25 ENCOUNTER — NURSE ONLY (OUTPATIENT)
Age: 85
End: 2025-06-25
Attending: INTERNAL MEDICINE
Payer: MEDICARE

## 2025-06-25 ENCOUNTER — OFFICE VISIT (OUTPATIENT)
Age: 85
End: 2025-06-25
Attending: INTERNAL MEDICINE
Payer: MEDICARE

## 2025-06-25 VITALS
DIASTOLIC BLOOD PRESSURE: 79 MMHG | HEART RATE: 59 BPM | SYSTOLIC BLOOD PRESSURE: 199 MMHG | RESPIRATION RATE: 16 BRPM | TEMPERATURE: 98 F | HEIGHT: 56 IN | OXYGEN SATURATION: 100 % | WEIGHT: 112 LBS | BODY MASS INDEX: 25.19 KG/M2

## 2025-06-25 DIAGNOSIS — N18.2 ANEMIA IN STAGE 2 CHRONIC KIDNEY DISEASE: ICD-10-CM

## 2025-06-25 DIAGNOSIS — D50.9 IRON DEFICIENCY ANEMIA, UNSPECIFIED IRON DEFICIENCY ANEMIA TYPE: ICD-10-CM

## 2025-06-25 DIAGNOSIS — D63.1 ANEMIA IN STAGE 2 CHRONIC KIDNEY DISEASE: ICD-10-CM

## 2025-06-25 DIAGNOSIS — K90.9: ICD-10-CM

## 2025-06-25 DIAGNOSIS — D50.9 IRON DEFICIENCY ANEMIA, UNSPECIFIED IRON DEFICIENCY ANEMIA TYPE: Primary | ICD-10-CM

## 2025-06-25 LAB
BASOPHILS # BLD AUTO: 0.01 X10(3) UL (ref 0–0.2)
BASOPHILS NFR BLD AUTO: 0.2 %
DEPRECATED HBV CORE AB SER IA-ACNC: 41 NG/ML (ref 50–306)
DEPRECATED RDW RBC AUTO: 48.6 FL (ref 35.1–46.3)
EOSINOPHIL # BLD AUTO: 0.07 X10(3) UL (ref 0–0.7)
EOSINOPHIL NFR BLD AUTO: 1.5 %
ERYTHROCYTE [DISTWIDTH] IN BLOOD BY AUTOMATED COUNT: 14.6 % (ref 11–15)
HCT VFR BLD AUTO: 32 % (ref 35–48)
HGB BLD-MCNC: 11.1 G/DL (ref 12–16)
IMM GRANULOCYTES # BLD AUTO: 0.01 X10(3) UL (ref 0–1)
IMM GRANULOCYTES NFR BLD: 0.2 %
IRON SATN MFR SERPL: 14 % (ref 15–50)
IRON SERPL-MCNC: 48 UG/DL (ref 50–170)
LYMPHOCYTES # BLD AUTO: 1.37 X10(3) UL (ref 1–4)
LYMPHOCYTES NFR BLD AUTO: 29.4 %
MCH RBC QN AUTO: 31.9 PG (ref 26–34)
MCHC RBC AUTO-ENTMCNC: 34.7 G/DL (ref 31–37)
MCV RBC AUTO: 92 FL (ref 80–100)
MONOCYTES # BLD AUTO: 0.42 X10(3) UL (ref 0.1–1)
MONOCYTES NFR BLD AUTO: 9 %
NEUTROPHILS # BLD AUTO: 2.78 X10 (3) UL (ref 1.5–7.7)
NEUTROPHILS # BLD AUTO: 2.78 X10(3) UL (ref 1.5–7.7)
NEUTROPHILS NFR BLD AUTO: 59.7 %
PLATELET # BLD AUTO: 242 10(3)UL (ref 150–450)
RBC # BLD AUTO: 3.48 X10(6)UL (ref 3.8–5.3)
TOTAL IRON BINDING CAPACITY: 332 UG/DL (ref 250–425)
TRANSFERRIN SERPL-MCNC: 242 MG/DL (ref 250–380)
WBC # BLD AUTO: 4.7 X10(3) UL (ref 4–11)

## 2025-06-25 NOTE — PROGRESS NOTES
KT     Mercy Nieves is a 85 year old female who is here today for f/u of   Encounter Diagnoses   Name Primary?    Iron deficiency anemia, unspecified iron deficiency anemia type Yes    Impaired intestinal absorption (HCC)     Anemia in stage 2 chronic kidney disease          She is here today for follow-up.    Last course of venofer in June 2023, she had a total of 900 mg in 3 divided doses.    Has h/o GAVE.  Patient's last EGD was on 10/12/2023.  Mucosa showed GAVE in the antrum of the stomach extending out in multiple A's which was treated with APC on gastric settings with multiple applications with good overall results and no bleeding completion.  She also has small gastric fundic polyp in the body of stomach which was not biopsied.  She was recommended for follow-up with the blood counts, and retreatment of GAVE if ongoing issues.    Patient completed 2 doses of Venofer 400 mg on 11/2/2023, and 12/6/2023    Patient had EGD on 1/12/2024, she was found to have esophagitis with erosion/ulceration about 1 cm above the GE junction, gastric polyps which were biopsied, and in the stomach mucosa streaks of GAVE in the antrum.  No APC anticoagulation was performed.  She was prescribed PPI twice daily for a month as well as Carafate before meals.  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  She was treated with acyclovir.      Patient had EGD on February 2025 showing stricturing at the GE junction at the prior site of HSV infection.  This was dilated to 8 mm.  She was able to have the upper scope after the dilation.  GAVE was noted with heme staining in the stomach.  Given the tight stricture and recent dilation, the GAVE was not treated during that time.  She is scheduled for for 4/3/2025 to have treatment for GAVE.  And stricture dilation.    Patient following with GI for ongoing management of GAVE.  EGD on 6/12/25 with  esophageal stricture, which was dilated and bx done and negative.  Also had APC treatment for GAVE.  Scheduled for EGD on 8/14/2025 for esophageal dilation.    Feraheme 510 mg last dose end of April of 2025.  Not taking B12 or folic acid.      States she does feel better.  Food not getting stuck.        Still has pica for ice.  Some restless legs. Hair loss again.    Review of Systems:   Review of Systems   Constitutional:  Positive for fatigue (improved) and unexpected weight change (from stricture). Negative for appetite change.   HENT:   Positive for hearing loss and trouble swallowing (improved).    Respiratory:  Positive for shortness of breath (if sleeps on the L side when gets out of bed gets some SOB for 2-3 min or less).    Cardiovascular:  Negative for chest pain, leg swelling and palpitations.   Gastrointestinal:  Positive for diarrhea (much improved with new medication Dr. Clement prescribed.). Negative for abdominal pain, blood in stool and constipation.   Genitourinary:  Negative for hematuria.    Neurological:  Negative for dizziness and headaches.   Hematological:  Bruises/bleeds easily.   Psychiatric/Behavioral:  Negative for sleep disturbance.            Current Outpatient Medications   Medication Sig Dispense Refill    hydroCHLOROthiazide 12.5 MG Oral Tab Take 1 tablet (12.5 mg total) by mouth daily. 90 tablet 3    CREON 05263-55701 units Oral Cap DR Particles Take 1 capsule by mouth 3 (three) times daily with meals.      sucralfate 1 g Oral Tab Take 1 tablet (1 g total) by mouth 3 (three) times daily before meals. 90 tablet 2    telmisartan 80 MG Oral Tab Take 1 tablet (80 mg total) by mouth daily. 90 tablet 3    Lancets 30G Does not apply Misc Use once daily 100 each 1    Glucose Blood In Vitro Strip Use to check fasting blood sugar once daily 100 strip 1    folic acid 1 MG Oral Tab Take 1 tablet (1 mg total) by mouth daily. 90 tablet 1    pantoprazole 40 MG Oral Tab EC Take 1 tablet (40 mg total)  by mouth before breakfast. 90 tablet 3    clopidogrel 75 MG Oral Tab Take 1 tablet (75 mg total) by mouth daily. 90 tablet 3    oxybutynin ER 10 MG Oral Tablet 24 Hr Take 1 tablet (10 mg total) by mouth daily. 90 tablet 3    atorvastatin 20 MG Oral Tab Take 1 tablet (20 mg total) by mouth daily. 90 tablet 3    SITagliptin Phosphate (JANUVIA) 100 MG Oral Tab Take 1 tablet (100 mg total) by mouth daily. 90 tablet 3    metoprolol succinate ER 25 MG Oral Tablet 24 Hr Take 1 tablet (25 mg total) by mouth in the morning and 1 tablet (25 mg total) before bedtime. (Patient taking differently: Take 1 tablet (25 mg total) by mouth in the morning.) 180 tablet 3    Blood Pressure Monitor Does not apply Device Use daily. 1 each 0    Glucose Blood (TRUE METRIX BLOOD GLUCOSE TEST) In Vitro Strip CHECK BLOOD GLUCOSE TWICE DAILY 200 strip 3    B Complex Vitamins (VITAMIN B COMPLEX) Oral Tab Take 1 tablet by mouth once daily. 90 tablet 0    Blood Pressure Monitoring Does not apply Misc Use as needed 1 Device 0    TRUEPLUS LANCETS 28G Does not apply Misc CHECK BLOOD GLUCOSE TWICE DAILY 200 each 4    ferrous sulfate 325 (65 FE) MG Oral Tab EC Take 1 tablet (325 mg total) by mouth in the morning.      aspirin 81 MG Oral Tab Take 1 tablet (81 mg total) by mouth in the morning.      Blood Glucose Monitoring Suppl (TRUE METRIX METER) W/DEVICE Does not apply Kit 1 kit by Does not apply route 2 (two) times daily. 1 kit 0    Milk Thistle Extract 175 MG Oral Tab Take 1 tablet by mouth in the morning.       Allergies:   Allergies   Allergen Reactions    Zocor [Simvastatin] SWELLING    Sulfamethoxazole W/Trimethoprim NAUSEA AND VOMITING       Past Medical History:    Anemia    Arthritis    Cholelithiasis    CKD (chronic kidney disease) stage 3, GFR 30-59 ml/min (HCC)    Coronary atherosclerosis    Deep vein thrombosis (HCC)    right leg/calf    Depression    Diabetes (HCC)    Management:  Medication    Gastritis    GERD (gastroesophageal reflux  disease)    Hearing impaired person, bilateral    danial hearing aids    Heart attack (HCC)    High blood pressure    High cholesterol    History of blood transfusion    Incontinence    Muscle weakness    walker    Non-ST elevation MI (NSTEMI) (HCC)    stenting of the proximal LAD and ptca of mid LAD    Pregnancy (HCC)        Problems with swallowing    Renal disorder    CKD 3    Special screening for osteoporosis    Dexa Scan    Stress incontinence    Type II or unspecified type diabetes mellitus without mention of complication, not stated as uncontrolled    Pills only    Visual impairment     Past Surgical History:   Procedure Laterality Date    Anesth,angioplasty            4X    Cath drug eluting stent      Cholecystectomy      Colonoscopy      Colonoscopy N/A 2019    Procedure: COLONOSCOPY;  Surgeon: Thomas Clement MD;  Location: [facility] ENDOSCOPY    Egd      Egd N/A 02/10/2025    ; esophageal stricture,gastric AVMs; balloon dilation up to 8mm    Electrocardiogram, complete  2013    Scanned to Media Tab - Date of Service 2013    Upper gi endoscopy,exam       Social History     Socioeconomic History    Marital status:    Tobacco Use    Smoking status: Never     Passive exposure: Never    Smokeless tobacco: Never   Vaping Use    Vaping status: Never Used   Substance and Sexual Activity    Alcohol use: No     Alcohol/week: 0.0 standard drinks of alcohol    Drug use: No   Other Topics Concern    Caffeine Concern Yes     Comment: Coffee, 1 cup daily       Family History   Problem Relation Age of Onset    Diabetes Father     Other (Other) Father 62    Diabetes Mother 64        (cause of death)    Diabetes Brother     Other (Other) Brother 54    Other (Other) Brother 64    Glaucoma Neg     Macular degeneration Neg          PHYSICAL EXAM:    BP (!) 199/79 (BP Location: Right arm, Patient Position: Sitting, Cuff Size: adult)   Pulse 59   Temp 98 °F (36.7 °C) (Oral)    Resp 16   Ht 1.422 m (4' 8\")   Wt 50.8 kg (112 lb)   SpO2 100%   BMI 25.11 kg/m²   Wt Readings from Last 6 Encounters:   06/25/25 50.8 kg (112 lb)   06/03/25 52.6 kg (116 lb)   04/25/25 54.4 kg (120 lb)   04/17/25 54.2 kg (119 lb 6.4 oz)   04/15/25 53.5 kg (118 lb)   04/03/25 52.6 kg (115 lb 15.4 oz)     Physical Exam  General: Patient is alert and oriented x 3, not in acute distress.  HEENT: EOMs intact. PERRL.  Neck: No JVD. No palpable lymphadenopathy. Neck is supple.  Chest: Clear to auscultation.  Heart: Regular rate and rhythm.   Abdomen: Soft, + epigastric tenderness, good bowel sounds.  Extremities: Pedal pulses are present. No edema.  Neurological: Grossly intact.   Lymphatics: There is no palpable lymphadenopathy throughout in the cervical, supraclavicular, or axillary regions.  Psych/Depression: normal        ASSESSMENT/PLAN:     Encounter Diagnoses   Name Primary?    Iron deficiency anemia, unspecified iron deficiency anemia type Yes    Impaired intestinal absorption (HCC)     Anemia in stage 2 chronic kidney disease         Iron deficiency due to UGI blood loss from gastritis that was chronic, since prior to July 2018.  Most recent EGD showed telangiactasias (GAVE) which underwent APC on 10/12/2023..    Continue to follow with GI as recommended. Needs f/u with Dr. Clement for EGD recommended again for APC.    She had venofer most recent 900 mg over 3 days ending June 2023.  Given her worsening iron deficiency and anemia, repeated course of Venofer 400 mg x 2 days, from end of November to beginning of December 2023.  Patient is taking folic acid daily.    Patient's iron stores are improved.  Her anemia has improved.  Patient can continue off iron supplement and continue with B12 and folic acid daily.  If she has worsening anemia, or worsening iron stores in 3 months, may repeat course of intravenous Venofer.    EGD January 2024 with streaks of GAVE.  She also had esophageal ulcer above the GE  junction, consistent with HSV esophagitis.  She was treated with PPI, Carafate, and acyclovir.    Had additional Feraheme 510 mg x 2 doses July 2024.  She had additional feraheme 510 mg on 4/25/25.  She tolerated well and had excellent response.   Her iron stores have improved but still iron deficient and has symptoms.      Patient following with GI for ongoing management of GAVE.  EGD on 6/12/25 with esophageal stricture, which was dilated and bx done and negative.  Also had APC treatment for GAVE.  Scheduled for EGD on 8/14/2025 for esophageal dilation.    D/w patient that the anemia is also related to chronic disease and CKD.  Her CKD is now stage II per most recent BMP in January.  If patient ever has decreased of hemoglobin to 10 or less and her CKD is stage III or above, may benefit from TAQUERIA if anemia with replace iron stores.    F/u in 3 months with labs, if symptomatic or anemia/iron worsening follow sooner.      Quality measures:    Hypertension target range 130-140/70-80  Per PCP, advised not to eat very salty foods.         No orders of the defined types were placed in this encounter.   MDM high risk medication.      No follow-ups on file.     Recent Results (from the past 24 hours)   CBC With Differential With Platelet    Collection Time: 06/25/25  2:44 PM   Result Value Ref Range    WBC 4.7 4.0 - 11.0 x10(3) uL    RBC 3.48 (L) 3.80 - 5.30 x10(6)uL    HGB 11.1 (L) 12.0 - 16.0 g/dL    HCT 32.0 (L) 35.0 - 48.0 %    MCV 92.0 80.0 - 100.0 fL    MCH 31.9 26.0 - 34.0 pg    MCHC 34.7 31.0 - 37.0 g/dL    RDW-SD 48.6 (H) 35.1 - 46.3 fL    RDW 14.6 11.0 - 15.0 %    .0 150.0 - 450.0 10(3)uL    Neutrophil Absolute Prelim 2.78 1.50 - 7.70 x10 (3) uL    Neutrophil Absolute 2.78 1.50 - 7.70 x10(3) uL    Lymphocyte Absolute 1.37 1.00 - 4.00 x10(3) uL    Monocyte Absolute 0.42 0.10 - 1.00 x10(3) uL    Eosinophil Absolute 0.07 0.00 - 0.70 x10(3) uL    Basophil Absolute 0.01 0.00 - 0.20 x10(3) uL    Immature  Granulocyte Absolute 0.01 0.00 - 1.00 x10(3) uL    Neutrophil % 59.7 %    Lymphocyte % 29.4 %    Monocyte % 9.0 %    Eosinophil % 1.5 %    Basophil % 0.2 %    Immature Granulocyte % 0.2 %   Ferritin    Collection Time: 06/25/25  2:44 PM   Result Value Ref Range    Ferritin 41 (L) 50 - 306 ng/mL   Iron And Tibc    Collection Time: 06/25/25  2:44 PM   Result Value Ref Range    Iron 48 (L) 50 - 170 ug/dL    Transferrin 242 (L) 250 - 380 mg/dL    Total Iron Binding Capacity 332 250 - 425 ug/dL    % Saturation 14 (L) 15 - 50 %     Component      Latest Ref Rng 2/4/2025 3/12/2025 6/25/2025   WBC      4.0 - 11.0 x10(3) uL 4.3  5.0  4.7    RBC      3.80 - 5.30 x10(6)uL 3.30 (L)  3.47 (L)  3.48 (L)    Hemoglobin      12.0 - 16.0 g/dL 9.8 (L)  9.7 (L)  11.1 (L)    Hematocrit      35.0 - 48.0 % 30.0 (L)  29.6 (L)  32.0 (L)    MCV      80.0 - 100.0 fL 90.9  85.3  92.0    MCH      26.0 - 34.0 pg 29.7  28.0  31.9    MCHC      31.0 - 37.0 g/dL 32.7  32.8  34.7    RDW-SD      35.1 - 46.3 fL 49.1 (H)  50.1 (H)  48.6 (H)    RDW      11.0 - 15.0 % 14.7  16.0 (H)  14.6    Platelet Count      150.0 - 450.0 10(3)uL 236.0  233.0  242.0    Prelim Neutrophil Abs      1.50 - 7.70 x10 (3) uL 2.37  3.32  2.78    Neutrophils Absolute      1.50 - 7.70 x10(3) uL 2.37  3.32  2.78    Lymphocytes Absolute      1.00 - 4.00 x10(3) uL 1.41  1.11  1.37    Monocytes Absolute      0.10 - 1.00 x10(3) uL 0.35  0.42  0.42    Eosinophils Absolute      0.00 - 0.70 x10(3) uL 0.11  0.10  0.07    Basophils Absolute      0.00 - 0.20 x10(3) uL 0.02  0.01  0.01    Immature Granulocyte Absolute      0.00 - 1.00 x10(3) uL 0.01  0.02  0.01    Neutrophils %      % 55.5  66.7  59.7    Lymphocytes %      % 33.0  22.3  29.4    Monocytes %      % 8.2  8.4  9.0    Eosinophils %      % 2.6  2.0  1.5    Basophils %      % 0.5  0.2  0.2    Immature Granulocyte %      % 0.2  0.4  0.2    Iron, Serum      50 - 170 ug/dL 31 (L)   48 (L)    Transferrin      250 - 380 mg/dL 293   242  (L)    Iron Bind.Cap.(TIBC)      250 - 425 ug/dL 380   332    Iron Saturation      15 - 50 % 8 (L)   14 (L)    FERRITIN      50 - 306 ng/mL 24 (L)   41 (L)       Legend:  (L) Low  (H) High

## 2025-06-30 NOTE — TELEPHONE ENCOUNTER
I tried to call Courtney  It sounded like someone picked up and I could hear some music but I said hello twice but no one responded.  I will try again at a later time.

## 2025-07-03 NOTE — TELEPHONE ENCOUNTER
Dr. Clement    I reviewed below Plavix instructions with Courtney.  She wanted to let you know that since her last procedure, Mercy has been coughing up a lot of clear mucus.  Unsure if this is reflux after she eats.    Is there anything she should be doing about this mucus?    She still has issues with swallowing regular meats, so she is looking forward to the upcoming procedure.    Thank you

## 2025-07-09 RX ORDER — PANTOPRAZOLE SODIUM 40 MG/1
40 TABLET, DELAYED RELEASE ORAL
Qty: 90 TABLET | Refills: 3 | Status: SHIPPED | OUTPATIENT
Start: 2025-07-09

## 2025-07-09 RX ORDER — SUCRALFATE 1 G/1
1 TABLET ORAL
Qty: 90 TABLET | Refills: 2 | Status: SHIPPED | OUTPATIENT
Start: 2025-07-09

## 2025-07-09 NOTE — TELEPHONE ENCOUNTER
Pt/spouse contacted, reviewed below. Instructed to notify us if weakness, shortness of breath, chest discomfort. Confirmed address and mailed lab order, due Nov 19. Dr Lex Lebron will review next labs, consider capsule study. Requested eye exam from Dr Newton. Remind me created

## 2025-07-09 NOTE — TELEPHONE ENCOUNTER
Dr Clement,       Patient daughters states that patient hadn't taken the Carafate for two weeks prior to the procedure and yes she is taken the pantoprazole and she is down to 2 pills left , will need a refill on carafate

## 2025-07-21 ENCOUNTER — OFFICE VISIT (OUTPATIENT)
Age: 85
End: 2025-07-21
Attending: INTERNAL MEDICINE
Payer: MEDICARE

## 2025-07-21 VITALS
BODY MASS INDEX: 25.26 KG/M2 | HEART RATE: 53 BPM | WEIGHT: 112.31 LBS | HEIGHT: 56 IN | TEMPERATURE: 98 F | SYSTOLIC BLOOD PRESSURE: 183 MMHG | RESPIRATION RATE: 18 BRPM | DIASTOLIC BLOOD PRESSURE: 72 MMHG | OXYGEN SATURATION: 99 %

## 2025-07-21 DIAGNOSIS — D50.9 IRON DEFICIENCY ANEMIA, UNSPECIFIED IRON DEFICIENCY ANEMIA TYPE: Primary | ICD-10-CM

## 2025-07-21 DIAGNOSIS — K90.9: ICD-10-CM

## 2025-07-21 NOTE — PROGRESS NOTES
Patient arrives to infusion center for Feraheme. Patient denies any issues or concerns.      Ordering Provider: Bryon Burger MD     Patient appeared to tolerate infusion without difficulty or complaint. No s/s of adverse reaction noted.    Reviewed next apt date/time: 7/28 at 3pm    Education Record  Learner:  Patient  Disease / Diagnosis: SVITLANA  Barriers / Limitations:  None  Method:  Discussion and Reinforcement  General Topics:  Medication, Side effects and symptom management, and Plan of care reviewed  Outcome:  Shows understanding

## 2025-07-22 ENCOUNTER — LAB ENCOUNTER (OUTPATIENT)
Dept: LAB | Age: 85
End: 2025-07-22
Attending: FAMILY MEDICINE

## 2025-07-22 ENCOUNTER — OFFICE VISIT (OUTPATIENT)
Dept: FAMILY MEDICINE CLINIC | Facility: CLINIC | Age: 85
End: 2025-07-22
Payer: MEDICARE

## 2025-07-22 VITALS
SYSTOLIC BLOOD PRESSURE: 191 MMHG | HEART RATE: 54 BPM | WEIGHT: 109 LBS | DIASTOLIC BLOOD PRESSURE: 75 MMHG | BODY MASS INDEX: 24 KG/M2

## 2025-07-22 DIAGNOSIS — D50.8 OTHER IRON DEFICIENCY ANEMIA: ICD-10-CM

## 2025-07-22 DIAGNOSIS — N18.32 TYPE 2 DIABETES MELLITUS WITH STAGE 3B CHRONIC KIDNEY DISEASE, WITHOUT LONG-TERM CURRENT USE OF INSULIN (HCC): ICD-10-CM

## 2025-07-22 DIAGNOSIS — E11.22 TYPE 2 DIABETES MELLITUS WITH STAGE 3B CHRONIC KIDNEY DISEASE, WITHOUT LONG-TERM CURRENT USE OF INSULIN (HCC): ICD-10-CM

## 2025-07-22 DIAGNOSIS — H02.883 MEIBOMIAN GLAND DYSFUNCTION (MGD) OF BOTH EYES, UNSPECIFIED EYELID: ICD-10-CM

## 2025-07-22 DIAGNOSIS — I73.9 PERIPHERAL VASCULAR DISEASE: ICD-10-CM

## 2025-07-22 DIAGNOSIS — Z00.00 ENCOUNTER FOR ANNUAL HEALTH EXAMINATION: Primary | ICD-10-CM

## 2025-07-22 DIAGNOSIS — H04.123 DRY EYE SYNDROME OF BOTH EYES: ICD-10-CM

## 2025-07-22 DIAGNOSIS — E55.9 VITAMIN D DEFICIENCY: ICD-10-CM

## 2025-07-22 DIAGNOSIS — K31.819 GAVE (GASTRIC ANTRAL VASCULAR ECTASIA): ICD-10-CM

## 2025-07-22 DIAGNOSIS — R01.1 MURMUR: ICD-10-CM

## 2025-07-22 DIAGNOSIS — E11.9 DIABETES MELLITUS TYPE 2 WITHOUT RETINOPATHY (HCC): ICD-10-CM

## 2025-07-22 DIAGNOSIS — K22.2 ESOPHAGEAL STRICTURE: ICD-10-CM

## 2025-07-22 DIAGNOSIS — M25.571 CHRONIC PAIN OF RIGHT ANKLE: ICD-10-CM

## 2025-07-22 DIAGNOSIS — H52.4 PRESBYOPIA OF BOTH EYES: ICD-10-CM

## 2025-07-22 DIAGNOSIS — K90.9: ICD-10-CM

## 2025-07-22 DIAGNOSIS — R09.89 BILATERAL CAROTID BRUITS: ICD-10-CM

## 2025-07-22 DIAGNOSIS — M79.661 PAIN IN RIGHT LOWER LEG: ICD-10-CM

## 2025-07-22 DIAGNOSIS — I10 ESSENTIAL HYPERTENSION: ICD-10-CM

## 2025-07-22 DIAGNOSIS — H35.363 DEGENERATIVE DRUSEN OF BOTH EYES: ICD-10-CM

## 2025-07-22 DIAGNOSIS — G89.29 CHRONIC PAIN OF RIGHT ANKLE: ICD-10-CM

## 2025-07-22 DIAGNOSIS — H02.886 MEIBOMIAN GLAND DYSFUNCTION (MGD) OF BOTH EYES, UNSPECIFIED EYELID: ICD-10-CM

## 2025-07-22 DIAGNOSIS — I25.118 CORONARY ARTERY DISEASE OF NATIVE HEART WITH STABLE ANGINA PECTORIS, UNSPECIFIED VESSEL OR LESION TYPE: ICD-10-CM

## 2025-07-22 DIAGNOSIS — H90.3 SENSORINEURAL HEARING LOSS (SNHL) OF BOTH EARS: ICD-10-CM

## 2025-07-22 DIAGNOSIS — M85.89 OSTEOPENIA OF MULTIPLE SITES: ICD-10-CM

## 2025-07-22 DIAGNOSIS — Z00.00 ENCOUNTER FOR ANNUAL HEALTH EXAMINATION: ICD-10-CM

## 2025-07-22 DIAGNOSIS — H25.13 AGE-RELATED NUCLEAR CATARACT OF BOTH EYES: ICD-10-CM

## 2025-07-22 DIAGNOSIS — I70.0 ATHEROSCLEROSIS OF AORTA: ICD-10-CM

## 2025-07-22 DIAGNOSIS — F33.0 MILD EPISODE OF RECURRENT MAJOR DEPRESSIVE DISORDER: ICD-10-CM

## 2025-07-22 DIAGNOSIS — H35.3132 INTERMEDIATE STAGE NONEXUDATIVE AGE-RELATED MACULAR DEGENERATION OF BOTH EYES: ICD-10-CM

## 2025-07-22 DIAGNOSIS — E22.2 SIADH (SYNDROME OF INAPPROPRIATE ADH PRODUCTION) (HCC): ICD-10-CM

## 2025-07-22 PROBLEM — R13.10 DYSPHAGIA: Status: RESOLVED | Noted: 2025-02-10 | Resolved: 2025-07-22

## 2025-07-22 LAB
ALBUMIN SERPL-MCNC: 4.5 G/DL (ref 3.2–4.8)
ALBUMIN/GLOB SERPL: 2.1 (ref 1–2)
ALP LIVER SERPL-CCNC: 71 U/L (ref 55–142)
ALT SERPL-CCNC: 19 U/L (ref 10–49)
ANION GAP SERPL CALC-SCNC: 10 MMOL/L (ref 0–18)
AST SERPL-CCNC: 24 U/L (ref ?–34)
BASOPHILS # BLD AUTO: 0.01 X10(3) UL (ref 0–0.2)
BASOPHILS NFR BLD AUTO: 0.2 %
BILIRUB SERPL-MCNC: 0.5 MG/DL (ref 0.2–1.1)
BUN BLD-MCNC: 7 MG/DL (ref 9–23)
BUN/CREAT SERPL: 7.6 (ref 10–20)
CALCIUM BLD-MCNC: 9.3 MG/DL (ref 8.7–10.4)
CHLORIDE SERPL-SCNC: 97 MMOL/L (ref 98–112)
CHOLEST SERPL-MCNC: 184 MG/DL (ref ?–200)
CO2 SERPL-SCNC: 25 MMOL/L (ref 21–32)
CREAT BLD-MCNC: 0.92 MG/DL (ref 0.55–1.02)
DEPRECATED RDW RBC AUTO: 45.5 FL (ref 35.1–46.3)
EGFRCR SERPLBLD CKD-EPI 2021: 61 ML/MIN/1.73M2 (ref 60–?)
EOSINOPHIL # BLD AUTO: 0.1 X10(3) UL (ref 0–0.7)
EOSINOPHIL NFR BLD AUTO: 2.1 %
ERYTHROCYTE [DISTWIDTH] IN BLOOD BY AUTOMATED COUNT: 13.6 % (ref 11–15)
EST. AVERAGE GLUCOSE BLD GHB EST-MCNC: 117 MG/DL (ref 68–126)
FASTING PATIENT LIPID ANSWER: YES
FASTING STATUS PATIENT QL REPORTED: YES
GLOBULIN PLAS-MCNC: 2.1 G/DL (ref 2–3.5)
GLUCOSE BLD-MCNC: 119 MG/DL (ref 70–99)
HBA1C MFR BLD: 5.7 % (ref ?–5.7)
HCT VFR BLD AUTO: 33.2 % (ref 35–48)
HDLC SERPL-MCNC: 52 MG/DL (ref 40–59)
HGB BLD-MCNC: 11.4 G/DL (ref 12–16)
IMM GRANULOCYTES # BLD AUTO: 0 X10(3) UL (ref 0–1)
IMM GRANULOCYTES NFR BLD: 0 %
LDLC SERPL CALC-MCNC: 93 MG/DL (ref ?–100)
LYMPHOCYTES # BLD AUTO: 1.33 X10(3) UL (ref 1–4)
LYMPHOCYTES NFR BLD AUTO: 28.2 %
MCH RBC QN AUTO: 31 PG (ref 26–34)
MCHC RBC AUTO-ENTMCNC: 34.3 G/DL (ref 31–37)
MCV RBC AUTO: 90.2 FL (ref 80–100)
MONOCYTES # BLD AUTO: 0.41 X10(3) UL (ref 0.1–1)
MONOCYTES NFR BLD AUTO: 8.7 %
NEUTROPHILS # BLD AUTO: 2.87 X10 (3) UL (ref 1.5–7.7)
NEUTROPHILS # BLD AUTO: 2.87 X10(3) UL (ref 1.5–7.7)
NEUTROPHILS NFR BLD AUTO: 60.8 %
NONHDLC SERPL-MCNC: 132 MG/DL (ref ?–130)
OSMOLALITY SERPL CALC.SUM OF ELEC: 273 MOSM/KG (ref 275–295)
PLATELET # BLD AUTO: 235 10(3)UL (ref 150–450)
POTASSIUM SERPL-SCNC: 4.3 MMOL/L (ref 3.5–5.1)
PROT SERPL-MCNC: 6.6 G/DL (ref 5.7–8.2)
RBC # BLD AUTO: 3.68 X10(6)UL (ref 3.8–5.3)
SODIUM SERPL-SCNC: 132 MMOL/L (ref 136–145)
TRIGL SERPL-MCNC: 232 MG/DL (ref 30–149)
TSI SER-ACNC: 1.59 UIU/ML (ref 0.55–4.78)
VLDLC SERPL CALC-MCNC: 38 MG/DL (ref 0–30)
WBC # BLD AUTO: 4.7 X10(3) UL (ref 4–11)

## 2025-07-22 PROCEDURE — 96160 PT-FOCUSED HLTH RISK ASSMT: CPT | Performed by: FAMILY MEDICINE

## 2025-07-22 PROCEDURE — 1170F FXNL STATUS ASSESSED: CPT | Performed by: FAMILY MEDICINE

## 2025-07-22 PROCEDURE — 1160F RVW MEDS BY RX/DR IN RCRD: CPT | Performed by: FAMILY MEDICINE

## 2025-07-22 PROCEDURE — 80053 COMPREHEN METABOLIC PANEL: CPT

## 2025-07-22 PROCEDURE — 83036 HEMOGLOBIN GLYCOSYLATED A1C: CPT

## 2025-07-22 PROCEDURE — 36415 COLL VENOUS BLD VENIPUNCTURE: CPT

## 2025-07-22 PROCEDURE — 1125F AMNT PAIN NOTED PAIN PRSNT: CPT | Performed by: FAMILY MEDICINE

## 2025-07-22 PROCEDURE — 1159F MED LIST DOCD IN RCRD: CPT | Performed by: FAMILY MEDICINE

## 2025-07-22 PROCEDURE — 85025 COMPLETE CBC W/AUTO DIFF WBC: CPT

## 2025-07-22 PROCEDURE — 99499 UNLISTED E&M SERVICE: CPT | Performed by: FAMILY MEDICINE

## 2025-07-22 PROCEDURE — G0439 PPPS, SUBSEQ VISIT: HCPCS | Performed by: FAMILY MEDICINE

## 2025-07-22 PROCEDURE — 80061 LIPID PANEL: CPT

## 2025-07-22 PROCEDURE — 84443 ASSAY THYROID STIM HORMONE: CPT

## 2025-07-22 NOTE — PATIENT INSTRUCTIONS
PLEASE OFFICE NOTE FAX RESULTS -[phone], ATTENTION DR. BARRERA    POR FAVOR, NOTA DE LA OFICINA ENVÍE LOS RESULTADOS POR FAX -[phone], ATENCIÓN DR. BETTENCOURT

## 2025-07-22 NOTE — PROGRESS NOTES
Subjective:   Mercy Nieves is a 85 year old female who presents for a MA AHA (Medicare Advantage Annual Health Assessment) and Subsequent Annual Wellness visit (Pt already had Initial Annual Wellness) and scheduled follow up of multiple significant but stable problems.               History/Other:   Fall Risk Assessment:   She has been screened for Falls and is High Risk. Fall Prevention information provided to patient in After Visit Summary.    Do you feel unsteady when standing or walking?: Yes  Do you worry about falling?: Yes  Have you fallen in the past year?: No     Cognitive Assessment:   Abnormal  What day of the week is this?: Correct  What month is it?: Correct  What year is it?: Correct  Recall \"Ball\": Correct  Recall \"Flag\": Incorrect  Recall \"Tree\": Correct    Functional Ability/Status:   Mercy Nieves has some abnormal functions as listed below:  She has Dressing and/or Bathing issues based on screening of functional status.  Difficulty dressing or bathing?: No  Bathing or Showering: Able without help  Dressing: Need some help  She has Toileting difficulties based on screening of functional status.She has Driving difficulties based on screening of functional status. She has Meal Preparation difficulties based on screening of functional status.She has difficulties Managing Money/Bills based on screening of functional status.She has difficulties Shopping for Groceries based on screening of functional status. She has difficulties Taking Meds as Rx'd based on screening of functional status. She has Hearing problems based on screening of functional status.She has Vision problems based on screening of functional status. She has Walking problems based on screening of functional status. She has problems with Daily Activities based on screening of functional status. She has problems with Memory based on screening of functional status.       Depression Screening (PHQ):  PHQ-2 SCORE: 1  , done 7/22/2025    Little interest or pleasure in doing things: 1              Advanced Directives:   She does have a Living Will but we do NOT have it on file in Epic.    She does NOT have a Power of  for Health Care. [Do you have a healthcare power of ?: No]  Discussed Advance Care Planning with patient (and family/surrogate if present). Standard forms made available to patient in After Visit Summary.      Problem List[1]  Allergies:  She is allergic to zocor [simvastatin] and sulfamethoxazole w/trimethoprim.    Current Medications:  Active Meds, Sig Only[2]    Medical History:  She  has a past medical history of Anemia, Arthritis, Cholelithiasis, CKD (chronic kidney disease) stage 3, GFR 30-59 ml/min ([facility]), Coronary atherosclerosis, Deep vein thrombosis ([facility]), Depression, Diabetes ([facility]), Gastritis, GERD (gastroesophageal reflux disease), Hearing impaired person, bilateral, Heart attack ([facility]), High blood pressure, High cholesterol, History of blood transfusion, Incontinence, Muscle weakness, Non-ST elevation MI (NSTEMI) ([facility]) (2016), Pregnancy ([facility]), Problems with swallowing, Renal disorder, Special screening for osteoporosis (2013), Stress incontinence, Type II or unspecified type diabetes mellitus without mention of complication, not stated as uncontrolled (), and Visual impairment.  Surgical History:  She  has a past surgical history that includes cholecystectomy; electrocardiogram, complete (2013); anesth,angioplasty; colonoscopy (); egd (); ; colonoscopy (N/A, 2019); cath drug eluting stent; egd (N/A, 02/10/2025); and upper gi endoscopy,exam.   Family History:  Her family history includes Diabetes in her brother and father; Diabetes (age of onset: 64) in her mother; Other (age of onset: 54) in her brother; Other (age of onset: 62) in her father; Other (age of onset: 64) in her brother.  Social History:  She  reports that she has never smoked. She has never been exposed  to tobacco smoke. She has never used smokeless tobacco. She reports that she does not drink alcohol and does not use drugs.    Tobacco:  She has never smoked tobacco.    CAGE Alcohol Screen:   CAGE screening score of 0 on 7/22/2025, showing low risk of alcohol abuse.      Patient Care Team:  Luann Chung MD as PCP - General (Family Medicine)  Mireya Ramachandran, PT as Physical Therapist (Physical Therapy)  Gayatri Kunz, PT as Physical Therapist (Physical Therapy)  Steven Reeves MD (Interventional, Cardiology)  Bryon Burger MD (Hematology and Oncology)    Review of Systems     Negative except per hpi     Objective:   Physical Exam  General appearance: alert  HEENT: Normocephalic, without obvious abnormality, atraumatic. PERRL, EOMI, pink conjunctiva.   Neck: supple, symmetrical, trachea midline, no adenopathy, no JVD and thyroid not enlarged,  Lungs: respirations unlabored, clear to auscultation bilaterally  Heart: regular rate and rhythm, S1, S2 normal, no murmur  Abdomen: normoactive bowel sounds x4; soft, non-distended; nontender; no masses  Extremities: extremities normal, atraumatic, no cyanosis or edema; no erythema or tenderness in calves bilaterally  Neurologic: alert, oriented x3      BP (!) 191/75   Pulse 54   Wt 109 lb (49.4 kg)   BMI 24.44 kg/m²  Estimated body mass index is 24.44 kg/m² as calculated from the following:    Height as of 7/21/25: 4' 8\" (1.422 m).    Weight as of this encounter: 109 lb (49.4 kg).    Medicare Hearing Assessment:   Hearing Screening    Time taken: 7/22/2025 10:22 AM  Screening Method: Questionnaire  I have a problem hearing over the telephone: Yes I have trouble following the conversations when two or more people are talking at the same time: Yes   I have trouble understanding things on the TV: Yes I have to strain to understand conversations: Yes   I have to worry about missing the telephone ring or doorbell: Yes I have trouble hearing conversations in a  noisy background such as a crowded room or restaurant: Yes   I get confused about where sounds come from: Yes I misunderstand some words in a sentence and need to ask people to repeat themselves: Yes   I especially have trouble understanding the speech of women and children: Yes I have trouble understanding the speaker in a large room such as at a meeting or place of Yazidi: Yes   Many people I talk to seem to mumble (or don't speak clearly): Yes People get annoyed because I misunderstand what they say: Yes   I misunderstand what others are saying and make inappropriate responses: Yes I avoid social activities because I cannot hear well and fear I will reply improperly: No   Family members and friends have told me they think I may have hearing loss: Yes                   Assessment & Plan:   Mercy Nieves is a 85 year old female who presents for a Medicare Assessment.     1. Encounter for annual health examination  -Medical, surgical and social history, as well as medications and allergies were reviewed with patient.  - CBC With Differential With Platelet; Future  - Comp Metabolic Panel (14); Future  - Hemoglobin A1C; Future  - Lipid Panel; Future  - TSH W Reflex To Free T4; Future    2. Atherosclerosis of aorta  - Stable condition, continue present management.      3. Coronary artery disease of native heart with stable angina pectoris, unspecified vessel or lesion type  - Stable condition, continue present management.      4. Diabetes mellitus type 2 without retinopathy (HCC)  - Stable condition, continue present management.      5. Mild episode of recurrent major depressive disorder  - Stable condition, continue present management.      6. Peripheral vascular disease  - Stable condition, continue present management.      7. SIADH (syndrome of inappropriate ADH production) (HCC)  - Stable condition, continue present management.      8. Type 2 diabetes mellitus with stage 3b chronic kidney disease, without  long-term current use of insulin (HCC)  - Stable condition, continue present management.      9. Bilateral carotid bruits  - Stable condition, continue present management.      10. Essential hypertension  - Stable condition, continue present management.      11. Murmur  - Stable condition, continue present management.      12. Osteopenia of multiple sites  - Stable condition, continue present management.      13. Vitamin D deficiency  - Stable condition, continue present management.      14. Esophageal stricture  - Stable condition, continue present management.      15. GAVE (gastric antral vascular ectasia)  - Stable condition, continue present management.      16. Impaired intestinal absorption (HCC)  - Stable condition, continue present management.      17. Chronic pain of right ankle  - Stable condition, continue present management.      18. Pain in right lower leg  - Stable condition, continue present management.      19. Other iron deficiency anemia  - Stable condition, continue present management.      20. Sensorineural hearing loss (SNHL) of both ears  - Stable condition, continue present management.      21. Age-related nuclear cataract of both eyes  - Stable condition, continue present management.      22. Degenerative drusen of both eyes  - Stable condition, continue present management.      23. Dry eye syndrome of both eyes  - Stable condition, continue present management.      24. Intermediate stage nonexudative age-related macular degeneration of both eyes  - Stable condition, continue present management.      25. Meibomian gland dysfunction (MGD) of both eyes, unspecified eyelid  - Stable condition, continue present management.      26. Presbyopia of both eyes  - Stable condition, continue present management.              The patient indicates understanding of these issues and agrees to the plan.  Reinforced healthy diet, lifestyle, and exercise.      No follow-ups on file.     Luann Chung MD,  7/22/2025     Supplementary Documentation:   General Health:  In the past six months, have you lost more than 10 pounds without trying?: 1 - Yes  Has your appetite been poor?: Yes  Type of Diet: Balanced, Low Salt  How does the patient maintain a good energy level?: Stretching  How would you describe your daily physical activity?: Light  How would you describe your current health state?: Fair  How do you maintain positive mental well-being?: Visiting Family  On a scale of 0 to 10, with 0 being no pain and 10 being severe pain, what is your pain level?: 6 - (Moderate)  In the past six months, have you experienced urine leakage?: 0-No  At any time do you feel concerned for the safety/well-being of yourself and/or your children, in your home or elsewhere?: No  Have you had any immunizations at another office such as Influenza, Hepatitis B, Tetanus, or Pneumococcal?: Yes    Health Maintenance   Topic Date Due    Diabetes Care Foot Exam  Never done    Colorectal Cancer Screening  03/11/2024    Annual Well Visit  01/01/2025    COVID-19 Vaccine (7 - 2024-25 season) 03/08/2025    Diabetes Care A1C  08/04/2025    Diabetes Care Dilated Eye Exam  10/04/2025    HTN: BP Follow-Up  08/22/2025    Influenza Vaccine (1) 10/01/2025    Diabetes Care: GFR  02/04/2026    DEXA Scan  Completed    Annual Depression Screening  Completed    Fall Risk Screening (Annual)  Completed    Diabetes Care: Microalb/Creat Ratio (Annual)  Completed    Pneumococcal Vaccine: 50+ Years  Completed    Zoster Vaccines  Completed    Meningococcal B Vaccine  Aged Out            [1]   Patient Active Problem List  Diagnosis    Age-related nuclear cataract of both eyes    Diabetes mellitus type 2 without retinopathy (HCC)    Anemia, iron deficiency    Essential hypertension    Murmur    Atherosclerosis of aorta    Peripheral vascular disease    Coronary artery disease of native heart with stable angina pectoris    Impaired intestinal absorption (HCC)    Bilateral  carotid bruits    Meibomian gland dysfunction (MGD) of both eyes    Osteopenia of multiple sites    Vitamin D deficiency    Sensorineural hearing loss (SNHL) of both ears    SIADH (syndrome of inappropriate ADH production) ([facility])    GAVE (gastric antral vascular ectasia)    Type 2 diabetes mellitus with diabetic chronic kidney disease ([facility])    Pain in right lower leg    Chronic pain of right ankle    Mild episode of recurrent major depressive disorder    Degenerative drusen of both eyes    Dry eye syndrome of both eyes    Intermediate stage nonexudative age-related macular degeneration of both eyes    Presbyopia of both eyes    Esophageal stricture   [2]   Outpatient Medications Marked as Taking for the 7/22/25 encounter (Office Visit) with Luann Chung MD   Medication Sig    sucralfate 1 g Oral Tab Take 1 tablet (1 g total) by mouth 3 (three) times daily before meals.    pantoprazole 40 MG Oral Tab EC Take 1 tablet (40 mg total) by mouth before breakfast.    hydroCHLOROthiazide 12.5 MG Oral Tab Take 1 tablet (12.5 mg total) by mouth daily.    CREON 43607-55196 units Oral Cap DR Particles Take 1 capsule by mouth 3 (three) times daily with meals.    telmisartan 80 MG Oral Tab Take 1 tablet (80 mg total) by mouth daily.    folic acid 1 MG Oral Tab Take 1 tablet (1 mg total) by mouth daily.    clopidogrel 75 MG Oral Tab Take 1 tablet (75 mg total) by mouth daily.    oxybutynin ER 10 MG Oral Tablet 24 Hr Take 1 tablet (10 mg total) by mouth daily.    atorvastatin 20 MG Oral Tab Take 1 tablet (20 mg total) by mouth daily.    SITagliptin Phosphate (JANUVIA) 100 MG Oral Tab Take 1 tablet (100 mg total) by mouth daily.    metoprolol succinate ER 25 MG Oral Tablet 24 Hr Take 1 tablet (25 mg total) by mouth in the morning and 1 tablet (25 mg total) before bedtime. (Patient taking differently: Take 1 tablet (25 mg total) by mouth in the morning.)    B Complex Vitamins (VITAMIN B COMPLEX) Oral Tab Take 1 tablet by mouth  once daily.    ferrous sulfate 325 (65 FE) MG Oral Tab EC Take 1 tablet (325 mg total) by mouth in the morning.    aspirin 81 MG Oral Tab Take 1 tablet (81 mg total) by mouth in the morning.    Milk Thistle Extract 175 MG Oral Tab Take 1 tablet by mouth in the morning.

## 2025-07-28 ENCOUNTER — OFFICE VISIT (OUTPATIENT)
Facility: LOCATION | Age: 85
End: 2025-07-28
Attending: INTERNAL MEDICINE
Payer: MEDICARE

## 2025-07-28 VITALS
RESPIRATION RATE: 16 BRPM | DIASTOLIC BLOOD PRESSURE: 74 MMHG | OXYGEN SATURATION: 99 % | HEART RATE: 51 BPM | TEMPERATURE: 98 F | SYSTOLIC BLOOD PRESSURE: 183 MMHG

## 2025-07-28 DIAGNOSIS — D50.9 IRON DEFICIENCY ANEMIA, UNSPECIFIED IRON DEFICIENCY ANEMIA TYPE: Primary | ICD-10-CM

## 2025-07-28 DIAGNOSIS — K90.9: ICD-10-CM

## 2025-07-28 NOTE — PROGRESS NOTES
Mercy to infusion today for Feraheme (dose 2 of 2). Pt denies any issues or concerns.      Ordering Provider: Tao Carolina Exp: 8/15/25     Pt tolerated infusion without difficulty or complaint. Reviewed next apt date/time: 10/3 for labs + MD f/u visit      Education Record  Learner:  Patient  Disease / Diagnosis: SVITLANA  Barriers / Limitations:  None  Method:  Reinforcement  General Topics:  Plan of care reviewed  Outcome:  Shows understanding

## 2025-08-12 DIAGNOSIS — E78.5 HYPERLIPIDEMIA, UNSPECIFIED HYPERLIPIDEMIA TYPE: ICD-10-CM

## 2025-08-12 DIAGNOSIS — E11.9 DIABETES MELLITUS TYPE 2 WITHOUT RETINOPATHY (HCC): ICD-10-CM

## 2025-08-13 DIAGNOSIS — I10 ESSENTIAL HYPERTENSION: ICD-10-CM

## 2025-08-14 ENCOUNTER — APPOINTMENT (OUTPATIENT)
Dept: PICC SERVICES | Facility: HOSPITAL | Age: 85
End: 2025-08-14
Attending: INTERNAL MEDICINE

## 2025-08-14 ENCOUNTER — HOSPITAL ENCOUNTER (OUTPATIENT)
Facility: HOSPITAL | Age: 85
Setting detail: HOSPITAL OUTPATIENT SURGERY
Discharge: HOME | End: 2025-08-14
Attending: INTERNAL MEDICINE | Admitting: INTERNAL MEDICINE

## 2025-08-14 ENCOUNTER — ANESTHESIA (OUTPATIENT)
Dept: ENDOSCOPY | Facility: HOSPITAL | Age: 85
End: 2025-08-14

## 2025-08-14 ENCOUNTER — ANESTHESIA EVENT (OUTPATIENT)
Dept: ENDOSCOPY | Facility: HOSPITAL | Age: 85
End: 2025-08-14

## 2025-08-14 VITALS
HEIGHT: 56 IN | HEART RATE: 56 BPM | OXYGEN SATURATION: 98 % | WEIGHT: 113 LBS | DIASTOLIC BLOOD PRESSURE: 48 MMHG | SYSTOLIC BLOOD PRESSURE: 178 MMHG | RESPIRATION RATE: 15 BRPM | BODY MASS INDEX: 25.42 KG/M2

## 2025-08-14 LAB — GLUCOSE BLDC GLUCOMTR-MCNC: 109 MG/DL (ref 70–99)

## 2025-08-14 PROCEDURE — 43249 ESOPH EGD DILATION <30 MM: CPT | Performed by: INTERNAL MEDICINE

## 2025-08-14 RX ORDER — SODIUM CHLORIDE, SODIUM LACTATE, POTASSIUM CHLORIDE, CALCIUM CHLORIDE 600; 310; 30; 20 MG/100ML; MG/100ML; MG/100ML; MG/100ML
INJECTION, SOLUTION INTRAVENOUS CONTINUOUS
Status: DISCONTINUED | OUTPATIENT
Start: 2025-08-14 | End: 2025-08-14

## 2025-08-14 RX ORDER — OXYBUTYNIN CHLORIDE 10 MG/1
10 TABLET, EXTENDED RELEASE ORAL DAILY
Qty: 90 TABLET | Refills: 3 | Status: SHIPPED | OUTPATIENT
Start: 2025-08-14

## 2025-08-14 RX ORDER — LIDOCAINE HYDROCHLORIDE 10 MG/ML
INJECTION, SOLUTION EPIDURAL; INFILTRATION; INTRACAUDAL; PERINEURAL AS NEEDED
Status: DISCONTINUED | OUTPATIENT
Start: 2025-08-14 | End: 2025-08-14 | Stop reason: SURG

## 2025-08-14 RX ADMIN — SODIUM CHLORIDE, SODIUM LACTATE, POTASSIUM CHLORIDE, CALCIUM CHLORIDE: 600; 310; 30; 20 INJECTION, SOLUTION INTRAVENOUS at 14:44:00

## 2025-08-14 RX ADMIN — SODIUM CHLORIDE, SODIUM LACTATE, POTASSIUM CHLORIDE, CALCIUM CHLORIDE: 600; 310; 30; 20 INJECTION, SOLUTION INTRAVENOUS at 15:02:00

## 2025-08-14 RX ADMIN — LIDOCAINE HYDROCHLORIDE 50 MG: 10 INJECTION, SOLUTION EPIDURAL; INFILTRATION; INTRACAUDAL; PERINEURAL at 14:48:00

## 2025-08-15 RX ORDER — CLOPIDOGREL BISULFATE 75 MG/1
75 TABLET ORAL DAILY
Qty: 90 TABLET | Refills: 3 | Status: SHIPPED | OUTPATIENT
Start: 2025-08-15

## 2025-08-15 RX ORDER — ATORVASTATIN CALCIUM 20 MG/1
20 TABLET, FILM COATED ORAL DAILY
Qty: 90 TABLET | Refills: 3 | Status: SHIPPED | OUTPATIENT
Start: 2025-08-15

## 2025-08-15 RX ORDER — METOPROLOL SUCCINATE 25 MG/1
25 TABLET, EXTENDED RELEASE ORAL DAILY
Qty: 90 TABLET | Refills: 3 | Status: SHIPPED | OUTPATIENT
Start: 2025-08-15

## 2025-08-21 RX ORDER — PANCRELIPASE 24000; 76000; 120000 [USP'U]/1; [USP'U]/1; [USP'U]/1
1 CAPSULE, DELAYED RELEASE PELLETS ORAL 2 TIMES DAILY
Qty: 180 CAPSULE | Refills: 0 | Status: SHIPPED | OUTPATIENT
Start: 2025-08-21

## (undated) DIAGNOSIS — I10 ESSENTIAL HYPERTENSION: ICD-10-CM

## (undated) DIAGNOSIS — E78.5 HYPERLIPIDEMIA, UNSPECIFIED HYPERLIPIDEMIA TYPE: ICD-10-CM

## (undated) DIAGNOSIS — D63.8 ANEMIA, CHRONIC DISEASE: ICD-10-CM

## (undated) DIAGNOSIS — D50.8 OTHER IRON DEFICIENCY ANEMIA: Primary | ICD-10-CM

## (undated) DIAGNOSIS — I10 ESSENTIAL HYPERTENSION: Primary | ICD-10-CM

## (undated) DIAGNOSIS — M79.604 RIGHT LEG PAIN: Primary | ICD-10-CM

## (undated) DEVICE — GIJAW SINGLE-USE BIOPSY FORCEPS WITH NEEDLE: Brand: GIJAW

## (undated) DEVICE — KIT ENDO ORCAPOD 160/180/190

## (undated) DEVICE — MEDI-VAC NON-CONDUCTIVE SUCTION TUBING: Brand: CARDINAL HEALTH

## (undated) DEVICE — CONMED SCOPE SAVER BITE BLOCK, 20X27 MM: Brand: SCOPE SAVER

## (undated) DEVICE — DEVICE INFL 60ML 15ATM PRSS COOK SPHR

## (undated) DEVICE — REM POLYHESIVE ADULT PATIENT RETURN ELECTRODE: Brand: VALLEYLAB

## (undated) DEVICE — MEDI-VAC NON-CONDUCTIVE SUCTION TUBING 6MM X 1.8M (6FT.) L: Brand: CARDINAL HEALTH

## (undated) DEVICE — Device

## (undated) DEVICE — 60 ML SYRINGE REGULAR TIP: Brand: MONOJECT

## (undated) DEVICE — LINE MNTR ADLT SET O2 INTMD

## (undated) DEVICE — Device: Brand: DEFENDO AIR/WATER/SUCTION AND BIOPSY VALVE

## (undated) DEVICE — 35 ML SYRINGE REGULAR TIP: Brand: MONOJECT

## (undated) DEVICE — Device: Brand: DUAL NARE NASAL CANNULAE FEMALE LUER CON 7FT O2 TUBE

## (undated) DEVICE — KIT CLEAN ENDOKIT 1.1OZ GOWNX2

## (undated) DEVICE — BLOCK BITE LG LUMN 20X27MM GRN DISP

## (undated) DEVICE — YANKAUER,BULB TIP,W/O VENT,RIGID,STERILE: Brand: MEDLINE

## (undated) DEVICE — HERCULES 3 STAGE BALLOON ESOPHAGEAL: Brand: HERCULES

## (undated) DEVICE — Device: Brand: CUSTOM PROCEDURE KIT

## (undated) DEVICE — CAPSULE ENDO PILL CAM SB3

## (undated) DEVICE — FORCEP RADIAL JAW 4

## (undated) DEVICE — V2 SPECIMEN COLLECTION MANIFOLD KIT: Brand: NEPTUNE

## (undated) DEVICE — SYRINGE, LUER SLIP, STERILE, 60ML: Brand: MEDLINE

## (undated) DEVICE — FIAPC® PROBE W/ FILTER 2200 SC OD 2.3MM/6.9FR; L 2.2M/7.2FT: Brand: ERBE

## (undated) DEVICE — ENDOSCOPY PACK UPPER: Brand: MEDLINE INDUSTRIES, INC.

## (undated) DEVICE — CO2 CANNULA,SSOFT,ADLT,7O2,4CO2,FEMALE: Brand: MEDLINE

## (undated) DEVICE — KIT MFLD FOR SPEC COLL

## (undated) DEVICE — YANKAUER SUCTION INSTRUMENT NO CONTROL VENT, BULB TIP, CLEAR: Brand: YANKAUER

## (undated) DEVICE — FORCEPS BX L240CM DIA2.4MM L NDL RAD JAW 4

## (undated) DEVICE — TUBING SCT CLR 6FT .25IN MDVC

## (undated) NOTE — LETTER
[facility]   Administracion de Anestesia  Yo, Mercy Nieves, acepto ser atendido por un anestesiólogo, quien está especialmente capacitado para monitorearme y darme medicamento para hacerme dormir o mantenerme cómodo martha mi procedimiento.   Entiendo que mi anestesiólogo no es un empleado o agente de [facility]. Él o donald trabaja para [facility]  Ronny el paciente que solicita los servicios de anestesia, estoy de acuerdo con lo siguiente:  Permitir al anestesiólogo (médico de anestesia) que me suministre el medicamento y hacer los procedimientos adicionales que marine necesarios. Algunos ejemplos son: Al iniciar o utilizar karli “IV” para suministrarme medicamentos, fluidos o cee martha mi procedimiento, y colocarme karli sonda de respiración, me ayudará a respirar cuando esté dormido (intubación). En el tej de que mi corazón deje de funcionar correctamente, entiendo que mi anestesiólogo hará todo lo posible para salvar mi satish, a menos que los documentos de No resucitar de [facility] lo indiquen de otra manera.  Informar a mi anestesista antes del procedimiento:  Si estoy embarazada.  La última vez que comí o bebí algo.  Todos los medicamentos que kisha (incluyendo medicamentos recetados, suplementos herbales y pastillas que puedo comprar sin receta médica (incluyendo drogas en la roy [medicamentos ilegales]). No informar a mi anestesiólogo acerca de estos medicamentos puede aumentar mi riesgo de complicaciones con la anestesia.  Si soy alérgico a cualquier cosa o he tenido previamente karli reacción adversa a la anestesia.  Entiendo cómo la anestesia me ayudará (beneficios).  Entiendo que con cualquier tipo de anestesia hay riesgos:  Los riesgos más comunes son: náuseas, vómitos, dolor de garganta, dolor muscular, daño a los ojos, la boca o los dientes (por la colocación de la sonda de respiración).  Los riesgos poco comunes incluyen: recordar  lo que sucedió martha mi procedimiento, reacciones alérgicas a los medicamentos, lesiones en las vías respiratorias, el corazón, los pulmones, la visión, los nervios o músculos, y en casos sumamente raros, la muerte.  Mii médico me ha explicado otras opciones de atención disponibles para mí (alternativas).  Pacientes embarazadas (“epidural”):    Entiendo que los riesgos de recibir karli inyección epidural (medicamento que se aplica en la espalda para ayudar a controlar el dolor martha el parto), incluyen picazón, presión arterial baja, dificultad para orinar, dolor de jo ann o disminución en el ritmo del corazón del bebé. Los riesgos muy poco comunes incluyen infección, hemorragia, convulsiones, ritmo cardíaco irregular y lesión del nervio.  Anestesia regional (“columna vertebral”, “epidural” y “bloqueo de los nervios”):  Entiendo que hay complicaciones poco frecuentes emilee posibles, e incluyen dolor de jo ann, sangrado, infección, convulsiones, ritmo cardíaco irregular y la lesión del nervio.  .   Puedo cambiar de opinión acerca de recibir servicios de anestesia en cualquier momento antes de que se me administre el medicamento.         Paciente (o representante) Firma/Relación con el paciente  Fecha  Hora  Patient Signature/Signature of Responsible person Date Time           Nombre del intérprete (en armstrong tej)  Idioma/Organización  Hora  Name of  Language/Organization Time          Firma del anestesiólogo Fecha  Hora  Signature of anesthesiologist Date Time    He hablado del procedimiento y la información anterior con el paciente (o el representante del paciente) y respondí celeste preguntas. El paciente o armstrong representante ha aceptado recibir los servicios de anestesia.         Testigo Ulyssesha  Hora  Witness Date Time  He verificado que la firma es la del paciente o del representante del paciente, y que se firmó antes del procedimiento.    Nombre del paciente: Mercy Nieves Fec. de Nac.: 3/28/1940                  En letra de imprenta: October 16, 2024  Expediente médico No. [id number]                         Página 1 de 2  ----------ANESTHESIA CONSENT----------

## (undated) NOTE — LETTER
10/24/2017              [place] Gonz[address]les [facility] [id number]         Dear Autumn Turner,    This letter is to inform you that our office has made several attempts to reach you by phone without success.   We were attempting to conta

## (undated) NOTE — LETTER
7/27/2021              [address] [place] [id number]         Dear Evens Ayoub,    This letter is to inform you that our office has made several attempts to reach you by phone without success.   We were attempting to contac

## (undated) NOTE — LETTER
Patient Name: Mercy Nieves : 3/28/1940  Medical Record #: [id number] Printed: 10/16/2024  Page 1 of 2                                          [facility]  [address], [place], [place]  Autorización para operación y procedimiento quirúrgico                                                                                                      Por la presente, autorizo a Thomas Clement MD, mi médico y al asistente a realizar la operación/procedimiento quirúrgico a continuación, así franchesca a administrar la anestesia que determine necesaria mi médico Nombre (s) de la operación/procedimiento: ESOPHAGOGASTRODUODENOSCOPY en Mercy EASTMAN Nieves     Reconozco que martha la operación/procedimiento quirúrgico, las condiciones imprevistas pueden requerir de procedimientos adicionales o diferentes a aquellos mencionados anteriormente.  Por lo tanto, autorizo y solicito además que el cirujano antes mencionado, los asistentes o las personas designadas realicen los procedimientos que, a armstrong juicio, marine necesarios y convenientes.    Mi cirujano/médico holbrook discutido antes de mi cirugía los posibles beneficios, riesgos y efectos secundarios de wilmer procedimiento, la probabilidad de alcanzar las metas y los posibles problemas que puedan ocurrir martha la recuperación.  Ellos también berry discutido las alternativas razonables al procedimiento, incluso los riesgos, beneficios y efectos secundarios relacionados con las alternativas y los riesgos relacionados con no realizar wilmer procedimiento.  Berry respondido a todas mis preguntas y confirmo que no se ha dado ninguna garantía en cuanto a los resultados que pueda obtener.    En tej de que surja la necesidad martha mi operación o martha el periodo postoperatorio, también autorizo se aplique cee y/o productos sanguíneos.  Asimismo, entiendo que a pesar de las cuidadosas pruebas y análisis de cee o de los productos sanguíneos que realizan las entidades  recolectoras, todavía puedo estar sujeto a efectos adversos franchesca resultado de recibir karli transfusión de cee y/o productos sanguíneos.  A continuación se mencionan algunos, aunque no todos, los riesgos potenciales que pueden ocurrir: fiebre y reacciones alérgicas, reacciones hemolíticas, trasmisión de enfermedades franchesca hepatitis, SIDA y citomegalovirus (CMV), así franchesca sobrecarga de líquidos.   En tej de que desee tener karli transfusión autóloga de mi propia cee o karli transfusión de un donante dirigido.  Lo discutiré con mi médico.  Check only if Refusing Blood or Blood Products  I understand refusal of blood or blood products as deemed necessary by my physician may have serious consequences to my condition to include possible death. I hereby assume responsibility for my refusal and release the hospital, its personnel, and my physicians from any responsibility for the consequences of my refusal.           o  Refuse       Autorizo el uso de cualquier muestra, órgano, tejido, parte del cuerpo u objeto extraño que pueda ser extraído de mi cuerpo martha la operación/procedimiento para fines de diagnóstico, investigación o de enseñanza y armstrong desecho posterior por las autoridades del hospital.  También, autorizo la revelación de los resultados de las pruebas de muestras y/o los informes escritos al médico tratante franchesca personal médico del hospital u otros médicos de referencia o consulta involucrados en mi atención, a discreción del patólogo o de mi médico tratante.    Doy consentimiento para que se fotografíen o graben vídeos de las operaciones o procedimientos a realizarse, incluidas las partes de mi cuerpo que marine adecuadas para propósitos médicos, científicos o educativos, en el entendido de que, mi identidad no sea revelada por las fotografías o por textos descriptivos que las acompañen.  Si el procedimiento es fotografiado o grabado en vídeo, el cirujano obtendrá la imagen, cinta de vídeo o CD original.  El  hospital no se hará responsable por el almacenamiento, la revelación o el mantenimiento de la imagen, cinta o CD.    Autorizo la presencia de un especialista de producto u observadores en el quirófano, según lo considere necesario mi médico o las personas que éste designe.     Reconozco que en tej de que mi procedimiento resulte en un tiempo prolongado de radiografía/fluoroscopia, puedo desarrollar karli reacción en la piel.    Si tengo karli orden de No intentar la reanimación (ALTHEA), nayan estado se suspenderá mientras esté en el quirófano, la turner de procedimientos y martha el periodo de recuperación a menos que yo indique lo contrario explícitamente (o karli persona autorizada a chula el consentimiento en mi nombre). El cirujano o mi médico tratante determinarán cuándo termina el periodo de recuperación aplicable a los efectos de restablecer la orden de ALTHEA.  Pacientes que se realizan un procedimiento de esterilización: Entiendo que si el procedimiento tiene éxito, los resultados serán permanentes y que, por lo tanto, me será imposible inseminar, concebir o tener hijos.  Asimismo, entiendo que el procedimiento tiene franchesca propósito la esterilidad, aunque el resultado no está garantizado.   Admito que mi médico me ha explicado la aplicación de sedación/analgesia, incluidos los riesgos y beneficios y, consiento a la administración de sedación/analgesia conforme sea necesario o conveniente a juicio de mi médico.      CERTIFICO QUE HE LEÍDO Y COMPRENDIDO EL CONSENTIMIENTO ANTERIOR PARA LA OPERACIÓN y/o PROCEDIMIENTO.             ______________________________________  _______________________________  Firma del paciente      Firma de la persona responsible  Signature of patient      Signature of responsible person                        ___________________________________                                   Nombre en imprenta de la persona responsible         Name of responsible  person          ___________________________________                 Relación con el paciente         Relationship to patient    _________________________________________  ______________ ________________  Firma del testigo          Fecha / Date Hora / Time  Signature of witness    DECLARACÓN DEL MÉDICO Mediante mi firma al calce, afirmo que antes de la hora del procedimiento, he explicadoal paciente y/o a armstrong representante legal, los riesgos y beneficios involucrados en el tratamiento propuesto, así comocualquier alternativa razonable al tratamiento propuesto. También le(s) he explicado los riesgos y beneficios involucradosen el rechazo del tratarniento propuesto y alternativas al tratamiento propuesto, y he respondido a las preguntas del(la) paciente(My signature below affirms that prior to the time of the procedure, I have explained to the patient and/or his/her guardian, therisks and benefits involved in the proposed treatment and any reasonable alternative to the proposed treatment. I have alsoexplained the risks and benefits involved in refusal of the proposed treatment and have answered the patient's questions.)    ________________________________________   _________________________   _____________   (Firma del médico/Signature of Physician)                                    (Fecha/Date)                                             (Hora/Time)      Patient Name: Mercy RAIZA Nieves     : 3/28/1940                 Printed: 10/16/2024      Medical Record #: [id number]                                              Page 2 of 2

## (undated) NOTE — LETTER
December 6, 2018    Haritha Rosales DO  [address] [place] [id number]     Patient: Matilda Guzman   YOB: 1940   Date of Visit: 12/6/2018       Dear Dr. Riccardo Raman DO:    Thank you for referring Kirby Lizarraga to me for ev Problem Relation Age of Onset   • Diabetes Father    • Other (Other) Father 58   • Diabetes Mother 59        (cause of death)   • Diabetes Brother    • Other (Other) Brother 47   • Other (Other) Brother 59   • Glaucoma Neg    • Macular degeneration Neg Kit 1 kit by Does not apply route 2 (two) times daily. Disp: 1 kit Rfl: 0   Digestive Enzymes (ENZYME DIGEST OR) Take by mouth. Disp:  Rfl:    LORazepam 0.5 MG Oral Tab Take 0.5 mg by mouth every 4 (four) hours as needed for Anxiety.  Disp:  Rfl:    Blood P C/D Ratio 0.5 0.5    Macula Normal- no BDR Normal- no BDR    Vessels Normal Normal    Periphery Normal Normal            Refraction     Wearing Rx       Sphere Cylinder Axis Add    Right +1.00 +1.25 100 2.50    Left +0.75 +0.50 093 2.50    Type:  Progress

## (undated) NOTE — LETTER
[facility]   Administracion de Anestesia  Yo, Mercy Nieves, acepto ser atendido por un anestesiólogo, quien está especialmente capacitado para monitorearme y darme medicamento para hacerme dormir o mantenerme cómodo martha mi procedimiento.   Entiendo que mi anestesiólogo no es un empleado o agente de [facility]. Él o donald trabaja para [facility]  Ronny el paciente que solicita los servicios de anestesia, estoy de acuerdo con lo siguiente:  Permitir al anestesiólogo (médico de anestesia) que me suministre el medicamento y hacer los procedimientos adicionales que marine necesarios. Algunos ejemplos son: Al iniciar o utilizar karli “IV” para suministrarme medicamentos, fluidos o cee martha mi procedimiento, y colocarme karli sonda de respiración, me ayudará a respirar cuando esté dormido (intubación). En el tej de que mi corazón deje de funcionar correctamente, entiendo que mi anestesiólogo hará todo lo posible para salvar mi satish, a menos que los documentos de No resucitar de [facility] lo indiquen de otra manera.  Informar a mi anestesista antes del procedimiento:  Si estoy embarazada.  La última vez que comí o bebí algo.  Todos los medicamentos que kisha (incluyendo medicamentos recetados, suplementos herbales y pastillas que puedo comprar sin receta médica (incluyendo drogas en la roy [medicamentos ilegales]). No informar a mi anestesiólogo acerca de estos medicamentos puede aumentar mi riesgo de complicaciones con la anestesia.  Si soy alérgico a cualquier cosa o he tenido previamente karli reacción adversa a la anestesia.  Entiendo cómo la anestesia me ayudará (beneficios).  Entiendo que con cualquier tipo de anestesia hay riesgos:  Los riesgos más comunes son: náuseas, vómitos, dolor de garganta, dolor muscular, daño a los ojos, la boca o los dientes (por la colocación de la sonda de respiración).  Los riesgos poco comunes incluyen: recordar  lo que sucedió martha mi procedimiento, reacciones alérgicas a los medicamentos, lesiones en las vías respiratorias, el corazón, los pulmones, la visión, los nervios o músculos, y en casos sumamente raros, la muerte.  Mii médico me ha explicado otras opciones de atención disponibles para mí (alternativas).  Pacientes embarazadas (“epidural”):    Entiendo que los riesgos de recibir karli inyección epidural (medicamento que se aplica en la espalda para ayudar a controlar el dolor martha el parto), incluyen picazón, presión arterial baja, dificultad para orinar, dolor de jo ann o disminución en el ritmo del corazón del bebé. Los riesgos muy poco comunes incluyen infección, hemorragia, convulsiones, ritmo cardíaco irregular y lesión del nervio.  Anestesia regional (“columna vertebral”, “epidural” y “bloqueo de los nervios”):  Entiendo que hay complicaciones poco frecuentes emilee posibles, e incluyen dolor de jo ann, sangrado, infección, convulsiones, ritmo cardíaco irregular y la lesión del nervio.  .   Puedo cambiar de opinión acerca de recibir servicios de anestesia en cualquier momento antes de que se me administre el medicamento.         Paciente (o representante) Firma/Relación con el paciente  Fecha  Hora  Patient Signature/Signature of Responsible person Date Time           Nombre del intérprete (en armstrong tej)  Idioma/Organización  Hora  Name of  Language/Organization Time          Firma del anestesiólogo Fecha  Hora  Signature of anesthesiologist Date Time    He hablado del procedimiento y la información anterior con el paciente (o el representante del paciente) y respondí celeste preguntas. El paciente o armstrong representante ha aceptado recibir los servicios de anestesia.         Testigo Ulyssesha  Hora  Witness Date Time  He verificado que la firma es la del paciente o del representante del paciente, y que se firmó antes del procedimiento.    Nombre del paciente: Mercy Nieves Fec. de Nac.: 3/28/1940                  En letra de imprenta: June 6, 2025  Expediente médico No. [id number]                         Página 1 de 2  ----------ANESTHESIA CONSENT----------

## (undated) NOTE — LETTER
11/29/21        Ike Chopra  [facility] [address] [address]      Dear Mike Moulton,    [facility] records indicate that you have outstanding lab work and or testing that was ordered for you and has not yet been completed:  Orders Placed This Encounter

## (undated) NOTE — MR AVS SNAPSHOT
[facility]  [address] [place] [address], [address] [place] [address]  Brooks Hullman               Thank you for choosing us for your health care visit with Lily Brewster. DO Mulugeta.   We are glad to serve you and happy to provide you with this summary CUSTOM]  Order #:  [id number]         **REFERRAL REQUEST**    Your physician has referred you to a specialist.  Your physician or the clinic staff will provide you with the phone number you should call to schedule your appointment.      If you are confident Commonly known as:  PLAVIX           ENZYME DIGEST OR   Take by mouth.            Esomeprazole Magnesium 40 MG Cpdr   Commonly known as:  NEXIUM           Glucose Blood Strp   For blood glucose monitoring two times a day   Commonly known as:  TRUE METRIX BL

## (undated) NOTE — LETTER
[facility]  [address], [place], [place]    Authorization for Surgical Operation and Procedure                               I hereby authorize Thomas Clement MD, my physician and his/her assistants (if applicable), which may include medical students, residents, and/or fellows, to perform the following surgical operation/ procedure and administer such anesthesia as may be determined necessary by my physician: Operation/Procedure name (s) ESOPHAGOGASTRODUODENOSCOPY with Possible Dilation on Mercy RAIZA Nieves   2.   I recognize that during the surgical operation/procedure, unforeseen conditions may necessitate additional or different procedures than those listed above.  I, therefore, further authorize and request that the above-named surgeon, assistants, or designees perform such procedures as are, in their judgment, necessary and desirable.    3.   My surgeon/physician has discussed prior to my surgery the potential benefits, risks and side effects of this procedure; the likelihood of achieving goals; and potential problems that might occur during recuperation.  They also discussed reasonable alternatives to the procedure, including risks, benefits, and side effects related to the alternatives and risks related to not receiving this procedure.  I have had all my questions answered and I acknowledge that no guarantee has been made as to the result that may be obtained.    4.   Should the need arise during my operation/procedure, which includes change of level of care prior to discharge, I also consent to the administration of blood and/or blood products.  Further, I understand that despite careful testing and screening of blood or blood products by collecting agencies, I may still be subject to ill effects as a result of receiving a blood transfusion and/or blood products.  The following are some, but not all, of the potential risks that can occur: fever and allergic reactions, hemolytic  reactions, transmission of diseases such as Hepatitis, AIDS and Cytomegalovirus (CMV) and fluid overload.  In the event that I wish to have an autologous transfusion of my own blood, or a directed donor transfusion, I will discuss this with my physician.  Check only if Refusing Blood or Blood Products  I understand refusal of blood or blood products as deemed necessary by my physician may have serious consequences to my condition to include possible death. I hereby assume responsibility for my refusal and release the hospital, its personnel, and my physicians from any responsibility for the consequences of my refusal.    o  Refuse   5.   I authorize the use of any specimen, organs, tissues, body parts or foreign objects that may be removed from my body during the operation/procedure for diagnosis, research or teaching purposes and their subsequent disposal by hospital authorities.  I also authorize the release of specimen test results and/or written reports to my treating physician on the hospital medical staff or other referring or consulting physicians involved in my care, at the discretion of the Pathologist or my treating physician.    6.   I consent to the photographing or videotaping of the operations or procedures to be performed, including appropriate portions of my body for medical, scientific, or educational purposes, provided my identity is not revealed by the pictures or by descriptive texts accompanying them.  If the procedure has been photographed/videotaped, the surgeon will obtain the original picture, image, videotape or CD.  The hospital will not be responsible for storage, release or maintenance of the picture, image, tape or CD.    7.   I consent to the presence of a  or observers in the operating room as deemed necessary by my physician or their designees.    8.   I recognize that in the event my procedure results in extended X-Ray/fluoroscopy time, I may develop a skin  reaction.    9. If I have a Do Not Attempt Resuscitation (DNAR) order in place, that status will be suspended while in the operating room, procedural suite, and during the recovery period unless otherwise explicitly stated by me (or a person authorized to consent on my behalf). The surgeon or my attending physician will determine when the applicable recovery period ends for purposes of reinstating the DNAR order.  10. Patients having a sterilization procedure: I understand that if the procedure is successful the results will be permanent and it will therefore be impossible for me to inseminate, conceive, or bear children.  I also understand that the procedure is intended to result in sterility, although the result has not been guaranteed.   11. I acknowledge that my physician has explained sedation/analgesia administration to me including the risk and benefits I consent to the administration of sedation/analgesia as may be necessary or desirable in the judgment of my physician.    I CERTIFY THAT I HAVE READ AND FULLY UNDERSTAND THE ABOVE CONSENT TO OPERATION and/or OTHER PROCEDURE.     ____________________________________  _________________________________        ______________________________  Signature of Patient    Signature of Responsible Person                Printed Name of Responsible Person                                      ____________________________________  _____________________________                ________________________________  Signature of Witness        Date  Time         Relationship to Patient    STATEMENT OF PHYSICIAN My signature below affirms that prior to the time of the procedure; I have explained to the patient and/or his/her legal representative, the risks and benefits involved in the proposed treatment and any reasonable alternative to the proposed treatment. I have also explained the risks and benefits involved in refusal of the proposed treatment and alternatives to the proposed  treatment and have answered the patient's questions. If I have a significant financial interest in a co-management agreement or a significant financial interest in any product or implant, or other significant relationship used in this procedure/surgery, I have disclosed this and had a discussion with my patient.     _____________________________________________________              _____________________________  (Signature of Physician)                                                                                         (Date)                                   (Time)  Patient Name: Mercy RAIZA Nieves      : 3/28/1940      Printed: 2025     Medical Record #: [id number]                                      Page 1 of 1

## (undated) NOTE — LETTER
07/23/20        Fang Chase  [facility] [address] [address]      Dear Edith Stephenstor,    [facility] records indicate that you have outstanding lab work and or testing that was ordered for you and has not yet been completed:  Orders Placed This Encounter

## (undated) NOTE — LETTER
05/17/21        Radha Kidney  [facility] [address] [address]      Dear Nikky Singh,    [facility] records indicate that you have outstanding lab work and or testing that was ordered for you and has not yet been completed:  Orders Placed This Encounter

## (undated) NOTE — MR AVS SNAPSHOT
Za Avilez   2017 12:00 PM   Office Visit   MRN:  [id number]    Description:  Female : 3/28/1940   Provider:  Tonia Davis   Department:  [facility] Hematology Oncology              Visit Summary      Primary Visit Diagnosis B Complex Vitamins (VITAMIN B COMPLEX) Oral Tab Take 1 tablet by mouth once daily. Patient Instructions     None      Please Note     Please keep your updated medication list with you to share with other healthcare providers.   At [facility]

## (undated) NOTE — LETTER
11/5/2020    Patient: Jose G Sanders   MR Number: [id number]   YOB: 1940   Date of Visit: 11/5/2020   Physician: Jefe Thorpe MD     Dear Medicare Patient:  [place] TO BENEFICIARY:  Please know that while a refraction is im

## (undated) NOTE — Clinical Note
Hi, States that not as bad epigastric pain, but still has it.  She was taking half of the sucralfate.  Will you follow up with her outpatient? Thanks, AG

## (undated) NOTE — LETTER
Patient Name: Mercy Nieves : 3/28/1940  Medical Record #: [id number] Printed: 2025  Page 1 of 2                                          [facility]  [address], [place], [place]  Autorización para operación y procedimiento quirúrgico                                                                                                      Por la presente, autorizo a Thomas Clement MD, mi médico y al asistente a realizar la operación/procedimiento quirúrgico a continuación, así franchesca a administrar la anestesia que determine necesaria mi médico Nombre (s) de la operación/procedimiento: ESOPHAGOGASTRODUODENOSCOPY with Possible Dilation en Mercy Nieves     Reconozco que martha la operación/procedimiento quirúrgico, las condiciones imprevistas pueden requerir de procedimientos adicionales o diferentes a aquellos mencionados anteriormente.  Por lo tanto, autorizo y solicito además que el cirujano antes mencionado, los asistentes o las personas designadas realicen los procedimientos que, a armstrong juicio, marine necesarios y convenientes.    Mi cirujano/médico holbrook discutido antes de mi cirugía los posibles beneficios, riesgos y efectos secundarios de wilmer procedimiento, la probabilidad de alcanzar las metas y los posibles problemas que puedan ocurrir martha la recuperación.  Ellos también berry discutido las alternativas razonables al procedimiento, incluso los riesgos, beneficios y efectos secundarios relacionados con las alternativas y los riesgos relacionados con no realizar wilmer procedimiento.  Berry respondido a todas mis preguntas y confirmo que no se ha dado ninguna garantía en cuanto a los resultados que pueda obtener.    En tej de que surja la necesidad martha mi operación o martha el periodo postoperatorio, también autorizo se aplique cee y/o productos sanguíneos.  Asimismo, entiendo que a pesar de las cuidadosas pruebas y análisis de cee o de los productos sanguíneos que realizan  las entidades recolectoras, todavía puedo estar sujeto a efectos adversos franchesca resultado de recibir karli transfusión de cee y/o productos sanguíneos.  A continuación se mencionan algunos, aunque no todos, los riesgos potenciales que pueden ocurrir: fiebre y reacciones alérgicas, reacciones hemolíticas, trasmisión de enfermedades franchesca hepatitis, SIDA y citomegalovirus (CMV), así franchesca sobrecarga de líquidos.   En tej de que desee tener karli transfusión autóloga de mi propia cee o karli transfusión de un donante dirigido.  Lo discutiré con mi médico.  Check only if Refusing Blood or Blood Products  I understand refusal of blood or blood products as deemed necessary by my physician may have serious consequences to my condition to include possible death. I hereby assume responsibility for my refusal and release the hospital, its personnel, and my physicians from any responsibility for the consequences of my refusal.           o  Refuse       Autorizo el uso de cualquier muestra, órgano, tejido, parte del cuerpo u objeto extraño que pueda ser extraído de mi cuerpo martha la operación/procedimiento para fines de diagnóstico, investigación o de enseñanza y armstrong desecho posterior por las autoridades del hospital.  También, autorizo la revelación de los resultados de las pruebas de muestras y/o los informes escritos al médico tratante franchesca personal médico del hospital u otros médicos de referencia o consulta involucrados en mi atención, a discreción del patólogo o de mi médico tratante.    Doy consentimiento para que se fotografíen o graben vídeos de las operaciones o procedimientos a realizarse, incluidas las partes de mi cuerpo que marine adecuadas para propósitos médicos, científicos o educativos, en el entendido de que, mi identidad no sea revelada por las fotografías o por textos descriptivos que las acompañen.  Si el procedimiento es fotografiado o grabado en vídeo, el cirujano obtendrá la imagen, cinta de vídeo o CD  original.  El hospital no se hará responsable por el almacenamiento, la revelación o el mantenimiento de la imagen, cinta o CD.    Autorizo la presencia de un especialista de producto u observadores en el quirófano, según lo considere necesario mi médico o las personas que éste designe.     Reconozco que en tej de que mi procedimiento resulte en un tiempo prolongado de radiografía/fluoroscopia, puedo desarrollar karli reacción en la piel.    Si tengo karli orden de No intentar la reanimación (ALTHEA), nayan estado se suspenderá mientras esté en el quirófano, la turner de procedimientos y martha el periodo de recuperación a menos que yo indique lo contrario explícitamente (o karli persona autorizada a chula el consentimiento en mi nombre). El cirujano o mi médico tratante determinarán cuándo termina el periodo de recuperación aplicable a los efectos de restablecer la orden de ALTHEA.  Pacientes que se realizan un procedimiento de esterilización: Entiendo que si el procedimiento tiene éxito, los resultados serán permanentes y que, por lo tanto, me será imposible inseminar, concebir o tener hijos.  Asimismo, entiendo que el procedimiento tiene franchesca propósito la esterilidad, aunque el resultado no está garantizado.   Admito que mi médico me ha explicado la aplicación de sedación/analgesia, incluidos los riesgos y beneficios y, consiento a la administración de sedación/analgesia conforme sea necesario o conveniente a juicio de mi médico.      CERTIFICO QUE HE LEÍDO Y COMPRENDIDO EL CONSENTIMIENTO ANTERIOR PARA LA OPERACIÓN y/o PROCEDIMIENTO.             ______________________________________  _______________________________  Firma del paciente      Firma de la persona responsible  Signature of patient      Signature of responsible person                        ___________________________________                                   Nombre en imprenta de la persona responsible         Name of responsible  person          ___________________________________                 Relación con el paciente         Relationship to patient    _________________________________________  ______________ ________________  Firma del testigo          Fecha / Date Hora / Time  Signature of witness    DECLARACÓN DEL MÉDICO Mediante mi firma al calce, afirmo que antes de la hora del procedimiento, he explicadoal paciente y/o a armstrong representante legal, los riesgos y beneficios involucrados en el tratamiento propuesto, así comocualquier alternativa razonable al tratamiento propuesto. También le(s) he explicado los riesgos y beneficios involucradosen el rechazo del tratarniento propuesto y alternativas al tratamiento propuesto, y he respondido a las preguntas del(la) paciente(My signature below affirms that prior to the time of the procedure, I have explained to the patient and/or his/her guardian, therisks and benefits involved in the proposed treatment and any reasonable alternative to the proposed treatment. I have alsoexplained the risks and benefits involved in refusal of the proposed treatment and have answered the patient's questions.)    ________________________________________   _________________________   _____________   (Firma del médico/Signature of Physician)                                    (Fecha/Date)                                             (Hora/Time)      Patient Name: Mercy RAIZA Nieves     : 3/28/1940                 Printed: 2025      Medical Record #: [id number]                                              Page 2 of 2

## (undated) NOTE — LETTER
[facility] [place] [address], [place], [place]  Autorización para operación y procedimiento quirúrgico   Fecha:___________                                                                                                         Hora:__________   Por la presente, autorizo a Thomas Clement MD, mi médico y al asistente a realizar la operación/procedimiento quirúrgico a continuación, así franchesca a administrar la anestesia que determine necesaria mi médico                  Nombre (s) de la operación/procedimiento: ESOPHAGOGASTRODUODENOSCOPY  with Dilation and possible Argon Plasma Coagulation (APC) a Mercy Nieves           Reconozco que martha la operación/procedimiento quirúrgico, las condiciones imprevistas pueden requerir de procedimientos adicionales o diferentes a aquellos mencionados anteriormente.  Por lo tanto, autorizo y solicito además que el cirujano antes mencionado, los asistentes o las personas designadas realicen los procedimientos que, a armstrong juicio, marine necesarios y convenientes.    Mi cirujano/médico holbrook discutido antes de mi cirugía los posibles beneficios, riesgos y efectos secundarios de wilmer procedimiento, la probabilidad de alcanzar las metas y los posibles problemas que puedan ocurrir martha la recuperación.  Ellos también berry discutido las alternativas razonables al procedimiento, incluso los riesgos, beneficios y efectos secundarios relacionados con las alternativas y los riesgos relacionados con no realizar wilmer procedimiento.  Berry respondido a todas mis preguntas y confirmo que no se ha dado ninguna garantía en cuanto a los resultados que pueda obtener.    En tej de que surja la necesidad martha mi operación o martha el periodo postoperatorio, también autorizo se aplique cee y/o productos sanguíneos.  Asimismo, entiendo que a pesar de las cuidadosas pruebas y análisis de cee o de los productos sanguíneos que realizan las entidades recolectoras, todavía puedo estar  sujeto a efectos adversos franchesca resultado de recibir karli transfusión de cee y/o productos sanguíneos.  A continuación se mencionan algunos, aunque no todos, los riesgos potenciales que pueden ocurrir: fiebre y reacciones alérgicas, reacciones hemolíticas, trasmisión de enfermedades franchesca hepatitis, SIDA y citomegalovirus (CMV), así franchesca sobrecarga de líquidos.   En tej de que desee tener karli transfusión autóloga de mi propia cee o karli transfusión de un donante dirigido.  Lo discutiré con mi médico.   Autorizo el uso de cualquier muestra, órgano, tejido, parte del cuerpo u objeto extraño que pueda ser extraído de mi cuerpo martha la operación/procedimiento para fines de diagnóstico, investigación o de enseñanza y armstrong desecho posterior por las autoridades del hospital.  También, autorizo la revelación de los resultados de las pruebas de muestras y/o los informes escritos al médico tratante franchesca personal médico del hospital u otros médicos de referencia o consulta involucrados en mi atención, a discreción del patólogo o de mi médico tratante.    Doy consentimiento para que se fotografíen o graben vídeos de las operaciones o procedimientos a realizarse, incluidas las partes de mi cuerpo que marine adecuadas para propósitos médicos, científicos o educativos, en el entendido de que, mi identidad no sea revelada por las fotografías o por textos descriptivos que las acompañen.  Si el procedimiento es fotografiado o grabado en vídeo, el cirujano obtendrá la imagen, cinta de vídeo o CD original.  El hospital no se hará responsable por el almacenamiento, la revelación o el mantenimiento de la imagen, cinta o CD.    Autorizo la presencia de un especialista de producto u observadores en el quirófano, según lo considere necesario mi médico o las personas que éste designe.     Reconozco que en tej de que mi procedimiento resulte en un tiempo prolongado de radiografía/fluoroscopia, puedo desarrollar karli reacción en la piel.     Si tengo karli orden de No intentar la reanimación (ALTHEA), nayan estado se suspenderá mientras esté en el quirófano, la turner de procedimientos y martha el periodo de recuperación a menos que yo indique lo contrario explícitamente (o krali persona autorizada a chula el consentimiento en mi nombre). El cirujano o mi médico tratante determinarán cuándo termina el periodo de recuperación aplicable a los efectos de restablecer la orden de ALTHEA.  Pacientes que se realizan un procedimiento de esterilización: Entiendo que si el procedimiento tiene éxito, los resultados serán permanentes y que, por lo tanto, me será imposible inseminar, concebir o tener hijos.  Asimismo, entiendo que el procedimiento tiene franchesca propósito la esterilidad, aunque el resultado no está garantizado.   Admito que mi médico me ha explicado la aplicación de sedación/analgesia, incluidos los riesgos y beneficios y, consiento a la administración de sedación/analgesia conforme sea necesario o conveniente a juicio de mi médico.    CERTIFICO QUE HE LEÍDO Y COMPRENDIDO EL CONSENTIMIENTO ANTERIOR PARA LA OPERACIÓN y/o PROCEDIMIENTO.    ________________________________________  __________________________________  Firma del paciente      Firma de la persona responsable                        ___________________________________                                   Nombre en imprenta de la persona responsable          ___________________________________                 Relación con el paciente    _________________________________________  ______________ ________________  Firma del testigo          Fecha   Hora    DECLARACÓN DEL MÉDICO Mediante mi firma al calce, afirmo que antes de la hora del procedimiento, he explicadoal paciente y/o a armstrong representante legal, los riesgos y beneficios involucrados en el tratamiento propuesto, así comocualquier alternativa razonable al tratamiento propuesto. También kenneth(s) he explicado los riesgos y beneficios involucradosen el rechazo  del tratarniento propuesto y alternativas al tratamiento propuesto, y he respondido a las preguntas del(la) paciente(My signature below affirms that prior to the time of the procedure, I have explained to the patient and/or his/her guardian, therisks and benefits involved in the proposed treatment and any reasonable alternative to the proposed treatment. I have alsoexplained the risks and benefits involved in refusal of the proposed treatment and have answered the patient's questions.)    ________________________________________   _________________________   _____________   (Firma del médico/Signature of Physician)                                    (Fecha/Date)                                             (Hora/Time)

## (undated) NOTE — LETTER
6/17/2020    [address] [facility] [address]            Dear Chip Alexander,      [address] records indicate that you are due for an appointment for a EGD or Upper Endoscopy in July 2020, or shortly there after, with Viv Clancy

## (undated) NOTE — LETTER
7/22/2017              [facility] [address]         Dear Jayna Christian,    It was a pleasure to see you. Your mammogram was normal.  There is no need for further testing at this time.   I look forward to seeing you

## (undated) NOTE — LETTER
08/26/21        Fly Estevez  [facility] [address] [address]      Dear Elisa Bertrand,    [address] records indicate that you have outstanding lab work and or testing that was ordered for you and has not yet been completed:  Orders Placed This Encounter

## (undated) NOTE — LETTER
09/16/20    Pete Brennan  [facility] [address] [address]      Dear Gal Arroyo,    [address] [facility] records indicate that you have outstanding lab work and or testing that was ordered for you and has not yet been completed:  Orders Placed This Encounter      Mamm

## (undated) NOTE — LETTER
09/12/19    Lawrence Boon  [facility] [address] [address]      Dear Moreno Mena,    [address] [facility] records indicate that you have outstanding lab work and or testing that was ordered for you and has not yet been completed:  Orders Placed This Encounter      Corinna

## (undated) NOTE — MR AVS SNAPSHOT
[place]  [address]  [phone]               Thank you for choosing us for your health care visit with Lidia Ellington MD.  We are glad to serve you and happy to provide you with this summary Zocor [Simvastatin] Swelling                Today's Vital Signs     BP Pulse Temp Height Weight BMI    128/60 mmHg 66 98.2 °F (36.8 °C) (Oral) 4' 9\" (1.448 m) 118 lb (53.524 kg) 25.53 kg/m2         Current Medications          This list is accurate as of Take 1 tablet by mouth once daily. Vitamin D 1000 UNITS Tabs   Take by mouth.                    Swetahart                  Visit [facility] online at  [email or web address]

## (undated) NOTE — LETTER
[address] [facility] [address] [address]        Dear Brinda Langley: On behalf of your primary doctor Faustina Dalal, DO, we have made multiple attempts to reach you by phone over the past several weeks.   Unfortunately, we have

## (undated) NOTE — LETTER
[facility]   Administracion de Anestesia  Yo, Mercy Nieves, acepto ser atendido por un anestesiólogo, quien está especialmente capacitado para monitorearme y darme medicamento para hacerme dormir o mantenerme cómodo martha mi procedimiento.   Entiendo que mi anestesiólogo no es un empleado o agente de [facility] o Health Services. Él o donald trabaja para [facility]  Ronny el paciente que solicita los servicios de anestesia, estoy de acuerdo con lo siguiente:  Permitir al anestesiólogo (médico de anestesia) que me suministre el medicamento y hacer los procedimientos adicionales que marine necesarios. Algunos ejemplos son: Al iniciar o utilizar karli “IV” para suministrarme medicamentos, fluidos o cee martha mi procedimiento, y colocarme karli sonda de respiración, me ayudará a respirar cuando esté dormido (intubación). En el tej de que mi corazón deje de funcionar correctamente, entiendo que mi anestesiólogo hará todo lo posible para salvar mi satish, a menos que los documentos de No resucitar de [facility] lo indiquen de otra manera.  Informar a mi anestesista antes del procedimiento:  Si estoy embarazada.  La última vez que comí o bebí algo.  Todos los medicamentos que kisha (incluyendo medicamentos recetados, suplementos herbales y pastillas que puedo comprar sin receta médica (incluyendo drogas en la roy [medicamentos ilegales]). No informar a mi anestesiólogo acerca de estos medicamentos puede aumentar mi riesgo de complicaciones con la anestesia.  Si soy alérgico a cualquier cosa o he tenido previamente karli reacción adversa a la anestesia.  Entiendo cómo la anestesia me ayudará (beneficios).  Entiendo que con cualquier tipo de anestesia hay riesgos:  Los riesgos más comunes son: náuseas, vómitos, dolor de garganta, dolor muscular, daño a los ojos, la boca o los dientes (por la colocación de la sonda de respiración).  Los riesgos poco comunes incluyen: recordar  lo que sucedió martha mi procedimiento, reacciones alérgicas a los medicamentos, lesiones en las vías respiratorias, el corazón, los pulmones, la visión, los nervios o músculos, y en casos sumamente raros, la muerte.  Mii médico me ha explicado otras opciones de atención disponibles para mí (alternativas).  Pacientes embarazadas (“epidural”):    Entiendo que los riesgos de recibir karli inyección epidural (medicamento que se aplica en la espalda para ayudar a controlar el dolor martha el parto), incluyen picazón, presión arterial baja, dificultad para orinar, dolor de jo ann o disminución en el ritmo del corazón del bebé. Los riesgos muy poco comunes incluyen infección, hemorragia, convulsiones, ritmo cardíaco irregular y lesión del nervio.  Anestesia regional (“columna vertebral”, “epidural” y “bloqueo de los nervios”):  Entiendo que hay complicaciones poco frecuentes emilee posibles, e incluyen dolor de jo ann, sangrado, infección, convulsiones, ritmo cardíaco irregular y la lesión del nervio.  .   Puedo cambiar de opinión acerca de recibir servicios de anestesia en cualquier momento antes de que se me administre el medicamento.         Paciente (o representante) Firma/Relación con el paciente  Fecha  Hora  Patient Signature/Signature of Responsible person Date Time           Nombre del intérprete (en armstrong tej)  Idioma/Organización  Hora  Name of  Language/Organization Time          Firma del anestesiólogo Fecha  Hora  Signature of anesthesiologist Date Time    He hablado del procedimiento y la información anterior con el paciente (o el representante del paciente) y respondí celeste preguntas. El paciente o armsrtong representante ha aceptado recibir los servicios de anestesia.         Testigo Ulyssesha  Hora  Witness Date Time  He verificado que la firma es la del paciente o del representante del paciente, y que se firmó antes del procedimiento.    Nombre del paciente: Mercy Nieves Fec. de Nac.: 3/28/1940                  En letra de imprenta: March 31, 2025  Expediente médico No. [id number]                         Página 1 de 2  ----------ANESTHESIA CONSENT----------

## (undated) NOTE — LETTER
Patient Name: Mercy iNeves : 3/28/1940  Medical Record #: [id number] Printed: 3/31/2025  Page 1 of 2                                          [facility]  [address], [place], [place]  Autorización para operación y procedimiento quirúrgico                                                                                                      Por la presente, autorizo a Thomas Clement MD, mi médico y al asistente a realizar la operación/procedimiento quirúrgico a continuación, así franchesca a administrar la anestesia que determine necesaria mi médico Nombre (s) de la operación/procedimiento: ESOPHAGOGASTRODUODENOSCOPY  with Dilation en Mercy Nieves     Reconozco que martha la operación/procedimiento quirúrgico, las condiciones imprevistas pueden requerir de procedimientos adicionales o diferentes a aquellos mencionados anteriormente.  Por lo tanto, autorizo y solicito además que el cirujano antes mencionado, los asistentes o las personas designadas realicen los procedimientos que, a armstrong juicio, marine necesarios y convenientes.    Mi cirujano/médico holbrook discutido antes de mi cirugía los posibles beneficios, riesgos y efectos secundarios de wilmer procedimiento, la probabilidad de alcanzar las metas y los posibles problemas que puedan ocurrir martha la recuperación.  Ellos también berry discutido las alternativas razonables al procedimiento, incluso los riesgos, beneficios y efectos secundarios relacionados con las alternativas y los riesgos relacionados con no realizar wilmer procedimiento.  Berry respondido a todas mis preguntas y confirmo que no se ha dado ninguna garantía en cuanto a los resultados que pueda obtener.    En tej de que surja la necesidad martha mi operación o martha el periodo postoperatorio, también autorizo se aplique cee y/o productos sanguíneos.  Asimismo, entiendo que a pesar de las cuidadosas pruebas y análisis de cee o de los productos sanguíneos que realizan las  entidades recolectoras, todavía puedo estar sujeto a efectos adversos franchesca resultado de recibir karli transfusión de cee y/o productos sanguíneos.  A continuación se mencionan algunos, aunque no todos, los riesgos potenciales que pueden ocurrir: fiebre y reacciones alérgicas, reacciones hemolíticas, trasmisión de enfermedades franchesca hepatitis, SIDA y citomegalovirus (CMV), así franchesca sobrecarga de líquidos.   En tej de que desee tener karli transfusión autóloga de mi propia cee o karli transfusión de un donante dirigido.  Lo discutiré con mi médico.  Check only if Refusing Blood or Blood Products  I understand refusal of blood or blood products as deemed necessary by my physician may have serious consequences to my condition to include possible death. I hereby assume responsibility for my refusal and release the hospital, its personnel, and my physicians from any responsibility for the consequences of my refusal.           o  Refuse       Autorizo el uso de cualquier muestra, órgano, tejido, parte del cuerpo u objeto extraño que pueda ser extraído de mi cuerpo martha la operación/procedimiento para fines de diagnóstico, investigación o de enseñanza y armstrong desecho posterior por las autoridades del hospital.  También, autorizo la revelación de los resultados de las pruebas de muestras y/o los informes escritos al médico tratante franchesca personal médico del hospital u otros médicos de referencia o consulta involucrados en mi atención, a discreción del patólogo o de mi médico tratante.    Doy consentimiento para que se fotografíen o graben vídeos de las operaciones o procedimientos a realizarse, incluidas las partes de mi cuerpo que marine adecuadas para propósitos médicos, científicos o educativos, en el entendido de que, mi identidad no sea revelada por las fotografías o por textos descriptivos que las acompañen.  Si el procedimiento es fotografiado o grabado en vídeo, el cirujano obtendrá la imagen, cinta de vídeo o CD  original.  El hospital no se hará responsable por el almacenamiento, la revelación o el mantenimiento de la imagen, cinta o CD.    Autorizo la presencia de un especialista de producto u observadores en el quirófano, según lo considere necesario mi médico o las personas que éste designe.     Reconozco que en tej de que mi procedimiento resulte en un tiempo prolongado de radiografía/fluoroscopia, puedo desarrollar karli reacción en la piel.    Si tengo karli orden de No intentar la reanimación (ALTHEA), nayan estado se suspenderá mientras esté en el quirófano, la turner de procedimientos y martha el periodo de recuperación a menos que yo indique lo contrario explícitamente (o karli persona autorizada a chula el consentimiento en mi nombre). El cirujano o mi médico tratante determinarán cuándo termina el periodo de recuperación aplicable a los efectos de restablecer la orden de ALTHEA.  Pacientes que se realizan un procedimiento de esterilización: Entiendo que si el procedimiento tiene éxito, los resultados serán permanentes y que, por lo tanto, me será imposible inseminar, concebir o tener hijos.  Asimismo, entiendo que el procedimiento tiene franchesca propósito la esterilidad, aunque el resultado no está garantizado.   Admito que mi médico me ha explicado la aplicación de sedación/analgesia, incluidos los riesgos y beneficios y, consiento a la administración de sedación/analgesia conforme sea necesario o conveniente a juicio de mi médico.      CERTIFICO QUE HE LEÍDO Y COMPRENDIDO EL CONSENTIMIENTO ANTERIOR PARA LA OPERACIÓN y/o PROCEDIMIENTO.             ______________________________________  _______________________________  Firma del paciente      Firma de la persona responsible  Signature of patient      Signature of responsible person                        ___________________________________                                   Nombre en imprenta de la persona responsible         Name of responsible  person          ___________________________________                 Relación con el paciente         Relationship to patient    _________________________________________  ______________ ________________  Firma del testigo          Fecha / Date Hora / Time  Signature of witness    DECLARACÓN DEL MÉDICO Mediante mi firma al calce, afirmo que antes de la hora del procedimiento, he explicadoal paciente y/o a armstrong representante legal, los riesgos y beneficios involucrados en el tratamiento propuesto, así comocualquier alternativa razonable al tratamiento propuesto. También le(s) he explicado los riesgos y beneficios involucradosen el rechazo del tratarniento propuesto y alternativas al tratamiento propuesto, y he respondido a las preguntas del(la) paciente(My signature below affirms that prior to the time of the procedure, I have explained to the patient and/or his/her guardian, therisks and benefits involved in the proposed treatment and any reasonable alternative to the proposed treatment. I have alsoexplained the risks and benefits involved in refusal of the proposed treatment and have answered the patient's questions.)    ________________________________________   _________________________   _____________   (Firma del médico/Signature of Physician)                                    (Fecha/Date)                                             (Hora/Time)      Patient Name: Mercy RAIZA Nieves     : 3/28/1940                 Printed: 3/31/2025      Medical Record #: [id number]                                              Page 2 of 2

## (undated) NOTE — Clinical Note
Aviva Clark, she needs EGD ASAP, Hgb down to 7.3 from 10 3 mo ago. Describes no bleeding. Will give venofer and f/u in 3 months. She is scheduled for EGD in February  but should be moved up.

## (undated) NOTE — LETTER
[facility]  [address], [place], [place]  Authorization for Surgical Operation and Procedure                                                                                           I hereby authorize Thomas Clement MD, my physician and his/her assistants (if applicable), which may include medical students, residents, and/or fellows, to perform the following surgical operation/ procedure and administer such anesthesia as may be determined necessary by my physician: Operation/Procedure name (s) ESOPHAGOGASTRODUODENOSCOPY (EGD) on Mercy Nieves   2.   I recognize that during the surgical operation/procedure, unforeseen conditions may necessitate additional or different procedures than those listed above.  I, therefore, further authorize and request that the above-named surgeon, assistants, or designees perform such procedures as are, in their judgment, necessary and desirable.    3.   My surgeon/physician has discussed prior to my surgery the potential benefits, risks and side effects of this procedure; the likelihood of achieving goals; and potential problems that might occur during recuperation.  They also discussed reasonable alternatives to the procedure, including risks, benefits, and side effects related to the alternatives and risks related to not receiving this procedure.  I have had all my questions answered and I acknowledge that no guarantee has been made as to the result that may be obtained.    4.   Should the need arise during my operation/procedure, which includes change of level of care prior to discharge, I also consent to the administration of blood and/or blood products.  Further, I understand that despite careful testing and screening of blood or blood products by collecting agencies, I may still be subject to ill effects as a result of receiving a blood transfusion and/or blood products.  The following are some, but not all, of the potential risks that can occur: fever  and allergic reactions, hemolytic reactions, transmission of diseases such as Hepatitis, AIDS and Cytomegalovirus (CMV) and fluid overload.  In the event that I wish to have an autologous transfusion of my own blood, or a directed donor transfusion, I will discuss this with my physician.  Check only if Refusing Blood or Blood Products  I understand refusal of blood or blood products as deemed necessary by my physician may have serious consequences to my condition to include possible death. I hereby assume responsibility for my refusal and release the hospital, its personnel, and my physicians from any responsibility for the consequences of my refusal.    o  Refuse   5.   I authorize the use of any specimen, organs, tissues, body parts or foreign objects that may be removed from my body during the operation/procedure for diagnosis, research or teaching purposes and their subsequent disposal by hospital authorities.  I also authorize the release of specimen test results and/or written reports to my treating physician on the hospital medical staff or other referring or consulting physicians involved in my care, at the discretion of the Pathologist or my treating physician.    6.   I consent to the photographing or videotaping of the operations or procedures to be performed, including appropriate portions of my body for medical, scientific, or educational purposes, provided my identity is not revealed by the pictures or by descriptive texts accompanying them.  If the procedure has been photographed/videotaped, the surgeon will obtain the original picture, image, videotape or CD.  The hospital will not be responsible for storage, release or maintenance of the picture, image, tape or CD.    7.   I consent to the presence of a  or observers in the operating room as deemed necessary by my physician or their designees.    8.   I recognize that in the event my procedure results in extended X-Ray/fluoroscopy  time, I may develop a skin reaction.    9. If I have a Do Not Attempt Resuscitation (DNAR) order in place, that status will be suspended while in the operating room, procedural suite, and during the recovery period unless otherwise explicitly stated by me (or a person authorized to consent on my behalf). The surgeon or my attending physician will determine when the applicable recovery period ends for purposes of reinstating the DNAR order.  10. Patients having a sterilization procedure: I understand that if the procedure is successful the results will be permanent and it will therefore be impossible for me to inseminate, conceive, or bear children.  I also understand that the procedure is intended to result in sterility, although the result has not been guaranteed.   11. I acknowledge that my physician has explained sedation/analgesia administration to me including the risk and benefits I consent to the administration of sedation/analgesia as may be necessary or desirable in the judgment of my physician.    I CERTIFY THAT I HAVE READ AND FULLY UNDERSTAND THE ABOVE CONSENT TO OPERATION and/or OTHER PROCEDURE.     _________________________________________ _________________________________     ___________________________________  Signature of Patient     Signature of Responsible Person                   Printed Name of Responsible Person                              _________________________________________ ______________________________        ___________________________________  Signature of Witness         Date  Time         Relationship to Patient    STATEMENT OF PHYSICIAN My signature below affirms that prior to the time of the procedure; I have explained to the patient and/or his/her legal representative, the risks and benefits involved in the proposed treatment and any reasonable alternative to the proposed treatment. I have also explained the risks and benefits involved in refusal of the proposed treatment and  alternatives to the proposed treatment and have answered the patient's questions. If I have a significant financial interest in a co-management agreement or a significant financial interest in any product or implant, or other significant relationship used in this procedure/surgery, I have disclosed this and had a discussion with my patient.     _______________________________________________________________ _____________________________  (Signature of Physician)                                                                                         (Date)                                   (Time)  Patient Name: Mercy RAIZA Nieves    : 3/28/1940   Printed: 2024      Medical Record #: [id number]                                              Page 1 of 1

## (undated) NOTE — LETTER
Patient Name: Mercy Nieves : 3/28/1940  Medical Record #: [id number] Printed: 2025  Page 1 of 2                                          [facility]  [address], [place], [place]  Autorización para operación y procedimiento quirúrgico                                                                                                      Por la presente, autorizo a Thomas Clement MD, mi médico y al asistente a realizar la operación/procedimiento quirúrgico a continuación, así franchesca a administrar la anestesia que determine necesaria mi médico Nombre (s) de la operación/procedimiento: ESOPHAGOGASTRODUODENOSCOPY (EGD) with possible dilation en Mercy Nieves     Reconozco que martha la operación/procedimiento quirúrgico, las condiciones imprevistas pueden requerir de procedimientos adicionales o diferentes a aquellos mencionados anteriormente.  Por lo tanto, autorizo y solicito además que el cirujano antes mencionado, los asistentes o las personas designadas realicen los procedimientos que, a armstrong juicio, marine necesarios y convenientes.    Mi cirujano/médico holbrook discutido antes de mi cirugía los posibles beneficios, riesgos y efectos secundarios de wilmer procedimiento, la probabilidad de alcanzar las metas y los posibles problemas que puedan ocurrir martha la recuperación.  Ellos también berry discutido las alternativas razonables al procedimiento, incluso los riesgos, beneficios y efectos secundarios relacionados con las alternativas y los riesgos relacionados con no realizar wilmer procedimiento.  Berry respondido a todas mis preguntas y confirmo que no se ha dado ninguna garantía en cuanto a los resultados que pueda obtener.    En tej de que surja la necesidad martha mi operación o martha el periodo postoperatorio, también autorizo se aplique cee y/o productos sanguíneos.  Asimismo, entiendo que a pesar de las cuidadosas pruebas y análisis de cee o de los productos sanguíneos que  realizan las entidades recolectoras, todavía puedo estar sujeto a efectos adversos franchesca resultado de recibir karli transfusión de cee y/o productos sanguíneos.  A continuación se mencionan algunos, aunque no todos, los riesgos potenciales que pueden ocurrir: fiebre y reacciones alérgicas, reacciones hemolíticas, trasmisión de enfermedades franchesca hepatitis, SIDA y citomegalovirus (CMV), así franchesca sobrecarga de líquidos.   En tej de que desee tener karli transfusión autóloga de mi propia cee o karli transfusión de un donante dirigido.  Lo discutiré con mi médico.  Check only if Refusing Blood or Blood Products  I understand refusal of blood or blood products as deemed necessary by my physician may have serious consequences to my condition to include possible death. I hereby assume responsibility for my refusal and release the hospital, its personnel, and my physicians from any responsibility for the consequences of my refusal.           o  Refuse       Autorizo el uso de cualquier muestra, órgano, tejido, parte del cuerpo u objeto extraño que pueda ser extraído de mi cuerpo martha la operación/procedimiento para fines de diagnóstico, investigación o de enseñanza y armstrong desecho posterior por las autoridades del hospital.  También, autorizo la revelación de los resultados de las pruebas de muestras y/o los informes escritos al médico tratante franchesca personal médico del hospital u otros médicos de referencia o consulta involucrados en mi atención, a discreción del patólogo o de mi médico tratante.    Doy consentimiento para que se fotografíen o graben vídeos de las operaciones o procedimientos a realizarse, incluidas las partes de mi cuerpo que marine adecuadas para propósitos médicos, científicos o educativos, en el entendido de que, mi identidad no sea revelada por las fotografías o por textos descriptivos que las acompañen.  Si el procedimiento es fotografiado o grabado en vídeo, el cirujano obtendrá la imagen, cinta de  vídeo o CD original.  El hospital no se hará responsable por el almacenamiento, la revelación o el mantenimiento de la imagen, cinta o CD.    Autorizo la presencia de un especialista de producto u observadores en el quirófano, según lo considere necesario mi médico o las personas que éste designe.     Reconozco que en tej de que mi procedimiento resulte en un tiempo prolongado de radiografía/fluoroscopia, puedo desarrollar karli reacción en la piel.    Si tengo karli orden de No intentar la reanimación (ALTHEA), nayan estado se suspenderá mientras esté en el quirófano, la turner de procedimientos y martha el periodo de recuperación a menos que yo indique lo contrario explícitamente (o karli persona autorizada a chula el consentimiento en mi nombre). El cirujano o mi médico tratante determinarán cuándo termina el periodo de recuperación aplicable a los efectos de restablecer la orden de ALTHEA.  Pacientes que se realizan un procedimiento de esterilización: Entiendo que si el procedimiento tiene éxito, los resultados serán permanentes y que, por lo tanto, me será imposible inseminar, concebir o tener hijos.  Asimismo, entiendo que el procedimiento tiene franchesca propósito la esterilidad, aunque el resultado no está garantizado.   Admito que mi médico me ha explicado la aplicación de sedación/analgesia, incluidos los riesgos y beneficios y, consiento a la administración de sedación/analgesia conforme sea necesario o conveniente a juicio de mi médico.      CERTIFICO QUE HE LEÍDO Y COMPRENDIDO EL CONSENTIMIENTO ANTERIOR PARA LA OPERACIÓN y/o PROCEDIMIENTO.             ______________________________________  _______________________________  Firma del paciente      Firma de la persona responsible  Signature of patient      Signature of responsible person                        ___________________________________                                   Nombre en imprenta de la persona responsible         Name of responsible  person          ___________________________________                 Relación con el paciente         Relationship to patient    _________________________________________  ______________ ________________  Firma del testigo          Fecha / Date Hora / Time  Signature of witness    DECLARACÓN DEL MÉDICO Mediante mi firma al calce, afirmo que antes de la hora del procedimiento, he explicadoal paciente y/o a armstrong representante legal, los riesgos y beneficios involucrados en el tratamiento propuesto, así comocualquier alternativa razonable al tratamiento propuesto. También le(s) he explicado los riesgos y beneficios involucradosen el rechazo del tratarniento propuesto y alternativas al tratamiento propuesto, y he respondido a las preguntas del(la) paciente(My signature below affirms that prior to the time of the procedure, I have explained to the patient and/or his/her guardian, therisks and benefits involved in the proposed treatment and any reasonable alternative to the proposed treatment. I have alsoexplained the risks and benefits involved in refusal of the proposed treatment and have answered the patient's questions.)    ________________________________________   _________________________   _____________   (Firma del médico/Signature of Physician)                                    (Fecha/Date)                                             (Hora/Time)      Patient Name: Mercy RAIZA Nieves     : 3/28/1940                 Printed: 2025      Medical Record #: [id number]                                              Page 2 of 2

## (undated) NOTE — LETTER
November 5, 2020    Luba Trejo, [address] [place] [address]  [id number] [facility]     Patient: Jose Llamas   YOB: 1940   Date of Visit: 11/5/2020       Dear Dr. Phyllis Reno MD:    Thank you for referring Rajesh Richmond to me for evaluation. Surgical History: Pete Brennan has a past surgical history that includes cholecystectomy; electrocardiogram, complete (2013) (Scanned to Media Tab - Date of Service 2013); anesth,angioplasty; colonoscopy (2016); egd (2018);  (4X); a • ferrous sulfate 325 (65 FE) MG Oral Tab EC Take 325 mg by mouth daily. • aspirin 81 MG Oral Tab Take 81 mg by mouth daily.      • Blood Glucose Monitoring Suppl (TRUE METRIX METER) W/DEVICE Does not apply Kit 1 kit by Does not apply route 2 (two) kim Sphere Cylinder [place]    Right +0.50 +1.25 100 20/20- +2.75 20/30    Left +0.75 +0.50 080 20/25- +2.75 20/30    Type: Progressive bifocal                 ASSESSMENT/PLAN:     Diagnoses and Plan:     Diabetes mellitus type 2 without re

## (undated) NOTE — LETTER
[facility]  Administration of Anesthesia  I, Mercy Nieves agree to be cared for by a physician anesthesiologist alone and/or with a nurse anesthetist, who is specially trained to monitor me and give me medicine to put me to sleep or keep me comfortable during my procedure    I understand that my anesthesiologist and/or anesthetist is not an employee or agent of [facility] or [facility]. He or she works for [facility]    As the patient asking for anesthesia services, I agree to:  Allow the anesthesiologist (anesthesia doctor) to give me medicine and do additional procedures as necessary. Some examples are: Starting or using an “IV” to give me medicine, fluids or blood during my procedure, and having a breathing tube placed to help me breathe when I’m asleep (intubation). In the event that my heart stops working properly, I understand that my anesthesiologist will make every effort to sustain my life, unless otherwise directed by [facility] Do Not Resuscitate documents.  Tell my anesthesia doctor before my procedure:  If I am pregnant.  The last time that I ate or drank.  iii. All of the medicines I take (including prescriptions, herbal supplements, and pills I can buy without a prescription (including street drugs/illegal medications). Failure to inform my anesthesiologist about these medicines may increase my risk of anesthetic complications.  iv.If I am allergic to anything or have had a reaction to anesthesia before.  I understand how the anesthesia medicine will help me (benefits).  I understand that with any type of anesthesia medicine there are risks:  The most common risks are: nausea, vomiting, sore throat, muscle soreness, damage to my eyes, mouth, or teeth (from breathing tube placement).  Rare risks include: remembering what happened during my procedure, allergic reactions to medications, injury to my airway, heart, lungs, vision, nerves, or  muscles and in extremely rare instances death.  My doctor has explained to me other choices available to me for my care (alternatives).  Pregnant Patients (“epidural”):  I understand that the risks of having an epidural (medicine given into my back to help control pain during labor), include itching, low blood pressure, difficulty urinating, headache or slowing of the baby’s heart. Very rare risks include infection, bleeding, seizure, irregular heart rhythms and nerve injury.  Regional Anesthesia (“spinal”, “epidural”, & “nerve blocks”):  I understand that rare but potential complications include headache, bleeding, infection, seizure, irregular heart rhythms, and nerve injury.    _____________________________________________________________________________  Patient (or Representative) Signature/Relationship to Patient  Date   Time    _____________________________________________________________________________   Name (if used)    Language/Organization   Time    _____________________________________________________________________________  Nurse Anesthetist Signature     Date   Time  _____________________________________________________________________________  Anesthesiologist Signature     Date   Time  I have discussed the procedure and information above with the patient (or patient’s representative) and answered their questions. The patient or their representative has agreed to have anesthesia services.    _____________________________________________________________________________  Witness        Date   Time  I have verified that the signature is that of the patient or patient’s representative, and that it was signed before the procedure  Patient Name: Mercy Nieves     : 3/28/1940                 Printed: 2024 at 11:43 AM    Medical Record #: [id number]                                            Page 1 of 1  ----------ANESTHESIA CONSENT----------

## (undated) NOTE — LETTER
[address] [facility] [address] [place] [address]        Dear Gal Arroyo: On behalf of your primary doctor Evens Dalal, DO, we have made multiple attempts to reach you by phone over the past several weeks.   We hope all is going we

## (undated) NOTE — MR AVS SNAPSHOT
[facility]  [address] [place] [address], [address] [place] [address]               Thank you for choosing us for your health care visit with Shaunna Hunter. DO [place].   We are glad to serve you and happy to provide you with this summary Lipid Panel [E]    Complete by:  Jermaine 15, 2017 (Approximate)    Assoc Dx:  Type 2 diabetes mellitus without complication, without long-term current use of insulin ([facility] [address] [E11.9]           Comp Metabolic Panel (14) [E]    Complete by:  Jermaine 15, 2017 (Approxim authorization, such as [place], please feel free to schedule your appointment immediately. However, if you are unsure about the requirements for authorization, please wait 5-7 days and then contact your physician's office.  At that time, you will Commonly known as:  TRUE METRIX BLOOD GLUCOSE TEST           hydrochlorothiazide 12.5 MG Caps   Take 1 capsule (12.5 mg total) by mouth daily.    Commonly known as:  MICROZIDE           Lancets Misc   For blood glucose monitoring two times a day           L

## (undated) NOTE — LETTER
8/15/2017              [address] [facility] [address]         Dear Mallory Alexandre,      It was a pleasure to see you at our [address] [facility] office.   Your urinalysis results from your

## (undated) NOTE — LETTER
1/9/2020              [address] [place] [id number]         Dear Gal Arroyo,    [facility] records indicate that the labs ordered for you by Dylon Bustillos MD  have not been done.   If you have, in fact, already completed the

## (undated) NOTE — Clinical Note
Hi Pat,She is overdue for EGD. She also had ongoing GI issues as described on my note. Please have your office call her for evaluation and EGD. Thanks,AG

## (undated) NOTE — LETTER
[facility]  [address], [place], [place]    Authorization for Surgical Operation and Procedure                               I hereby authorize Thomas Clement MD, my physician and his/her assistants (if applicable), which may include medical students, residents, and/or fellows, to perform the following surgical operation/ procedure and administer such anesthesia as may be determined necessary by my physician: Operation/Procedure name (s) ESOPHAGOGASTRODUODENOSCOPY  with Dilation and possible Argon Plasma Coagulation (APC) on Mercy Nieves   2.   I recognize that during the surgical operation/procedure, unforeseen conditions may necessitate additional or different procedures than those listed above.  I, therefore, further authorize and request that the above-named surgeon, assistants, or designees perform such procedures as are, in their judgment, necessary and desirable.    3.   My surgeon/physician has discussed prior to my surgery the potential benefits, risks and side effects of this procedure; the likelihood of achieving goals; and potential problems that might occur during recuperation.  They also discussed reasonable alternatives to the procedure, including risks, benefits, and side effects related to the alternatives and risks related to not receiving this procedure.  I have had all my questions answered and I acknowledge that no guarantee has been made as to the result that may be obtained.    4.   Should the need arise during my operation/procedure, which includes change of level of care prior to discharge, I also consent to the administration of blood and/or blood products.  Further, I understand that despite careful testing and screening of blood or blood products by collecting agencies, I may still be subject to ill effects as a result of receiving a blood transfusion and/or blood products.  The following are some, but not all, of the potential risks that can occur: fever and  allergic reactions, hemolytic reactions, transmission of diseases such as Hepatitis, AIDS and Cytomegalovirus (CMV) and fluid overload.  In the event that I wish to have an autologous transfusion of my own blood, or a directed donor transfusion, I will discuss this with my physician.  Check only if Refusing Blood or Blood Products  I understand refusal of blood or blood products as deemed necessary by my physician may have serious consequences to my condition to include possible death. I hereby assume responsibility for my refusal and release the hospital, its personnel, and my physicians from any responsibility for the consequences of my refusal.    o  Refuse   5.   I authorize the use of any specimen, organs, tissues, body parts or foreign objects that may be removed from my body during the operation/procedure for diagnosis, research or teaching purposes and their subsequent disposal by hospital authorities.  I also authorize the release of specimen test results and/or written reports to my treating physician on the hospital medical staff or other referring or consulting physicians involved in my care, at the discretion of the Pathologist or my treating physician.    6.   I consent to the photographing or videotaping of the operations or procedures to be performed, including appropriate portions of my body for medical, scientific, or educational purposes, provided my identity is not revealed by the pictures or by descriptive texts accompanying them.  If the procedure has been photographed/videotaped, the surgeon will obtain the original picture, image, videotape or CD.  The hospital will not be responsible for storage, release or maintenance of the picture, image, tape or CD.    7.   I consent to the presence of a  or observers in the operating room as deemed necessary by my physician or their designees.    8.   I recognize that in the event my procedure results in extended X-Ray/fluoroscopy  time, I may develop a skin reaction.    9. If I have a Do Not Attempt Resuscitation (DNAR) order in place, that status will be suspended while in the operating room, procedural suite, and during the recovery period unless otherwise explicitly stated by me (or a person authorized to consent on my behalf). The surgeon or my attending physician will determine when the applicable recovery period ends for purposes of reinstating the DNAR order.  10. Patients having a sterilization procedure: I understand that if the procedure is successful the results will be permanent and it will therefore be impossible for me to inseminate, conceive, or bear children.  I also understand that the procedure is intended to result in sterility, although the result has not been guaranteed.   11. I acknowledge that my physician has explained sedation/analgesia administration to me including the risk and benefits I consent to the administration of sedation/analgesia as may be necessary or desirable in the judgment of my physician.    I CERTIFY THAT I HAVE READ AND FULLY UNDERSTAND THE ABOVE CONSENT TO OPERATION and/or OTHER PROCEDURE.     ____________________________________  _________________________________        ______________________________  Signature of Patient    Signature of Responsible Person                Printed Name of Responsible Person                                      ____________________________________  _____________________________                ________________________________  Signature of Witness        Date  Time         Relationship to Patient    STATEMENT OF PHYSICIAN My signature below affirms that prior to the time of the procedure; I have explained to the patient and/or his/her legal representative, the risks and benefits involved in the proposed treatment and any reasonable alternative to the proposed treatment. I have also explained the risks and benefits involved in refusal of the proposed treatment and  alternatives to the proposed treatment and have answered the patient's questions. If I have a significant financial interest in a co-management agreement or a significant financial interest in any product or implant, or other significant relationship used in this procedure/surgery, I have disclosed this and had a discussion with my patient.     _____________________________________________________              _____________________________  (Signature of Physician)                                                                                         (Date)                                   (Time)  Patient Name: Mercy RAIZA Nieves      : 3/28/1940      Printed: 4/3/2025     Medical Record #: [id number]                                      Page 1 of 1

## (undated) NOTE — Clinical Note
HiMercy's iron stores again are decreasing despite tid po iron. I am giving her venofer again. She had diarrhea yesterday and this am and has abd tenderness. I recommend capsule endoscopy to complete GI w/u. Thanks,AG

## (undated) NOTE — MR AVS SNAPSHOT
[facility]  [address]  [phone]               Thank you for choosing us for your health care visit with Herminio Landin MD.  We are glad to serve you and happy to provide you with this summa sugar control. Diagnosis and treatment discussed in detail with patient. Age-related nuclear cataract of both eyes  Discussed early cataracts with patient. No treatment recommended at this time.           Foreign body in cornea  Small plastic foreign b TRUE METRIX METER w/Device Kit   1 kit by Does not apply route 2 (two) times daily. Vitamin B Complex Tabs   Take 1 tablet by mouth once daily. Vitamin D 1000 units Tabs   Take by mouth.                    MyChart     Call the helpdesk You don’t need to join a gym. Home exercises work great.  Put more priority on exercise in your life                    Visit [facility] online at  [email or web address]

## (undated) NOTE — LETTER
[facility]   Administracion de Anestesia  Yo, Mercy Nieves, acepto ser atendido por un anestesiólogo, quien está especialmente capacitado para monitorearme y darme medicamento para hacerme dormir o mantenerme cómodo martha mi procedimiento.   Entiendo que mi anestesiólogo no es un empleado o agente de [facility]. Él o donald trabaja para [facility]  Ronny el paciente que solicita los servicios de anestesia, estoy de acuerdo con lo siguiente:  Permitir al anestesiólogo (médico de anestesia) que me suministre el medicamento y hacer los procedimientos adicionales que marine necesarios. Algunos ejemplos son: Al iniciar o utilizar karli “IV” para suministrarme medicamentos, fluidos o cee martha mi procedimiento, y colocarme karli sonda de respiración, me ayudará a respirar cuando esté dormido (intubación). En el tej de que mi corazón deje de funcionar correctamente, entiendo que mi anestesiólogo hará todo lo posible para salvar mi satish, a menos que los documentos de No resucitar de [facility] lo indiquen de otra manera.  Informar a mi anestesista antes del procedimiento:  Si estoy embarazada.  La última vez que comí o bebí algo.  Todos los medicamentos que kisha (incluyendo medicamentos recetados, suplementos herbales y pastillas que puedo comprar sin receta médica (incluyendo drogas en la roy [medicamentos ilegales]). No informar a mi anestesiólogo acerca de estos medicamentos puede aumentar mi riesgo de complicaciones con la anestesia.  Si soy alérgico a cualquier cosa o he tenido previamente karli reacción adversa a la anestesia.  Entiendo cómo la anestesia me ayudará (beneficios).  Entiendo que con cualquier tipo de anestesia hay riesgos:  Los riesgos más comunes son: náuseas, vómitos, dolor de garganta, dolor muscular, daño a los ojos, la boca o los dientes (por la colocación de la sonda de respiración).  Los riesgos poco comunes incluyen: recordar  lo que sucedió martha mi procedimiento, reacciones alérgicas a los medicamentos, lesiones en las vías respiratorias, el corazón, los pulmones, la visión, los nervios o músculos, y en casos sumamente raros, la muerte.  Mii médico me ha explicado otras opciones de atención disponibles para mí (alternativas).  Pacientes embarazadas (“epidural”):    Entiendo que los riesgos de recibir karli inyección epidural (medicamento que se aplica en la espalda para ayudar a controlar el dolor martha el parto), incluyen picazón, presión arterial baja, dificultad para orinar, dolor de jo ann o disminución en el ritmo del corazón del bebé. Los riesgos muy poco comunes incluyen infección, hemorragia, convulsiones, ritmo cardíaco irregular y lesión del nervio.  Anestesia regional (“columna vertebral”, “epidural” y “bloqueo de los nervios”):  Entiendo que hay complicaciones poco frecuentes emilee posibles, e incluyen dolor de jo ann, sangrado, infección, convulsiones, ritmo cardíaco irregular y la lesión del nervio.  .   Puedo cambiar de opinión acerca de recibir servicios de anestesia en cualquier momento antes de que se me administre el medicamento.         Paciente (o representante) Firma/Relación con el paciente  Fecha  Hora  Patient Signature/Signature of Responsible person Date Time           Nombre del intérprete (en armstrong tej)  Idioma/Organización  Hora  Name of  Language/Organization Time          Firma del anestesiólogo Fecha  Hora  Signature of anesthesiologist Date Time    He hablado del procedimiento y la información anterior con el paciente (o el representante del paciente) y respondí celeste preguntas. El paciente o armstrong representante ha aceptado recibir los servicios de anestesia.         Testigo Ulyssesha  Hora  Witness Date Time  He verificado que la firma es la del paciente o del representante del paciente, y que se firmó antes del procedimiento.    Nombre del paciente: Mercy Nieves Fec. de Nac.: 3/28/1940                  En letra de imprenta: February 6, 2025  Expediente médico No. [id number]                         Página 1 de 2  ----------ANESTHESIA CONSENT----------